# Patient Record
Sex: FEMALE | Race: WHITE | NOT HISPANIC OR LATINO | Employment: OTHER | URBAN - NONMETROPOLITAN AREA
[De-identification: names, ages, dates, MRNs, and addresses within clinical notes are randomized per-mention and may not be internally consistent; named-entity substitution may affect disease eponyms.]

---

## 2017-01-10 RX ORDER — TRAZODONE HYDROCHLORIDE 150 MG/1
TABLET ORAL
Qty: 30 TABLET | Refills: 5 | Status: SHIPPED | OUTPATIENT
Start: 2017-01-10 | End: 2017-04-12 | Stop reason: SDUPTHER

## 2017-01-11 RX ORDER — PRAVASTATIN SODIUM 80 MG/1
TABLET ORAL
Qty: 30 TABLET | Refills: 5 | Status: SHIPPED | OUTPATIENT
Start: 2017-01-11 | End: 2017-04-12 | Stop reason: SDUPTHER

## 2017-02-13 RX ORDER — LEVALBUTEROL TARTRATE 45 UG/1
1 AEROSOL, METERED ORAL EVERY 6 HOURS PRN
Qty: 15 G | Refills: 11 | Status: SHIPPED | OUTPATIENT
Start: 2017-02-13 | End: 2021-10-26

## 2017-02-20 ENCOUNTER — OFFICE VISIT (OUTPATIENT)
Dept: FAMILY MEDICINE CLINIC | Facility: CLINIC | Age: 62
End: 2017-02-20

## 2017-02-20 VITALS
DIASTOLIC BLOOD PRESSURE: 68 MMHG | SYSTOLIC BLOOD PRESSURE: 118 MMHG | HEART RATE: 93 BPM | HEIGHT: 62 IN | WEIGHT: 119 LBS | BODY MASS INDEX: 21.9 KG/M2 | OXYGEN SATURATION: 93 %

## 2017-02-20 DIAGNOSIS — I25.10 CAD IN NATIVE ARTERY: Chronic | ICD-10-CM

## 2017-02-20 DIAGNOSIS — Z13.9 SCREENING: ICD-10-CM

## 2017-02-20 DIAGNOSIS — E11.9 TYPE 2 DIABETES MELLITUS WITHOUT COMPLICATION, UNSPECIFIED LONG TERM INSULIN USE STATUS: ICD-10-CM

## 2017-02-20 DIAGNOSIS — M54.2 CERVICAL SPINE PAIN: Primary | ICD-10-CM

## 2017-02-20 DIAGNOSIS — J44.9 CHRONIC OBSTRUCTIVE PULMONARY DISEASE, UNSPECIFIED COPD TYPE (HCC): Chronic | ICD-10-CM

## 2017-02-20 DIAGNOSIS — M54.12 CERVICAL RADICULOPATHY: ICD-10-CM

## 2017-02-20 DIAGNOSIS — Z12.31 ENCOUNTER FOR SCREENING MAMMOGRAM FOR BREAST CANCER: ICD-10-CM

## 2017-02-20 PROCEDURE — 99214 OFFICE O/P EST MOD 30 MIN: CPT | Performed by: INTERNAL MEDICINE

## 2017-02-20 RX ORDER — PREDNISONE 5 MG/1
1 TABLET ORAL TAKE AS DIRECTED
Qty: 48 EACH | Refills: 0 | Status: SHIPPED | OUTPATIENT
Start: 2017-02-20 | End: 2017-04-12

## 2017-03-08 LAB
ALBUMIN SERPL-MCNC: 4 G/DL (ref 3.2–5.5)
ALBUMIN/GLOB SERPL: 1.3 G/DL (ref 1–3)
ALP SERPL-CCNC: 100 U/L (ref 15–121)
ALT SERPL W P-5'-P-CCNC: 27 U/L (ref 10–60)
ANION GAP SERPL CALCULATED.3IONS-SCNC: 6 MMOL/L (ref 5–15)
AST SERPL-CCNC: 22 U/L (ref 10–60)
BILIRUB SERPL-MCNC: 0.5 MG/DL (ref 0.2–1)
BUN BLD-MCNC: 14 MG/DL (ref 8–25)
BUN/CREAT SERPL: 17.5 (ref 7–25)
CALCIUM SPEC-SCNC: 9.7 MG/DL (ref 8.4–10.8)
CHLORIDE SERPL-SCNC: 101 MMOL/L (ref 100–112)
CHOLEST SERPL-MCNC: 166 MG/DL (ref 150–200)
CO2 SERPL-SCNC: 31 MMOL/L (ref 20–32)
CREAT BLD-MCNC: 0.8 MG/DL (ref 0.4–1.3)
GFR SERPL CREATININE-BSD FRML MDRD: 73 ML/MIN/1.73 (ref 45–104)
GLOBULIN UR ELPH-MCNC: 3 GM/DL (ref 2.5–4.6)
GLUCOSE BLD-MCNC: 122 MG/DL (ref 70–100)
HDLC SERPL-MCNC: 69 MG/DL (ref 35–100)
LDLC SERPL CALC-MCNC: 83 MG/DL
LDLC/HDLC SERPL: 1.2 {RATIO}
POTASSIUM BLD-SCNC: 4.3 MMOL/L (ref 3.4–5.4)
PROT SERPL-MCNC: 7 G/DL (ref 6.7–8.2)
SODIUM BLD-SCNC: 138 MMOL/L (ref 134–146)
TRIGL SERPL-MCNC: 70 MG/DL (ref 35–160)
VLDLC SERPL-MCNC: 14 MG/DL

## 2017-03-08 PROCEDURE — 36415 COLL VENOUS BLD VENIPUNCTURE: CPT | Performed by: INTERNAL MEDICINE

## 2017-03-08 PROCEDURE — 80053 COMPREHEN METABOLIC PANEL: CPT | Performed by: INTERNAL MEDICINE

## 2017-03-08 PROCEDURE — 84443 ASSAY THYROID STIM HORMONE: CPT | Performed by: INTERNAL MEDICINE

## 2017-03-08 PROCEDURE — 84436 ASSAY OF TOTAL THYROXINE: CPT | Performed by: INTERNAL MEDICINE

## 2017-03-08 PROCEDURE — 80061 LIPID PANEL: CPT | Performed by: INTERNAL MEDICINE

## 2017-03-09 LAB
T4 SERPL-MCNC: 7.76 MCG/DL (ref 5.5–11)
TSH SERPL DL<=0.05 MIU/L-ACNC: 0.48 MIU/ML (ref 0.46–4.68)

## 2017-03-10 ENCOUNTER — TELEPHONE (OUTPATIENT)
Dept: FAMILY MEDICINE CLINIC | Facility: CLINIC | Age: 62
End: 2017-03-10

## 2017-03-10 ENCOUNTER — TRANSCRIBE ORDERS (OUTPATIENT)
Dept: PHYSICAL THERAPY | Facility: CLINIC | Age: 62
End: 2017-03-10

## 2017-03-10 ENCOUNTER — OFFICE VISIT (OUTPATIENT)
Dept: FAMILY MEDICINE CLINIC | Facility: CLINIC | Age: 62
End: 2017-03-10

## 2017-03-10 VITALS
BODY MASS INDEX: 21.94 KG/M2 | DIASTOLIC BLOOD PRESSURE: 68 MMHG | WEIGHT: 119.2 LBS | OXYGEN SATURATION: 97 % | SYSTOLIC BLOOD PRESSURE: 128 MMHG | HEIGHT: 62 IN

## 2017-03-10 DIAGNOSIS — Z00.00 ROUTINE MEDICAL EXAM: Primary | ICD-10-CM

## 2017-03-10 DIAGNOSIS — R63.4 WEIGHT LOSS: ICD-10-CM

## 2017-03-10 DIAGNOSIS — M54.2 CERVICAL PAIN: Primary | ICD-10-CM

## 2017-03-10 DIAGNOSIS — Z13.9 SCREENING: ICD-10-CM

## 2017-03-10 DIAGNOSIS — R13.14 PHARYNGOESOPHAGEAL DYSPHAGIA: Primary | ICD-10-CM

## 2017-03-10 DIAGNOSIS — D64.9 ANEMIA, UNSPECIFIED TYPE: Chronic | ICD-10-CM

## 2017-03-10 DIAGNOSIS — I10 ESSENTIAL HYPERTENSION: Chronic | ICD-10-CM

## 2017-03-10 DIAGNOSIS — J43.9 PULMONARY EMPHYSEMA, UNSPECIFIED EMPHYSEMA TYPE (HCC): Chronic | ICD-10-CM

## 2017-03-10 DIAGNOSIS — R11.0 NAUSEA: Chronic | ICD-10-CM

## 2017-03-10 PROCEDURE — 99214 OFFICE O/P EST MOD 30 MIN: CPT | Performed by: INTERNAL MEDICINE

## 2017-03-10 NOTE — TELEPHONE ENCOUNTER
Spoke with pt about lab results and orders to have lab work done .  Colonoscopy was done 2 years ago.  Pt voiced understanding and lab orders placed

## 2017-03-10 NOTE — TELEPHONE ENCOUNTER
----- Message from Manjinder Joshi MD sent at 3/9/2017  1:50 PM CST -----  Anemia weiss. colonscopy if due. Cbc 2 weeks.

## 2017-03-10 NOTE — PROGRESS NOTES
Subjective   Maira Yepez is a 61 y.o. female.   Chief Complaint   Patient presents with   • Follow-up     review lab work       History of Present Illness pt fu. Co wt loss over past 4 mths. Food sticks in throat. Even lipids. Weak. Sees gi. Fu labs.     The following portions of the patient's history were reviewed and updated as appropriate: allergies, current medications, past family history, past medical history, past social history, past surgical history and problem list.    Review of Systems   Constitutional: Positive for unexpected weight change. Negative for activity change, appetite change and fatigue.   HENT: Negative for ear pain, facial swelling and hearing loss.    Respiratory: Positive for shortness of breath and wheezing. Negative for cough and chest tightness.    Cardiovascular: Negative for chest pain, palpitations and leg swelling.   Gastrointestinal: Positive for diarrhea and nausea. Negative for abdominal pain.   Genitourinary: Negative for difficulty urinating, dysuria, flank pain, frequency and urgency.   Musculoskeletal: Negative for arthralgias and back pain.   Skin: Negative for color change and rash.   Allergic/Immunologic: Negative for environmental allergies and food allergies.   Neurological: Negative for dizziness, syncope, weakness, numbness and headaches.   Hematological: Negative for adenopathy.   Psychiatric/Behavioral: Negative for confusion, decreased concentration, dysphoric mood, sleep disturbance and suicidal ideas. The patient is not nervous/anxious.    All other systems reviewed and are negative.      Objective   Physical Exam   Constitutional: She is oriented to person, place, and time. Vital signs are normal.   HENT:   Head: Normocephalic.   Right Ear: External ear normal.   Left Ear: External ear normal.   Nose: Nose normal.   Mouth/Throat: Oropharynx is clear and moist.   Eyes: Conjunctivae and EOM are normal. Pupils are equal, round, and reactive to light.    Neck: Normal range of motion. Neck supple. No JVD present. No thyromegaly present.   Cardiovascular: Normal rate, regular rhythm, normal heart sounds and intact distal pulses.    No murmur heard.  Pulmonary/Chest: Effort normal and breath sounds normal. No respiratory distress. She has no wheezes. She has no rales. She exhibits no tenderness.   Abdominal: Soft. Bowel sounds are normal. She exhibits no distension and no mass. There is no tenderness. There is no rebound and no guarding. No hernia.   Musculoskeletal: Normal range of motion. She exhibits no edema, tenderness or deformity.   Lymphadenopathy:     She has no cervical adenopathy.   Neurological: She is alert and oriented to person, place, and time. No cranial nerve deficit. Coordination normal.   Skin: Skin is warm and dry. No rash noted. No pallor.   Psychiatric: She has a normal mood and affect. Her behavior is normal. Judgment and thought content normal. Her mood appears not anxious. She does not exhibit a depressed mood. She expresses no homicidal and no suicidal ideation.   Nursing note and vitals reviewed.      Assessment/Plan   Maria was seen today for follow-up.    Diagnoses and all orders for this visit:    Pharyngoesophageal dysphagia    Screening  -     Hemoglobin A1c; Future  -     Protein, Urine, Random; Future    Pulmonary emphysema, unspecified emphysema type    Essential hypertension    Weight loss  Comments:  acute    Nausea      Decrease metformin to 500mg bid. Protocool. See gi within 2 weeks. Needs ct scan, egd,     i reviewed labs. Anemia weiss.     rtc after gi or within 1 mth.

## 2017-03-13 ENCOUNTER — CONSULT (OUTPATIENT)
Dept: PHYSICAL THERAPY | Facility: CLINIC | Age: 62
End: 2017-03-13

## 2017-03-13 DIAGNOSIS — M54.2 CERVICAL PAIN: ICD-10-CM

## 2017-03-13 PROCEDURE — 97162 PT EVAL MOD COMPLEX 30 MIN: CPT | Performed by: PHYSICAL THERAPIST

## 2017-03-13 NOTE — PROGRESS NOTES
"     Outpatient Physical Therapy Ortho Initial Evaluation       Patient Name: Maria Yepez  : 1955  MRN: [id number]  Today's Date: 3/13/2017      Visit Date: 2017    TIME IN 10:55    TIME OUT 11:26   Not yet approved by insurance.  No M.D. follow-up is set  Patient Active Problem List   Diagnosis   • Right foot pain   • Plantar fasciitis of right foot        Past Medical History   Diagnosis Date   • Abdominal pain    • Acute bronchitis    • Acute pancreatitis    • Acute posthemorrhagic anemia    • Acute sinusitis    • Anal pain    • Anemia    • Anemia due to blood loss    • Anxiety    • Backache      mid and lower back     • Chronic back pain    • Chronic obstructive lung disease    • Coronary atherosclerosis    • Diabetic neuropathy    • Diarrhea    • Diverticulosis of colon without diverticulitis    • Dysphagia    • Dysuria    • Emphysema/COPD      \"Emphysema\"   • Encounter for immunization    • Epigastric pain    • Esophagitis      with stricture   • Essential hypertension    • Fatigue    • Foot pain    • Ganglion cyst of wrist      left wrist     • Gastroesophageal reflux disease    • Generalized abdominal pain    • Gout    • Headache    • Herpes simplex    • Hip pain, bilateral    • Hoarse    • Hyperlipidemia    • Incontinence of feces    • Insomnia    • Irritable bowel syndrome    • Joint pain    • Knee pain    • Left lower quadrant pain    • Left upper quadrant pain    • Leg pain    • Leukorrhea      not specified as infective    • Low back pain      injury   • Malignant neoplasm of head and neck      History of malignant neoplasm of head and/or neck      • Muscle pain    • Nausea    • Nausea    • Nausea and vomiting    • Neck pain    • Neck pain    • Noncompliance with medication regimen    • Osteopenia    • Pain in lower back    • Pain in right femur    • Palpitations    • Productive cough    • Scoliosis deformity of spine    • Screening examination for venereal disease    • Shoulder " pain, left    • Stricture of esophagus    • Symptomatic menopausal or female climacteric states    • Synovitis and tenosynovitis      left forearm   • Tenderness      Tenderness of left upper quadrant of abdomen      • Thoracic back pain    • Type 2 diabetes mellitus    • Upper abdominal pain    • Urgency of urination      Urgent desire to urinate      • Urinary frequency      due to benign prostatic hypertrophy      • Urinary tract infection      site not specified   • Urinary tract infectious disease    • Venous varices    • Weakness      General symptom - weakness    • Weight gain         Past Surgical History   Procedure Laterality Date   • Hysterectomy       Anesth, hysterectomy (1)   • Fracture surgery Right      Arm Surgery (1)  right, arm fracture   • Lymph node biopsy       Biopsy/removal, lymph node(s) (1)  cervical node biopsy   • Cardiac catheterization  2014     Cardiac cath 17700 (1)  Non obstructive coronary artery disease. Normal left ventricular systolic function. Performed at [facility].   • Carpal tunnel release     •  section     • Cholecystectomy       laparoscopic   • Endoscopy  10/24/2012     Colon endoscopy 91645 (2)  internal & external hemorrhoids found. Diverticulum in sigmoid colon.   • Colonoscopy w/ polypectomy  2015     Colonoscopy remove polyps 61506 (1)  Internal and external hemorrhoids found. Colonoscopy with biopsy.   • Injection of medication  2016     Depo Medrol (Methylprednisone) 80mg (2)  Ordered By: BRIAN LOU ([facility])    • Endoscopy  2014     EGD w/ Dilatation 83649 (1)  Esophageal stricture was present. Dilatation performed. Gastritis found in the stomach. Biopsy taken. Normal duodenum.   • Endoscopy  2013     EGD w/ tube 38697 (3)  Normal esophagus. Gastritis in stomach. Biopsy taken. Normal duodenum. Biopsy taken.   • Hernia repair       Hernia repair w/mesh (1)   • Injection of medication  03/10/2016      Kenalog (4)  Ordered By: ARNOLDO MURDOCK ([facility])    • Paraesophageal hernia repair     • Injection of medication  2016     Rocephin (1)  Ordered By: ARNOLDO MURDOCK ([facility])    • Breast biopsy       Stereotactic breast biopsy (1)  left   • Total abdominal hysterectomy       RUPALI BSO   • Other surgical history       Tubal ligation (1)     Outpatient Medications     amLODIPine (NORVASC) 2.5 MG tablet      aspirin 81 MG chewable tablet      clonazePAM (KLONOPIN) 0.5 MG tablet      famciclovir (FAMVIR) 500 MG tablet      furosemide (LASIX) 20 MG tablet      gabapentin (NEURONTIN) 400 MG capsule      levalbuterol (XOPENEX HFA) 45 MCG/ACT inhaler      lisinopril (PRINIVIL,ZESTRIL) 5 MG tablet      metFORMIN (GLUCOPHAGE) 500 MG tablet      nitroglycerin (NITROSTAT) 0.4 MG SL tablet     omeprazole (PRILOSEC) 20 MG capsule      ondansetron ODT (ZOFRAN ODT) 4 MG disintegrating tablet      pravastatin (PRAVACHOL) 80 MG tablet      PredniSONE 5 MG (48) tablet therapy pack dosepak      traMADol (ULTRAM) 50 MG tablet      traZODone (DESYREL) 150 MG tablet      vitamin D (ERGOCALCIFEROL) 42486 UNITS capsule capsule      ALLERGIES:  Albuterol, Ibuprofen, shellfish derived products.  Visit Dx:     ICD-10-CM ICD-9-CM   1. Cervical pain M54.2 723.1       Subjective Evaluation    History of Present Illness  Mechanism of injury: The patient reports that she has had cervical problems for a long time.  She had anterior cervical fusion in  by Dr. Cheney.  She is unsure of the levels that were repaired.  She is currently having increased neck pain and headaches bilateral upper arm pain and cramps.  She is raising a 10-year-old grandchild with ADHD.  Doesn't tolerate much lifting or pulling on heavy objects    Quality of life: good    Pain  Current pain ratin  Location: Neck and upper arms  Quality: dull ache, radiating and cramping  Relieving factors: change in position, rest, ice and medications  Aggravating factors: movement  (Driving)    Social Support  Lives in: one-story house  Lives with: young children    Hand dominance: right    Diagnostic Tests  X-ray: abnormal  MRI studies: abnormal    Patient Goals  Patient goals for therapy: decreased pain  Patient goal: 1.  Cervical flexion ROM 50°  2.  Cervical extension ROM 40°  3.  Cervical side bend 30 right, 25 left  Degrees  4.  Cervical rotation  65  Degrees.    LT. Independent in HEP.  2. Decrease NDI to 40%.  3.  strength increases 5 #.  4.  Lateral pinch increased 2 #.  5. MMT shoulder flexion 4+/5 without pain      OBJECTIVE:     Cervical range of motion 35° extension, 44° flexion, 60° rotation bilaterally, right side bend 25, left side bend 17.  Tenderness to palpation left cervical paraspinals, spinous processes C7 to T1.  Left rotations of the upper vertebral spinous processes.  Manual muscle test upper extremities biceps 5 minus right, 4+ left shoulder flexion 4 with giveaway pain bilaterally.   on hand-held dynamometer position 2 right 34, left 28.  Lateral pinch 11 pounds bilaterally.   Patient denies  numbness and tingling.  Reflexes intact and symmetrical, biceps, triceps and brachial radialis.       TREATMENT: Deferred for insurance authorization.      Assessment & Plan       Goals  1.  Cervical flexion ROM 50°  2.  Cervical extension ROM 40°  3.  Cervical side bend 30 right, 25 left  Degrees  4.  Cervical rotation  65  Degrees.    LT. Independent in HEP.  2. Decrease NDI to 40%.  3.  strength increases 5 #.  4.  Lateral pinch increased 2 #.  5. MMT shoulder flexion 4+/5 without pain    Plan  Therapy options: will be seen for skilled physical therapy services  Planned modality interventions: cryotherapy and electrical stimulation/Russian stimulation  Planned therapy interventions: home exercise program, strengthening, stretching and manual therapy  Frequency: 2x week  Duration in weeks: 5  Treatment plan discussed with: patient  Plan details:  Cervical ROM with postural/scapular stabilization exercises. Light Manual distraction, ME, upper PIVM and ROM/stretching.  Ice /electrical stimulation. HEP        Time Calculation: 31      Rebekah Alvarado, PT, ATC, DPT  3/13/2017        Maria Yepez    1955

## 2017-03-14 RX ORDER — LISINOPRIL 5 MG/1
TABLET ORAL
Qty: 30 TABLET | Refills: 5 | Status: SHIPPED | OUTPATIENT
Start: 2017-03-14 | End: 2017-04-12 | Stop reason: SDUPTHER

## 2017-03-20 ENCOUNTER — OFFICE VISIT (OUTPATIENT)
Dept: PODIATRY | Facility: CLINIC | Age: 62
End: 2017-03-20

## 2017-03-20 VITALS — HEIGHT: 62 IN | BODY MASS INDEX: 21.53 KG/M2 | WEIGHT: 117 LBS

## 2017-03-20 DIAGNOSIS — M76.71 PERONEAL TENDINITIS, RIGHT: ICD-10-CM

## 2017-03-20 DIAGNOSIS — E11.42 DIABETIC POLYNEUROPATHY ASSOCIATED WITH TYPE 2 DIABETES MELLITUS (HCC): Primary | ICD-10-CM

## 2017-03-20 DIAGNOSIS — M79.671 RIGHT FOOT PAIN: ICD-10-CM

## 2017-03-20 PROCEDURE — 99203 OFFICE O/P NEW LOW 30 MIN: CPT | Performed by: PODIATRIST

## 2017-03-21 ENCOUNTER — TREATMENT (OUTPATIENT)
Dept: PHYSICAL THERAPY | Facility: CLINIC | Age: 62
End: 2017-03-21

## 2017-03-21 ENCOUNTER — LAB (OUTPATIENT)
Dept: LAB | Facility: OTHER | Age: 62
End: 2017-03-21

## 2017-03-21 DIAGNOSIS — Z13.9 SCREENING: ICD-10-CM

## 2017-03-21 DIAGNOSIS — M54.2 CERVICAL PAIN: Primary | ICD-10-CM

## 2017-03-21 DIAGNOSIS — D64.9 ANEMIA, UNSPECIFIED TYPE: Chronic | ICD-10-CM

## 2017-03-21 LAB
COLLECT DATE SP2 STL: NORMAL
COLLECT DATE SP3 STL: NORMAL
COLLECT DATE STL: NORMAL
GASTROCULT GAST QL: NEGATIVE
Lab: NORMAL

## 2017-03-21 PROCEDURE — 82272 OCCULT BLD FECES 1-3 TESTS: CPT | Performed by: INTERNAL MEDICINE

## 2017-03-21 NOTE — PROGRESS NOTES
"Daily Progress Note    Time In: 12:30      Time Out: 1:31    ICD-10-CM ICD-9-CM   1. Cervical pain M54.2 723.1       Subjective   Pt reports no pain presently. She has pain with increased activity.   Patient reports to therapy 0/10 pain, and % improvement.  Attendance  2/2 visits. No scheduled MD f/u.       Objective     V cues necessary for correct TE performance. TTP @ L upper cerv. Mild guarding observed at UT's. HEP reviewed with handouts.       PROCEDURES AND MODALITIES:   Ice  Ice Applied: Yes  Location: Cerv  Rx Minutes: 15 mins  Ice S/P Rx: Yes    Electrical Stimulation  Stimulation Type: IFC  Max mAmp: 8  Location/Electrode Placement/Other: Cerv       EXERCISE  Exercise 1  Exercise Name 1: Man cerv: light distraction, L upper cerv p-a's  Sets/Reps 1: 8 1/2 mn  Exercise 1 Completed: Yes  Exercise 2  Exercise Name 2: Supine CT  Sets/Reps 2: 2/10x  Exercise 2 Completed: Yes Exercise 3  Exercise Name 3: Supine cerv retraction  Sets/Reps 3: 2/10x  Exercise 3 Completed: Yes  Exercise 3 Home Program: Yes Exercise 4  Exercise Name 4: B UT stretch  Sets/Reps 4: 2/30\"  Exercise 4 Completed: Yes  Exercise 4 Home Program: Yes Exercise 5  Exercise Name 5: Shld rolls  A/P  Sets/Reps 5: 10x ea  Exercise 5 Completed: Yes Exercise 6  Exercise Name 6: CROM  Sets/Reps 6: 10x ea  Exercise 6 Completed: Yes  Exercise 6 Home Program: Yes   Exercise 7  Exercise Name 7: Doorway pec stretch  Sets/Reps 7: 2/30\"  Exercise 7 Completed: Yes  Exercise 7 Home Program: Yes Exercise 8  Exercise Name 8: Wand flex  Sets/Reps 8: 2/10x  Exercise 8 Completed: Yes Exercise 9  Exercise Name 9: Scap squeeze  Sets/Reps 9: 2/10x  Exercise 9 Completed: Yes  Exercise 9 Home Program: Yes                                     Therapy Exercise [id number] 46 minutes and Other Procedure CPT 15 minutes Electrical Stimulation    Total Treatment Time: 61 Minutes    Assessment/Plan   Good tolerance of today's treatment. Added HEP should help improve condition. No " increased pain post treatment.   Progress per Plan of Care             Harshad Ko, PTA  Physical Therapist

## 2017-03-23 ENCOUNTER — TREATMENT (OUTPATIENT)
Dept: PHYSICAL THERAPY | Facility: CLINIC | Age: 62
End: 2017-03-23

## 2017-03-23 DIAGNOSIS — M54.2 CERVICAL PAIN: Primary | ICD-10-CM

## 2017-03-23 NOTE — PROGRESS NOTES
"Daily Progress Note    Time In: 10:18      Time Out: 11:16    ICD-10-CM ICD-9-CM   1. Cervical pain M54.2 723.1       Subjective   Pt reports feeling better after prev treatment. Then she did too much house cleaning and aggravated symptoms. She does not like CP secondary to cold tolerance.   Patient reports to therapy 5/10 pain, and % improvement.  Attendance  3/3 visits.       Objective     V cues necessary for correct TE performance. Intermittent c/o discomfort with TE. Pain decreased post treatment.       PROCEDURES AND MODALITIES:       Electrical Stimulation  Stimulation Type: IFC  Max mAmp: 8  Location/Electrode Placement/Other: Cerv  Rx Minutes: 15 mins       EXERCISE  Exercise 1  Exercise Name 1: Man cerv: light distraction  Sets/Reps 1: 6 min  Exercise 1 Completed: Yes  Exercise 2  Exercise Name 2: Supine CT  Sets/Reps 2: 2/10x  Exercise 2 Completed: Yes Exercise 3  Exercise Name 3: Supine cerv retraction  Sets/Reps 3: 2/10x  Exercise 3 Completed: Yes Exercise 4  Exercise Name 4: Supine DNF training: squares, circles, triangles  Sets/Reps 4: 2/10x ea  Exercise 4 Completed: Yes Exercise 5  Exercise Name 5: B UT stretch  Sets/Reps 5: 2/30\"  Exercise 5 Completed: Yes Exercise 6  Exercise Name 6: CROM  Sets/Reps 6: 10x ea  Exercise 6 Completed: Yes   Exercise 7  Exercise Name 7: Doorway pec stretch  Sets/Reps 7: 2/30\"  Exercise 7 Completed: Yes Exercise 8  Exercise Name 8: Wand flex  Equipment/Resistance 8: 2#  Sets/Reps 8: 2/10x  Exercise 8 Completed: Yes Exercise 9  Exercise Name 9: Scap squeeze  Sets/Reps 9: 20x  Exercise 9 Completed: Yes                                     Therapy Exercise [id number] 43 minutes and Other Procedure CPT 15 minutes Electrical Stimulation    Total Treatment Time: 58 Minutes    Assessment/Plan   Fair demetra of today's TE. Able to effectively manage pain with modality. Tolerance to ADL's needs increasing.   Progress per Plan of Care             Harshad Ko, PTA  Physical " Therapist

## 2017-03-24 RX ORDER — OMEPRAZOLE 20 MG/1
20 CAPSULE, DELAYED RELEASE ORAL 2 TIMES DAILY
Qty: 60 CAPSULE | Refills: 5 | Status: SHIPPED | OUTPATIENT
Start: 2017-03-24 | End: 2017-09-21 | Stop reason: SDUPTHER

## 2017-03-27 DIAGNOSIS — Z13.9 SCREENING: Primary | ICD-10-CM

## 2017-03-27 NOTE — TELEPHONE ENCOUNTER
Patient came by office window this morning wanting to speak with nurse.  Spoke with patient and she is concerned because she can not get into [facility] office til May and stated she can not wait that long.  Explained to her if she is having pain and not eating like she stated she needed to go to ER.  She stated that she had grandchildren and she could not do that she had to be there for them when they get off the bus.  I spoke with Dr. Joshi about this situation and he wants her referred to Dr. Morris for upper and lower GI that she could get into him quicker.  Called patient back with this info and she is now referred to see Dr. Morris.

## 2017-04-04 DIAGNOSIS — Z12.11 SCREEN FOR COLON CANCER: Primary | ICD-10-CM

## 2017-04-04 RX ORDER — DEXTROSE AND SODIUM CHLORIDE 5; .45 G/100ML; G/100ML
100 INJECTION, SOLUTION INTRAVENOUS CONTINUOUS
Status: CANCELLED | OUTPATIENT
Start: 2017-04-19

## 2017-04-07 ENCOUNTER — TREATMENT (OUTPATIENT)
Dept: PHYSICAL THERAPY | Facility: CLINIC | Age: 62
End: 2017-04-07

## 2017-04-07 DIAGNOSIS — M54.2 CERVICAL PAIN: Primary | ICD-10-CM

## 2017-04-07 PROCEDURE — 97110 THERAPEUTIC EXERCISES: CPT | Performed by: PHYSICAL THERAPIST

## 2017-04-07 PROCEDURE — 97014 ELECTRIC STIMULATION THERAPY: CPT | Performed by: PHYSICAL THERAPIST

## 2017-04-07 NOTE — PROGRESS NOTES
"Recertification    Diagnosis/ICD-10 Code:  Cervical pain [M54.2]  Referring practitioner: Manjinder Joshi MD  Date of Initial Visit: 4/7/2017  Patient seen for 4/6 sessions, 6 approved.   840-  9:42    Subjective:   Maria Yepez states:   Left arm has been hurting and burning.  Objective:   Current condition: Fair  Test measurement: Cervical flexion 58, extension 32, rotation 57,55 :sidebend 20, 23.  Shoulder flexion 5/5.  strength right 49, left 35.     Assessment:   Summary of Treatment:     Electrical Stimulation  Stimulation Type: IFC  Max mAmp: 8  Location/Electrode Placement/Other: Cerv     EXERCISE 39  Exercise 1  Exercise Name 1: Man cerv: light distraction  Sets/Reps 1:  7min  Exercise 1 Completed: Yes  Exercise 2  Exercise Name 2: Cervical ROM  Sets/Reps 2: 10 x each Exercise 3  Exercise Name 3: scap squeezes  Sets/Reps 3: 20x  Exercise 3 Completed: Yes Exercise 4  Exercise Name 4: UT strectch  Sets/Reps 4: 2 x 30\"  Exercise 4 Completed: Yes  Exercise 4 Home Program: Yes Exercise 5  Exercise Name 5: Door way stretches  Sets/Reps 5: 2 x30\"  Exercise 5 Completed: Yes  Exercise 5 Home Program: Yes Exercise 6  Exercise Name 6: Putty   Equipment/Resistance 6: red  Sets/Reps 6: 4 min   Exercise 7  Exercise Name 7: wand flexion  Equipment/Resistance 7: 1#  Sets/Reps 7: 25                                         Progress toward previous goals: Continue STG/LTG goal 1,3,LTG 5 met.        Plan:   Goals  Short-term goals (STG):  continue eval goals  Long-term goals (LTG):  continue eval goals    Timeframe: 2 weeks  Prognosis to achieve goals: fair    Plan  Treatment plan with rationale: The patient is to  be seen 2 time(s) per week, for 2 week(s) to progress toward short and long term goals.  Recommendations: Initiate/continue PT services    PT Signature: Rebekah Alvarado, PT, ATC, DPT      Based upon review of the patient's progress and continued therapy plan, it is my medical opinion that Maria Yepez " should continue physical therapy treatment at [facility]  [address] [place] [address] Dr Katerina RUIZ [id number] [phone].    Signature:  Manjinder Joshi MD

## 2017-04-10 ENCOUNTER — LAB (OUTPATIENT)
Dept: LAB | Facility: OTHER | Age: 62
End: 2017-04-10

## 2017-04-10 ENCOUNTER — TELEPHONE (OUTPATIENT)
Dept: FAMILY MEDICINE CLINIC | Facility: CLINIC | Age: 62
End: 2017-04-10

## 2017-04-10 ENCOUNTER — TREATMENT (OUTPATIENT)
Dept: PHYSICAL THERAPY | Facility: CLINIC | Age: 62
End: 2017-04-10

## 2017-04-10 DIAGNOSIS — D64.9 ANEMIA, UNSPECIFIED TYPE: Chronic | ICD-10-CM

## 2017-04-10 DIAGNOSIS — Z13.9 SCREENING: Primary | ICD-10-CM

## 2017-04-10 DIAGNOSIS — Z00.00 ROUTINE MEDICAL EXAM: ICD-10-CM

## 2017-04-10 DIAGNOSIS — M54.2 CERVICAL PAIN: Primary | ICD-10-CM

## 2017-04-10 DIAGNOSIS — Z13.9 SCREENING: ICD-10-CM

## 2017-04-10 LAB
ALBUMIN SERPL-MCNC: 4.2 G/DL (ref 3.2–5.5)
ANION GAP SERPL CALCULATED.3IONS-SCNC: 6 MMOL/L (ref 5–15)
BASOPHILS # BLD AUTO: 0.01 10*3/MM3 (ref 0–0.2)
BASOPHILS NFR BLD AUTO: 0.2 % (ref 0–2)
BUN BLD-MCNC: 14 MG/DL (ref 8–25)
BUN/CREAT SERPL: 23.3 (ref 7–25)
CALCIUM SPEC-SCNC: 9.6 MG/DL (ref 8.4–10.8)
CHLORIDE SERPL-SCNC: 99 MMOL/L (ref 100–112)
CO2 SERPL-SCNC: 32 MMOL/L (ref 20–32)
CREAT BLD-MCNC: 0.6 MG/DL (ref 0.4–1.3)
DEPRECATED RDW RBC AUTO: 40.3 FL (ref 36.4–46.3)
EOSINOPHIL # BLD AUTO: 0.27 10*3/MM3 (ref 0–0.7)
EOSINOPHIL NFR BLD AUTO: 6 % (ref 0–7)
ERYTHROCYTE [DISTWIDTH] IN BLOOD BY AUTOMATED COUNT: 12.8 % (ref 11.5–14.5)
GFR SERPL CREATININE-BSD FRML MDRD: 102 ML/MIN/1.73 (ref 45–104)
GLUCOSE BLD-MCNC: 102 MG/DL (ref 70–100)
HCT VFR BLD AUTO: 33.5 % (ref 35–45)
HGB BLD-MCNC: 10.6 G/DL (ref 12–15.5)
LYMPHOCYTES # BLD AUTO: 1.84 10*3/MM3 (ref 0.6–4.2)
LYMPHOCYTES NFR BLD AUTO: 40.6 % (ref 10–50)
MCH RBC QN AUTO: 28.1 PG (ref 26.5–34)
MCHC RBC AUTO-ENTMCNC: 31.6 G/DL (ref 31.4–36)
MCV RBC AUTO: 88.9 FL (ref 80–98)
MONOCYTES # BLD AUTO: 0.39 10*3/MM3 (ref 0–0.9)
MONOCYTES NFR BLD AUTO: 8.6 % (ref 0–12)
NEUTROPHILS # BLD AUTO: 2.02 10*3/MM3 (ref 2–8.6)
NEUTROPHILS NFR BLD AUTO: 44.6 % (ref 37–80)
PLATELET # BLD AUTO: 297 10*3/MM3 (ref 150–450)
PMV BLD AUTO: 9.4 FL (ref 8–12)
POTASSIUM BLD-SCNC: 4.5 MMOL/L (ref 3.4–5.4)
RBC # BLD AUTO: 3.77 10*6/MM3 (ref 3.77–5.16)
SODIUM BLD-SCNC: 137 MMOL/L (ref 134–146)
WBC NRBC COR # BLD: 4.53 10*3/MM3 (ref 3.2–9.8)

## 2017-04-10 PROCEDURE — 36415 COLL VENOUS BLD VENIPUNCTURE: CPT | Performed by: INTERNAL MEDICINE

## 2017-04-10 PROCEDURE — 82607 VITAMIN B-12: CPT | Performed by: INTERNAL MEDICINE

## 2017-04-10 PROCEDURE — 97014 ELECTRIC STIMULATION THERAPY: CPT | Performed by: PHYSICAL THERAPIST

## 2017-04-10 PROCEDURE — 84156 ASSAY OF PROTEIN URINE: CPT | Performed by: INTERNAL MEDICINE

## 2017-04-10 PROCEDURE — 82040 ASSAY OF SERUM ALBUMIN: CPT | Performed by: INTERNAL MEDICINE

## 2017-04-10 PROCEDURE — 83036 HEMOGLOBIN GLYCOSYLATED A1C: CPT | Performed by: INTERNAL MEDICINE

## 2017-04-10 PROCEDURE — 82728 ASSAY OF FERRITIN: CPT | Performed by: INTERNAL MEDICINE

## 2017-04-10 PROCEDURE — 97110 THERAPEUTIC EXERCISES: CPT | Performed by: PHYSICAL THERAPIST

## 2017-04-10 PROCEDURE — 85025 COMPLETE CBC W/AUTO DIFF WBC: CPT | Performed by: INTERNAL MEDICINE

## 2017-04-10 PROCEDURE — 80048 BASIC METABOLIC PNL TOTAL CA: CPT | Performed by: INTERNAL MEDICINE

## 2017-04-10 NOTE — PROGRESS NOTES
"Daily Progress Note    Time In: 10:15      Time Out: 11:07    ICD-10-CM ICD-9-CM   1. Cervical pain M54.2 723.1       Subjective   Continued R cerv complaints. She reports some overall improvement.   Patient reports to therapy 1-2/10 pain, and 50% improvement.  Attendance  5/7 visits. 6 approved visits.       Objective     V cues necessary for correct TE performance. Reported increased rot post SNAGS. Post treatment pain 0/10.     PROCEDURES AND MODALITIES:     Electrical Stimulation  Stimulation Type: IFC  Max mAmp: 8  Location/Electrode Placement/Other: Cerv  Rx Minutes: 15 mins       EXERCISE  Exercise 1  Exercise Name 1: Man cerv: light distraction  Time: 6 min  Exercise 1 Completed: Yes  Exercise 2  Exercise Name 2: Supine CT  Sets/Reps 2: 2/10x  Exercise 2 Completed: Yes Exercise 3  Exercise Name 3: Supine cerv retractions  Sets/Reps 3: 2/10x  Exercise 3 Completed: Yes Exercise 4  Exercise Name 4: L UT stretch  Sets/Reps 4: 2/30\"  Exercise 4 Completed: Yes Exercise 5  Exercise Name 5: L LS stretch  Sets/Reps 5: 2/30\"  Exercise 5 Completed: Yes Exercise 6  Exercise Name 6: Doorway pec stretch  Sets/Reps 6: 2/30\"  Exercise 6 Completed: Yes   Exercise 7  Exercise Name 7: Rows  Equipment/Resistance 7: red tb  Sets/Reps 7: 30x  Exercise 7 Completed: Yes Exercise 8  Exercise Name 8: B shld ext  Equipment/Resistance 8: yellow tb  Sets/Reps 8: 30x  Exercise 8 Completed: Yes Exercise 9  Exercise Name 9: SNAGS  L rot  Sets/Reps 9: 10x, 15x  Exercise 9 Completed: Yes                                       Therapy Exercise [id number] 37 minutes and Other Procedure CPT 15 minutes Elkectrical Stimulation    Total Treatment Time: 52 Minutes    Assessment/Plan   Good sub reports of % improvement. Pt continues to benefit from PT for further progression towards goals.    Progress per Plan of Care             Harshad Ko, PTA  Physical Therapist    "

## 2017-04-11 LAB
FERRITIN SERPL-MCNC: 18.7 NG/ML (ref 11.1–264)
HBA1C MFR BLD: 5.84 % (ref 4–5.6)
PROT UR-MCNC: 7.3 MG/DL
VIT B12 BLD-MCNC: 552 PG/ML (ref 239–931)

## 2017-04-12 ENCOUNTER — TREATMENT (OUTPATIENT)
Dept: PHYSICAL THERAPY | Facility: CLINIC | Age: 62
End: 2017-04-12

## 2017-04-12 DIAGNOSIS — M54.2 CERVICAL PAIN: Primary | ICD-10-CM

## 2017-04-12 PROCEDURE — 97110 THERAPEUTIC EXERCISES: CPT | Performed by: PHYSICAL THERAPIST

## 2017-04-12 RX ORDER — PRAVASTATIN SODIUM 80 MG/1
80 TABLET ORAL DAILY
COMMUNITY
End: 2017-08-30 | Stop reason: SDUPTHER

## 2017-04-12 RX ORDER — LISINOPRIL 5 MG/1
5 TABLET ORAL DAILY
COMMUNITY
End: 2017-04-25 | Stop reason: SDUPTHER

## 2017-04-12 RX ORDER — TRAZODONE HYDROCHLORIDE 150 MG/1
150 TABLET ORAL NIGHTLY
COMMUNITY
End: 2017-08-14 | Stop reason: SDUPTHER

## 2017-04-12 RX ORDER — GABAPENTIN 400 MG/1
CAPSULE ORAL
Qty: 30 CAPSULE | Refills: 5 | Status: SHIPPED | OUTPATIENT
Start: 2017-04-12 | End: 2017-05-16 | Stop reason: SDUPTHER

## 2017-04-12 NOTE — PROGRESS NOTES
"Daily Progress Note    Time In: 10:15      Time Out: 11:21    ICD-10-CM ICD-9-CM   1. Cervical pain M54.2 723.1       Subjective   Pt reports mild L cerv discomfort.   Patient reports to therapy 2/10 pain, and 60% improvement.  Attendance  6/7 visits.       Objective     V cues necessary for correct TE performance.  B 53#. Lat pinch B 10#.     AROM:    CROM: flex 40°, ext 51°, SB R 20°, L 21°, Rot R 65°, L 60°.         PROCEDURES AND MODALITIES:     Electrical Stimulation  Stimulation Type: IFC  Max mAmp: 11  Location/Electrode Placement/Other: Cerv  Rx Minutes: 10 mins       EXERCISE  Exercise 1  Exercise Name 1: PRO2  UE's  Equipment/Resistance 1: 3.0  Sets/Reps 1: 5min  Exercise 1 Completed: Yes  Exercise 2  Exercise Name 2: Supine towel roll stretch  Sets/Reps 2: 3min  Exercise 2 Completed: Yes Exercise 3  Exercise Name 3: L UT stretch  Sets/Reps 3: 2/30\"  Exercise 3 Completed: Yes Exercise 4  Exercise Name 4: L LS stretch  Sets/Reps 4: 2/30\"  Exercise 4 Completed: Yes Exercise 5  Exercise Name 5: Manual cerv  Time 5: 7 1/2 min  Exercise 5 Completed: Yes Exercise 6  Exercise Name 6: Supine cerv retractions  Sets/Reps 6: 20x  Exercise 6 Completed: Yes   Exercise 7  Exercise Name 7: SNAGS  L rot  Sets/Reps 7: 2/10x  Exercise 7 Completed: Yes Exercise 8  Exercise Name 8: Horz abd  Equipment/Resistance 8: yellow tb  Sets/Reps 8: 2/10x  Exercise 8 Completed: Yes Exercise 9  Exercise Name 9: Rows  Equipment/Resistance 9: green tb  Sets/Reps 9: 2 sets  Exercise 9 Completed: Yes                                     Therapy Exercise [id number] 56 minutes    Total Treatment Time: 66 Minutes    Assessment/Plan   Good tolerance of today's treatment. ROM increased. STG's 2,4 met. LTG 3 met. Pt continues to benefit from PT for further progression towards goals.  Progress per Plan of Care             Harshad Ko, PTA  Physical Therapist    "

## 2017-04-18 ENCOUNTER — OFFICE VISIT (OUTPATIENT)
Dept: CARDIOLOGY | Facility: CLINIC | Age: 62
End: 2017-04-18

## 2017-04-18 VITALS
WEIGHT: 113.8 LBS | HEART RATE: 86 BPM | SYSTOLIC BLOOD PRESSURE: 128 MMHG | HEIGHT: 62 IN | RESPIRATION RATE: 16 BRPM | DIASTOLIC BLOOD PRESSURE: 64 MMHG | OXYGEN SATURATION: 98 % | BODY MASS INDEX: 20.94 KG/M2

## 2017-04-18 DIAGNOSIS — E78.5 HYPERLIPIDEMIA, UNSPECIFIED HYPERLIPIDEMIA TYPE: ICD-10-CM

## 2017-04-18 DIAGNOSIS — R07.9 CHEST PAIN, UNSPECIFIED TYPE: ICD-10-CM

## 2017-04-18 DIAGNOSIS — I10 ESSENTIAL HYPERTENSION: Primary | ICD-10-CM

## 2017-04-18 PROCEDURE — 93010 ELECTROCARDIOGRAM REPORT: CPT | Performed by: INTERNAL MEDICINE

## 2017-04-18 PROCEDURE — 99202 OFFICE O/P NEW SF 15 MIN: CPT | Performed by: INTERNAL MEDICINE

## 2017-04-18 RX ORDER — GUAIFENESIN 600 MG/1
600 TABLET, EXTENDED RELEASE ORAL DAILY
COMMUNITY
End: 2018-04-23

## 2017-04-18 RX ORDER — CHLORAL HYDRATE 500 MG
1000 CAPSULE ORAL
COMMUNITY

## 2017-04-18 NOTE — PROGRESS NOTES
Chief complaint : Chest pain    History of Present Illness this is a very pleasant 61-year-old female who comes for cardiac evaluation and establish a cardiologist close to home.  She was seeing Dr. Dunne in [place].  The patient has had multiple workup for vascular disease including carotid Doppler and cardiac catheterization.  Currently she has no significant chest pain or chest discomfort she denies orthopnea or PND.  She has no dizziness or palpitations.    Patient has lost significant amount of weight.  Her weight loss appears to be not intentional.  Patient currently is getting a workup by GI.  She is scheduled to have colonoscopy and upper GI endoscopy tomorrow.    · S/p carotid doppler 2/2015: [place]   1. Mild plaque of the left carotid bifurcation and proximal left internal carotid artery with elevated velocity in the left internal carotid artery. Doppler findings indicate 50-69 percent diameter stenosis.  2. No gross plaque or significant stenosis right cervical carotid system.  3. Antegrade flow in both vertebral arteries    · S/p cath 6/25/2014 [place]  Non Obstructive CAD   Normal left ventricular systolic function          Review of Systems   Constitution: Negative. Negative for decreased appetite, diaphoresis, weakness, night sweats, weight gain and weight loss.   HENT: Negative for headaches, hearing loss, nosebleeds and sore throat.    Eyes: Negative.  Negative for blurred vision and photophobia.   Cardiovascular: Negative for chest pain, claudication, dyspnea on exertion, irregular heartbeat, leg swelling, palpitations, paroxysmal nocturnal dyspnea and syncope.   Respiratory: Negative for cough, hemoptysis, shortness of breath and wheezing.    Endocrine: Negative for cold intolerance, heat intolerance, polydipsia, polyphagia and polyuria.   Hematologic/Lymphatic: Negative.    Skin: Negative for color change, dry skin, flushing, itching and rash.   Musculoskeletal: Negative.  Negative for  "muscle cramps, muscle weakness and myalgias.   Gastrointestinal: Negative for abdominal pain, change in bowel habit, diarrhea, hematemesis, melena, nausea and vomiting.   Genitourinary: Negative for dysuria, frequency and hematuria.   Neurological: Negative for dizziness, focal weakness, light-headedness, loss of balance, numbness and seizures.   Psychiatric/Behavioral: Negative.  Negative for substance abuse, suicidal ideas and thoughts of violence.   Allergic/Immunologic: Negative.        Past Medical History:   Diagnosis Date   • Abdominal pain    • Acute bronchitis    • Acute pancreatitis    • Acute posthemorrhagic anemia    • Acute sinusitis    • Anal pain    • Anemia    • Anemia due to blood loss    • Anxiety    • Backache     mid and lower back     • Chronic back pain    • Chronic obstructive lung disease    • Coronary atherosclerosis    • Diabetic neuropathy    • Diarrhea    • Diverticulosis of colon without diverticulitis    • Dysphagia    • Dysuria    • Emphysema/COPD     \"Emphysema\"   • Encounter for immunization    • Epigastric pain    • Esophagitis     with stricture   • Essential hypertension    • Fatigue    • Foot pain    • Ganglion cyst of wrist     left wrist     • Gastroesophageal reflux disease    • Generalized abdominal pain    • Gout    • Headache    • Herpes simplex    • Hip pain, bilateral    • Hoarse    • Hyperlipidemia    • Incontinence of feces    • Insomnia    • Irritable bowel syndrome    • Joint pain    • Knee pain    • Left lower quadrant pain    • Left upper quadrant pain    • Leg pain    • Leukorrhea     not specified as infective    • Low back pain     injury   • Muscle pain    • Nausea    • Nausea    • Nausea and vomiting    • Neck pain    • Neck pain    • Noncompliance with medication regimen    • Osteopenia    • Pain in lower back    • Pain in right femur    • Palpitations    • Productive cough    • Scoliosis deformity of spine    • Screening examination for venereal disease    • " Shoulder pain, left    • Stricture of esophagus    • Symptomatic menopausal or female climacteric states    • Synovitis and tenosynovitis     left forearm   • Tenderness     Tenderness of left upper quadrant of abdomen      • Thoracic back pain    • Type 2 diabetes mellitus    • Upper abdominal pain    • Urgency of urination     Urgent desire to urinate      • Urinary frequency     due to benign prostatic hypertrophy      • Urinary tract infection     site not specified   • Urinary tract infectious disease    • Venous varices    • Weakness     General symptom - weakness    • Weight gain        Family History   Problem Relation Age of Onset   • Lung cancer Mother    • Hypertension Mother    • Lymphoma Father      NonHodgkins   • Hypertension Father    • Diabetes Other    • Hypertension Other    • Hypertension Sister    • Diabetes Sister    • Hypertension Brother    • Diabetes Brother        Albuterol; Ibuprofen; and Shellfish-derived products     reports that she quit smoking about 10 years ago. She has never used smokeless tobacco. She reports that she does not drink alcohol or use illicit drugs.    Objective     Vital Signs  Heart Rate:  [86] 86  Resp:  [16] 16  BP: (128)/(64) 128/64    Physical Exam   Constitutional: She is oriented to person, place, and time. She appears well-developed and well-nourished.   HENT:   Head: Normocephalic and atraumatic.   Eyes: Conjunctivae and EOM are normal. Pupils are equal, round, and reactive to light.   Neck: Neck supple. No JVD present. Carotid bruit is not present. No tracheal deviation and no edema present.   Cardiovascular: Normal rate, regular rhythm, S1 normal, S2 normal, normal heart sounds and intact distal pulses.  Exam reveals no gallop, no S3, no S4 and no friction rub.    No murmur heard.  Pulmonary/Chest: Effort normal and breath sounds normal. She has no wheezes. She has no rales. She exhibits no tenderness.   Abdominal: Bowel sounds are normal. She exhibits no  abdominal bruit and no pulsatile midline mass. There is no rebound and no guarding.   Musculoskeletal: Normal range of motion. She exhibits no edema.   Neurological: She is alert and oriented to person, place, and time.   Skin: Skin is warm and dry.   Psychiatric: She has a normal mood and affect.         ECG 12 Lead  Date/Time: 4/18/2017 10:34 AM  Performed by: DICK PITTMAN  Authorized by: DICK PITTMAN   Rhythm: sinus rhythm  Rate: normal  BPM: 82  Conduction: conduction normal  ST Segments: ST segments normal  T Waves: T waves normal  QRS axis: normal  Other: no other findings  Clinical impression: normal ECG            Assessment/Plan     Patient Active Problem List   Diagnosis   • Right foot pain   • Plantar fasciitis of right foot     1. Essential hypertension  Patient's blood pressure is well-controlled.  Plan:  We will continue with current medications.  Continue with current lifestyle modification.    2. Hyperlipidemia, unspecified hyperlipidemia type  Component      Latest Ref Rng & Units 8/7/2014 2/3/2015 7/14/2016 3/8/2017   Total Cholesterol      150 - 200 mg/dL 166 166 159 166   Triglycerides      35 - 160 mg/dL 111 128 75 70   HDL Cholesterol      35 - 100 mg/dL 58.0 55.0 52.1 69   LDL Cholesterol      mg/dL 86 85 92 83   VLDL Cholesterol      mg/dL    14   LDL/HDL Ratio          1.20     We will continue with current dose of pravastatin.  Continue with lifestyle modification.    3. Chest pain, unspecified type  Cardiac catheterization performed 2014 revealed no significant obstructive coronary disease.  Plan:  · Continue with current medications including amlodipine  · Continue with current dose of statin.  · Continue with low dose aspirin    Patient is cleared for her upper GI and lower GI endoscopy tomorrow.    I discussed the patients findings and my recommendations with patient.    Dick Pittman MD  04/18/17  10:30 AM    EMR Dragon/Transcription disclaimer:   Much of this encounter note is an  electronic transcription/translation of spoken language to printed text. The electronic translation of spoken language may permit erroneous, or at times, nonsensical words or phrases to be inadvertently transcribed; Although I have reviewed the note for such errors, some may still exist.

## 2017-04-19 ENCOUNTER — HOSPITAL ENCOUNTER (OUTPATIENT)
Facility: HOSPITAL | Age: 62
Setting detail: HOSPITAL OUTPATIENT SURGERY
Discharge: HOME | End: 2017-04-19
Attending: SURGERY | Admitting: SURGERY

## 2017-04-19 ENCOUNTER — ANESTHESIA EVENT (OUTPATIENT)
Dept: GASTROENTEROLOGY | Facility: HOSPITAL | Age: 62
End: 2017-04-19

## 2017-04-19 ENCOUNTER — ANESTHESIA (OUTPATIENT)
Dept: GASTROENTEROLOGY | Facility: HOSPITAL | Age: 62
End: 2017-04-19

## 2017-04-19 VITALS
DIASTOLIC BLOOD PRESSURE: 55 MMHG | WEIGHT: 112.66 LBS | SYSTOLIC BLOOD PRESSURE: 107 MMHG | HEART RATE: 78 BPM | HEIGHT: 62 IN | RESPIRATION RATE: 16 BRPM | TEMPERATURE: 97.3 F | BODY MASS INDEX: 20.73 KG/M2 | OXYGEN SATURATION: 99 %

## 2017-04-19 DIAGNOSIS — Z12.11 SCREEN FOR COLON CANCER: ICD-10-CM

## 2017-04-19 LAB — GLUCOSE BLDC GLUCOMTR-MCNC: 78 MG/DL (ref 70–130)

## 2017-04-19 PROCEDURE — 25010000002 MIDAZOLAM PER 1 MG: Performed by: NURSE ANESTHETIST, CERTIFIED REGISTERED

## 2017-04-19 PROCEDURE — 25010000002 PROPOFOL 10 MG/ML EMULSION: Performed by: NURSE ANESTHETIST, CERTIFIED REGISTERED

## 2017-04-19 PROCEDURE — 82962 GLUCOSE BLOOD TEST: CPT

## 2017-04-19 PROCEDURE — 45378 DIAGNOSTIC COLONOSCOPY: CPT | Performed by: SURGERY

## 2017-04-19 RX ORDER — LIDOCAINE HYDROCHLORIDE 10 MG/ML
INJECTION, SOLUTION INFILTRATION; PERINEURAL AS NEEDED
Status: DISCONTINUED | OUTPATIENT
Start: 2017-04-19 | End: 2017-04-19 | Stop reason: SURG

## 2017-04-19 RX ORDER — PROPOFOL 10 MG/ML
VIAL (ML) INTRAVENOUS AS NEEDED
Status: DISCONTINUED | OUTPATIENT
Start: 2017-04-19 | End: 2017-04-19 | Stop reason: SURG

## 2017-04-19 RX ORDER — DEXTROSE AND SODIUM CHLORIDE 5; .45 G/100ML; G/100ML
100 INJECTION, SOLUTION INTRAVENOUS CONTINUOUS
Status: DISCONTINUED | OUTPATIENT
Start: 2017-04-19 | End: 2017-04-19 | Stop reason: HOSPADM

## 2017-04-19 RX ORDER — SODIUM CHLORIDE, SODIUM GLUCONATE, SODIUM ACETATE, POTASSIUM CHLORIDE, AND MAGNESIUM CHLORIDE 526; 502; 368; 37; 30 MG/100ML; MG/100ML; MG/100ML; MG/100ML; MG/100ML
INJECTION, SOLUTION INTRAVENOUS CONTINUOUS PRN
Status: DISCONTINUED | OUTPATIENT
Start: 2017-04-19 | End: 2017-04-19 | Stop reason: SURG

## 2017-04-19 RX ORDER — MIDAZOLAM HYDROCHLORIDE 1 MG/ML
INJECTION INTRAMUSCULAR; INTRAVENOUS AS NEEDED
Status: DISCONTINUED | OUTPATIENT
Start: 2017-04-19 | End: 2017-04-19 | Stop reason: SURG

## 2017-04-19 RX ADMIN — DEXTROSE AND SODIUM CHLORIDE 100 ML/HR: 5; 450 INJECTION, SOLUTION INTRAVENOUS at 08:48

## 2017-04-19 RX ADMIN — PROPOFOL 50 MG: 10 INJECTION, EMULSION INTRAVENOUS at 10:07

## 2017-04-19 RX ADMIN — LIDOCAINE HYDROCHLORIDE 50 MG: 10 INJECTION, SOLUTION INFILTRATION; PERINEURAL at 10:07

## 2017-04-19 RX ADMIN — MIDAZOLAM 1 MG: 1 INJECTION INTRAMUSCULAR; INTRAVENOUS at 09:59

## 2017-04-19 RX ADMIN — MIDAZOLAM 1 MG: 1 INJECTION INTRAMUSCULAR; INTRAVENOUS at 10:10

## 2017-04-19 RX ADMIN — SODIUM CHLORIDE, SODIUM GLUCONATE, SODIUM ACETATE, POTASSIUM CHLORIDE, AND MAGNESIUM CHLORIDE: 526; 502; 368; 37; 30 INJECTION, SOLUTION INTRAVENOUS at 09:58

## 2017-04-19 NOTE — ANESTHESIA PREPROCEDURE EVALUATION
Anesthesia Evaluation     Patient summary reviewed   NPO Status: > 2 hours   Airway   Mallampati: II  Neck ROM: full  no difficulty expected  Dental    (+) upper dentures    Pulmonary - normal exam   (+) COPD,   Cardiovascular - normal exam    (+) hypertension well controlled, CAD,       Neuro/Psych  (+) headaches, weakness, psychiatric history Anxiety,    GI/Hepatic/Renal/Endo    (+)  GERD, diabetes mellitus,     Musculoskeletal     (+) neck pain,   Abdominal    Substance History      OB/GYN          Other   (+) arthritis     ROS/Med Hx Other: ibs                            Anesthesia Plan    ASA 3     MAC     intravenous induction   Anesthetic plan and risks discussed with patient.

## 2017-04-19 NOTE — ANESTHESIA POSTPROCEDURE EVALUATION
Patient: Maria Yepez    Procedure Summary     Date Anesthesia Start Anesthesia Stop Room / Location    04/19/17 0957 1021 [facility] ENDOSCOPY 2 / [facility] ENDOSCOPY       Procedure Diagnosis Surgeon Provider    COLONOSCOPY (N/A ) Screen for colon cancer  (Screen for colon cancer [Z12.11]) MD Lissa Hardy CRNA          Anesthesia Type: MAC  Last vitals  BP      Temp      Pulse     Resp      SpO2        Post Anesthesia Care and Evaluation    Patient location during evaluation: bedside  Patient participation: complete - patient participated  Level of consciousness: awake and awake and alert  Pain score: 0  Pain management: adequate  Airway patency: patent  Anesthetic complications: No anesthetic complications  PONV Status: none  Cardiovascular status: acceptable  Respiratory status: acceptable  Hydration status: acceptable

## 2017-04-19 NOTE — PLAN OF CARE
Problem: Patient Care Overview (Adult)  Goal: Plan of Care Review  Outcome: Outcome(s) achieved Date Met:  04/19/17 04/19/17 1036   Coping/Psychosocial Response Interventions   Plan Of Care Reviewed With patient   Patient Care Overview   Progress no change   Outcome Evaluation   Outcome Summary/Follow up Plan vss, pt alert, no nausea         Problem: GI Endoscopy (Adult)  Goal: Signs and Symptoms of Listed Potential Problems Will be Absent or Manageable (GI Endoscopy)  Outcome: Outcome(s) achieved Date Met:  04/19/17 04/19/17 1036   GI Endoscopy   Problems Assessed (GI Endoscopy) all   Problems Present (GI Endoscopy) none

## 2017-04-19 NOTE — PLAN OF CARE
Problem: Patient Care Overview (Adult)  Goal: Plan of Care Review  Outcome: Ongoing (interventions implemented as appropriate)    04/19/17 1022   Coping/Psychosocial Response Interventions   Plan Of Care Reviewed With patient   Patient Care Overview   Progress no change   Outcome Evaluation   Outcome Summary/Follow up Plan vss         Problem: GI Endoscopy (Adult)  Goal: Signs and Symptoms of Listed Potential Problems Will be Absent or Manageable (GI Endoscopy)  Outcome: Ongoing (interventions implemented as appropriate)    04/19/17 1022   GI Endoscopy   Problems Assessed (GI Endoscopy) all   Problems Present (GI Endoscopy) none

## 2017-04-19 NOTE — H&P
No chief complaint on file.  Referred by Dr Joshi.  Unable to get in to GI medicine here for weight loss and eval so set up to have cscope with us.   Last cscope 3 yrs ago  By gi medicine and was negative.  Had colon polyps in past.      Maria Yepez is a 61 y.o. female referred today for evaluation for colonoscopy.  She notes no change in bowel habits, no blood in the stool.     Prior Colonoscopy:yes  Prior Polyps:yes  Family History of Colon Cancer:no  On anticoagulation:no    Past Surgical History:   Procedure Laterality Date   • BREAST BIOPSY      Stereotactic breast biopsy (1)  left   • CARDIAC CATHETERIZATION  2014    Cardiac cath 39302 (1)  Non obstructive coronary artery disease. Normal left ventricular systolic function. Performed at [facility].   • CARPAL TUNNEL RELEASE     •  SECTION     • CHOLECYSTECTOMY      laparoscopic   • COLONOSCOPY W/ POLYPECTOMY  2015    Colonoscopy remove polyps 94344 (1)  Internal and external hemorrhoids found. Colonoscopy with biopsy.   • ENDOSCOPY  10/24/2012    Colon endoscopy 78295 (2)  internal & external hemorrhoids found. Diverticulum in sigmoid colon.   • ENDOSCOPY  2014    EGD w/ Dilatation 30759 (1)  Esophageal stricture was present. Dilatation performed. Gastritis found in the stomach. Biopsy taken. Normal duodenum.   • ENDOSCOPY  2013    EGD w/ tube 40608 (3)  Normal esophagus. Gastritis in stomach. Biopsy taken. Normal duodenum. Biopsy taken.   • FRACTURE SURGERY Right     Arm Surgery (1)  right, arm fracture   • HERNIA REPAIR      Hernia repair w/mesh (1)   • HYSTERECTOMY      Anesth, hysterectomy (1)   • INJECTION OF MEDICATION  2016    Depo Medrol (Methylprednisone) 80mg (2)  Ordered By: BRIAN LOU ([facility])    • INJECTION OF MEDICATION  03/10/2016    Kenalog (4)  Ordered By: ARNOLDO JOSHI ([facility])    • INJECTION OF MEDICATION  2016    Rocephin (1)  Ordered By: ARNOLDO JOSHI ([facility])    •  "LYMPH NODE BIOPSY      Biopsy/removal, lymph node(s) (1)  cervical node biopsy   • OTHER SURGICAL HISTORY      Tubal ligation (1)   • PARAESOPHAGEAL HERNIA REPAIR     • TOTAL ABDOMINAL HYSTERECTOMY      RUPALI BSO     Past Medical History:   Diagnosis Date   • Abdominal pain    • Acute bronchitis    • Acute pancreatitis    • Acute posthemorrhagic anemia    • Acute sinusitis    • Anal pain    • Anemia    • Anemia due to blood loss    • Anxiety    • Backache     mid and lower back     • Chronic back pain    • Chronic obstructive lung disease    • Coronary atherosclerosis    • Diabetic neuropathy    • Diarrhea    • Diverticulosis of colon without diverticulitis    • Dysphagia    • Dysuria    • Emphysema/COPD     \"Emphysema\"   • Encounter for immunization    • Epigastric pain    • Esophagitis     with stricture   • Essential hypertension    • Fatigue    • Foot pain    • Ganglion cyst of wrist     left wrist     • Gastroesophageal reflux disease    • Generalized abdominal pain    • Gout    • Headache    • Herpes simplex    • Hip pain, bilateral    • Hoarse    • Hyperlipidemia    • Incontinence of feces    • Insomnia    • Irritable bowel syndrome    • Joint pain    • Knee pain    • Left lower quadrant pain    • Left upper quadrant pain    • Leg pain    • Leukorrhea     not specified as infective    • Low back pain     injury   • Muscle pain    • Nausea    • Nausea    • Nausea and vomiting    • Neck pain    • Neck pain    • Noncompliance with medication regimen    • Osteopenia    • Pain in lower back    • Pain in right femur    • Palpitations    • Productive cough    • Scoliosis deformity of spine    • Screening examination for venereal disease    • Shoulder pain, left    • Stricture of esophagus    • Symptomatic menopausal or female climacteric states    • Synovitis and tenosynovitis     left forearm   • Tenderness     Tenderness of left upper quadrant of abdomen      • Thoracic back pain    • Type 2 diabetes mellitus    • " Upper abdominal pain    • Urgency of urination     Urgent desire to urinate      • Urinary frequency     due to benign prostatic hypertrophy      • Urinary tract infection     site not specified   • Urinary tract infectious disease    • Venous varices    • Weakness     General symptom - weakness    • Weight gain      Social History     Social History   • Marital status:      Spouse name: N/A   • Number of children: N/A   • Years of education: N/A     Occupational History   • Not on file.     Social History Main Topics   • Smoking status: Former Smoker     Quit date: 2007   • Smokeless tobacco: Never Used   • Alcohol use No   • Drug use: No   • Sexual activity: No     Other Topics Concern   • Not on file     Social History Narrative     Current Facility-Administered Medications   Medication Dose Route Frequency Provider Last Rate Last Dose   • dextrose 5 % and sodium chloride 0.45 % infusion  100 mL/hr Intravenous Continuous Elia Cheatham  mL/hr at 04/19/17 0848 100 mL/hr at 04/19/17 0848       Review of Systems   Constitutional: Negative.    HENT: Negative for hearing loss, nosebleeds and trouble swallowing.    Respiratory: Negative for apnea, chest tightness and shortness of breath.    Cardiovascular: Negative for chest pain and palpitations.   Gastrointestinal: Negative for abdominal distention, abdominal pain, blood in stool, constipation, diarrhea, nausea and vomiting.   Genitourinary: Negative for difficulty urinating, dysuria, frequency and urgency.   Musculoskeletal: Negative for back pain, joint swelling and neck pain.   Skin: Negative for rash.   Neurological: Negative for dizziness, seizures, weakness, light-headedness, numbness and headaches.   Hematological: Negative for adenopathy.   Psychiatric/Behavioral: Negative for agitation. The patient is not nervous/anxious.        Vitals:    04/19/17 0820   BP: 153/62   Pulse: 78   Resp: 16   Temp: 97.6 °F (36.4 °C)   SpO2: 100%       Physical  Exam   Constitutional: She is oriented to person, place, and time. She appears well-developed and well-nourished. No distress.   HENT:   Head: Normocephalic and atraumatic.   Nose: Nose normal.   Eyes: Conjunctivae are normal.   Neck: Normal range of motion. No tracheal deviation present. No thyromegaly present.   Cardiovascular: Normal rate, regular rhythm and normal heart sounds.    No murmur heard.  Pulmonary/Chest: Effort normal and breath sounds normal. No respiratory distress. She has no wheezes. She has no rales. She exhibits no tenderness.   Abdominal: Soft. She exhibits no distension. There is no tenderness. There is no rebound and no guarding. No hernia.   Musculoskeletal: She exhibits no tenderness or deformity.   Neurological: She is alert and oriented to person, place, and time.   Skin: Skin is warm and dry. No rash noted.   Psychiatric: She has a normal mood and affect. Her behavior is normal. Judgment and thought content normal.   Vitals reviewed.      Assessment     In need of   Colonoscopy. With hx of weight loss and polyps.      Plan     Risks, benefits, rationale and prep for colonoscopy have been discussed with the patient.  The patient indicates understanding of these issues and agrees with the plan.        EMR Dragon/Transcription disclaimer:  Much of this encounter note is on electronic transcription/translation of spoken language to printed text.  The electronic translation of spoken language may permit erroneous or at times, nonsensical words or phrases to be inadvertently transcribed;  Although I have reviewed the note for such errors, some may still exist.

## 2017-04-24 ENCOUNTER — TREATMENT (OUTPATIENT)
Dept: PHYSICAL THERAPY | Facility: CLINIC | Age: 62
End: 2017-04-24

## 2017-04-24 DIAGNOSIS — M54.2 CERVICAL PAIN: Primary | ICD-10-CM

## 2017-04-24 PROCEDURE — 97014 ELECTRIC STIMULATION THERAPY: CPT | Performed by: PHYSICAL THERAPIST

## 2017-04-24 PROCEDURE — 97110 THERAPEUTIC EXERCISES: CPT | Performed by: PHYSICAL THERAPIST

## 2017-04-24 NOTE — PROGRESS NOTES
"Daily Progress Note    Time In: 9:35     Time Out: 10:36    ICD-10-CM ICD-9-CM   1. Cervical pain M54.2 723.1       Subjective   Pt reports continued on/off pain. She feels good for a couple of days post PT.   Patient reports to therapy 3-4/10 pain, and 60% improvement.  Attendance  7/8 visits. Receert 4-28-17.       Objective     V cues necessary for correct TE performance. No c/o TTP with manual.     PROCEDURES AND MODALITIES:     Electrical Stimulation  Stimulation Type: IFC  Max mAmp: 13  Location/Electrode Placement/Other: Cerv  Rx Minutes: 15 mins     EXERCISE  Exercise 1  Exercise Name 1: Man cerv: distraction  Time: 7 min  Exercise 1 Completed: Yes  Exercise 2  Exercise Name 2: Supine cerv retractions  Sets/Reps 2: 20x  Exercise 2 Completed: Yes Exercise 3  Exercise Name 3: Supine CT, head on towel  Sets/Reps 3: 2/10x  Exercise 3 Completed: Yes Exercise 4  Exercise Name 4: Doorway pec stretch  Sets/Reps 4: 2/30\"  Exercise 4 Completed: Yes Exercise 5  Exercise Name 5: B UT stretch  Sets/Reps 5: 2/30\"  Exercise 5 Completed: Yes Exercise 6  Exercise Name 6: PRO2  UE's (Focus on posture)  Equipment/Resistance 6: 3.0  Sets/Reps 6: 5min  Exercise 6 Completed: Yes   Exercise 7  Exercise Name 7: [place] walk  Equipment/Resistance 7: 3#dbs  Sets/Reps 7: 3.5 min  Exercise 7 Completed: Yes Exercise 8  Exercise Name 8: Putty   Equipment/Resistance 8: Lime  Sets/Reps 8: 2min ea, 4min total  Exercise 8 Completed: Yes                                         Therapy Exercise 15620 46 minutes and Other Procedure CPT 15 minutes Electrical Stimulation    Total Treatment Time: 61 Minutes    Assessment/Plan   Continued cyclical reactivity. Good tolerance of today's treatment. No recorded goals met this date.   Progress per Plan of Care             Harshad Ko, PTA  Physical Therapist    "

## 2017-04-25 RX ORDER — LISINOPRIL 5 MG/1
5 TABLET ORAL DAILY
Qty: 30 TABLET | Refills: 5 | Status: SHIPPED | OUTPATIENT
Start: 2017-04-25 | End: 2017-10-09 | Stop reason: SDUPTHER

## 2017-05-02 ENCOUNTER — LAB (OUTPATIENT)
Dept: LAB | Facility: OTHER | Age: 62
End: 2017-05-02

## 2017-05-02 ENCOUNTER — OFFICE VISIT (OUTPATIENT)
Dept: FAMILY MEDICINE CLINIC | Facility: CLINIC | Age: 62
End: 2017-05-02

## 2017-05-02 VITALS
SYSTOLIC BLOOD PRESSURE: 122 MMHG | HEART RATE: 98 BPM | OXYGEN SATURATION: 99 % | BODY MASS INDEX: 21.09 KG/M2 | WEIGHT: 114.6 LBS | HEIGHT: 62 IN | DIASTOLIC BLOOD PRESSURE: 74 MMHG

## 2017-05-02 DIAGNOSIS — R10.13 EPIGASTRIC PAIN: Primary | ICD-10-CM

## 2017-05-02 DIAGNOSIS — R10.13 EPIGASTRIC PAIN: ICD-10-CM

## 2017-05-02 LAB
ALBUMIN SERPL-MCNC: 4.4 G/DL (ref 3.2–5.5)
ALBUMIN/GLOB SERPL: 1.4 G/DL (ref 1–3)
ALP SERPL-CCNC: 75 U/L (ref 15–121)
ALT SERPL W P-5'-P-CCNC: 16 U/L (ref 10–60)
AMYLASE SERPL-CCNC: 60 U/L (ref 25–140)
ANION GAP SERPL CALCULATED.3IONS-SCNC: 7 MMOL/L (ref 5–15)
AST SERPL-CCNC: 23 U/L (ref 10–60)
BACTERIA UR QL AUTO: ABNORMAL /HPF
BASOPHILS # BLD AUTO: 0.02 10*3/MM3 (ref 0–0.2)
BASOPHILS NFR BLD AUTO: 0.3 % (ref 0–2)
BILIRUB SERPL-MCNC: 0.3 MG/DL (ref 0.2–1)
BILIRUB UR QL STRIP: NEGATIVE
BILIRUB UR QL STRIP: NEGATIVE
BUN BLD-MCNC: 18 MG/DL (ref 8–25)
BUN/CREAT SERPL: 22.5 (ref 7–25)
CALCIUM SPEC-SCNC: 9.6 MG/DL (ref 8.4–10.8)
CHLORIDE SERPL-SCNC: 97 MMOL/L (ref 100–112)
CLARITY UR: CLEAR
CLARITY UR: CLEAR
CO2 SERPL-SCNC: 32 MMOL/L (ref 20–32)
COLOR UR: YELLOW
COLOR UR: YELLOW
CREAT BLD-MCNC: 0.8 MG/DL (ref 0.4–1.3)
DEPRECATED RDW RBC AUTO: 40.6 FL (ref 36.4–46.3)
EOSINOPHIL # BLD AUTO: 0.21 10*3/MM3 (ref 0–0.7)
EOSINOPHIL NFR BLD AUTO: 2.7 % (ref 0–7)
ERYTHROCYTE [DISTWIDTH] IN BLOOD BY AUTOMATED COUNT: 12.9 % (ref 11.5–14.5)
GFR SERPL CREATININE-BSD FRML MDRD: 73 ML/MIN/1.73 (ref 45–104)
GLOBULIN UR ELPH-MCNC: 3.2 GM/DL (ref 2.5–4.6)
GLUCOSE BLD-MCNC: 98 MG/DL (ref 70–100)
GLUCOSE UR STRIP-MCNC: NEGATIVE MG/DL
GLUCOSE UR STRIP-MCNC: NEGATIVE MG/DL
HCT VFR BLD AUTO: 34.7 % (ref 35–45)
HGB BLD-MCNC: 11.3 G/DL (ref 12–15.5)
HGB UR QL STRIP.AUTO: NEGATIVE
HGB UR QL STRIP.AUTO: NEGATIVE
HYALINE CASTS UR QL AUTO: ABNORMAL /LPF
KETONES UR QL STRIP: NEGATIVE
KETONES UR QL STRIP: NEGATIVE
LEUKOCYTE ESTERASE UR QL STRIP.AUTO: ABNORMAL
LEUKOCYTE ESTERASE UR QL STRIP.AUTO: ABNORMAL
LYMPHOCYTES # BLD AUTO: 2.55 10*3/MM3 (ref 0.6–4.2)
LYMPHOCYTES NFR BLD AUTO: 33.2 % (ref 10–50)
MCH RBC QN AUTO: 28.7 PG (ref 26.5–34)
MCHC RBC AUTO-ENTMCNC: 32.6 G/DL (ref 31.4–36)
MCV RBC AUTO: 88.1 FL (ref 80–98)
MONOCYTES # BLD AUTO: 0.5 10*3/MM3 (ref 0–0.9)
MONOCYTES NFR BLD AUTO: 6.5 % (ref 0–12)
NEUTROPHILS # BLD AUTO: 4.4 10*3/MM3 (ref 2–8.6)
NEUTROPHILS NFR BLD AUTO: 57.3 % (ref 37–80)
NITRITE UR QL STRIP: NEGATIVE
NITRITE UR QL STRIP: NEGATIVE
PH UR STRIP.AUTO: 8.5 [PH] (ref 5.5–8)
PH UR STRIP.AUTO: 8.5 [PH] (ref 5.5–8)
PLATELET # BLD AUTO: 319 10*3/MM3 (ref 150–450)
PMV BLD AUTO: 9 FL (ref 8–12)
POTASSIUM BLD-SCNC: 4.3 MMOL/L (ref 3.4–5.4)
PROT SERPL-MCNC: 7.6 G/DL (ref 6.7–8.2)
PROT UR QL STRIP: ABNORMAL
PROT UR QL STRIP: ABNORMAL
RBC # BLD AUTO: 3.94 10*6/MM3 (ref 3.77–5.16)
RBC # UR: ABNORMAL /HPF
REF LAB TEST METHOD: ABNORMAL
SODIUM BLD-SCNC: 136 MMOL/L (ref 134–146)
SP GR UR STRIP: 1.01 (ref 1–1.03)
SP GR UR STRIP: 1.01 (ref 1–1.03)
SQUAMOUS #/AREA URNS HPF: ABNORMAL /HPF
UROBILINOGEN UR QL STRIP: ABNORMAL
UROBILINOGEN UR QL STRIP: ABNORMAL
WBC NRBC COR # BLD: 7.68 10*3/MM3 (ref 3.2–9.8)
WBC UR QL AUTO: ABNORMAL /HPF

## 2017-05-02 PROCEDURE — 82150 ASSAY OF AMYLASE: CPT | Performed by: INTERNAL MEDICINE

## 2017-05-02 PROCEDURE — 80053 COMPREHEN METABOLIC PANEL: CPT | Performed by: INTERNAL MEDICINE

## 2017-05-02 PROCEDURE — 85025 COMPLETE CBC W/AUTO DIFF WBC: CPT | Performed by: INTERNAL MEDICINE

## 2017-05-02 PROCEDURE — 99213 OFFICE O/P EST LOW 20 MIN: CPT | Performed by: INTERNAL MEDICINE

## 2017-05-02 PROCEDURE — 81001 URINALYSIS AUTO W/SCOPE: CPT | Performed by: INTERNAL MEDICINE

## 2017-05-02 RX ORDER — ONDANSETRON 4 MG/1
4 TABLET, FILM COATED ORAL EVERY 8 HOURS PRN
Qty: 20 TABLET | Refills: 1 | Status: SHIPPED | OUTPATIENT
Start: 2017-05-02 | End: 2017-05-02 | Stop reason: SDUPTHER

## 2017-05-02 RX ORDER — ONDANSETRON 4 MG/1
4 TABLET, FILM COATED ORAL EVERY 8 HOURS PRN
Qty: 20 TABLET | Refills: 1 | Status: SHIPPED | OUTPATIENT
Start: 2017-05-02 | End: 2017-10-25 | Stop reason: SDUPTHER

## 2017-05-02 RX ORDER — SULFAMETHOXAZOLE AND TRIMETHOPRIM 800; 160 MG/1; MG/1
1 TABLET ORAL 2 TIMES DAILY
Qty: 10 TABLET | Refills: 0 | Status: SHIPPED | OUTPATIENT
Start: 2017-05-02 | End: 2017-05-16

## 2017-05-03 ENCOUNTER — TREATMENT (OUTPATIENT)
Dept: PHYSICAL THERAPY | Facility: CLINIC | Age: 62
End: 2017-05-03

## 2017-05-03 DIAGNOSIS — M54.2 CERVICAL PAIN: Primary | ICD-10-CM

## 2017-05-03 PROCEDURE — 97014 ELECTRIC STIMULATION THERAPY: CPT | Performed by: PHYSICAL THERAPIST

## 2017-05-03 PROCEDURE — 97110 THERAPEUTIC EXERCISES: CPT | Performed by: PHYSICAL THERAPIST

## 2017-05-03 PROCEDURE — 97140 MANUAL THERAPY 1/> REGIONS: CPT | Performed by: PHYSICAL THERAPIST

## 2017-05-11 ENCOUNTER — CONSULT (OUTPATIENT)
Dept: SURGERY | Facility: CLINIC | Age: 62
End: 2017-05-11

## 2017-05-11 ENCOUNTER — TELEPHONE (OUTPATIENT)
Dept: FAMILY MEDICINE CLINIC | Facility: CLINIC | Age: 62
End: 2017-05-11

## 2017-05-11 VITALS
HEIGHT: 62 IN | SYSTOLIC BLOOD PRESSURE: 122 MMHG | WEIGHT: 112 LBS | DIASTOLIC BLOOD PRESSURE: 76 MMHG | BODY MASS INDEX: 20.61 KG/M2

## 2017-05-11 DIAGNOSIS — R13.10 DYSPHAGIA, UNSPECIFIED TYPE: Primary | ICD-10-CM

## 2017-05-11 PROCEDURE — 99213 OFFICE O/P EST LOW 20 MIN: CPT | Performed by: SURGERY

## 2017-05-11 RX ORDER — DEXTROSE AND SODIUM CHLORIDE 5; .45 G/100ML; G/100ML
30 INJECTION, SOLUTION INTRAVENOUS CONTINUOUS PRN
Status: CANCELLED | OUTPATIENT
Start: 2017-05-23

## 2017-05-11 RX ORDER — LIDOCAINE HYDROCHLORIDE 10 MG/ML
0.1 INJECTION, SOLUTION EPIDURAL; INFILTRATION; INTRACAUDAL; PERINEURAL ONCE AS NEEDED
Status: CANCELLED | OUTPATIENT
Start: 2017-05-11

## 2017-05-11 RX ORDER — SODIUM CHLORIDE 0.9 % (FLUSH) 0.9 %
1-10 SYRINGE (ML) INJECTION AS NEEDED
Status: CANCELLED | OUTPATIENT
Start: 2017-05-11

## 2017-05-11 RX ORDER — TIZANIDINE 4 MG/1
4 TABLET ORAL EVERY 8 HOURS PRN
COMMUNITY
Start: 2017-04-24 | End: 2017-10-25

## 2017-05-12 ENCOUNTER — DOCUMENTATION (OUTPATIENT)
Dept: PHYSICAL THERAPY | Facility: CLINIC | Age: 62
End: 2017-05-12

## 2017-05-16 RX ORDER — FLUOXETINE HYDROCHLORIDE 20 MG/1
20 CAPSULE ORAL DAILY
Qty: 30 CAPSULE | Refills: 5 | Status: SHIPPED | OUTPATIENT
Start: 2017-05-16 | End: 2017-11-20 | Stop reason: SDUPTHER

## 2017-05-16 RX ORDER — GABAPENTIN 400 MG/1
400 CAPSULE ORAL NIGHTLY
COMMUNITY
End: 2017-11-03 | Stop reason: SDUPTHER

## 2017-05-23 ENCOUNTER — ANESTHESIA EVENT (OUTPATIENT)
Dept: GASTROENTEROLOGY | Facility: HOSPITAL | Age: 62
End: 2017-05-23

## 2017-05-23 ENCOUNTER — HOSPITAL ENCOUNTER (OUTPATIENT)
Facility: HOSPITAL | Age: 62
Setting detail: HOSPITAL OUTPATIENT SURGERY
Discharge: HOME | End: 2017-05-23
Attending: SURGERY | Admitting: SURGERY

## 2017-05-23 ENCOUNTER — ANESTHESIA (OUTPATIENT)
Dept: GASTROENTEROLOGY | Facility: HOSPITAL | Age: 62
End: 2017-05-23

## 2017-05-23 VITALS
BODY MASS INDEX: 19.8 KG/M2 | TEMPERATURE: 96.3 F | HEART RATE: 86 BPM | RESPIRATION RATE: 19 BRPM | SYSTOLIC BLOOD PRESSURE: 122 MMHG | DIASTOLIC BLOOD PRESSURE: 68 MMHG | OXYGEN SATURATION: 98 % | HEIGHT: 62 IN | WEIGHT: 107.58 LBS

## 2017-05-23 DIAGNOSIS — R13.10 DYSPHAGIA, UNSPECIFIED TYPE: ICD-10-CM

## 2017-05-23 LAB — GLUCOSE BLDC GLUCOMTR-MCNC: 76 MG/DL (ref 70–130)

## 2017-05-23 PROCEDURE — 43235 EGD DIAGNOSTIC BRUSH WASH: CPT | Performed by: SURGERY

## 2017-05-23 PROCEDURE — 25010000002 PROPOFOL 10 MG/ML EMULSION: Performed by: NURSE ANESTHETIST, CERTIFIED REGISTERED

## 2017-05-23 PROCEDURE — 82962 GLUCOSE BLOOD TEST: CPT

## 2017-05-23 RX ORDER — LIDOCAINE HYDROCHLORIDE 10 MG/ML
INJECTION, SOLUTION INFILTRATION; PERINEURAL AS NEEDED
Status: DISCONTINUED | OUTPATIENT
Start: 2017-05-23 | End: 2017-05-23 | Stop reason: SURG

## 2017-05-23 RX ORDER — PROPOFOL 10 MG/ML
VIAL (ML) INTRAVENOUS AS NEEDED
Status: DISCONTINUED | OUTPATIENT
Start: 2017-05-23 | End: 2017-05-23 | Stop reason: SURG

## 2017-05-23 RX ORDER — DEXTROSE AND SODIUM CHLORIDE 5; .45 G/100ML; G/100ML
30 INJECTION, SOLUTION INTRAVENOUS CONTINUOUS PRN
Status: DISCONTINUED | OUTPATIENT
Start: 2017-05-23 | End: 2017-05-23 | Stop reason: HOSPADM

## 2017-05-23 RX ADMIN — DEXTROSE AND SODIUM CHLORIDE 30 ML/HR: 5; 450 INJECTION, SOLUTION INTRAVENOUS at 13:54

## 2017-05-23 RX ADMIN — LIDOCAINE HYDROCHLORIDE 60 MG: 10 INJECTION, SOLUTION INFILTRATION; PERINEURAL at 14:33

## 2017-05-23 RX ADMIN — PROPOFOL 60 MG: 10 INJECTION, EMULSION INTRAVENOUS at 14:36

## 2017-05-23 RX ADMIN — PROPOFOL 60 MG: 10 INJECTION, EMULSION INTRAVENOUS at 14:33

## 2017-05-25 RX ORDER — FUROSEMIDE 20 MG/1
20 TABLET ORAL DAILY
Qty: 30 TABLET | Refills: 5 | Status: SHIPPED | OUTPATIENT
Start: 2017-05-25 | End: 2021-10-26

## 2017-05-30 ENCOUNTER — OFFICE VISIT (OUTPATIENT)
Dept: SURGERY | Facility: CLINIC | Age: 62
End: 2017-05-30

## 2017-05-30 VITALS
BODY MASS INDEX: 19.51 KG/M2 | HEIGHT: 62 IN | WEIGHT: 106 LBS | DIASTOLIC BLOOD PRESSURE: 70 MMHG | SYSTOLIC BLOOD PRESSURE: 112 MMHG

## 2017-05-30 DIAGNOSIS — R13.10 DYSPHAGIA, UNSPECIFIED TYPE: Primary | ICD-10-CM

## 2017-05-30 PROCEDURE — 99212 OFFICE O/P EST SF 10 MIN: CPT | Performed by: SURGERY

## 2017-06-02 ENCOUNTER — HOSPITAL ENCOUNTER (OUTPATIENT)
Dept: GENERAL RADIOLOGY | Facility: HOSPITAL | Age: 62
Discharge: HOME | End: 2017-06-02
Admitting: SURGERY

## 2017-06-02 DIAGNOSIS — R13.10 DYSPHAGIA, UNSPECIFIED TYPE: ICD-10-CM

## 2017-06-02 PROCEDURE — 74247: CPT

## 2017-06-02 RX ADMIN — BARIUM SULFATE 100 ML: 960 POWDER, FOR SUSPENSION ORAL at 08:45

## 2017-06-05 ENCOUNTER — OFFICE VISIT (OUTPATIENT)
Dept: SURGERY | Facility: CLINIC | Age: 62
End: 2017-06-05

## 2017-06-05 VITALS
SYSTOLIC BLOOD PRESSURE: 128 MMHG | BODY MASS INDEX: 19.88 KG/M2 | WEIGHT: 108 LBS | HEIGHT: 62 IN | DIASTOLIC BLOOD PRESSURE: 72 MMHG

## 2017-06-05 DIAGNOSIS — R13.10 DYSPHAGIA, UNSPECIFIED TYPE: Primary | ICD-10-CM

## 2017-06-05 PROCEDURE — 99212 OFFICE O/P EST SF 10 MIN: CPT | Performed by: SURGERY

## 2017-06-05 RX ORDER — SODIUM CHLORIDE 9 MG/ML
100 INJECTION, SOLUTION INTRAVENOUS CONTINUOUS
Status: CANCELLED | OUTPATIENT
Start: 2017-06-05

## 2017-06-05 RX ORDER — SODIUM CHLORIDE 0.9 % (FLUSH) 0.9 %
1-10 SYRINGE (ML) INJECTION AS NEEDED
Status: CANCELLED | OUTPATIENT
Start: 2017-06-05

## 2017-06-05 NOTE — PROGRESS NOTES
"CHIEF COMPLAINT:    Follow up UGI, dysphagia    HISTORY OF PRESENT ILLNESS:    Maria Yepez is a 61 y.o. female who has been seen previously for dysphagia and weight loss following a Nissen fundoplication in 2005.  She is now undergone an EGD as well as an upper GI.  On her upper GI she was found to have lodging of the barium tablet near the gastroesophageal junction.  She states that she has continued dysphagia and upper abdominal and back pain at home along with anxiety due to her dysphagia.    We discussed her upper GI results today.    Past Medical History:   Diagnosis Date   • Abdominal pain    • Acute bronchitis    • Acute pancreatitis    • Acute posthemorrhagic anemia    • Acute sinusitis    • Anal pain    • Anemia    • Anemia due to blood loss    • Anxiety    • Backache     mid and lower back     • Chronic back pain    • Chronic obstructive lung disease    • Coronary atherosclerosis    • Diabetic neuropathy    • Diarrhea    • Diverticulosis of colon without diverticulitis    • Dysphagia    • Dysuria    • Emphysema/COPD     \"Emphysema\"   • Encounter for immunization    • Epigastric pain    • Esophagitis     with stricture   • Essential hypertension    • Fatigue    • Foot pain    • Ganglion cyst of wrist     left wrist     • Gastroesophageal reflux disease    • Generalized abdominal pain    • Gout    • Headache    • Herpes simplex    • Hip pain, bilateral    • Hoarse    • Hyperlipidemia    • Incontinence of feces    • Insomnia    • Irritable bowel syndrome    • Joint pain    • Knee pain    • Left lower quadrant pain    • Left upper quadrant pain    • Leg pain    • Leukorrhea     not specified as infective    • Low back pain     injury   • Muscle pain    • Nausea    • Nausea    • Nausea and vomiting    • Neck pain    • Neck pain    • Noncompliance with medication regimen    • Osteopenia    • Pain in lower back    • Pain in right femur    • Palpitations    • Productive cough    • Scoliosis deformity of " spine    • Screening examination for venereal disease    • Shoulder pain, left    • Stricture of esophagus    • Symptomatic menopausal or female climacteric states    • Synovitis and tenosynovitis     left forearm   • Tenderness     Tenderness of left upper quadrant of abdomen      • Thoracic back pain    • Type 2 diabetes mellitus    • Upper abdominal pain    • Urgency of urination     Urgent desire to urinate      • Urinary frequency     due to benign prostatic hypertrophy      • Urinary tract infection     site not specified   • Urinary tract infectious disease    • Venous varices    • Weakness     General symptom - weakness    • Weight gain        Past Surgical History:   Procedure Laterality Date   • BREAST BIOPSY      Stereotactic breast biopsy (1)  left   • CARDIAC CATHETERIZATION  2014    Cardiac cath 52860 (1)  Non obstructive coronary artery disease. Normal left ventricular systolic function. Performed at [facility].   • CARPAL TUNNEL RELEASE     •  SECTION     • CHOLECYSTECTOMY      laparoscopic   • COLONOSCOPY N/A 2017    Procedure: COLONOSCOPY;  Surgeon: Elia Cheatham MD;  Location: [facility] ENDOSCOPY;  Service:    • COLONOSCOPY W/ POLYPECTOMY  2015    Colonoscopy remove polyps 85172 (1)  Internal and external hemorrhoids found. Colonoscopy with biopsy.   • ENDOSCOPY  10/24/2012    Colon endoscopy 04392 (2)  internal & external hemorrhoids found. Diverticulum in sigmoid colon.   • ENDOSCOPY  2014    EGD w/ Dilatation 82704 (1)  Esophageal stricture was present. Dilatation performed. Gastritis found in the stomach. Biopsy taken. Normal duodenum.   • ENDOSCOPY  2013    EGD w/ tube 02028 (3)  Normal esophagus. Gastritis in stomach. Biopsy taken. Normal duodenum. Biopsy taken.   • ENDOSCOPY N/A 2017    Procedure: ESOPHAGOGASTRODUODENOSCOPY, possible dilation;  Surgeon: Grant Morris MD;  Location: [facility] ENDOSCOPY;  Service:     • FRACTURE SURGERY Right     Arm Surgery (1)  right, arm fracture   • HERNIA REPAIR      Hernia repair w/mesh (1)   • HYSTERECTOMY      Anesth, hysterectomy (1)   • INJECTION OF MEDICATION  05/27/2016    Depo Medrol (Methylprednisone) 80mg (2)  Ordered By: BRIAN LOU ([facility])    • INJECTION OF MEDICATION  03/10/2016    Kenalog (4)  Ordered By: ARNOLDO MURDOCK ([facility])    • INJECTION OF MEDICATION  01/07/2016    Rocephin (1)  Ordered By: ARNOLDO MURDOCK ([facility])    • LYMPH NODE BIOPSY      Biopsy/removal, lymph node(s) (1)  cervical node biopsy   • NISSEN FUNDOPLICATION     • OTHER SURGICAL HISTORY      Tubal ligation (1)   • PARAESOPHAGEAL HERNIA REPAIR     • TOTAL ABDOMINAL HYSTERECTOMY      RUPALI BSO         Current Outpatient Prescriptions:   •  amLODIPine (NORVASC) 2.5 MG tablet, Take 2.5 mg by mouth daily. (unconfirmed), Disp: , Rfl:   •  aspirin 81 MG chewable tablet, Chew 81 mg daily. (unconfirmed), Disp: , Rfl:   •  clonazePAM (KLONOPIN) 0.5 MG tablet, 0.5 mg 2 (two) times a day as needed. 1/2 in the am and 1/2 in the pm prn  (unconfirmed), Disp: , Rfl:   •  famciclovir (FAMVIR) 500 MG tablet, Take 500 mg by mouth daily. (unconfirmed), Disp: , Rfl:   •  FLUoxetine (PROzac) 20 MG capsule, Take 1 capsule by mouth Daily., Disp: 30 capsule, Rfl: 5  •  furosemide (LASIX) 20 MG tablet, Take 1 tablet by mouth Daily. (unconfirmed), Disp: 30 tablet, Rfl: 5  •  gabapentin (NEURONTIN) 400 MG capsule, Take 400 mg by mouth Every Night., Disp: , Rfl:   •  guaiFENesin (MUCINEX) 600 MG 12 hr tablet, Take 600 mg by mouth 2 (Two) Times a Day., Disp: , Rfl:   •  levalbuterol (XOPENEX HFA) 45 MCG/ACT inhaler, Inhale 1 puff Every 6 (Six) Hours As Needed for wheezing., Disp: 15 g, Rfl: 11  •  lisinopril (PRINIVIL,ZESTRIL) 5 MG tablet, Take 1 tablet by mouth Daily., Disp: 30 tablet, Rfl: 5  •  metFORMIN (GLUCOPHAGE) 500 MG tablet, Take 1 tablet by mouth 2 (Two) Times a Day With Meals., Disp: 60 tablet, Rfl: 5  •  nitroglycerin (NITROSTAT)  0.4 MG SL tablet, Place 0.4 mg under the tongue Every 5 (Five) Minutes As Needed., Disp: , Rfl:   •  Omega-3 Fatty Acids (FISH OIL) 1000 MG capsule capsule, Take 1,000 mg by mouth Daily With Breakfast., Disp: , Rfl:   •  omeprazole (PRILOSEC) 20 MG capsule, Take 1 capsule by mouth 2 (Two) Times a Day. Prilosec 20 mg capsule,delayed release  (unconfirmed), Disp: 60 capsule, Rfl: 5  •  ondansetron (ZOFRAN) 4 MG tablet, Take 1 tablet by mouth Every 8 (Eight) Hours As Needed for Nausea or Vomiting., Disp: 20 tablet, Rfl: 1  •  pravastatin (PRAVACHOL) 80 MG tablet, Take 80 mg by mouth Daily., Disp: , Rfl:   •  tiotropium (SPIRIVA) 18 MCG per inhalation capsule, Place 1 capsule into inhaler and inhale Daily., Disp: , Rfl:   •  tiZANidine (ZANAFLEX) 4 MG tablet, Take 4 mg by mouth As Needed., Disp: , Rfl:   •  traMADol (ULTRAM) 50 MG tablet, Take 50 mg by mouth Every 6 (Six) Hours As Needed (knee pain). (unconfirmed) , Disp: , Rfl:   •  traZODone (DESYREL) 150 MG tablet, Take 150 mg by mouth Every Night., Disp: , Rfl:   •  vitamin D (ERGOCALCIFEROL) 06504 UNITS capsule capsule, Take 50,000 Units by mouth Every 14 (Fourteen) Days., Disp: , Rfl:     Allergies   Allergen Reactions   • Albuterol Shortness Of Breath and Palpitations   • Ibuprofen GI Intolerance   • Shellfish-Derived Products Itching       Family History   Problem Relation Age of Onset   • Lung cancer Mother    • Hypertension Mother    • Lymphoma Father      NonHodgkins   • Hypertension Father    • Diabetes Other    • Hypertension Other    • Hypertension Sister    • Diabetes Sister    • Hypertension Brother    • Diabetes Brother        Social History     Social History   • Marital status: Single     Spouse name: N/A   • Number of children: N/A   • Years of education: N/A     Occupational History   • Not on file.     Social History Main Topics   • Smoking status: Former Smoker     Quit date: 2007   • Smokeless tobacco: Never Used   • Alcohol use No   • Drug use:  "No   • Sexual activity: No     Other Topics Concern   • Not on file     Social History Narrative       Review of Systems   Constitutional: Positive for appetite change and unexpected weight change. Negative for chills and fever.   HENT: Negative for hearing loss, nosebleeds and trouble swallowing.    Eyes: Negative for visual disturbance.   Respiratory: Negative for apnea, cough, choking, chest tightness, shortness of breath, wheezing and stridor.    Cardiovascular: Negative for chest pain, palpitations and leg swelling.   Gastrointestinal: Positive for abdominal pain. Negative for abdominal distention, blood in stool, constipation, diarrhea, nausea and vomiting.        Dysphagia   Endocrine: Negative for cold intolerance, heat intolerance, polydipsia, polyphagia and polyuria.   Genitourinary: Negative for difficulty urinating, dysuria, frequency, hematuria and urgency.   Musculoskeletal: Negative for arthralgias, back pain, myalgias and neck pain.   Skin: Negative for color change, pallor and rash.   Allergic/Immunologic: Negative for immunocompromised state.   Neurological: Negative for dizziness, seizures, syncope, light-headedness, numbness and headaches.   Hematological: Negative for adenopathy.   Psychiatric/Behavioral: Negative for suicidal ideas. The patient is not nervous/anxious.        Objective     /72  Ht 62\" (157.5 cm)  Wt 108 lb (49 kg)  BMI 19.75 kg/m2    Physical Exam   Constitutional: She is oriented to person, place, and time. She appears well-developed and well-nourished. No distress.   HENT:   Head: Normocephalic and atraumatic.   Eyes: Conjunctivae and EOM are normal. Pupils are equal, round, and reactive to light. Right eye exhibits no discharge. Left eye exhibits no discharge.   Neck: Normal range of motion. Neck supple. No JVD present. No tracheal deviation present. No thyromegaly present.   Cardiovascular: Normal rate, regular rhythm and normal heart sounds.  Exam reveals no gallop " and no friction rub.    No murmur heard.  Pulmonary/Chest: Effort normal and breath sounds normal. No respiratory distress. She has no wheezes. She has no rales. She exhibits no tenderness.   Abdominal: Soft. She exhibits no distension and no mass. There is no tenderness. There is no rebound and no guarding. No hernia.   Musculoskeletal: Normal range of motion. She exhibits no edema, tenderness or deformity.   Neurological: She is alert and oriented to person, place, and time. No cranial nerve deficit.   Skin: Skin is warm and dry. No rash noted. She is not diaphoretic. No erythema. No pallor.   Psychiatric: She has a normal mood and affect. Her behavior is normal. Judgment and thought content normal.       DIAGNOSTIC DATA:    UGI images reviewed, shows intact Nissen with lodging of barium tablet above wrap.    ASSESSMENT:    Dysphagia s/p Open Nissen    PLAN:    I discussed her swallow study with her today.  She clearly had lodging of the barium tablet in the area of her wrap.  She did not appear to have an area that was amenable to dilation on EGD, and she has had prior dilation with minimal relief.  In any care, she is not interested in undergoing serial dilations.  Therefore, the only option would then be to revise her wrap.  Risks and benefits of Open Revision of Nissen fundoplicaiton with gastrostomy tube placement were discussed with the patient and she is agreeable with proceeding.  Will plan for OR on 6/7/17.          This document has been electronically signed by Grant Morris MD on June 5, 2017 10:31 AM

## 2017-06-06 ENCOUNTER — APPOINTMENT (OUTPATIENT)
Dept: PREADMISSION TESTING | Facility: HOSPITAL | Age: 62
End: 2017-06-06

## 2017-06-06 VITALS
DIASTOLIC BLOOD PRESSURE: 76 MMHG | HEIGHT: 62 IN | HEART RATE: 82 BPM | OXYGEN SATURATION: 98 % | RESPIRATION RATE: 16 BRPM | BODY MASS INDEX: 19.88 KG/M2 | WEIGHT: 108 LBS | SYSTOLIC BLOOD PRESSURE: 152 MMHG

## 2017-06-06 DIAGNOSIS — R13.10 DYSPHAGIA, UNSPECIFIED TYPE: ICD-10-CM

## 2017-06-06 LAB
ABO GROUP BLD: NORMAL
ANION GAP SERPL CALCULATED.3IONS-SCNC: 12 MMOL/L (ref 5–15)
BLD GP AB SCN SERPL QL: NEGATIVE
BUN BLD-MCNC: 16 MG/DL (ref 7–21)
BUN/CREAT SERPL: 19.8 (ref 7–25)
CALCIUM SPEC-SCNC: 9.8 MG/DL (ref 8.4–10.2)
CHLORIDE SERPL-SCNC: 95 MMOL/L (ref 95–110)
CO2 SERPL-SCNC: 29 MMOL/L (ref 22–31)
CREAT BLD-MCNC: 0.81 MG/DL (ref 0.5–1)
DEPRECATED RDW RBC AUTO: 40.8 FL (ref 36.4–46.3)
ERYTHROCYTE [DISTWIDTH] IN BLOOD BY AUTOMATED COUNT: 13.2 % (ref 11.5–14.5)
GFR SERPL CREATININE-BSD FRML MDRD: 72 ML/MIN/1.73 (ref 45–104)
GLUCOSE BLD-MCNC: 88 MG/DL (ref 60–100)
HCT VFR BLD AUTO: 33.8 % (ref 35–45)
HGB BLD-MCNC: 11.3 G/DL (ref 12–15.5)
Lab: NORMAL
MCH RBC QN AUTO: 28.2 PG (ref 26.5–34)
MCHC RBC AUTO-ENTMCNC: 33.4 G/DL (ref 31.4–36)
MCV RBC AUTO: 84.3 FL (ref 80–98)
PLATELET # BLD AUTO: 309 10*3/MM3 (ref 150–450)
PMV BLD AUTO: 9.3 FL (ref 8–12)
POTASSIUM BLD-SCNC: 4.5 MMOL/L (ref 3.5–5.1)
RBC # BLD AUTO: 4.01 10*6/MM3 (ref 3.77–5.16)
RH BLD: POSITIVE
SODIUM BLD-SCNC: 136 MMOL/L (ref 137–145)
WBC NRBC COR # BLD: 6.43 10*3/MM3 (ref 3.2–9.8)

## 2017-06-06 PROCEDURE — 80048 BASIC METABOLIC PNL TOTAL CA: CPT | Performed by: SURGERY

## 2017-06-06 PROCEDURE — 86901 BLOOD TYPING SEROLOGIC RH(D): CPT | Performed by: ANESTHESIOLOGY

## 2017-06-06 PROCEDURE — 86900 BLOOD TYPING SEROLOGIC ABO: CPT | Performed by: ANESTHESIOLOGY

## 2017-06-06 PROCEDURE — 36415 COLL VENOUS BLD VENIPUNCTURE: CPT

## 2017-06-06 PROCEDURE — 86850 RBC ANTIBODY SCREEN: CPT | Performed by: ANESTHESIOLOGY

## 2017-06-06 PROCEDURE — 85027 COMPLETE CBC AUTOMATED: CPT | Performed by: SURGERY

## 2017-06-06 RX ORDER — ASPIRIN 81 MG/1
81 TABLET ORAL NIGHTLY
COMMUNITY
End: 2017-10-25 | Stop reason: SDUPTHER

## 2017-06-06 NOTE — DISCHARGE INSTRUCTIONS
[facility]  Pre-op Information and Guidelines    You will be called after 2 p.m. the day before your surgery (Friday for Monday surgery) and notified of your time for arrival and approximate surgery time.  If you have not received a call by 4P.M., please contact Same Day Surgery at [phone] of if outside [facility] call [phone].    Please Follow these Important Safety Guidelines:    • The morning of your procedure, take only the medications listed below with   A sip of water:_____________________________________________       _AMLODIPINE, PROZAC, PRILOSEC, AND INHALERS      • DO NOT eat or drink anything after 12:00 midnight the night before surgery  Specific instructions concerning drinking clear liquids will be discussed during  the pre-surgery instruction call the day before your surgery.    • If you take a blood thinner (ex. Plavix, Coumadin, aspirin), ask your doctor when to stop it before surgery  STOP DATE: _________________    • Only 2 visitors are allowed in patient rooms at a time  Your visitors will be asked to wait in the lobby until the admission process is complete with the exception of a parent with a child and patients in need of special assistance.    • YOU CANNOT DRIVE YOURSELF HOME  You must be accompanied by someone who will be responsible for driving you home after surgery and for your care at home.    • DO NOT chew gum, use breath mints, hard candy, or smoke the day of surgery  • DO NOT drink alcohol for at least 24 hours before your surgery  • DO NOT wear any jewelry and remove all body piercing before coming to the hospital  • DO NOT wear make-up to the hospital  • If you are having surgery on an extremity (arm/leg/foot) remove nail polish/artificial nails on the surgical side  • Clothing, glasses, contacts, dentures, and hairpieces must be removed before surgery  • Bathe the night before or the morning of your surgery and do not use powders/lotions on  skin.

## 2017-06-07 ENCOUNTER — ANESTHESIA EVENT (OUTPATIENT)
Dept: PERIOP | Facility: HOSPITAL | Age: 62
End: 2017-06-07

## 2017-06-07 ENCOUNTER — ANESTHESIA (OUTPATIENT)
Dept: PERIOP | Facility: HOSPITAL | Age: 62
End: 2017-06-07

## 2017-06-07 ENCOUNTER — HOSPITAL ENCOUNTER (INPATIENT)
Facility: HOSPITAL | Age: 62
LOS: 4 days | Discharge: HOME | End: 2017-06-11
Attending: SURGERY | Admitting: SURGERY

## 2017-06-07 DIAGNOSIS — R13.10 DYSPHAGIA, UNSPECIFIED TYPE: ICD-10-CM

## 2017-06-07 LAB
ARTERIAL PATENCY WRIST A: ABNORMAL
ATMOSPHERIC PRESS: ABNORMAL MMHG
BASE EXCESS BLDA CALC-SCNC: -3.4 MMOL/L (ref -2.4–2.4)
BDY SITE: ABNORMAL
CA-I BLD-MCNC: 4 MG/DL (ref 4.5–4.9)
CO2 BLDA-SCNC: 21.9 MMOL/L (ref 23–27)
GLUCOSE BLDA-MCNC: 111 MMOL/L
GLUCOSE BLDC GLUCOMTR-MCNC: 110 MG/DL (ref 70–130)
GLUCOSE BLDC GLUCOMTR-MCNC: 96 MG/DL (ref 70–130)
HCO3 BLDA-SCNC: 20.9 MMOL/L (ref 22–26)
HCT VFR BLD CALC: 27 % (ref 38–47)
HGB BLDA-MCNC: 9.1 G/DL (ref 12–16)
MODALITY: ABNORMAL
PCO2 BLDA: 34.4 MM HG (ref 35–45)
PH BLDA: 7.4 PH UNITS (ref 7.35–7.45)
PO2 BLDA: 235.1 MM HG (ref 80–105)
POTASSIUM BLDA-SCNC: 3.45 MMOL/L (ref 3.6–4.9)
SAO2 % BLDCOA: 99.1 %
SODIUM BLDA-SCNC: 136.7 MMOL/L (ref 138–146)

## 2017-06-07 PROCEDURE — 25010000002 HYDROMORPHONE PER 4 MG: Performed by: NURSE ANESTHETIST, CERTIFIED REGISTERED

## 2017-06-07 PROCEDURE — 25010000002 MIDAZOLAM PER 1 MG: Performed by: NURSE ANESTHETIST, CERTIFIED REGISTERED

## 2017-06-07 PROCEDURE — 25010000002 NEOSTIGMINE PER 0.5 MG: Performed by: NURSE ANESTHETIST, CERTIFIED REGISTERED

## 2017-06-07 PROCEDURE — 43327 ESOPH FUNDOPLASTY LAP: CPT | Performed by: SURGERY

## 2017-06-07 PROCEDURE — C2627 CATH, SUPRAPUBIC/CYSTOSCOPIC: HCPCS | Performed by: SURGERY

## 2017-06-07 PROCEDURE — 25010000002 SUCCINYLCHOLINE PER 20 MG: Performed by: NURSE ANESTHETIST, CERTIFIED REGISTERED

## 2017-06-07 PROCEDURE — 0DV40ZZ RESTRICTION OF ESOPHAGOGASTRIC JUNCTION, OPEN APPROACH: ICD-10-PCS | Performed by: SURGERY

## 2017-06-07 PROCEDURE — P9041 ALBUMIN (HUMAN),5%, 50ML: HCPCS | Performed by: NURSE ANESTHETIST, CERTIFIED REGISTERED

## 2017-06-07 PROCEDURE — 0DH60UZ INSERTION OF FEEDING DEVICE INTO STOMACH, OPEN APPROACH: ICD-10-PCS | Performed by: SURGERY

## 2017-06-07 PROCEDURE — 25010000002 PHENYLEPHRINE PER 1 ML: Performed by: NURSE ANESTHETIST, CERTIFIED REGISTERED

## 2017-06-07 PROCEDURE — 94799 UNLISTED PULMONARY SVC/PX: CPT

## 2017-06-07 PROCEDURE — 25010000002 ALBUMIN HUMAN 5% PER 50 ML: Performed by: NURSE ANESTHETIST, CERTIFIED REGISTERED

## 2017-06-07 PROCEDURE — 82803 BLOOD GASES ANY COMBINATION: CPT | Performed by: NURSE ANESTHETIST, CERTIFIED REGISTERED

## 2017-06-07 PROCEDURE — 82962 GLUCOSE BLOOD TEST: CPT

## 2017-06-07 PROCEDURE — 25010000002 PROPOFOL 10 MG/ML EMULSION: Performed by: NURSE ANESTHETIST, CERTIFIED REGISTERED

## 2017-06-07 PROCEDURE — 25010000002 FENTANYL CITRATE (PF) 100 MCG/2ML SOLUTION: Performed by: NURSE ANESTHETIST, CERTIFIED REGISTERED

## 2017-06-07 PROCEDURE — 25010000003 CEFAZOLIN PER 500 MG: Performed by: SURGERY

## 2017-06-07 PROCEDURE — 94640 AIRWAY INHALATION TREATMENT: CPT

## 2017-06-07 PROCEDURE — 25010000003 MORPHINE PER 10 MG: Performed by: SURGERY

## 2017-06-07 RX ORDER — ONDANSETRON 2 MG/ML
4 INJECTION INTRAMUSCULAR; INTRAVENOUS EVERY 6 HOURS PRN
Status: DISCONTINUED | OUTPATIENT
Start: 2017-06-07 | End: 2017-06-11 | Stop reason: HOSPADM

## 2017-06-07 RX ORDER — GLYCOPYRROLATE 0.2 MG/ML
INJECTION INTRAMUSCULAR; INTRAVENOUS AS NEEDED
Status: DISCONTINUED | OUTPATIENT
Start: 2017-06-07 | End: 2017-06-07 | Stop reason: SURG

## 2017-06-07 RX ORDER — PROMETHAZINE HYDROCHLORIDE 25 MG/ML
12.5 INJECTION, SOLUTION INTRAMUSCULAR; INTRAVENOUS EVERY 4 HOURS PRN
Status: DISCONTINUED | OUTPATIENT
Start: 2017-06-07 | End: 2017-06-07 | Stop reason: RX

## 2017-06-07 RX ORDER — LISINOPRIL 5 MG/1
5 TABLET ORAL DAILY
Status: DISCONTINUED | OUTPATIENT
Start: 2017-06-07 | End: 2017-06-11 | Stop reason: HOSPADM

## 2017-06-07 RX ORDER — DIPHENHYDRAMINE HYDROCHLORIDE 50 MG/ML
12.5 INJECTION INTRAMUSCULAR; INTRAVENOUS
Status: DISCONTINUED | OUTPATIENT
Start: 2017-06-07 | End: 2017-06-07 | Stop reason: HOSPADM

## 2017-06-07 RX ORDER — FLUOXETINE HYDROCHLORIDE 20 MG/1
20 CAPSULE ORAL DAILY
Status: DISCONTINUED | OUTPATIENT
Start: 2017-06-08 | End: 2017-06-11 | Stop reason: HOSPADM

## 2017-06-07 RX ORDER — ONDANSETRON 4 MG/1
4 TABLET, ORALLY DISINTEGRATING ORAL EVERY 6 HOURS PRN
Status: DISCONTINUED | OUTPATIENT
Start: 2017-06-07 | End: 2017-06-11 | Stop reason: HOSPADM

## 2017-06-07 RX ORDER — ACETAMINOPHEN 650 MG/1
650 SUPPOSITORY RECTAL ONCE AS NEEDED
Status: DISCONTINUED | OUTPATIENT
Start: 2017-06-07 | End: 2017-06-07 | Stop reason: HOSPADM

## 2017-06-07 RX ORDER — ACETAMINOPHEN 325 MG/1
650 TABLET ORAL ONCE AS NEEDED
Status: DISCONTINUED | OUTPATIENT
Start: 2017-06-07 | End: 2017-06-07 | Stop reason: HOSPADM

## 2017-06-07 RX ORDER — ONDANSETRON 2 MG/ML
4 INJECTION INTRAMUSCULAR; INTRAVENOUS ONCE AS NEEDED
Status: DISCONTINUED | OUTPATIENT
Start: 2017-06-07 | End: 2017-06-07 | Stop reason: HOSPADM

## 2017-06-07 RX ORDER — MIDAZOLAM HYDROCHLORIDE 1 MG/ML
INJECTION INTRAMUSCULAR; INTRAVENOUS AS NEEDED
Status: DISCONTINUED | OUTPATIENT
Start: 2017-06-07 | End: 2017-06-07 | Stop reason: SURG

## 2017-06-07 RX ORDER — ALBUMIN, HUMAN INJ 5% 5 %
SOLUTION INTRAVENOUS CONTINUOUS PRN
Status: DISCONTINUED | OUTPATIENT
Start: 2017-06-07 | End: 2017-06-07 | Stop reason: SURG

## 2017-06-07 RX ORDER — PROMETHAZINE HYDROCHLORIDE 25 MG/ML
12.5 INJECTION, SOLUTION INTRAMUSCULAR; INTRAVENOUS EVERY 6 HOURS PRN
Status: DISCONTINUED | OUTPATIENT
Start: 2017-06-07 | End: 2017-06-07 | Stop reason: RX

## 2017-06-07 RX ORDER — LABETALOL HYDROCHLORIDE 5 MG/ML
5 INJECTION, SOLUTION INTRAVENOUS
Status: DISCONTINUED | OUTPATIENT
Start: 2017-06-07 | End: 2017-06-07 | Stop reason: HOSPADM

## 2017-06-07 RX ORDER — NALOXONE HCL 0.4 MG/ML
0.1 VIAL (ML) INJECTION
Status: DISCONTINUED | OUTPATIENT
Start: 2017-06-07 | End: 2017-06-07 | Stop reason: SDUPTHER

## 2017-06-07 RX ORDER — TRAZODONE HYDROCHLORIDE 150 MG/1
150 TABLET ORAL NIGHTLY
Status: DISCONTINUED | OUTPATIENT
Start: 2017-06-07 | End: 2017-06-11 | Stop reason: HOSPADM

## 2017-06-07 RX ORDER — GABAPENTIN 400 MG/1
400 CAPSULE ORAL NIGHTLY
Status: DISCONTINUED | OUTPATIENT
Start: 2017-06-07 | End: 2017-06-11 | Stop reason: HOSPADM

## 2017-06-07 RX ORDER — LIDOCAINE HYDROCHLORIDE 20 MG/ML
INJECTION, SOLUTION INFILTRATION; PERINEURAL AS NEEDED
Status: DISCONTINUED | OUTPATIENT
Start: 2017-06-07 | End: 2017-06-07 | Stop reason: SURG

## 2017-06-07 RX ORDER — LEVALBUTEROL TARTRATE 45 UG/1
1 AEROSOL, METERED ORAL EVERY 6 HOURS PRN
Status: DISCONTINUED | OUTPATIENT
Start: 2017-06-07 | End: 2017-06-07 | Stop reason: CLARIF

## 2017-06-07 RX ORDER — FENTANYL CITRATE 50 UG/ML
INJECTION, SOLUTION INTRAMUSCULAR; INTRAVENOUS AS NEEDED
Status: DISCONTINUED | OUTPATIENT
Start: 2017-06-07 | End: 2017-06-07 | Stop reason: SURG

## 2017-06-07 RX ORDER — NALOXONE HCL 0.4 MG/ML
0.2 VIAL (ML) INJECTION AS NEEDED
Status: DISCONTINUED | OUTPATIENT
Start: 2017-06-07 | End: 2017-06-07 | Stop reason: HOSPADM

## 2017-06-07 RX ORDER — SODIUM CHLORIDE 9 MG/ML
75 INJECTION, SOLUTION INTRAVENOUS CONTINUOUS
Status: DISCONTINUED | OUTPATIENT
Start: 2017-06-07 | End: 2017-06-11 | Stop reason: HOSPADM

## 2017-06-07 RX ORDER — SODIUM CHLORIDE 0.9 % (FLUSH) 0.9 %
1-10 SYRINGE (ML) INJECTION AS NEEDED
Status: DISCONTINUED | OUTPATIENT
Start: 2017-06-07 | End: 2017-06-07 | Stop reason: HOSPADM

## 2017-06-07 RX ORDER — SUCCINYLCHOLINE CHLORIDE 20 MG/ML
INJECTION INTRAMUSCULAR; INTRAVENOUS AS NEEDED
Status: DISCONTINUED | OUTPATIENT
Start: 2017-06-07 | End: 2017-06-07 | Stop reason: SURG

## 2017-06-07 RX ORDER — ROCURONIUM BROMIDE 10 MG/ML
INJECTION, SOLUTION INTRAVENOUS AS NEEDED
Status: DISCONTINUED | OUTPATIENT
Start: 2017-06-07 | End: 2017-06-07 | Stop reason: SURG

## 2017-06-07 RX ORDER — SODIUM CHLORIDE 9 MG/ML
100 INJECTION, SOLUTION INTRAVENOUS CONTINUOUS
Status: DISCONTINUED | OUTPATIENT
Start: 2017-06-07 | End: 2017-06-07 | Stop reason: DRUGHIGH

## 2017-06-07 RX ORDER — SODIUM CHLORIDE 9 MG/ML
INJECTION, SOLUTION INTRAVENOUS CONTINUOUS PRN
Status: DISCONTINUED | OUTPATIENT
Start: 2017-06-07 | End: 2017-06-07 | Stop reason: SURG

## 2017-06-07 RX ORDER — AMLODIPINE BESYLATE 2.5 MG/1
2.5 TABLET ORAL DAILY
Status: DISCONTINUED | OUTPATIENT
Start: 2017-06-08 | End: 2017-06-11 | Stop reason: HOSPADM

## 2017-06-07 RX ORDER — SODIUM CHLORIDE, SODIUM GLUCONATE, SODIUM ACETATE, POTASSIUM CHLORIDE, AND MAGNESIUM CHLORIDE 526; 502; 368; 37; 30 MG/100ML; MG/100ML; MG/100ML; MG/100ML; MG/100ML
INJECTION, SOLUTION INTRAVENOUS CONTINUOUS PRN
Status: DISCONTINUED | OUTPATIENT
Start: 2017-06-07 | End: 2017-06-07 | Stop reason: SURG

## 2017-06-07 RX ORDER — PROPOFOL 10 MG/ML
VIAL (ML) INTRAVENOUS AS NEEDED
Status: DISCONTINUED | OUTPATIENT
Start: 2017-06-07 | End: 2017-06-07 | Stop reason: SURG

## 2017-06-07 RX ORDER — FLUMAZENIL 0.1 MG/ML
0.2 INJECTION INTRAVENOUS AS NEEDED
Status: DISCONTINUED | OUTPATIENT
Start: 2017-06-07 | End: 2017-06-07 | Stop reason: HOSPADM

## 2017-06-07 RX ORDER — ONDANSETRON 4 MG/1
4 TABLET, FILM COATED ORAL EVERY 6 HOURS PRN
Status: DISCONTINUED | OUTPATIENT
Start: 2017-06-07 | End: 2017-06-11 | Stop reason: HOSPADM

## 2017-06-07 RX ORDER — NALOXONE HCL 0.4 MG/ML
0.1 VIAL (ML) INJECTION
Status: DISCONTINUED | OUTPATIENT
Start: 2017-06-07 | End: 2017-06-11 | Stop reason: HOSPADM

## 2017-06-07 RX ORDER — NITROGLYCERIN 0.4 MG/1
0.4 TABLET SUBLINGUAL
Status: DISCONTINUED | OUTPATIENT
Start: 2017-06-07 | End: 2017-06-11 | Stop reason: HOSPADM

## 2017-06-07 RX ORDER — MORPHINE SULFATE 1 MG/ML
INJECTION INTRAVENOUS CONTINUOUS
Status: DISCONTINUED | OUTPATIENT
Start: 2017-06-07 | End: 2017-06-09

## 2017-06-07 RX ORDER — LEVALBUTEROL TARTRATE 45 UG/1
1 AEROSOL, METERED ORAL EVERY 6 HOURS PRN
Status: DISCONTINUED | OUTPATIENT
Start: 2017-06-07 | End: 2017-06-08 | Stop reason: SDDI

## 2017-06-07 RX ADMIN — FENTANYL CITRATE 50 MCG: 50 INJECTION, SOLUTION INTRAMUSCULAR; INTRAVENOUS at 15:22

## 2017-06-07 RX ADMIN — ROCURONIUM BROMIDE 10 MG: 10 INJECTION INTRAVENOUS at 13:46

## 2017-06-07 RX ADMIN — ROCURONIUM BROMIDE 30 MG: 10 INJECTION INTRAVENOUS at 11:25

## 2017-06-07 RX ADMIN — SUCCINYLCHOLINE CHLORIDE 80 MG: 20 INJECTION, SOLUTION INTRAMUSCULAR; INTRAVENOUS at 11:09

## 2017-06-07 RX ADMIN — LISINOPRIL 5 MG: 5 TABLET ORAL at 18:16

## 2017-06-07 RX ADMIN — FENTANYL CITRATE 50 MCG: 50 INJECTION, SOLUTION INTRAMUSCULAR; INTRAVENOUS at 12:20

## 2017-06-07 RX ADMIN — FENTANYL CITRATE 50 MCG: 50 INJECTION, SOLUTION INTRAMUSCULAR; INTRAVENOUS at 11:13

## 2017-06-07 RX ADMIN — CEFAZOLIN 1 G: 1 INJECTION, POWDER, FOR SOLUTION INTRAMUSCULAR; INTRAVENOUS; PARENTERAL at 18:52

## 2017-06-07 RX ADMIN — SODIUM CHLORIDE: 9 INJECTION, SOLUTION INTRAVENOUS at 11:19

## 2017-06-07 RX ADMIN — FENTANYL CITRATE 50 MCG: 50 INJECTION, SOLUTION INTRAMUSCULAR; INTRAVENOUS at 15:17

## 2017-06-07 RX ADMIN — ALBUMIN HUMAN: 0.05 INJECTION, SOLUTION INTRAVENOUS at 14:35

## 2017-06-07 RX ADMIN — SODIUM CHLORIDE, SODIUM GLUCONATE, SODIUM ACETATE, POTASSIUM CHLORIDE, AND MAGNESIUM CHLORIDE: 526; 502; 368; 37; 30 INJECTION, SOLUTION INTRAVENOUS at 14:51

## 2017-06-07 RX ADMIN — GLYCOPYRROLATE 0.6 MG: 0.2 INJECTION, SOLUTION INTRAMUSCULAR; INTRAVENOUS at 14:53

## 2017-06-07 RX ADMIN — MIDAZOLAM 2 MG: 1 INJECTION INTRAMUSCULAR; INTRAVENOUS at 11:06

## 2017-06-07 RX ADMIN — IPRATROPIUM BROMIDE 0.5 MG: 0.5 SOLUTION RESPIRATORY (INHALATION) at 19:10

## 2017-06-07 RX ADMIN — PROPOFOL 120 MG: 10 INJECTION, EMULSION INTRAVENOUS at 11:13

## 2017-06-07 RX ADMIN — GABAPENTIN 400 MG: 400 CAPSULE ORAL at 20:56

## 2017-06-07 RX ADMIN — FENTANYL CITRATE 100 MCG: 50 INJECTION, SOLUTION INTRAMUSCULAR; INTRAVENOUS at 11:48

## 2017-06-07 RX ADMIN — PROPOFOL 30 MG: 10 INJECTION, EMULSION INTRAVENOUS at 15:04

## 2017-06-07 RX ADMIN — FENTANYL CITRATE 100 MCG: 50 INJECTION, SOLUTION INTRAMUSCULAR; INTRAVENOUS at 12:54

## 2017-06-07 RX ADMIN — LIDOCAINE HYDROCHLORIDE 40 MG: 20 INJECTION, SOLUTION INFILTRATION; PERINEURAL at 11:13

## 2017-06-07 RX ADMIN — SODIUM CHLORIDE 100 ML/HR: 9 INJECTION, SOLUTION INTRAVENOUS at 09:32

## 2017-06-07 RX ADMIN — ROCURONIUM BROMIDE 10 MG: 10 INJECTION INTRAVENOUS at 12:16

## 2017-06-07 RX ADMIN — FENTANYL CITRATE 50 MCG: 50 INJECTION, SOLUTION INTRAMUSCULAR; INTRAVENOUS at 15:26

## 2017-06-07 RX ADMIN — HYDROMORPHONE HYDROCHLORIDE 0.5 MG: 1 INJECTION, SOLUTION INTRAMUSCULAR; INTRAVENOUS; SUBCUTANEOUS at 15:47

## 2017-06-07 RX ADMIN — PHENYLEPHRINE HYDROCHLORIDE 100 MCG: 10 INJECTION INTRAVENOUS at 14:26

## 2017-06-07 RX ADMIN — ROCURONIUM BROMIDE 10 MG: 10 INJECTION INTRAVENOUS at 12:55

## 2017-06-07 RX ADMIN — TRAZODONE HYDROCHLORIDE 150 MG: 150 TABLET ORAL at 20:56

## 2017-06-07 RX ADMIN — NEOSTIGMINE METHYLSULFATE 3 MG: 1 INJECTION, SOLUTION INTRAMUSCULAR; INTRAVENOUS; SUBCUTANEOUS at 14:53

## 2017-06-07 RX ADMIN — HYDROMORPHONE HYDROCHLORIDE 0.5 MG: 1 INJECTION, SOLUTION INTRAMUSCULAR; INTRAVENOUS; SUBCUTANEOUS at 15:58

## 2017-06-07 RX ADMIN — Medication: at 16:09

## 2017-06-07 RX ADMIN — SODIUM CHLORIDE: 9 INJECTION, SOLUTION INTRAVENOUS at 12:50

## 2017-06-07 RX ADMIN — ALBUMIN HUMAN: 0.05 INJECTION, SOLUTION INTRAVENOUS at 14:23

## 2017-06-07 RX ADMIN — FENTANYL CITRATE 50 MCG: 50 INJECTION, SOLUTION INTRAMUSCULAR; INTRAVENOUS at 12:30

## 2017-06-07 RX ADMIN — SODIUM CHLORIDE 125 ML/HR: 900 INJECTION, SOLUTION INTRAVENOUS at 20:13

## 2017-06-07 RX ADMIN — CEFAZOLIN SODIUM 2 G: 1 INJECTION, POWDER, FOR SOLUTION INTRAMUSCULAR; INTRAVENOUS at 11:27

## 2017-06-07 NOTE — INTERVAL H&P NOTE
H&P reviewed. The patient was examined and there are no changes to the H&P.         This document has been electronically signed by Grant Morris MD on June 7, 2017 11:10 AM

## 2017-06-07 NOTE — BRIEF OP NOTE
NISSEN FUNDOPLICATION  Procedure Note    Maria Yepez  6/7/2017    Pre-op Diagnosis:   Dysphagia, unspecified type [R13.10]    Post-op Diagnosis:     Post-Op Diagnosis Codes:     * Dysphagia, unspecified type [R13.10]    Procedure/CPT® Codes:      Procedure(s):  OPEN REVISION OF NISSEN FUNDOPLICATION WITH GASTROSTOMY TUBE PLACEMENT     Surgeon(s):  Grant Morris MD    Anesthesia: General    Staff:   Circulator: Trisha Mirza RN; Oanh Mckinney RN  Scrub Person: Serge Tinoco; Kathia Murphy  Assistant: Nohelia Mas    Estimated Blood Loss: 200 mL  Urine Voided: 390 mL    Specimens:                  ID Type Source Tests Collected by Time Destination   1 :  Arterial Blood Blood, Arterial Line BLOOD GAS, ARTERIAL Shelly Joe CRNA 6/7/2017 1449          Drains:   Naso/Oral/Gastric Tube 06/07/17 1252 nasogastric 18 right nostril (Active)       Naso/Oral/Gastric Tube 06/07/17 1431 22 (Active)       Urethral Catheter 06/07/17 1128 100% silicone 16 10 10 (Active)         IVF: 2700cc Crystalloid, 500cc 5% Albumin    Implant: 22 Fr Gtube    Findings: Nissen constructed around 56Fr Bougie    Complications: none noted.          This document has been electronically signed by Grant Morris MD on June 7, 2017 3:33 PM      Grant Morris MD     Date: 6/7/2017  Time: 3:29 PM

## 2017-06-07 NOTE — PLAN OF CARE
Problem: Patient Care Overview (Adult)  Goal: Plan of Care Review  Outcome: Ongoing (interventions implemented as appropriate)    06/07/17 1641   Coping/Psychosocial Response Interventions   Plan Of Care Reviewed With patient   Patient Care Overview   Progress improving   Outcome Evaluation   Outcome Summary/Follow up Plan vss. pain to shoulders better. pt rests comfortably when not disturbed - has not used pca in pacu. mcneal patent. gtube patent - with thick drainage noted in tubing only. ready for dc to icu.       Goal: Adult Individualization and Mutuality  Outcome: Ongoing (interventions implemented as appropriate)    Problem: Perioperative Period (Adult)  Goal: Signs and Symptoms of Listed Potential Problems Will be Absent or Manageable (Perioperative Period)  Outcome: Ongoing (interventions implemented as appropriate)

## 2017-06-07 NOTE — H&P (VIEW-ONLY)
"CHIEF COMPLAINT:    Follow up UGI, dysphagia    HISTORY OF PRESENT ILLNESS:    Maria Yepez is a 61 y.o. female who has been seen previously for dysphagia and weight loss following a Nissen fundoplication in 2005.  She is now undergone an EGD as well as an upper GI.  On her upper GI she was found to have lodging of the barium tablet near the gastroesophageal junction.  She states that she has continued dysphagia and upper abdominal and back pain at home along with anxiety due to her dysphagia.    We discussed her upper GI results today.    Past Medical History:   Diagnosis Date   • Abdominal pain    • Acute bronchitis    • Acute pancreatitis    • Acute posthemorrhagic anemia    • Acute sinusitis    • Anal pain    • Anemia    • Anemia due to blood loss    • Anxiety    • Backache     mid and lower back     • Chronic back pain    • Chronic obstructive lung disease    • Coronary atherosclerosis    • Diabetic neuropathy    • Diarrhea    • Diverticulosis of colon without diverticulitis    • Dysphagia    • Dysuria    • Emphysema/COPD     \"Emphysema\"   • Encounter for immunization    • Epigastric pain    • Esophagitis     with stricture   • Essential hypertension    • Fatigue    • Foot pain    • Ganglion cyst of wrist     left wrist     • Gastroesophageal reflux disease    • Generalized abdominal pain    • Gout    • Headache    • Herpes simplex    • Hip pain, bilateral    • Hoarse    • Hyperlipidemia    • Incontinence of feces    • Insomnia    • Irritable bowel syndrome    • Joint pain    • Knee pain    • Left lower quadrant pain    • Left upper quadrant pain    • Leg pain    • Leukorrhea     not specified as infective    • Low back pain     injury   • Muscle pain    • Nausea    • Nausea    • Nausea and vomiting    • Neck pain    • Neck pain    • Noncompliance with medication regimen    • Osteopenia    • Pain in lower back    • Pain in right femur    • Palpitations    • Productive cough    • Scoliosis deformity of " spine    • Screening examination for venereal disease    • Shoulder pain, left    • Stricture of esophagus    • Symptomatic menopausal or female climacteric states    • Synovitis and tenosynovitis     left forearm   • Tenderness     Tenderness of left upper quadrant of abdomen      • Thoracic back pain    • Type 2 diabetes mellitus    • Upper abdominal pain    • Urgency of urination     Urgent desire to urinate      • Urinary frequency     due to benign prostatic hypertrophy      • Urinary tract infection     site not specified   • Urinary tract infectious disease    • Venous varices    • Weakness     General symptom - weakness    • Weight gain        Past Surgical History:   Procedure Laterality Date   • BREAST BIOPSY      Stereotactic breast biopsy (1)  left   • CARDIAC CATHETERIZATION  2014    Cardiac cath 73613 (1)  Non obstructive coronary artery disease. Normal left ventricular systolic function. Performed at [facility].   • CARPAL TUNNEL RELEASE     •  SECTION     • CHOLECYSTECTOMY      laparoscopic   • COLONOSCOPY N/A 2017    Procedure: COLONOSCOPY;  Surgeon: Elia Cheatham MD;  Location: [facility] ENDOSCOPY;  Service:    • COLONOSCOPY W/ POLYPECTOMY  2015    Colonoscopy remove polyps 47389 (1)  Internal and external hemorrhoids found. Colonoscopy with biopsy.   • ENDOSCOPY  10/24/2012    Colon endoscopy 68751 (2)  internal & external hemorrhoids found. Diverticulum in sigmoid colon.   • ENDOSCOPY  2014    EGD w/ Dilatation 57933 (1)  Esophageal stricture was present. Dilatation performed. Gastritis found in the stomach. Biopsy taken. Normal duodenum.   • ENDOSCOPY  2013    EGD w/ tube 44274 (3)  Normal esophagus. Gastritis in stomach. Biopsy taken. Normal duodenum. Biopsy taken.   • ENDOSCOPY N/A 2017    Procedure: ESOPHAGOGASTRODUODENOSCOPY, possible dilation;  Surgeon: Grant Morris MD;  Location: [facility] ENDOSCOPY;  Service:     • FRACTURE SURGERY Right     Arm Surgery (1)  right, arm fracture   • HERNIA REPAIR      Hernia repair w/mesh (1)   • HYSTERECTOMY      Anesth, hysterectomy (1)   • INJECTION OF MEDICATION  05/27/2016    Depo Medrol (Methylprednisone) 80mg (2)  Ordered By: BRIAN LOU ([facility])    • INJECTION OF MEDICATION  03/10/2016    Kenalog (4)  Ordered By: ARNOLDO MURDOCK ([facility])    • INJECTION OF MEDICATION  01/07/2016    Rocephin (1)  Ordered By: ARNOLDO MURDOCK ([facility])    • LYMPH NODE BIOPSY      Biopsy/removal, lymph node(s) (1)  cervical node biopsy   • NISSEN FUNDOPLICATION     • OTHER SURGICAL HISTORY      Tubal ligation (1)   • PARAESOPHAGEAL HERNIA REPAIR     • TOTAL ABDOMINAL HYSTERECTOMY      RUPALI BSO         Current Outpatient Prescriptions:   •  amLODIPine (NORVASC) 2.5 MG tablet, Take 2.5 mg by mouth daily. (unconfirmed), Disp: , Rfl:   •  aspirin 81 MG chewable tablet, Chew 81 mg daily. (unconfirmed), Disp: , Rfl:   •  clonazePAM (KLONOPIN) 0.5 MG tablet, 0.5 mg 2 (two) times a day as needed. 1/2 in the am and 1/2 in the pm prn  (unconfirmed), Disp: , Rfl:   •  famciclovir (FAMVIR) 500 MG tablet, Take 500 mg by mouth daily. (unconfirmed), Disp: , Rfl:   •  FLUoxetine (PROzac) 20 MG capsule, Take 1 capsule by mouth Daily., Disp: 30 capsule, Rfl: 5  •  furosemide (LASIX) 20 MG tablet, Take 1 tablet by mouth Daily. (unconfirmed), Disp: 30 tablet, Rfl: 5  •  gabapentin (NEURONTIN) 400 MG capsule, Take 400 mg by mouth Every Night., Disp: , Rfl:   •  guaiFENesin (MUCINEX) 600 MG 12 hr tablet, Take 600 mg by mouth 2 (Two) Times a Day., Disp: , Rfl:   •  levalbuterol (XOPENEX HFA) 45 MCG/ACT inhaler, Inhale 1 puff Every 6 (Six) Hours As Needed for wheezing., Disp: 15 g, Rfl: 11  •  lisinopril (PRINIVIL,ZESTRIL) 5 MG tablet, Take 1 tablet by mouth Daily., Disp: 30 tablet, Rfl: 5  •  metFORMIN (GLUCOPHAGE) 500 MG tablet, Take 1 tablet by mouth 2 (Two) Times a Day With Meals., Disp: 60 tablet, Rfl: 5  •  nitroglycerin (NITROSTAT)  0.4 MG SL tablet, Place 0.4 mg under the tongue Every 5 (Five) Minutes As Needed., Disp: , Rfl:   •  Omega-3 Fatty Acids (FISH OIL) 1000 MG capsule capsule, Take 1,000 mg by mouth Daily With Breakfast., Disp: , Rfl:   •  omeprazole (PRILOSEC) 20 MG capsule, Take 1 capsule by mouth 2 (Two) Times a Day. Prilosec 20 mg capsule,delayed release  (unconfirmed), Disp: 60 capsule, Rfl: 5  •  ondansetron (ZOFRAN) 4 MG tablet, Take 1 tablet by mouth Every 8 (Eight) Hours As Needed for Nausea or Vomiting., Disp: 20 tablet, Rfl: 1  •  pravastatin (PRAVACHOL) 80 MG tablet, Take 80 mg by mouth Daily., Disp: , Rfl:   •  tiotropium (SPIRIVA) 18 MCG per inhalation capsule, Place 1 capsule into inhaler and inhale Daily., Disp: , Rfl:   •  tiZANidine (ZANAFLEX) 4 MG tablet, Take 4 mg by mouth As Needed., Disp: , Rfl:   •  traMADol (ULTRAM) 50 MG tablet, Take 50 mg by mouth Every 6 (Six) Hours As Needed (knee pain). (unconfirmed) , Disp: , Rfl:   •  traZODone (DESYREL) 150 MG tablet, Take 150 mg by mouth Every Night., Disp: , Rfl:   •  vitamin D (ERGOCALCIFEROL) 97405 UNITS capsule capsule, Take 50,000 Units by mouth Every 14 (Fourteen) Days., Disp: , Rfl:     Allergies   Allergen Reactions   • Albuterol Shortness Of Breath and Palpitations   • Ibuprofen GI Intolerance   • Shellfish-Derived Products Itching       Family History   Problem Relation Age of Onset   • Lung cancer Mother    • Hypertension Mother    • Lymphoma Father      NonHodgkins   • Hypertension Father    • Diabetes Other    • Hypertension Other    • Hypertension Sister    • Diabetes Sister    • Hypertension Brother    • Diabetes Brother        Social History     Social History   • Marital status: Single     Spouse name: N/A   • Number of children: N/A   • Years of education: N/A     Occupational History   • Not on file.     Social History Main Topics   • Smoking status: Former Smoker     Quit date: 2007   • Smokeless tobacco: Never Used   • Alcohol use No   • Drug use:  "No   • Sexual activity: No     Other Topics Concern   • Not on file     Social History Narrative       Review of Systems   Constitutional: Positive for appetite change and unexpected weight change. Negative for chills and fever.   HENT: Negative for hearing loss, nosebleeds and trouble swallowing.    Eyes: Negative for visual disturbance.   Respiratory: Negative for apnea, cough, choking, chest tightness, shortness of breath, wheezing and stridor.    Cardiovascular: Negative for chest pain, palpitations and leg swelling.   Gastrointestinal: Positive for abdominal pain. Negative for abdominal distention, blood in stool, constipation, diarrhea, nausea and vomiting.        Dysphagia   Endocrine: Negative for cold intolerance, heat intolerance, polydipsia, polyphagia and polyuria.   Genitourinary: Negative for difficulty urinating, dysuria, frequency, hematuria and urgency.   Musculoskeletal: Negative for arthralgias, back pain, myalgias and neck pain.   Skin: Negative for color change, pallor and rash.   Allergic/Immunologic: Negative for immunocompromised state.   Neurological: Negative for dizziness, seizures, syncope, light-headedness, numbness and headaches.   Hematological: Negative for adenopathy.   Psychiatric/Behavioral: Negative for suicidal ideas. The patient is not nervous/anxious.        Objective     /72  Ht 62\" (157.5 cm)  Wt 108 lb (49 kg)  BMI 19.75 kg/m2    Physical Exam   Constitutional: She is oriented to person, place, and time. She appears well-developed and well-nourished. No distress.   HENT:   Head: Normocephalic and atraumatic.   Eyes: Conjunctivae and EOM are normal. Pupils are equal, round, and reactive to light. Right eye exhibits no discharge. Left eye exhibits no discharge.   Neck: Normal range of motion. Neck supple. No JVD present. No tracheal deviation present. No thyromegaly present.   Cardiovascular: Normal rate, regular rhythm and normal heart sounds.  Exam reveals no gallop " and no friction rub.    No murmur heard.  Pulmonary/Chest: Effort normal and breath sounds normal. No respiratory distress. She has no wheezes. She has no rales. She exhibits no tenderness.   Abdominal: Soft. She exhibits no distension and no mass. There is no tenderness. There is no rebound and no guarding. No hernia.   Musculoskeletal: Normal range of motion. She exhibits no edema, tenderness or deformity.   Neurological: She is alert and oriented to person, place, and time. No cranial nerve deficit.   Skin: Skin is warm and dry. No rash noted. She is not diaphoretic. No erythema. No pallor.   Psychiatric: She has a normal mood and affect. Her behavior is normal. Judgment and thought content normal.       DIAGNOSTIC DATA:    UGI images reviewed, shows intact Nissen with lodging of barium tablet above wrap.    ASSESSMENT:    Dysphagia s/p Open Nissen    PLAN:    I discussed her swallow study with her today.  She clearly had lodging of the barium tablet in the area of her wrap.  She did not appear to have an area that was amenable to dilation on EGD, and she has had prior dilation with minimal relief.  In any care, she is not interested in undergoing serial dilations.  Therefore, the only option would then be to revise her wrap.  Risks and benefits of Open Revision of Nissen fundoplicaiton with gastrostomy tube placement were discussed with the patient and she is agreeable with proceeding.  Will plan for OR on 6/7/17.          This document has been electronically signed by Grant Morris MD on June 5, 2017 10:31 AM

## 2017-06-07 NOTE — ANESTHESIA PREPROCEDURE EVALUATION
Anesthesia Evaluation     Patient summary reviewed and Nursing notes reviewed   NPO Solid Status: > 8 hours  NPO Liquid Status: > 8 hours     Airway   Mallampati: II  TM distance: >3 FB  Neck ROM: full  possible difficult intubation  Dental    (+) upper dentures and edentulous    Pulmonary - normal exam   (+) COPD ( she feels she is breathing well today.) mild,   Cardiovascular - normal exam    ECG reviewed    (+) hypertension well controlled, CAD ( had a catheterization in 2013 which revealed 30% blockage: did not need intervention and medically manages this.), hyperlipidemia    ROS comment: She has known medically managed CAD without definite anginal symptoms (clouded by her GERD symptoms and lower esophageal symptoms).  She is able to walk 100 yards.    Neuro/Psych  (+) psychiatric history Anxiety and Depression,    GI/Hepatic/Renal/Endo    (+)  GERD ( she had her omeprazole and prozac capsules stuck at her GE-junction. I gave her a sip of H2O at 10:20 with some improvement.), diabetes mellitus ( accucheck= 96) type 2,     Musculoskeletal     (+) chronic pain ( has chronic low back pain with scoliosis (no radiculopathy)),   Abdominal    Substance History      OB/GYN          Other   (+) arthritis     Blood dyscrasia:  has chronic anemia with H/H= 11.3/33.8 on 6/8/17. Dr. Morris has ordered a T&S.    Other Comment: Has some osteopenia                                  Anesthesia Plan    ASA 3     general     intravenous induction   Anesthetic plan and risks discussed with patient and sibling.

## 2017-06-07 NOTE — ANESTHESIA PROCEDURE NOTES
Airway  Urgency: elective    End Time:6/7/2017 11:15 AM  Airway not difficult    General Information and Staff    Patient location during procedure: OR  CRNA: MARYCHUY WOODWARD    Indications and Patient Condition  Indications for airway management: airway protection    Preoxygenated: yes  MILS maintained throughout  Mask difficulty assessment: 0 - not attempted    Final Airway Details  Final airway type: endotracheal airway      Successful airway: ETT  Cuffed: yes   Successful intubation technique: direct laryngoscopy  Facilitating devices/methods: intubating stylet  Endotracheal tube insertion site: oral  Blade: Re  Blade size: #3  ETT size: 7.0 mm  Cormack-Lehane Classification: grade I - full view of glottis  Placement verified by: chest auscultation and capnometry   Cuff volume (mL): 6  Measured from: lips  ETT to lips (cm): 19  Number of attempts at approach: 1

## 2017-06-07 NOTE — ANESTHESIA POSTPROCEDURE EVALUATION
Patient: Maria Yepez    Procedure Summary     Date Anesthesia Start Anesthesia Stop Room / Location    06/07/17 1109 1529  [facility] OR 03 / [facility]       Procedure Diagnosis Surgeon Provider    OPEN REVISION OF NISSEN FUNDOPLICATION WITH GASTROSTOMY TUBE PLACEMENT  (N/A Abdomen) Dysphagia, unspecified type  (Dysphagia, unspecified type [R13.10]) MD Shelly Florez, BUSTER          Anesthesia Type: general  Last vitals  BP      Temp      Pulse     Resp      SpO2        Post Anesthesia Care and Evaluation    Patient location during evaluation: PACU  Patient participation: complete - patient participated  Level of consciousness: awake  Pain score: 2  Pain management: adequate  Airway patency: patent  Anesthetic complications: No anesthetic complications  PONV Status: none  Cardiovascular status: acceptable  Respiratory status: acceptable  Hydration status: acceptable

## 2017-06-07 NOTE — ANESTHESIA PROCEDURE NOTES
Peripheral IV    Patient location during procedure: OR  End time: 6/7/2017 11:18 AM  Line placed for Fluids/Medication Admin.  Performed By   CRNA: MARYCHUY WOODWARD  Preanesthetic Checklist  Completed: patient identified, site marked, surgical consent, pre-op evaluation, timeout performed, IV checked, risks and benefits discussed and monitors and equipment checked  Peripheral IV Prep   Patient position: supine   Prep: ChloraPrep  Patient monitoring: heart rate, cardiac monitor and continuous pulse ox  Peripheral IV Procedure   Laterality:left  Location:  Wrist  Catheter size: 16 G         Post Assessment   Dressing Type: tape and transparent.    IV Dressing/Site: clean, dry and intact

## 2017-06-08 ENCOUNTER — APPOINTMENT (OUTPATIENT)
Dept: GENERAL RADIOLOGY | Facility: HOSPITAL | Age: 62
End: 2017-06-08

## 2017-06-08 LAB
ALBUMIN SERPL-MCNC: 3 G/DL (ref 3.4–4.8)
ALBUMIN/GLOB SERPL: 1.4 G/DL (ref 1.1–1.8)
ALP SERPL-CCNC: 91 U/L (ref 38–126)
ALT SERPL W P-5'-P-CCNC: 289 U/L (ref 9–52)
ANION GAP SERPL CALCULATED.3IONS-SCNC: 12 MMOL/L (ref 5–15)
AST SERPL-CCNC: 307 U/L (ref 14–36)
BASOPHILS # BLD AUTO: 0.01 10*3/MM3 (ref 0–0.2)
BASOPHILS NFR BLD AUTO: 0.1 % (ref 0–2)
BILIRUB SERPL-MCNC: 0.7 MG/DL (ref 0.2–1.3)
BUN BLD-MCNC: 14 MG/DL (ref 7–21)
BUN/CREAT SERPL: 23.7 (ref 7–25)
CALCIUM SPEC-SCNC: 7.8 MG/DL (ref 8.4–10.2)
CHLORIDE SERPL-SCNC: 104 MMOL/L (ref 95–110)
CO2 SERPL-SCNC: 23 MMOL/L (ref 22–31)
CREAT BLD-MCNC: 0.59 MG/DL (ref 0.5–1)
DEPRECATED RDW RBC AUTO: 41.3 FL (ref 36.4–46.3)
EOSINOPHIL # BLD AUTO: 0.05 10*3/MM3 (ref 0–0.7)
EOSINOPHIL NFR BLD AUTO: 0.4 % (ref 0–7)
ERYTHROCYTE [DISTWIDTH] IN BLOOD BY AUTOMATED COUNT: 13.2 % (ref 11.5–14.5)
GFR SERPL CREATININE-BSD FRML MDRD: 104 ML/MIN/1.73 (ref 45–104)
GLOBULIN UR ELPH-MCNC: 2.2 GM/DL (ref 2.3–3.5)
GLUCOSE BLD-MCNC: 84 MG/DL (ref 60–100)
HCT VFR BLD AUTO: 23 % (ref 35–45)
HCT VFR BLD AUTO: 23.1 % (ref 35–45)
HGB BLD-MCNC: 7.5 G/DL (ref 12–15.5)
HGB BLD-MCNC: 7.6 G/DL (ref 12–15.5)
HOLD SPECIMEN: NORMAL
IMM GRANULOCYTES # BLD: 0.05 10*3/MM3 (ref 0–0.02)
IMM GRANULOCYTES NFR BLD: 0.4 % (ref 0–0.5)
LYMPHOCYTES # BLD AUTO: 0.31 10*3/MM3 (ref 0.6–4.2)
LYMPHOCYTES NFR BLD AUTO: 2.6 % (ref 10–50)
MCH RBC QN AUTO: 28.1 PG (ref 26.5–34)
MCHC RBC AUTO-ENTMCNC: 32.6 G/DL (ref 31.4–36)
MCV RBC AUTO: 86.1 FL (ref 80–98)
MONOCYTES # BLD AUTO: 0.62 10*3/MM3 (ref 0–0.9)
MONOCYTES NFR BLD AUTO: 5.1 % (ref 0–12)
NEUTROPHILS # BLD AUTO: 11 10*3/MM3 (ref 2–8.6)
NEUTROPHILS NFR BLD AUTO: 91.4 % (ref 37–80)
PLATELET # BLD AUTO: 209 10*3/MM3 (ref 150–450)
PMV BLD AUTO: 9.3 FL (ref 8–12)
POTASSIUM BLD-SCNC: 3.6 MMOL/L (ref 3.5–5.1)
PROT SERPL-MCNC: 5.2 G/DL (ref 6.3–8.6)
RBC # BLD AUTO: 2.67 10*6/MM3 (ref 3.77–5.16)
SODIUM BLD-SCNC: 139 MMOL/L (ref 137–145)
WBC NRBC COR # BLD: 12.04 10*3/MM3 (ref 3.2–9.8)

## 2017-06-08 PROCEDURE — 94640 AIRWAY INHALATION TREATMENT: CPT

## 2017-06-08 PROCEDURE — 94799 UNLISTED PULMONARY SVC/PX: CPT

## 2017-06-08 PROCEDURE — 85018 HEMOGLOBIN: CPT | Performed by: SURGERY

## 2017-06-08 PROCEDURE — 85025 COMPLETE CBC W/AUTO DIFF WBC: CPT | Performed by: SURGERY

## 2017-06-08 PROCEDURE — 85014 HEMATOCRIT: CPT | Performed by: SURGERY

## 2017-06-08 PROCEDURE — 25010000003 MORPHINE PER 10 MG: Performed by: SURGERY

## 2017-06-08 PROCEDURE — 0 DIATRIZOATE MEGLUMINE & SODIUM PER 1 ML: Performed by: SURGERY

## 2017-06-08 PROCEDURE — 25010000002 ENOXAPARIN PER 10 MG: Performed by: SURGERY

## 2017-06-08 PROCEDURE — 74240 X-RAY XM UPR GI TRC 1CNTRST: CPT

## 2017-06-08 PROCEDURE — 99024 POSTOP FOLLOW-UP VISIT: CPT | Performed by: SURGERY

## 2017-06-08 PROCEDURE — 80053 COMPREHEN METABOLIC PANEL: CPT | Performed by: SURGERY

## 2017-06-08 RX ADMIN — IPRATROPIUM BROMIDE 0.5 MG: 0.5 SOLUTION RESPIRATORY (INHALATION) at 21:26

## 2017-06-08 RX ADMIN — DIATRIZOATE MEGLUMINE AND DIATRIZOATE SODIUM 60 ML: 660; 100 LIQUID ORAL; RECTAL at 10:15

## 2017-06-08 RX ADMIN — TRAZODONE HYDROCHLORIDE 150 MG: 150 TABLET ORAL at 22:20

## 2017-06-08 RX ADMIN — SODIUM CHLORIDE 125 ML/HR: 900 INJECTION, SOLUTION INTRAVENOUS at 09:37

## 2017-06-08 RX ADMIN — GABAPENTIN 400 MG: 400 CAPSULE ORAL at 22:20

## 2017-06-08 RX ADMIN — AMLODIPINE BESYLATE 2.5 MG: 2.5 TABLET ORAL at 09:17

## 2017-06-08 RX ADMIN — FLUOXETINE 20 MG: 20 CAPSULE ORAL at 09:17

## 2017-06-08 RX ADMIN — SODIUM CHLORIDE 125 ML/HR: 900 INJECTION, SOLUTION INTRAVENOUS at 19:34

## 2017-06-08 RX ADMIN — LISINOPRIL 5 MG: 5 TABLET ORAL at 09:16

## 2017-06-08 RX ADMIN — ENOXAPARIN SODIUM 40 MG: 40 INJECTION SUBCUTANEOUS at 09:17

## 2017-06-08 RX ADMIN — CEFAZOLIN 1 G: 1 INJECTION, POWDER, FOR SOLUTION INTRAMUSCULAR; INTRAVENOUS; PARENTERAL at 03:28

## 2017-06-08 RX ADMIN — IPRATROPIUM BROMIDE 0.5 MG: 0.5 SOLUTION RESPIRATORY (INHALATION) at 08:01

## 2017-06-08 RX ADMIN — IPRATROPIUM BROMIDE 0.5 MG: 0.5 SOLUTION RESPIRATORY (INHALATION) at 15:48

## 2017-06-08 RX ADMIN — Medication: at 22:47

## 2017-06-08 RX ADMIN — Medication: at 09:17

## 2017-06-08 NOTE — PLAN OF CARE
Problem: Patient Care Overview (Adult)  Goal: Plan of Care Review  Outcome: Ongoing (interventions implemented as appropriate)    06/08/17 7360   Coping/Psychosocial Response Interventions   Plan Of Care Reviewed With patient;caregiver   Patient Care Overview   Progress no change   Outcome Evaluation   Outcome Summary/Follow up Plan Initial assessment of pt who is POD #1--Open revision of Nissen fundoplication with GTube. she has a hx of a very significant wt loss of ~70# due to poor po tolerance and dysphagia. Rd will continue to monitor diet initiation and tolerance.

## 2017-06-08 NOTE — PROGRESS NOTES
GENERAL SURGERY PROGRESS NOTE  Chief Complaint:  Surgery Follow up   LOS: 1 day       Subjective     Interval History:     Feels well this AM. No issues overnight      Objective     Vital Signs  Temp:  [97.2 °F (36.2 °C)-98.5 °F (36.9 °C)] 98.5 °F (36.9 °C)  Heart Rate:  [] 112  Resp:  [14-20] 16  BP: (112-146)/(54-65) 129/59  Arterial Line BP: (128-164)/(48-66) 138/50    Physical Exam:   Abdomen appropriate, Gtube with bilious drainage.  Labs:  Lab Results (last 24 hours)     Procedure Component Value Units Date/Time    POC Glucose Fingerstick [[id number]]  (Normal) Collected:  06/07/17 0929    Specimen:  Blood Updated:  06/07/17 0942     Glucose 96 mg/dL       Meter: [id number]: [id number] [facility]       Blood Gas, Arterial [[id number]]  (Abnormal) Collected:  06/07/17 1436    Specimen:  Arterial Blood Updated:  06/07/17 1451     Site --      Not performed at this site.        Ray's Test --      Not performed at this site.        pH, Arterial 7.401 pH units      pCO2, Arterial 34.4 (L) mm Hg      pO2, Arterial 235.1 (H) mm Hg      HCO3, Arterial 20.9 (L) mmol/L      Base Excess, Arterial -3.4 (L) mmol/L      O2 Saturation, Arterial 99.1 %      Hemoglobin, Blood Gas 9.1 (L) g/dL      Hematocrit, Blood Gas 27.0 (L) %      CO2 Content 21.9 (L)     Sodium, Arterial 136.7 (L) mmol/L      Potassium, Arterial 3.45 (L) mmol/L      Glucose, Arterial 111 mmol/L      Barometric Pressure for Blood Gas -- mmHg       Not performed at this site.        Modality --      Not performed at this site.        Ionized Calcium 4.0 (L) mg/dL     POC Glucose Fingerstick [[id number]]  (Normal) Collected:  06/07/17 1704    Specimen:  Blood Updated:  06/07/17 1715     Glucose 110 mg/dL       Meter: [id number]: [id number] GERI VARELA       CBC & Differential [[id number]] Collected:  06/08/17 0349    Specimen:  Blood Updated:  06/08/17 0421    Narrative:       The following orders were created for panel order  CBC & Differential.  Procedure                               Abnormality         Status                     ---------                               -----------         ------                     CBC Auto Differential[[id number]]        Abnormal            Final result                 Please view results for these tests on the individual orders.    CBC Auto Differential [[id number]]  (Abnormal) Collected:  06/08/17 0349    Specimen:  Blood Updated:  06/08/17 0421     WBC 12.04 (H) 10*3/mm3      RBC 2.67 (L) 10*6/mm3      Hemoglobin 7.5 (L) g/dL      Hematocrit 23.0 (L) %      MCV 86.1 fL      MCH 28.1 pg      MCHC 32.6 g/dL      RDW 13.2 %      RDW-SD 41.3 fl      MPV 9.3 fL      Platelets 209 10*3/mm3      Neutrophil % 91.4 (H) %      Lymphocyte % 2.6 (L) %      Monocyte % 5.1 %      Eosinophil % 0.4 %      Basophil % 0.1 %      Immature Grans % 0.4 %      Neutrophils, Absolute 11.00 (H) 10*3/mm3      Lymphocytes, Absolute 0.31 (L) 10*3/mm3      Monocytes, Absolute 0.62 10*3/mm3      Eosinophils, Absolute 0.05 10*3/mm3      Basophils, Absolute 0.01 10*3/mm3      Immature Grans, Absolute 0.05 (H) 10*3/mm3     Comprehensive Metabolic Panel [[id number]]  (Abnormal) Collected:  06/08/17 0349    Specimen:  Blood Updated:  06/08/17 0440     Glucose 84 mg/dL      BUN 14 mg/dL      Creatinine 0.59 mg/dL      Sodium 139 mmol/L      Potassium 3.6 mmol/L      Chloride 104 mmol/L      CO2 23.0 mmol/L      Calcium 7.8 (L) mg/dL      Total Protein 5.2 (L) g/dL      Albumin 3.00 (L) g/dL      ALT (SGPT) 289 (H) U/L      AST (SGOT) 307 (H) U/L      Alkaline Phosphatase 91 U/L      Total Bilirubin 0.7 mg/dL      eGFR Non African Amer 104 mL/min/1.73      Globulin 2.2 (L) gm/dL      A/G Ratio 1.4 g/dL      BUN/Creatinine Ratio 23.7     Anion Gap 12.0 mmol/L     Hemoglobin & Hematocrit, Blood [[id number]]  (Abnormal) Collected:  06/08/17 0538    Specimen:  Blood Updated:  06/08/17 0545     Hemoglobin 7.6 (L) g/dL       Recollected to  check Hgb value.        Hematocrit 23.1 (L) %     Extra Tubes [[id number]] Collected:  06/08/17 0543    Specimen:  Blood from Blood, Venous Line Updated:  06/08/17 0701    Narrative:       The following orders were created for panel order Extra Tubes.  Procedure                               Abnormality         Status                     ---------                               -----------         ------                     Green Top (Gel)[[id number]]                                  Final result                 Please view results for these tests on the individual orders.    Green Top (Gel) [[id number]] Collected:  06/08/17 0543    Specimen:  Blood Updated:  06/08/17 0701     Extra Tube Hold for add-ons.      Auto resulted.              Results Review:     Labs and imaging for today were reviewed.    Assessment/Plan     Maria Yepez is a 61 y.o. female who is POD#1 open revision of Nissen and gastrostomy tube placement.      Anemia- acute blood loss on chronic, currently normotensive and mildly tachycardic-will monitor.    NICOLASA CORBETT  Will repeat UGI prior to initiating diet.          This document has been electronically signed by Grant Morris MD on June 8, 2017 7:17 AM        Grant Morris MD  06/08/17  7:17 AM

## 2017-06-08 NOTE — OP NOTE
NISSEN FUNDOPLICATION  Procedure Note    Maria Yepez  6/7/2017    Pre-op Diagnosis:   Dysphagia, unspecified type [R13.10]    Post-op Diagnosis:     Post-Op Diagnosis Codes:     * Dysphagia, unspecified type [R13.10]    Procedure/CPT® Codes:      Procedure(s):  OPEN REVISION OF NISSEN FUNDOPLICATION WITH GASTROSTOMY TUBE PLACEMENT     Surgeon(s):  Grant Morris MD    Anesthesia: General    Staff:     Circulator: Trisha Mirza RN; Oanh Mckinney RN  Scrub Person: Serge Tinoco; Kathia Murphy  Assistant: Nohelia Mas    Estimated Blood Loss: 200 mL    Specimens:                  ID Type Source Tests Collected by Time Destination   1 :  Arterial Blood Blood, Arterial Line BLOOD GAS, ARTERIAL Shelly Joe CRNA 6/7/2017 1449          Drains:   Naso/Oral/Gastric Tube 06/07/17 1431 22 (Active)   Clamp Status/Tolerance unclamped;no abdominal distention;no nausea;no restlessness 6/8/2017  4:00 AM   Suction Setting/Drainage Method dependent drainage 6/8/2017 11:00 AM   Drainage Color green, dark 6/8/2017 11:00 AM   Drainage Consistency thick 6/8/2017  4:00 AM   Insertion Site Appearance drainage 6/8/2017 11:00 AM   General output (mL) 60 6/8/2017  6:00 AM       [REMOVED] Naso/Oral/Gastric Tube 06/07/17 1252 nasogastric 18 right nostril (Removed)   Removed 06/07/17 1520       [REMOVED] Urethral Catheter 06/07/17 1128 100% silicone 16 10 10 (Removed)   Removed 06/08/17 1120   Daily Indications No longer indicated 6/8/2017  8:00 AM   Securement secured to upper leg with adhesive device 6/8/2017  8:00 AM   Catheter care done Yes 6/7/2017  8:00 PM   Tolerance no signs/symptoms of discomfort 6/7/2017  8:00 PM   Urine Output (mL) 300 6/8/2017 11:20 AM       Findings: see op note  Implant: 22Fr Gastrostomy tube  Complications: none    Indication: See H and P    Operative Note:    Patient was seen and consented preoperatively.  Following this she is taking the operating room and placed in supine position  on the OR table.  Gen. anesthetic was administered and the patient was orotracheally intubated without incident.  Irwin catheter was placed sterilely by nursing.  An arterial line was placed sterilely by the anesthesia service.  A preoperative briefing was then performed.  The abdomen was prepped and draped in normal sterile fashion and a timeout was then performed.    Following time out the patient's prior upper midline scar was excised using a knife and Bovie electrocautery.  Immediately below the scar there appeared to be abdominal fascia as well as mesh from her previous incisional hernia repair.  At the upper most portion of the incision I began carefully dissecting down through the abdominal wall fascia as well as the mesh which was then divided exposing the peritoneum as well as liver which appeared to be adherent to the mesh.  The mesh was then grasped with clamps and elevated upward to allow sharp dissection to remove the left lobe of the liver from below the mesh.  This dissection was then continued downward for the full length of our incision.  Dividing adhesions of the omentum away from the mesh as well.  Once the entire operative field was cleared of adhesions in the entire piece of mesh had been exposed a self-retaining retractor was then placed.    The patient was placed in reverse Trendelenburg position.  The left lobe of the liver was once again identified.  Adhesions of the left lobe of the liver to the diaphragm were then taken down so that the left lobe of the liver was freely mobile.  The stomach appeared to be adherent to the left lobe of the liver.    A harmonic dissector was then used to divide the lesser omentum near the inferior portion of the greater curvature of the stomach.  Once the lesser sac of the abdomen had been entered dissection of the greater curvature continued all the way up to the left tran of the diaphragm.  The stomach was then elevated upwards so that the posterior surface  was visible.  Loose adhesions of the stomach to the retroperitoneum were then taken down using the Harmonic as well.  Once this was done the stomach was replaced into the anatomic position.  Anesthesia then inserted and nasogastric tube which I was able to palpate coming through the diaphragmatic hiatus.  This aided in identification of the esophagus.    The lesser curvature of the of the stomach was then approached.  By placing a finger posteriorly I was able to see my finger in the loose area volar tissue above the lesser curvature of the stomach.  His was opened with the harmonic dissector.  I then carefully dissected up along the lesser curvature of the stomach.  Sharp dissection with scissors was used to separate the left lobe of the liver from the lesser curvature of the stomach.  Once the left lobe of the liver was completely mobilized away from the stomach it was retracted out of our field so that the diaphragmatic hiatus was exposed.  There did not appear to be a hiatal hernia.    At this point adhesions at the anterior portion of the stomach were taken down.  I did not visualize any sutures from a prior Nissen.  It did not appear that her previous wrap was intact, though there was scar tissue in this area.  Once the stomach had been completely mobilized and appeared to be lying flat in the normal anatomic position I turned my attention to performing a Nissen fundoplication.  The nasogastric tube was removed.  A 54 Luxembourgish bougie was passed through the esophagus and into the stomach with my assistance.  It appeared to pass easily.  It was used to aid in further dissection of the anterior portion of the stomach.  This dissector was then removed.  Blunt dissection was then used to completely mobilize the intra-abdominal esophagus.  The anterior and posterior vagus nerves were identified and appeared to be intact.    The fundus of the stomach was then grasped and passed posteriorly behind and around the  esophagus from patient's left to right.  A 56 Urdu bougie was then passed into the stomach without difficulty.  A Nissen wrap was then constructed using 0 Ethibond suture.  2 fundus to fundus bites were taken and secured.  The third suture incorporated a small bite of distal esophagus as well.  The bougie was then removed.  The lie of the wrap appeared to be in the appropriate position.    Satisfied with our fundoplication I turned my attention to insertion of a gastrostomy tube for potential decompression.  A pursestring suture of 3-0 Vicryl was placed on the distal body of the stomach.  A gastrotomy was made using cautery.  A 22 Urdu Malecot tube was placed through the gastrotomy and passed several centimeters down the greater curvature of the stomach.  The pursestring suture was then tied down and secured around the tube.  A Witzel tunnel was then constructed using interrupted 3-0 Vicryl suture for several centimeters along the body of the stomach.  A separate incision was made lateral to the edge of the patient's mesh and the tube was passed through the abdominal wall.  It was secured to the skin using a nylon suture.    At this point hemostasis was checked and appeared to be adequate.  I felt satisfied with the position of the wrap.  The retractor was removed and all sponge, instrument, and needle counts were confirmed to be correct.  The midline fascia and mesh were then closed using running #1 PDS suture.  The skin was then closed using running 4-0 Vicryl suture.  Sterile dressings were placed and the patient was awakened and returned recovery in good condition.          This document has been electronically signed by Grant Morris MD on June 8, 2017 5:17 PM            Grant Morris MD     Date: 6/8/2017  Time: 5:06 PM

## 2017-06-08 NOTE — CONSULTS
"Adult Nutrition  Assessment    Patient Name:  Maria Yepez  YOB: 1955  MRN: [id number]  Admit Date:  6/7/2017    Assessment Date:  6/8/2017          Reason for Assessment       06/08/17 1557    Reason for Assessment    Reason For Assessment/Visit identified at risk by screening criteria;dysphagia    Identified At Risk By Screening Criteria MST SCORE 2+;unintentional loss of 10 lbs or more in the past 2 mos;reduced oral intake over the last month                Nutrition/Diet History       06/08/17 1557    Nutrition/Diet History    Typical Food/Fluid Intake Pt reports a progressive wt loss over the last year of ~70# due to Gi distress and dysphagia but they finally found out what the problem was.  She just couldn't eat.              Anthropometrics       06/08/17 1559    Usual Body Weight (UBW)    Weight Loss 31.8 kg (70 lb)    Weight Loss Time Frame the past year --year and a half            Labs/Tests/Procedures/Meds       06/08/17 1559    Labs/Tests/Procedures/Meds    Labs/Tests Review Reviewed    Medication Review Reviewed, pertinent            Physical Findings       06/08/17 1559    Physical Findings/Assessment    Additional Documentation Physical Appearance (Group)    Physical Appearance    Overall Physical Appearance underweight            Estimated/Assessed Needs       06/08/17 1600    Calculation Measurements    Weight Used For Calculations 49.9 kg (110 lb)    Height Used for Calculations 1.549 m (5' 1\")    Estimated/Assessed Energy Needs    Energy Need Method Kcal/kg    kcal/kg 30    30 Kcal/Kg (kcal) 1496.88    Estimated Kcal Range  1500    Estimated/Assessed Protein Needs    Weight Used for Protein Calculation 49.9 kg (110 lb)    Protein (gm/kg) 1.2    1.2 Gm Protein (gm) 59.88    Estimated Protein Range 60    Estimated/Assessed Fluid Needs    Fluid Need Method RDA method    RDA Method (mL)  1500            Nutrition Prescription Ordered       06/08/17 1603    Nutrition " Prescription PO    Current PO Diet NPO              Comments:  Pt currently NPO s/p POD #1 for Open Revision of Nissen Fundoplication with GTube.  She has a hx of inadequate oral intake/tolerance due to GI distress and dysphagia with a reported wt loss of ~70# in the past year and a half.  This has lost 40% of her UBW and is currently and 61% of her UBW.  RD will monitor po initiation and tolerance.          Electronically signed by:  Christel Brunson RD  06/08/17 4:08 PM

## 2017-06-09 LAB
ANION GAP SERPL CALCULATED.3IONS-SCNC: 5 MMOL/L (ref 5–15)
BUN BLD-MCNC: 14 MG/DL (ref 7–21)
BUN/CREAT SERPL: 24.1 (ref 7–25)
CALCIUM SPEC-SCNC: 8.1 MG/DL (ref 8.4–10.2)
CHLORIDE SERPL-SCNC: 104 MMOL/L (ref 95–110)
CO2 SERPL-SCNC: 29 MMOL/L (ref 22–31)
CREAT BLD-MCNC: 0.58 MG/DL (ref 0.5–1)
DEPRECATED RDW RBC AUTO: 43.3 FL (ref 36.4–46.3)
ERYTHROCYTE [DISTWIDTH] IN BLOOD BY AUTOMATED COUNT: 13.8 % (ref 11.5–14.5)
GFR SERPL CREATININE-BSD FRML MDRD: 106 ML/MIN/1.73 (ref 60–104)
GLUCOSE BLD-MCNC: 86 MG/DL (ref 60–100)
HCT VFR BLD AUTO: 20.4 % (ref 35–45)
HGB BLD-MCNC: 6.8 G/DL (ref 12–15.5)
HOLD SPECIMEN: NORMAL
MCH RBC QN AUTO: 28.7 PG (ref 26.5–34)
MCHC RBC AUTO-ENTMCNC: 33.3 G/DL (ref 31.4–36)
MCV RBC AUTO: 86.1 FL (ref 80–98)
PLATELET # BLD AUTO: 192 10*3/MM3 (ref 150–450)
PMV BLD AUTO: 9.4 FL (ref 8–12)
POTASSIUM BLD-SCNC: 3.8 MMOL/L (ref 3.5–5.1)
RBC # BLD AUTO: 2.37 10*6/MM3 (ref 3.77–5.16)
SODIUM BLD-SCNC: 138 MMOL/L (ref 137–145)
WBC NRBC COR # BLD: 11.82 10*3/MM3 (ref 3.2–9.8)
WHOLE BLOOD HOLD SPECIMEN: NORMAL

## 2017-06-09 PROCEDURE — 80048 BASIC METABOLIC PNL TOTAL CA: CPT | Performed by: SURGERY

## 2017-06-09 PROCEDURE — 99024 POSTOP FOLLOW-UP VISIT: CPT | Performed by: SURGERY

## 2017-06-09 PROCEDURE — 25010000002 MORPHINE SULFATE (PF) 2 MG/ML SOLUTION: Performed by: SURGERY

## 2017-06-09 PROCEDURE — 94799 UNLISTED PULMONARY SVC/PX: CPT

## 2017-06-09 PROCEDURE — 85027 COMPLETE CBC AUTOMATED: CPT | Performed by: SURGERY

## 2017-06-09 RX ORDER — HYDROCODONE BITARTRATE AND ACETAMINOPHEN 5; 325 MG/1; MG/1
1 TABLET ORAL EVERY 6 HOURS PRN
Status: DISCONTINUED | OUTPATIENT
Start: 2017-06-09 | End: 2017-06-11 | Stop reason: HOSPADM

## 2017-06-09 RX ORDER — HYDROCODONE BITARTRATE AND ACETAMINOPHEN 7.5; 325 MG/1; MG/1
2 TABLET ORAL EVERY 6 HOURS PRN
Status: DISCONTINUED | OUTPATIENT
Start: 2017-06-09 | End: 2017-06-11 | Stop reason: HOSPADM

## 2017-06-09 RX ORDER — MORPHINE SULFATE 2 MG/ML
2 INJECTION, SOLUTION INTRAMUSCULAR; INTRAVENOUS
Status: DISCONTINUED | OUTPATIENT
Start: 2017-06-09 | End: 2017-06-11 | Stop reason: HOSPADM

## 2017-06-09 RX ORDER — TAMSULOSIN HYDROCHLORIDE 0.4 MG/1
0.4 CAPSULE ORAL DAILY
Status: DISCONTINUED | OUTPATIENT
Start: 2017-06-09 | End: 2017-06-11 | Stop reason: HOSPADM

## 2017-06-09 RX ADMIN — LISINOPRIL 5 MG: 5 TABLET ORAL at 09:16

## 2017-06-09 RX ADMIN — SODIUM CHLORIDE 125 ML/HR: 900 INJECTION, SOLUTION INTRAVENOUS at 12:57

## 2017-06-09 RX ADMIN — TRAZODONE HYDROCHLORIDE 150 MG: 150 TABLET ORAL at 20:39

## 2017-06-09 RX ADMIN — SODIUM CHLORIDE 125 ML/HR: 900 INJECTION, SOLUTION INTRAVENOUS at 03:22

## 2017-06-09 RX ADMIN — IPRATROPIUM BROMIDE 0.5 MG: 0.5 SOLUTION RESPIRATORY (INHALATION) at 19:09

## 2017-06-09 RX ADMIN — IPRATROPIUM BROMIDE 0.5 MG: 0.5 SOLUTION RESPIRATORY (INHALATION) at 00:41

## 2017-06-09 RX ADMIN — HYDROCODONE BITARTRATE AND ACETAMINOPHEN 2 TABLET: 7.5; 325 TABLET ORAL at 17:43

## 2017-06-09 RX ADMIN — FLUOXETINE 20 MG: 20 CAPSULE ORAL at 09:16

## 2017-06-09 RX ADMIN — IPRATROPIUM BROMIDE 0.5 MG: 0.5 SOLUTION RESPIRATORY (INHALATION) at 12:26

## 2017-06-09 RX ADMIN — AMLODIPINE BESYLATE 2.5 MG: 2.5 TABLET ORAL at 09:16

## 2017-06-09 RX ADMIN — TAMSULOSIN HYDROCHLORIDE 0.4 MG: 0.4 CAPSULE ORAL at 09:16

## 2017-06-09 RX ADMIN — MORPHINE SULFATE 2 MG: 2 INJECTION, SOLUTION INTRAMUSCULAR; INTRAVENOUS at 20:40

## 2017-06-09 RX ADMIN — SODIUM CHLORIDE 75 ML/HR: 900 INJECTION, SOLUTION INTRAVENOUS at 20:40

## 2017-06-09 RX ADMIN — GABAPENTIN 400 MG: 400 CAPSULE ORAL at 20:39

## 2017-06-09 RX ADMIN — IPRATROPIUM BROMIDE 0.5 MG: 0.5 SOLUTION RESPIRATORY (INHALATION) at 07:11

## 2017-06-09 NOTE — PLAN OF CARE
Problem: Patient Care Overview (Adult)  Goal: Plan of Care Review  Outcome: Ongoing (interventions implemented as appropriate)    06/08/17 1855   Coping/Psychosocial Response Interventions   Plan Of Care Reviewed With patient   Patient Care Overview   Progress improving   Outcome Evaluation   Outcome Summary/Follow up Plan pt to transfer to [facility]       Goal: Adult Individualization and Mutuality  Outcome: Ongoing (interventions implemented as appropriate)    Problem: Perioperative Period (Adult)  Goal: Signs and Symptoms of Listed Potential Problems Will be Absent or Manageable (Perioperative Period)  Outcome: Ongoing (interventions implemented as appropriate)  pca morphine pump in use    06/08/17 1855   Perioperative Period   Problems Assessed (Perioperative Period) all   Problems Present (Perioperative Period) pain

## 2017-06-09 NOTE — PROGRESS NOTES
I discussed the patient's Hgb with her and discussed with her the prospect of blood transfusion.  She currently refuses transfusion as she is asymptomatic.  She is agreeable with transfusion if needed.    She did tolerate solid food.  Her gtube remains unclamped.  I have discussed the need to clamp her tube with nursing.          This document has been electronically signed by Grant Morris MD on June 9, 2017 1:26 PM

## 2017-06-09 NOTE — PLAN OF CARE
Problem: Patient Care Overview (Adult)  Goal: Plan of Care Review  Outcome: Ongoing (interventions implemented as appropriate)    06/09/17 1621   Coping/Psychosocial Response Interventions   Plan Of Care Reviewed With patient   Patient Care Overview   Progress improving   Outcome Evaluation   Outcome Summary/Follow up Plan pain controlled with PCA, pt experiencing some anxiety and emotional today       Goal: Adult Individualization and Mutuality  Outcome: Ongoing (interventions implemented as appropriate)  Goal: Discharge Needs Assessment  Outcome: Ongoing (interventions implemented as appropriate)    Problem: Fundoplication (Nissen,Thal,Toupet) (Pediatric)  Goal: Signs and Symptoms of Listed Potential Problems Will be Absent or Manageable (Fundoplication)  Outcome: Ongoing (interventions implemented as appropriate)

## 2017-06-09 NOTE — PROGRESS NOTES
Malnutrition Severity Assessment    Patient Name:  Maria Yepez  YOB: 1955  MRN: [id number]  Admit Date:  6/7/2017    Patient meets criteria for : Moderate malnutrition    Comments:  Pt presents at moderate malnutrition as evidenced by hx of very poor po intake and po tolerance with dysphagia and Gi distress resulting in a very significant wt loss of ~70# over the last 1 1/2 years.  WT loss is 39% of her UBW and based on her admit wt of 108#, she is  61% of her UBW of 178#.  Physical signs of malnutrition present.      Malnutrition Type: Acute Illness/Injury Malnutrition     Malnutrition Type (last 8 hours)      Malnutrition Severity Assessment       06/09/17 1550    Physical Signs of Malnutrition (Acute)    Muscle Wasting Moderate    Fat Loss Moderate    Fluid Accumulation None    Secondary Physical Signs None      06/09/17 1552    Malnutrition Severity Assessment    Malnutrition Type Acute Illness/Injury Malnutrition      06/09/17 1550    Malnutrition Severity Assessment    Malnutrition Type Acute Illness/Injury Malnutrition          Physical Signs of Malnutrition         Most Recent Value    Muscle Wasting  Moderate    Fat Loss  Moderate    Fluid Accumulation  None    Secondary Physical Signs  None      Weight Status         Most Recent Value    %UBW  Severe (<75%)    Weight Loss  Mild (>10%/1yr)      Energy Intake Status         Most Recent Value    Energy Intake  Severe (< or equal to 50% / > or equal to 5d)              Electronically signed by:  Christel Brunson RD  06/09/17 3:54 PM

## 2017-06-09 NOTE — PLAN OF CARE
Problem: Patient Care Overview (Adult)  Goal: Plan of Care Review  Outcome: Ongoing (interventions implemented as appropriate)    06/09/17 0119   Coping/Psychosocial Response Interventions   Plan Of Care Reviewed With patient   Patient Care Overview   Progress improving   Outcome Evaluation   Outcome Summary/Follow up Plan Pediatric careplan placed for nissen fundo initiated r/t no adult CP for this dx. PCA effective for pain management.        Goal: Adult Individualization and Mutuality  Outcome: Ongoing (interventions implemented as appropriate)  Goal: Discharge Needs Assessment  Outcome: Ongoing (interventions implemented as appropriate)    Problem: Perioperative Period (Adult)  Goal: Signs and Symptoms of Listed Potential Problems Will be Absent or Manageable (Perioperative Period)  Outcome: Ongoing (interventions implemented as appropriate)  Goal: Signs and Symptoms of Listed Potential Problems Will be Absent or Manageable (Perioperative Period)  Outcome: Ongoing (interventions implemented as appropriate)    Problem: Fundoplication (Nissen,Thal,Toupet) (Pediatric)  Goal: Signs and Symptoms of Listed Potential Problems Will be Absent or Manageable (Fundoplication)  Outcome: Ongoing (interventions implemented as appropriate)

## 2017-06-09 NOTE — PROGRESS NOTES
GENERAL SURGERY PROGRESS NOTE  Chief Complaint:  Surgery Follow up   LOS: 2 days       Subjective     Interval History:     Feels well, but unable to urinate.  Has had two in and out caths.  Tolerated clears  C/O incisional pain    Objective     Vital Signs  Temp:  [97.1 °F (36.2 °C)-99.1 °F (37.3 °C)] 98.6 °F (37 °C)  Heart Rate:  [] 116  Resp:  [11-25] 18  BP: (104-147)/(51-70) 127/58  Arterial Line BP: (109-124)/(41-47) 116/46    Physical Exam:   Abdomen soft, tube in place  Labs:  Lab Results (last 24 hours)     Procedure Component Value Units Date/Time    Basic Metabolic Panel [[id number]]  (Abnormal) Collected:  06/09/17 0621    Specimen:  Blood Updated:  06/09/17 0655     Glucose 86 mg/dL      BUN 14 mg/dL      Creatinine 0.58 mg/dL      Sodium 138 mmol/L      Potassium 3.8 mmol/L      Chloride 104 mmol/L      CO2 29.0 mmol/L      Calcium 8.1 (L) mg/dL      eGFR Non African Amer 106 mL/min/1.73      BUN/Creatinine Ratio 24.1     Anion Gap 5.0 mmol/L     CBC (No Diff) [[id number]] Collected:  06/09/17 0740    Specimen:  Blood Updated:  06/09/17 0740           Results Review:     Labs and imaging for today were reviewed.  UGI showed no leak      Assessment/Plan     Maria Yepez is a 61 y.o. female who is s/p revision of nissen and Gtube      Start soft diet, gtube needs to be clamped as previously ordered.  Start Flomax for urinary retention.  OOB.  Overall looks well.          This document has been electronically signed by Grant Morris MD on June 9, 2017 7:50 AM        Grant Morris MD  06/09/17  7:50 AM

## 2017-06-10 LAB
ABO GROUP BLD: NORMAL
ANION GAP SERPL CALCULATED.3IONS-SCNC: 8 MMOL/L (ref 5–15)
BLD GP AB SCN SERPL QL: NEGATIVE
BUN BLD-MCNC: 7 MG/DL (ref 7–21)
BUN/CREAT SERPL: 13 (ref 7–25)
CALCIUM SPEC-SCNC: 8 MG/DL (ref 8.4–10.2)
CHLORIDE SERPL-SCNC: 102 MMOL/L (ref 95–110)
CO2 SERPL-SCNC: 29 MMOL/L (ref 22–31)
CREAT BLD-MCNC: 0.54 MG/DL (ref 0.5–1)
DEPRECATED RDW RBC AUTO: 42.2 FL (ref 36.4–46.3)
ERYTHROCYTE [DISTWIDTH] IN BLOOD BY AUTOMATED COUNT: 13.8 % (ref 11.5–14.5)
GFR SERPL CREATININE-BSD FRML MDRD: 115 ML/MIN/1.73 (ref 45–104)
GLUCOSE BLD-MCNC: 95 MG/DL (ref 60–100)
HCT VFR BLD AUTO: 18.5 % (ref 35–45)
HGB BLD-MCNC: 6.1 G/DL (ref 12–15.5)
Lab: NORMAL
MCH RBC QN AUTO: 27.7 PG (ref 26.5–34)
MCHC RBC AUTO-ENTMCNC: 33 G/DL (ref 31.4–36)
MCV RBC AUTO: 84.1 FL (ref 80–98)
PLATELET # BLD AUTO: 199 10*3/MM3 (ref 150–450)
PMV BLD AUTO: 9.4 FL (ref 8–12)
POTASSIUM BLD-SCNC: 3.2 MMOL/L (ref 3.5–5.1)
RBC # BLD AUTO: 2.2 10*6/MM3 (ref 3.77–5.16)
RH BLD: POSITIVE
SODIUM BLD-SCNC: 139 MMOL/L (ref 137–145)
WBC NRBC COR # BLD: 8.18 10*3/MM3 (ref 3.2–9.8)

## 2017-06-10 PROCEDURE — 25010000002 MORPHINE SULFATE (PF) 2 MG/ML SOLUTION: Performed by: SURGERY

## 2017-06-10 PROCEDURE — 86900 BLOOD TYPING SEROLOGIC ABO: CPT | Performed by: SURGERY

## 2017-06-10 PROCEDURE — 86850 RBC ANTIBODY SCREEN: CPT | Performed by: SURGERY

## 2017-06-10 PROCEDURE — 86923 COMPATIBILITY TEST ELECTRIC: CPT

## 2017-06-10 PROCEDURE — 99024 POSTOP FOLLOW-UP VISIT: CPT | Performed by: SURGERY

## 2017-06-10 PROCEDURE — 94799 UNLISTED PULMONARY SVC/PX: CPT

## 2017-06-10 PROCEDURE — 80048 BASIC METABOLIC PNL TOTAL CA: CPT | Performed by: SURGERY

## 2017-06-10 PROCEDURE — 86901 BLOOD TYPING SEROLOGIC RH(D): CPT | Performed by: SURGERY

## 2017-06-10 PROCEDURE — P9021 RED BLOOD CELLS UNIT: HCPCS

## 2017-06-10 PROCEDURE — 85027 COMPLETE CBC AUTOMATED: CPT | Performed by: SURGERY

## 2017-06-10 PROCEDURE — 36430 TRANSFUSION BLD/BLD COMPNT: CPT

## 2017-06-10 PROCEDURE — 94760 N-INVAS EAR/PLS OXIMETRY 1: CPT

## 2017-06-10 PROCEDURE — 86900 BLOOD TYPING SEROLOGIC ABO: CPT

## 2017-06-10 RX ADMIN — IPRATROPIUM BROMIDE 0.5 MG: 0.5 SOLUTION RESPIRATORY (INHALATION) at 18:41

## 2017-06-10 RX ADMIN — FLUOXETINE 20 MG: 20 CAPSULE ORAL at 08:21

## 2017-06-10 RX ADMIN — GABAPENTIN 400 MG: 400 CAPSULE ORAL at 21:01

## 2017-06-10 RX ADMIN — HYDROCODONE BITARTRATE AND ACETAMINOPHEN 2 TABLET: 7.5; 325 TABLET ORAL at 12:25

## 2017-06-10 RX ADMIN — AMLODIPINE BESYLATE 2.5 MG: 2.5 TABLET ORAL at 08:21

## 2017-06-10 RX ADMIN — TRAZODONE HYDROCHLORIDE 150 MG: 150 TABLET ORAL at 21:01

## 2017-06-10 RX ADMIN — IPRATROPIUM BROMIDE 0.5 MG: 0.5 SOLUTION RESPIRATORY (INHALATION) at 06:48

## 2017-06-10 RX ADMIN — IPRATROPIUM BROMIDE 0.5 MG: 0.5 SOLUTION RESPIRATORY (INHALATION) at 13:27

## 2017-06-10 RX ADMIN — LISINOPRIL 5 MG: 5 TABLET ORAL at 08:21

## 2017-06-10 RX ADMIN — HYDROCODONE BITARTRATE AND ACETAMINOPHEN 2 TABLET: 7.5; 325 TABLET ORAL at 05:16

## 2017-06-10 RX ADMIN — HYDROCODONE BITARTRATE AND ACETAMINOPHEN 2 TABLET: 7.5; 325 TABLET ORAL at 17:54

## 2017-06-10 RX ADMIN — MORPHINE SULFATE 2 MG: 2 INJECTION, SOLUTION INTRAMUSCULAR; INTRAVENOUS at 21:01

## 2017-06-10 RX ADMIN — MORPHINE SULFATE 2 MG: 2 INJECTION, SOLUTION INTRAMUSCULAR; INTRAVENOUS at 09:59

## 2017-06-10 RX ADMIN — SODIUM CHLORIDE 75 ML/HR: 900 INJECTION, SOLUTION INTRAVENOUS at 09:59

## 2017-06-10 NOTE — PLAN OF CARE
Problem: Fundoplication (Nissen,Thal,Toupet) (Pediatric)  Goal: Signs and Symptoms of Listed Potential Problems Will be Absent or Manageable (Fundoplication)  Outcome: Ongoing (interventions implemented as appropriate)

## 2017-06-10 NOTE — PROGRESS NOTES
LOS: 3 days   Patient Care Team:  Manjinder Joshi MD as PCP - General (Internal Medicine)    Chief Complaint:  <principal problem not specified>    Subjective     Interval History:   Tolerating soft diet, no dysphagia, G tube leaking    Objective     Vital Signs  Temp:  [97.6 °F (36.4 °C)-99.5 °F (37.5 °C)] 98.9 °F (37.2 °C)  Heart Rate:  [] 99  Resp:  [14-18] 16  BP: (123-158)/(55-72) 131/60    Physical Exam:  Incision OK, G tube drain sponge saturated with SS fluid     Results Review:       Lab Results (last 24 hours)     Procedure Component Value Units Date/Time    Basic Metabolic Panel [[id number]]  (Abnormal) Collected:  06/10/17 0601    Specimen:  Blood Updated:  06/10/17 0648     Glucose 95 mg/dL      BUN 7 mg/dL      Creatinine 0.54 mg/dL      Sodium 139 mmol/L      Potassium 3.2 (L) mmol/L      Chloride 102 mmol/L      CO2 29.0 mmol/L      Calcium 8.0 (L) mg/dL      eGFR Non African Amer 115 (H) mL/min/1.73      BUN/Creatinine Ratio 13.0     Anion Gap 8.0 mmol/L     CBC (No Diff) [[id number]]  (Abnormal) Collected:  06/10/17 0602    Specimen:  Blood Updated:  06/10/17 0655     WBC 8.18 10*3/mm3      RBC 2.20 (L) 10*6/mm3      Hemoglobin 6.1 (C) g/dL       VERIFIED RESULT REPEAT ANALYSIS        Hematocrit 18.5 (L) %      MCV 84.1 fL      MCH 27.7 pg      MCHC 33.0 g/dL      RDW 13.8 %      RDW-SD 42.2 fl      MPV 9.4 fL      Platelets 199 10*3/mm3           Medication Review:   Current Facility-Administered Medications   Medication Dose Route Frequency Provider Last Rate Last Dose   • amLODIPine (NORVASC) tablet 2.5 mg  2.5 mg Oral Daily Grant Morris MD   2.5 mg at 06/10/17 0821   • enoxaparin (LOVENOX) syringe 40 mg  40 mg Subcutaneous Daily Grant Morris MD   40 mg at 06/08/17 0917   • FLUoxetine (PROzac) capsule 20 mg  20 mg Oral Daily Grant Morris MD   20 mg at 06/10/17 0821   • gabapentin (NEURONTIN) capsule 400 mg  400 mg Oral Nightly Grant Morris,  MD   400 mg at 06/09/17 2039   • HYDROcodone-acetaminophen (NORCO) 5-325 MG per tablet 1 tablet  1 tablet Oral Q6H PRN Grant Morris MD       • HYDROcodone-acetaminophen (NORCO) 7.5-325 MG per tablet 2 tablet  2 tablet Oral Q6H PRN Grant Morris MD   2 tablet at 06/10/17 0516   • ipratropium (ATROVENT) nebulizer solution 0.5 mg  0.5 mg Nebulization Q6H - RT Grant Morris MD   0.5 mg at 06/10/17 0648   • lisinopril (PRINIVIL,ZESTRIL) tablet 5 mg  5 mg Oral Daily Grant Morris MD   5 mg at 06/10/17 0821   • Morphine sulfate (PF) injection 2 mg  2 mg Intravenous Q2H PRN Grant Morris MD   2 mg at 06/10/17 0959   • naloxone (NARCAN) injection 0.1 mg  0.1 mg Intravenous Q5 Min PRN Grant Morris MD       • nitroglycerin (NITROSTAT) SL tablet 0.4 mg  0.4 mg Sublingual Q5 Min PRN Grant Morris MD       • ondansetron (ZOFRAN) tablet 4 mg  4 mg Oral Q6H PRN Grant Morris MD        Or   • ondansetron ODT (ZOFRAN-ODT) disintegrating tablet 4 mg  4 mg Oral Q6H PRN Grant Morris MD        Or   • ondansetron (ZOFRAN) injection 4 mg  4 mg Intravenous Q6H PRN Grant Morris MD       • sodium chloride 0.9 % infusion  75 mL/hr Intravenous Continuous Grant Morris MD 75 mL/hr at 06/10/17 0959 75 mL/hr at 06/10/17 0959   • tamsulosin (FLOMAX) 24 hr capsule 0.4 mg  0.4 mg Oral Daily Grant Morris MD   0.4 mg at 06/09/17 0916   • traZODone (DESYREL) tablet 150 mg  150 mg Oral Nightly Grant Morris MD   150 mg at 06/09/17 2039       Assessment/Plan     Active Problems:    Dysphagia    62 yo lady POD#3 s/p open nissen revision and G tube  Acute blood loss anemia- transfuse 2 units PRBC today, monitor H&H  Continue diet and leave G tube clamped  Replace ANASTASIA Beckford MD  06/10/17  10:10 AM

## 2017-06-11 VITALS
OXYGEN SATURATION: 95 % | HEIGHT: 61 IN | SYSTOLIC BLOOD PRESSURE: 152 MMHG | WEIGHT: 115.74 LBS | TEMPERATURE: 98.4 F | BODY MASS INDEX: 21.85 KG/M2 | HEART RATE: 95 BPM | DIASTOLIC BLOOD PRESSURE: 78 MMHG | RESPIRATION RATE: 14 BRPM

## 2017-06-11 LAB
ABO + RH BLD: NORMAL
ABO + RH BLD: NORMAL
ANION GAP SERPL CALCULATED.3IONS-SCNC: 11 MMOL/L (ref 5–15)
BASOPHILS # BLD AUTO: 0.02 10*3/MM3 (ref 0–0.2)
BASOPHILS NFR BLD AUTO: 0.3 % (ref 0–2)
BH BB BLOOD EXPIRATION DATE: NORMAL
BH BB BLOOD EXPIRATION DATE: NORMAL
BH BB BLOOD TYPE BARCODE: 5100
BH BB BLOOD TYPE BARCODE: 5100
BH BB DISPENSE STATUS: NORMAL
BH BB DISPENSE STATUS: NORMAL
BH BB PRODUCT CODE: NORMAL
BH BB PRODUCT CODE: NORMAL
BH BB UNIT NUMBER: NORMAL
BH BB UNIT NUMBER: NORMAL
BUN BLD-MCNC: 6 MG/DL (ref 7–21)
BUN/CREAT SERPL: 12 (ref 7–25)
CALCIUM SPEC-SCNC: 7.8 MG/DL (ref 8.4–10.2)
CHLORIDE SERPL-SCNC: 102 MMOL/L (ref 95–110)
CO2 SERPL-SCNC: 29 MMOL/L (ref 22–31)
CREAT BLD-MCNC: 0.5 MG/DL (ref 0.5–1)
DEPRECATED RDW RBC AUTO: 41.7 FL (ref 36.4–46.3)
EOSINOPHIL # BLD AUTO: 0.49 10*3/MM3 (ref 0–0.7)
EOSINOPHIL NFR BLD AUTO: 6.2 % (ref 0–7)
ERYTHROCYTE [DISTWIDTH] IN BLOOD BY AUTOMATED COUNT: 13.3 % (ref 11.5–14.5)
GFR SERPL CREATININE-BSD FRML MDRD: 126 ML/MIN/1.73 (ref 45–104)
GLUCOSE BLD-MCNC: 100 MG/DL (ref 60–100)
HCT VFR BLD AUTO: 27.6 % (ref 35–45)
HGB BLD-MCNC: 9.4 G/DL (ref 12–15.5)
IMM GRANULOCYTES # BLD: 0.02 10*3/MM3 (ref 0–0.02)
IMM GRANULOCYTES NFR BLD: 0.3 % (ref 0–0.5)
LYMPHOCYTES # BLD AUTO: 1.09 10*3/MM3 (ref 0.6–4.2)
LYMPHOCYTES NFR BLD AUTO: 13.9 % (ref 10–50)
MCH RBC QN AUTO: 29.1 PG (ref 26.5–34)
MCHC RBC AUTO-ENTMCNC: 34.1 G/DL (ref 31.4–36)
MCV RBC AUTO: 85.4 FL (ref 80–98)
MONOCYTES # BLD AUTO: 0.5 10*3/MM3 (ref 0–0.9)
MONOCYTES NFR BLD AUTO: 6.4 % (ref 0–12)
NEUTROPHILS # BLD AUTO: 5.74 10*3/MM3 (ref 2–8.6)
NEUTROPHILS NFR BLD AUTO: 72.9 % (ref 37–80)
PLATELET # BLD AUTO: 179 10*3/MM3 (ref 150–450)
PMV BLD AUTO: 9.4 FL (ref 8–12)
POTASSIUM BLD-SCNC: 2.5 MMOL/L (ref 3.5–5.1)
POTASSIUM BLD-SCNC: 3.5 MMOL/L (ref 3.5–5.1)
RBC # BLD AUTO: 3.23 10*6/MM3 (ref 3.77–5.16)
SODIUM BLD-SCNC: 142 MMOL/L (ref 137–145)
UNIT  ABO: NORMAL
UNIT  ABO: NORMAL
UNIT  RH: NORMAL
UNIT  RH: NORMAL
WBC NRBC COR # BLD: 7.86 10*3/MM3 (ref 3.2–9.8)

## 2017-06-11 PROCEDURE — 85025 COMPLETE CBC W/AUTO DIFF WBC: CPT | Performed by: SURGERY

## 2017-06-11 PROCEDURE — 84132 ASSAY OF SERUM POTASSIUM: CPT | Performed by: SURGERY

## 2017-06-11 PROCEDURE — 94799 UNLISTED PULMONARY SVC/PX: CPT

## 2017-06-11 PROCEDURE — 80048 BASIC METABOLIC PNL TOTAL CA: CPT | Performed by: SURGERY

## 2017-06-11 PROCEDURE — 25010000002 ENOXAPARIN PER 10 MG: Performed by: SURGERY

## 2017-06-11 PROCEDURE — 94760 N-INVAS EAR/PLS OXIMETRY 1: CPT

## 2017-06-11 PROCEDURE — 99024 POSTOP FOLLOW-UP VISIT: CPT | Performed by: SURGERY

## 2017-06-11 PROCEDURE — 25010000003 POTASSIUM CHLORIDE 10 MEQ/100ML SOLUTION: Performed by: SURGERY

## 2017-06-11 RX ORDER — POTASSIUM CHLORIDE 29.8 MG/ML
20 INJECTION INTRAVENOUS ONCE
Status: DISCONTINUED | OUTPATIENT
Start: 2017-06-11 | End: 2017-06-11

## 2017-06-11 RX ORDER — POTASSIUM CHLORIDE 1.5 G/1.77G
40 POWDER, FOR SOLUTION ORAL ONCE
Status: COMPLETED | OUTPATIENT
Start: 2017-06-11 | End: 2017-06-11

## 2017-06-11 RX ORDER — POTASSIUM CHLORIDE 7.45 MG/ML
10 INJECTION INTRAVENOUS
Status: DISCONTINUED | OUTPATIENT
Start: 2017-06-11 | End: 2017-06-11

## 2017-06-11 RX ORDER — HYDROCODONE BITARTRATE AND ACETAMINOPHEN 5; 325 MG/1; MG/1
1 TABLET ORAL EVERY 4 HOURS PRN
Qty: 30 TABLET | Refills: 0 | Status: SHIPPED | OUTPATIENT
Start: 2017-06-11 | End: 2017-06-19

## 2017-06-11 RX ADMIN — FLUOXETINE 20 MG: 20 CAPSULE ORAL at 08:52

## 2017-06-11 RX ADMIN — POTASSIUM CHLORIDE 40 MEQ: 1.5 POWDER, FOR SOLUTION ORAL at 08:51

## 2017-06-11 RX ADMIN — HYDROCODONE BITARTRATE AND ACETAMINOPHEN 2 TABLET: 7.5; 325 TABLET ORAL at 10:07

## 2017-06-11 RX ADMIN — AMLODIPINE BESYLATE 2.5 MG: 2.5 TABLET ORAL at 08:52

## 2017-06-11 RX ADMIN — HYDROCODONE BITARTRATE AND ACETAMINOPHEN 2 TABLET: 7.5; 325 TABLET ORAL at 03:29

## 2017-06-11 RX ADMIN — LISINOPRIL 5 MG: 5 TABLET ORAL at 08:52

## 2017-06-11 RX ADMIN — IPRATROPIUM BROMIDE 0.5 MG: 0.5 SOLUTION RESPIRATORY (INHALATION) at 11:52

## 2017-06-11 RX ADMIN — IPRATROPIUM BROMIDE 0.5 MG: 0.5 SOLUTION RESPIRATORY (INHALATION) at 06:37

## 2017-06-11 RX ADMIN — POTASSIUM CHLORIDE 40 MEQ: 1.5 POWDER, FOR SOLUTION ORAL at 05:47

## 2017-06-11 RX ADMIN — SODIUM CHLORIDE 75 ML/HR: 900 INJECTION, SOLUTION INTRAVENOUS at 05:19

## 2017-06-12 NOTE — DISCHARGE SUMMARY
Discharge Summary  Date: 06/12/17  Service: General Surgery  Attending: Grant Morris    Procedures: Open Revision of Nissen, Gastrostomy placement  Consults: none  Discharge Diagnoses: Dysphagia secondary to prior Nissen  Hospital Course: Patient was admitted post-operatively.  Initially NPO and observed overnight in ICU.  She did well and an UGI confirmed expected post op anatomy.  She was then started on diet which was advanced to soft foods prior to discharge.  She received a blood transfusion due to acute blood loss anemia in the setting of chronic anemia.  Her Hgb responded appropriately.  She was then discharged home by Dr. Beckford in good condition.    Discharge Medications:     Your medication list       As of 6/11/2017 11:26 AM      START taking these medications       Instructions Last Dose Given Next Dose Due    HYDROcodone-acetaminophen 5-325 MG per tablet   Commonly known as:  NORCO        Take 1 tablet by mouth Every 4 (Four) Hours As Needed for Mild Pain (1-3) for up to 8 days.           CONTINUE taking these medications       Instructions Last Dose Given Next Dose Due    aspirin 81 MG EC tablet              fish oil 1000 MG capsule capsule              FLUoxetine 20 MG capsule   Commonly known as:  PROzac        Take 1 capsule by mouth Daily.         furosemide 20 MG tablet   Commonly known as:  LASIX        Take 1 tablet by mouth Daily. (unconfirmed)         gabapentin 400 MG capsule   Commonly known as:  NEURONTIN              guaiFENesin 600 MG 12 hr tablet   Commonly known as:  MUCINEX              levalbuterol 45 MCG/ACT inhaler   Commonly known as:  XOPENEX HFA        Inhale 1 puff Every 6 (Six) Hours As Needed for wheezing.         lisinopril 5 MG tablet   Commonly known as:  PRINIVIL,ZESTRIL        Take 1 tablet by mouth Daily.         metFORMIN 500 MG tablet   Commonly known as:  GLUCOPHAGE        Take 1 tablet by mouth 2 (Two) Times a Day With Meals.         nitroglycerin 0.4 MG SL tablet    Commonly known as:  NITROSTAT              NORVASC 2.5 MG tablet   Generic drug:  amLODIPine              omeprazole 20 MG capsule   Commonly known as:  PRILOSEC        Take 1 capsule by mouth 2 (Two) Times a Day. Prilosec 20 mg capsule,delayed release  (unconfirmed)         ondansetron 4 MG tablet   Commonly known as:  ZOFRAN        Take 1 tablet by mouth Every 8 (Eight) Hours As Needed for Nausea or Vomiting.         pravastatin 80 MG tablet   Commonly known as:  PRAVACHOL              tiotropium 18 MCG per inhalation capsule   Commonly known as:  SPIRIVA              tiZANidine 4 MG tablet   Commonly known as:  ZANAFLEX              traMADol 50 MG tablet   Commonly known as:  ULTRAM              traZODone 150 MG tablet   Commonly known as:  DESYREL              vitamin D 31087 UNITS capsule capsule   Commonly known as:  ERGOCALCIFEROL                   Where to Get Your Medications      You can get these medications from any pharmacy     Bring a paper prescription for each of these medications    • HYDROcodone-acetaminophen 5-325 MG per tablet             Activity Instructions     Activity as Tolerated                       Your Scheduled Appointments     Jun 20, 2017 10:00 AM CDT   Post-Op with Grant Morris MD   [facility] GENER[facility] SURGERY (--)    [address] [place] [address] Dr  [facility] [address] [place] [id number] [phone]            Aug 15, 2017 10:45 AM CDT   Office Visit with Gregory Hoffman MD   [facility] GASTROENTEROLOGY (--)    [address] [place] [address] Dr Katerina RUIZ [id number] [phone]           Arrive 15 minutes prior to appointment.            Oct 19, 2017 10:00 AM CDT   Follow Up with Dick Romero MD   [facility] CARDIOLOGY (--)    [address] [place] [address] Dr Katerina RUIZ [id number] [phone]           Arrive 15 minutes prior to appointment.                          This document has been electronically signed by Grant Morris  MD on June 12, 2017 3:02 PM

## 2017-06-15 PROCEDURE — 87086 URINE CULTURE/COLONY COUNT: CPT | Performed by: NURSE PRACTITIONER

## 2017-06-20 ENCOUNTER — OFFICE VISIT (OUTPATIENT)
Dept: SURGERY | Facility: CLINIC | Age: 62
End: 2017-06-20

## 2017-06-20 VITALS
WEIGHT: 107 LBS | HEIGHT: 61 IN | BODY MASS INDEX: 20.2 KG/M2 | DIASTOLIC BLOOD PRESSURE: 60 MMHG | SYSTOLIC BLOOD PRESSURE: 104 MMHG

## 2017-06-20 DIAGNOSIS — Z09 FOLLOW UP: Primary | ICD-10-CM

## 2017-06-20 PROCEDURE — 99024 POSTOP FOLLOW-UP VISIT: CPT | Performed by: SURGERY

## 2017-06-22 ENCOUNTER — LAB (OUTPATIENT)
Dept: LAB | Facility: OTHER | Age: 62
End: 2017-06-22

## 2017-06-22 ENCOUNTER — OFFICE VISIT (OUTPATIENT)
Dept: FAMILY MEDICINE CLINIC | Facility: CLINIC | Age: 62
End: 2017-06-22

## 2017-06-22 ENCOUNTER — TELEPHONE (OUTPATIENT)
Dept: FAMILY MEDICINE CLINIC | Facility: CLINIC | Age: 62
End: 2017-06-22

## 2017-06-22 VITALS
SYSTOLIC BLOOD PRESSURE: 110 MMHG | WEIGHT: 102.2 LBS | OXYGEN SATURATION: 98 % | HEART RATE: 101 BPM | DIASTOLIC BLOOD PRESSURE: 62 MMHG | HEIGHT: 61 IN | BODY MASS INDEX: 19.3 KG/M2

## 2017-06-22 DIAGNOSIS — E11.9 TYPE 2 DIABETES MELLITUS WITHOUT COMPLICATION, UNSPECIFIED LONG TERM INSULIN USE STATUS: Chronic | ICD-10-CM

## 2017-06-22 DIAGNOSIS — E87.6 HYPOKALEMIA: ICD-10-CM

## 2017-06-22 DIAGNOSIS — F32.A DEPRESSION, UNSPECIFIED DEPRESSION TYPE: Chronic | ICD-10-CM

## 2017-06-22 DIAGNOSIS — D64.9 ANEMIA, UNSPECIFIED TYPE: Primary | Chronic | ICD-10-CM

## 2017-06-22 DIAGNOSIS — J45.909 UNCOMPLICATED ASTHMA, UNSPECIFIED ASTHMA SEVERITY: Chronic | ICD-10-CM

## 2017-06-22 DIAGNOSIS — D64.9 ANEMIA, UNSPECIFIED TYPE: ICD-10-CM

## 2017-06-22 DIAGNOSIS — R79.89 INCREASED PLATELET COUNT: Primary | ICD-10-CM

## 2017-06-22 DIAGNOSIS — I10 ESSENTIAL HYPERTENSION: ICD-10-CM

## 2017-06-22 DIAGNOSIS — I10 ESSENTIAL HYPERTENSION: Chronic | ICD-10-CM

## 2017-06-22 DIAGNOSIS — Z98.890 STATUS POST NISSEN FUNDOPLICATION: ICD-10-CM

## 2017-06-22 LAB
ANION GAP SERPL CALCULATED.3IONS-SCNC: 10 MMOL/L (ref 5–15)
BUN BLD-MCNC: 14 MG/DL (ref 8–25)
BUN/CREAT SERPL: 17.5 (ref 7–25)
CALCIUM SPEC-SCNC: 9.7 MG/DL (ref 8.4–10.8)
CHLORIDE SERPL-SCNC: 97 MMOL/L (ref 100–112)
CO2 SERPL-SCNC: 29 MMOL/L (ref 20–32)
CREAT BLD-MCNC: 0.8 MG/DL (ref 0.4–1.3)
DEPRECATED RDW RBC AUTO: 47.6 FL (ref 36.4–46.3)
EOSINOPHIL # BLD MANUAL: 0.64 10*3/MM3 (ref 0–0.7)
EOSINOPHIL NFR BLD MANUAL: 6 % (ref 0–7)
ERYTHROCYTE [DISTWIDTH] IN BLOOD BY AUTOMATED COUNT: 14.5 % (ref 11.5–14.5)
GFR SERPL CREATININE-BSD FRML MDRD: 73 ML/MIN/1.73 (ref 45–104)
GLUCOSE BLD-MCNC: 100 MG/DL (ref 70–100)
HCT VFR BLD AUTO: 36.4 % (ref 35–45)
HGB BLD-MCNC: 11.7 G/DL (ref 12–15.5)
LARGE PLATELETS: NORMAL
LYMPHOCYTES # BLD MANUAL: 2.01 10*3/MM3 (ref 0.6–4.2)
LYMPHOCYTES NFR BLD MANUAL: 19 % (ref 10–50)
LYMPHOCYTES NFR BLD MANUAL: 4 % (ref 0–12)
MCH RBC QN AUTO: 29.5 PG (ref 26.5–34)
MCHC RBC AUTO-ENTMCNC: 32.1 G/DL (ref 31.4–36)
MCV RBC AUTO: 91.9 FL (ref 80–98)
MONOCYTES # BLD AUTO: 0.42 10*3/MM3 (ref 0–0.9)
NEUTROPHILS # BLD AUTO: 7.53 10*3/MM3 (ref 2–8.6)
NEUTROPHILS NFR BLD MANUAL: 70 % (ref 37–80)
NEUTS BAND NFR BLD MANUAL: 1 % (ref 0–5)
PLATELET # BLD AUTO: 933 10*3/MM3 (ref 150–450)
PMV BLD AUTO: 8.7 FL (ref 8–12)
POTASSIUM BLD-SCNC: 4.9 MMOL/L (ref 3.4–5.4)
RBC # BLD AUTO: 3.96 10*6/MM3 (ref 3.77–5.16)
RBC MORPH BLD: NORMAL
SMALL PLATELETS BLD QL SMEAR: NORMAL
SODIUM BLD-SCNC: 136 MMOL/L (ref 134–146)
WBC MORPH BLD: NORMAL
WBC NRBC COR # BLD: 10.6 10*3/MM3 (ref 3.2–9.8)

## 2017-06-22 PROCEDURE — 85025 COMPLETE CBC W/AUTO DIFF WBC: CPT | Performed by: INTERNAL MEDICINE

## 2017-06-22 PROCEDURE — 99214 OFFICE O/P EST MOD 30 MIN: CPT | Performed by: INTERNAL MEDICINE

## 2017-06-22 PROCEDURE — 80048 BASIC METABOLIC PNL TOTAL CA: CPT | Performed by: INTERNAL MEDICINE

## 2017-06-22 NOTE — TELEPHONE ENCOUNTER
Call placed to patient concerning MD new orders to repeat CBC in 1 wk d/t increased platlet count, pt voiced back understanding and order entered.

## 2017-06-22 NOTE — PROGRESS NOTES
Subjective   Maria Yepez is a 61 y.o. female.     Chief Complaint   Patient presents with   • Follow-up     [facility] f/u        History of Present Illness     Pt in f/u from hospital from her Nissen and UTI.     Pt says her Nissen came undone and tighten and she couldn't swallow and she got this years ago.  Dr. Morris found it and did surgery and gave her a feeding tube for a month in case she can't eat.  She states Dr. Morris fixed the Nissen for her.  She got out of the hospital on the 7th and is looking well.  Her potassium did drop and they brought it back up.  She says she was told that she had chronic anemia and she wants to know what she can do to fix it.     The following portions of the patient's history were reviewed and updated as appropriate: allergies, current medications, past family history, past medical history, past social history, past surgical history and problem list.    Review of Systems   Constitutional: Positive for fatigue. Negative for activity change, appetite change and unexpected weight change.   HENT: Negative for ear pain, facial swelling and hearing loss.    Respiratory: Negative for cough, chest tightness, shortness of breath and wheezing.    Cardiovascular: Negative for chest pain, palpitations and leg swelling.   Genitourinary: Negative for difficulty urinating, dysuria, flank pain, frequency and urgency.   Musculoskeletal: Negative for arthralgias.   Skin: Negative for color change and rash.   Allergic/Immunologic: Negative for environmental allergies and food allergies.   Neurological: Positive for weakness. Negative for dizziness, syncope, numbness and headaches.   Hematological: Negative for adenopathy.   Psychiatric/Behavioral: Negative for confusion, decreased concentration, dysphoric mood, sleep disturbance and suicidal ideas. The patient is not nervous/anxious.    All other systems reviewed and are negative.    In past two weeks the patient has not  felt down, depressed, hopeless, or lost interest in doing things.      Objective   Physical Exam   Constitutional: She is oriented to person, place, and time. Vital signs are normal. She appears well-developed and well-nourished.   HENT:   Head: Normocephalic.   Right Ear: External ear normal.   Left Ear: External ear normal.   Nose: Nose normal.   Mouth/Throat: Oropharynx is clear and moist.   Eyes: Conjunctivae and EOM are normal. Pupils are equal, round, and reactive to light.   Neck: Normal range of motion. Neck supple. No JVD present. No thyromegaly present.   Cardiovascular: Normal rate, regular rhythm, normal heart sounds and intact distal pulses.    No murmur heard.  Pulmonary/Chest: Effort normal and breath sounds normal. No respiratory distress. She has no wheezes. She has no rales. She exhibits no tenderness.   Abdominal: Soft. Bowel sounds are normal. She exhibits no distension and no mass. There is tenderness (due to the feeding tube). There is no rebound and no guarding. No hernia.   Musculoskeletal: Normal range of motion. She exhibits tenderness. She exhibits no edema or deformity.   Lymphadenopathy:     She has no cervical adenopathy.   Neurological: She is alert and oriented to person, place, and time. No cranial nerve deficit. Coordination normal.   Skin: Skin is warm and dry. No rash noted. No pallor.   Psychiatric: She has a normal mood and affect. Her behavior is normal. Judgment and thought content normal. Her mood appears not anxious. She does not exhibit a depressed mood. She expresses no homicidal and no suicidal ideation. She expresses no suicidal plans and no homicidal plans.   Nursing note and vitals reviewed.      Assessment/Plan   Maria was seen today for follow-up.    Diagnoses and all orders for this visit:    Anemia, unspecified type    Hypokalemia  Comments:  Acute    Status post Nissen fundoplication  Comments:  Ongoing    Uncomplicated asthma, unspecified asthma  severity    Depression, unspecified depression type    Essential hypertension    Type 2 diabetes mellitus without complication, unspecified long term insulin use status        Change her Spiriva that her insurance will not be covering to the Spiriva Respimat that will be covered.    BMP CBC on the way out.    It is recommended to this patient that they get a Diabetic eye exam and a Diabetic foot exam every year.      Scribed for Dr. Joshi by Ines Boyd [facility] 06/22/17

## 2017-06-27 ENCOUNTER — TELEPHONE (OUTPATIENT)
Dept: FAMILY MEDICINE CLINIC | Facility: CLINIC | Age: 62
End: 2017-06-27

## 2017-06-27 NOTE — TELEPHONE ENCOUNTER
Call placed to pt concerning MD new orders to repeat CBC this week, left message and order placed in computer at this time

## 2017-06-27 NOTE — TELEPHONE ENCOUNTER
----- Message from Manjinder Joshi MD sent at 6/27/2017 11:24 AM CDT -----  [facility] this week.

## 2017-06-30 ENCOUNTER — LAB (OUTPATIENT)
Dept: LAB | Facility: OTHER | Age: 62
End: 2017-06-30

## 2017-06-30 DIAGNOSIS — R79.89 INCREASED PLATELET COUNT: ICD-10-CM

## 2017-06-30 LAB
BASOPHILS # BLD AUTO: 0.05 10*3/MM3 (ref 0–0.2)
BASOPHILS NFR BLD AUTO: 0.6 % (ref 0–2)
DEPRECATED RDW RBC AUTO: 46.1 FL (ref 36.4–46.3)
EOSINOPHIL # BLD AUTO: 0.61 10*3/MM3 (ref 0–0.7)
EOSINOPHIL NFR BLD AUTO: 7.9 % (ref 0–7)
ERYTHROCYTE [DISTWIDTH] IN BLOOD BY AUTOMATED COUNT: 14.1 % (ref 11.5–14.5)
HCT VFR BLD AUTO: 34 % (ref 35–45)
HGB BLD-MCNC: 10.8 G/DL (ref 12–15.5)
LYMPHOCYTES # BLD AUTO: 2.1 10*3/MM3 (ref 0.6–4.2)
LYMPHOCYTES NFR BLD AUTO: 27.1 % (ref 10–50)
MCH RBC QN AUTO: 29.3 PG (ref 26.5–34)
MCHC RBC AUTO-ENTMCNC: 31.8 G/DL (ref 31.4–36)
MCV RBC AUTO: 92.4 FL (ref 80–98)
MONOCYTES # BLD AUTO: 0.39 10*3/MM3 (ref 0–0.9)
MONOCYTES NFR BLD AUTO: 5 % (ref 0–12)
NEUTROPHILS # BLD AUTO: 4.59 10*3/MM3 (ref 2–8.6)
NEUTROPHILS NFR BLD AUTO: 59.4 % (ref 37–80)
PLATELET # BLD AUTO: 460 10*3/MM3 (ref 150–450)
PMV BLD AUTO: 8.8 FL (ref 8–12)
RBC # BLD AUTO: 3.68 10*6/MM3 (ref 3.77–5.16)
WBC NRBC COR # BLD: 7.74 10*3/MM3 (ref 3.2–9.8)

## 2017-06-30 PROCEDURE — 85025 COMPLETE CBC W/AUTO DIFF WBC: CPT | Performed by: INTERNAL MEDICINE

## 2017-07-13 ENCOUNTER — OFFICE VISIT (OUTPATIENT)
Dept: SURGERY | Facility: CLINIC | Age: 62
End: 2017-07-13

## 2017-07-13 VITALS
DIASTOLIC BLOOD PRESSURE: 72 MMHG | BODY MASS INDEX: 19.26 KG/M2 | WEIGHT: 102 LBS | HEIGHT: 61 IN | SYSTOLIC BLOOD PRESSURE: 118 MMHG

## 2017-07-13 DIAGNOSIS — Z09 FOLLOW UP: Primary | ICD-10-CM

## 2017-07-13 PROCEDURE — 99024 POSTOP FOLLOW-UP VISIT: CPT | Performed by: SURGERY

## 2017-07-13 RX ORDER — TIOTROPIUM BROMIDE 18 UG/1
CAPSULE ORAL; RESPIRATORY (INHALATION)
COMMUNITY
Start: 2017-07-07 | End: 2017-08-15 | Stop reason: SDUPTHER

## 2017-07-13 RX ORDER — CLINDAMYCIN HYDROCHLORIDE 300 MG/1
CAPSULE ORAL
Refills: 0 | COMMUNITY
Start: 2017-07-09 | End: 2017-08-14

## 2017-07-13 RX ORDER — ASPIRIN 81 MG/1
81 TABLET ORAL
COMMUNITY
Start: 2016-08-19 | End: 2017-08-14 | Stop reason: SDUPTHER

## 2017-07-17 RX ORDER — TRAZODONE HYDROCHLORIDE 150 MG/1
TABLET ORAL
Qty: 30 TABLET | Refills: 5 | Status: SHIPPED | OUTPATIENT
Start: 2017-07-17 | End: 2017-09-01 | Stop reason: SDUPTHER

## 2017-08-07 NOTE — PROGRESS NOTES
CHIEF COMPLAINT:    Chief Complaint   Patient presents with   • Follow-up     S/P Open revision of Nissen and G-tube placement on 6/7/17.       HISTORY OF PRESENT ILLNESS:    Maria Yepez is a 62 y.o. female who underwent Open revision of her prior Nissen with G-tube placement on 6/7/2017.  She has done well postoperatively.  She is tolerating a soft diet at home.  Her gastrostomy tube was removed in the emergency department in [place] recently.  She's had no issues with this site since G-tube removal.    EXAM:  Vitals:    07/13/17 1331   BP: 118/72         Abdomen soft, incision well healed    ASSESSMENT:    Status post open revision of Nissen    PLAN:    Overall she appears to be doing well.  She's tolerating diet well and gaining weight.  We will see her back in 1 month.          This document has been electronically signed by Grant Morris MD on August 7, 2017 3:06 PM

## 2017-08-10 ENCOUNTER — OFFICE VISIT (OUTPATIENT)
Dept: SURGERY | Facility: CLINIC | Age: 62
End: 2017-08-10

## 2017-08-10 VITALS
BODY MASS INDEX: 19.45 KG/M2 | HEIGHT: 61 IN | DIASTOLIC BLOOD PRESSURE: 82 MMHG | WEIGHT: 103 LBS | SYSTOLIC BLOOD PRESSURE: 122 MMHG

## 2017-08-10 DIAGNOSIS — L08.9 WOUND INFECTION: Primary | ICD-10-CM

## 2017-08-10 DIAGNOSIS — T14.8XXA WOUND INFECTION: Primary | ICD-10-CM

## 2017-08-10 PROCEDURE — 99024 POSTOP FOLLOW-UP VISIT: CPT | Performed by: SURGERY

## 2017-08-10 RX ORDER — TRAZODONE HYDROCHLORIDE 150 MG/1
150-300 TABLET ORAL
COMMUNITY
End: 2017-08-14 | Stop reason: SDUPTHER

## 2017-08-10 RX ORDER — TRAMADOL HYDROCHLORIDE 50 MG/1
50 TABLET ORAL
COMMUNITY
End: 2017-08-14 | Stop reason: SDUPTHER

## 2017-08-10 RX ORDER — HYDROCODONE BITARTRATE AND ACETAMINOPHEN 5; 325 MG/1; MG/1
1 TABLET ORAL EVERY 6 HOURS PRN
Qty: 20 TABLET | Refills: 0 | Status: SHIPPED | OUTPATIENT
Start: 2017-08-10 | End: 2017-09-01

## 2017-08-10 RX ORDER — NITROGLYCERIN 0.4 MG/1
0.4 TABLET SUBLINGUAL
COMMUNITY
Start: 2016-08-19 | End: 2017-08-14 | Stop reason: SDUPTHER

## 2017-08-14 ENCOUNTER — OFFICE VISIT (OUTPATIENT)
Dept: FAMILY MEDICINE CLINIC | Facility: CLINIC | Age: 62
End: 2017-08-14

## 2017-08-14 VITALS — WEIGHT: 107 LBS | HEART RATE: 81 BPM | HEIGHT: 61 IN | BODY MASS INDEX: 20.2 KG/M2 | OXYGEN SATURATION: 99 %

## 2017-08-14 DIAGNOSIS — F32.A DEPRESSION, UNSPECIFIED DEPRESSION TYPE: ICD-10-CM

## 2017-08-14 DIAGNOSIS — M75.82 ROTATOR CUFF TENDONITIS, LEFT: Primary | ICD-10-CM

## 2017-08-14 PROCEDURE — 96372 THER/PROPH/DIAG INJ SC/IM: CPT | Performed by: INTERNAL MEDICINE

## 2017-08-14 PROCEDURE — 99213 OFFICE O/P EST LOW 20 MIN: CPT | Performed by: INTERNAL MEDICINE

## 2017-08-14 RX ORDER — TRIAMCINOLONE ACETONIDE 40 MG/ML
40 INJECTION, SUSPENSION INTRA-ARTICULAR; INTRAMUSCULAR ONCE
Status: COMPLETED | OUTPATIENT
Start: 2017-08-14 | End: 2017-08-14

## 2017-08-14 RX ADMIN — TRIAMCINOLONE ACETONIDE 40 MG: 40 INJECTION, SUSPENSION INTRA-ARTICULAR; INTRAMUSCULAR at 12:16

## 2017-08-14 NOTE — PROGRESS NOTES
Subjective   Maria Yepez is a 62 y.o. female.     Chief Complaint   Patient presents with   • Pain     Left Shoulder        History of Present Illness     Pt in c/o of shoulder pains.    Pt says they had to open up her incision recently, and after surgery, she noticed sharp pain in her left shoulder.  She states that the pain isn't a sharp pain anymore, it is just a constant dull pain now.  She couldn't move it much at all at first, but now she can move it a little up and down.     The following portions of the patient's history were reviewed and updated as appropriate: allergies, current medications, past family history, past medical history, past social history, past surgical history and problem list.    Review of Systems   Constitutional: Negative for activity change, appetite change, fatigue and unexpected weight change.   HENT: Negative for ear pain, facial swelling and hearing loss.    Respiratory: Negative for cough, chest tightness, shortness of breath and wheezing.    Cardiovascular: Negative for chest pain, palpitations and leg swelling.   Gastrointestinal: Negative for abdominal pain.   Genitourinary: Negative for difficulty urinating, dysuria, flank pain, frequency and urgency.   Musculoskeletal: Positive for arthralgias. Negative for back pain.   Skin: Negative for color change and rash.   Allergic/Immunologic: Negative for environmental allergies and food allergies.   Neurological: Positive for weakness. Negative for dizziness, syncope, numbness and headaches.   Hematological: Negative for adenopathy.   Psychiatric/Behavioral: Negative for confusion, decreased concentration, dysphoric mood, sleep disturbance and suicidal ideas. The patient is not nervous/anxious.    All other systems reviewed and are negative.      Objective   Physical Exam   Constitutional: She is oriented to person, place, and time. Vital signs are normal. She appears well-developed and well-nourished.   HENT:   Head:  Normocephalic.   Neck: No thyromegaly present.   Cardiovascular: Normal rate, regular rhythm and normal heart sounds.    No murmur heard.  Pulmonary/Chest: Effort normal and breath sounds normal. She has no wheezes. She has no rales.   Musculoskeletal: She exhibits tenderness. She exhibits no edema.        Left shoulder: She exhibits decreased range of motion and tenderness.   Lymphadenopathy:     She has no cervical adenopathy.   Neurological: She is alert and oriented to person, place, and time. No cranial nerve deficit.   Skin: Skin is warm and dry.   Psychiatric: She has a normal mood and affect. Her behavior is normal. Judgment and thought content normal. Her mood appears not anxious. She does not exhibit a depressed mood. She expresses no homicidal and no suicidal ideation. She expresses no suicidal plans and no homicidal plans.   Nursing note and vitals reviewed.      Assessment/Plan   Maria was seen today for pain.    Diagnoses and all orders for this visit:    Rotator cuff tendonitis, left  Comments:  New since surgery   Orders:  -     triamcinolone acetonide (KENALOG-40) injection 40 mg; Inject 1 mL into the shoulder, thigh, or buttocks 1 (One) Time.    Depression, unspecified depression type        Kenalog 40mg IM for rotator cuff tendonitis    If no better Friday RTC, and will re evaluate and do xray's and if xray is okay will do PT.     Get a blood count in a month.     Scribed for Dr. Joshi by Ines Boyd [facility] 08/14/17

## 2017-08-15 ENCOUNTER — OFFICE VISIT (OUTPATIENT)
Dept: GASTROENTEROLOGY | Facility: CLINIC | Age: 62
End: 2017-08-15

## 2017-08-15 VITALS
BODY MASS INDEX: 18.12 KG/M2 | WEIGHT: 96 LBS | HEART RATE: 80 BPM | DIASTOLIC BLOOD PRESSURE: 80 MMHG | SYSTOLIC BLOOD PRESSURE: 120 MMHG | HEIGHT: 61 IN

## 2017-08-15 DIAGNOSIS — R79.89 ELEVATED LIVER FUNCTION TESTS: Primary | ICD-10-CM

## 2017-08-15 PROCEDURE — 99203 OFFICE O/P NEW LOW 30 MIN: CPT | Performed by: INTERNAL MEDICINE

## 2017-08-15 RX ORDER — TIOTROPIUM BROMIDE 18 UG/1
1 CAPSULE ORAL; RESPIRATORY (INHALATION)
Qty: 30 CAPSULE | Refills: 5 | Status: SHIPPED | OUTPATIENT
Start: 2017-08-15 | End: 2021-10-26

## 2017-08-15 NOTE — PROGRESS NOTES
"[facility]      Chief Complaint:   Chief Complaint   Patient presents with   • Weight Loss   • Difficulty Swallowing     Ref B Adi       Subjective     HPI:   Patient presents for evaluation of her dysphagia.  Patient has been seeing Dr. Morris for this and has had surgery to repair of hiatal hernia and states that dysphagia has markedly improved since the surgery.  Patient continues to lose weight despite stating she is eating.  Evaluation of patient's labs show a markedly elevation in LFTs a few months ago.  Patient states she is unaware of any liver problems that she has.    Plan; we'll do workup on patient's liver disease.  The patient follow up in 4 weeks.  We'll consider doing EGD with small bowel biopsies for possible malabsorption if patient's weight is not improve shortly.    Past Medical History:   Past Medical History:   Diagnosis Date   • Abdominal pain    • Acute bronchitis    • Acute pancreatitis    • Acute posthemorrhagic anemia    • Acute sinusitis    • Anal pain    • Anemia    • Anemia due to blood loss    • Anxiety    • Backache     mid and lower back     • Carotid artery disease    • Chronic back pain    • Chronic obstructive lung disease    • Coronary atherosclerosis    • Diabetic neuropathy    • Diarrhea    • Diverticulosis of colon without diverticulitis    • Dysphagia    • Dysuria    • Emphysema/COPD     \"Emphysema\"   • Encounter for immunization    • Epigastric pain    • Esophagitis     with stricture   • Essential hypertension    • Fatigue    • Foot pain    • Ganglion cyst of wrist     left wrist     • Gastroesophageal reflux disease    • Generalized abdominal pain    • Gout    • Headache    • Herpes simplex    • Hip pain, bilateral    • Hoarse    • Hyperlipidemia    • Incontinence of feces    • Insomnia    • Irritable bowel syndrome    • Joint pain    • Knee pain    • Left lower quadrant pain    • Left upper quadrant pain    • Leg pain    • Leukorrhea     " not specified as infective    • Low back pain     injury   • Muscle pain    • Nausea    • Nausea    • Nausea and vomiting    • Neck pain    • Neck pain    • Noncompliance with medication regimen    • Osteopenia    • Pain in lower back    • Pain in right femur    • Palpitations    • Productive cough    • Scoliosis deformity of spine    • Screening examination for venereal disease    • Shoulder pain, left    • Stricture of esophagus    • Symptomatic menopausal or female climacteric states    • Synovitis and tenosynovitis     left forearm   • Tenderness     Tenderness of left upper quadrant of abdomen      • Thoracic back pain    • Type 2 diabetes mellitus    • Upper abdominal pain    • Urgency of urination     Urgent desire to urinate      • Urinary frequency     due to benign prostatic hypertrophy      • Urinary tract infection     site not specified   • Urinary tract infectious disease    • Venous varices    • Weakness     General symptom - weakness    • Weight gain        Family History:  Family History   Problem Relation Age of Onset   • Lung cancer Mother    • Hypertension Mother    • Lymphoma Father      NonHodgkins   • Hypertension Father    • Diabetes Other    • Hypertension Other    • Hypertension Sister    • Diabetes Sister    • Hypertension Brother    • Diabetes Brother        Social History:   reports that she quit smoking about 10 years ago. She quit after 15.00 years of use. She has never used smokeless tobacco. She reports that she does not drink alcohol or use illicit drugs.    Medications:   Current Outpatient Prescriptions   Medication Sig Dispense Refill   • amLODIPine (NORVASC) 2.5 MG tablet Take 2.5 mg by mouth daily. (unconfirmed)     • aspirin 81 MG EC tablet Take 81 mg by mouth Daily.     • FLUoxetine (PROzac) 20 MG capsule Take 1 capsule by mouth Daily. 30 capsule 5   • furosemide (LASIX) 20 MG tablet Take 1 tablet by mouth Daily. (unconfirmed) 30 tablet 5   • gabapentin (NEURONTIN) 400 MG  capsule Take 400 mg by mouth Every Night.     • guaiFENesin (MUCINEX) 600 MG 12 hr tablet Take 600 mg by mouth 2 (Two) Times a Day.     • HYDROcodone-acetaminophen (NORCO) 5-325 MG per tablet Take 1 tablet by mouth Every 6 (Six) Hours As Needed (pain). 20 tablet 0   • levalbuterol (XOPENEX HFA) 45 MCG/ACT inhaler Inhale 1 puff Every 6 (Six) Hours As Needed for wheezing. 15 g 11   • lisinopril (PRINIVIL,ZESTRIL) 5 MG tablet Take 1 tablet by mouth Daily. 30 tablet 5   • metFORMIN (GLUCOPHAGE) 500 MG tablet Take 1 tablet by mouth 2 (Two) Times a Day With Meals. 60 tablet 5   • nitroglycerin (NITROSTAT) 0.4 MG SL tablet Place 0.4 mg under the tongue Every 5 (Five) Minutes As Needed.     • Omega-3 Fatty Acids (FISH OIL) 1000 MG capsule capsule Take 1,000 mg by mouth Daily With Breakfast.     • omeprazole (PRILOSEC) 20 MG capsule Take 1 capsule by mouth 2 (Two) Times a Day. Prilosec 20 mg capsule,delayed release  (unconfirmed) 60 capsule 5   • ondansetron (ZOFRAN) 4 MG tablet Take 1 tablet by mouth Every 8 (Eight) Hours As Needed for Nausea or Vomiting. 20 tablet 1   • pravastatin (PRAVACHOL) 80 MG tablet Take 80 mg by mouth Daily.     • SPIRIVA HANDIHALER 18 MCG per inhalation capsule      • Tiotropium Bromide Monohydrate 2.5 MCG/ACT aerosol solution Inhale 2 puffs Daily. 4 g 5   • tiZANidine (ZANAFLEX) 4 MG tablet Take 4 mg by mouth Every 8 (Eight) Hours As Needed.     • traMADol (ULTRAM) 50 MG tablet Take 50 mg by mouth Every 6 (Six) Hours As Needed (knee pain). (unconfirmed)      • traZODone (DESYREL) 150 MG tablet TAKE 1 TABLET BY MOUTH AT BEDTIME 30 tablet 5   • vitamin D (ERGOCALCIFEROL) 34440 UNITS capsule capsule Take 50,000 Units by mouth Every 14 (Fourteen) Days.       No current facility-administered medications for this visit.        Allergies:  Albuterol; Ibuprofen; and Shellfish-derived products    ROS:    Review of Systems   Constitutional: Negative for activity change, appetite change, chills,  "diaphoresis, fatigue, fever and unexpected weight change.   HENT: Negative for sore throat and trouble swallowing.    Respiratory: Negative for shortness of breath.    Gastrointestinal: Negative for abdominal distention, abdominal pain, anal bleeding, blood in stool, constipation, diarrhea, nausea, rectal pain and vomiting.   Musculoskeletal: Negative for arthralgias.   Skin: Negative for pallor.   Neurological: Negative for light-headedness.     Objective     Blood pressure 120/80, pulse 80, height 61\" (154.9 cm), weight 96 lb (43.5 kg), not currently breastfeeding.    Physical Exam   Constitutional: She is oriented to person, place, and time. She appears well-developed and well-nourished. No distress.   HENT:   Head: Normocephalic and atraumatic.   Cardiovascular: Normal rate, regular rhythm, normal heart sounds and intact distal pulses.  Exam reveals no gallop and no friction rub.    No murmur heard.  Pulmonary/Chest: Breath sounds normal. No respiratory distress. She has no wheezes. She has no rales. She exhibits no tenderness.   Abdominal: Soft. Bowel sounds are normal. She exhibits no distension and no mass. There is no tenderness. There is no rebound and no guarding. No hernia.   Musculoskeletal: Normal range of motion. She exhibits no edema.   Neurological: She is alert and oriented to person, place, and time.   Skin: Skin is warm and dry. No rash noted. She is not diaphoretic. No erythema. No pallor.   Psychiatric: She has a normal mood and affect. Her behavior is normal. Judgment and thought content normal.        Assessment/Plan   Maria was seen today for weight loss and difficulty swallowing.    Diagnoses and all orders for this visit:    Elevated liver function tests  -     LEBLANC Fibrosure  -     Comprehensive Metabolic Panel  -     CBC & Differential  -     US Abdomen Complete; Future  -     Hepatitis B & C Profile  -     Alpha - 1 - Antitrypsin  -     Anti-Smooth Muscle Antibody Titer  -     FOLRES  -   "   Ceruloplasmin  -     Hemochromatosis Mutation  -     Mitochondrial Antibodies, M2  -     Iron Profile  -     Ferritin        * Surgery not found *     Diagnosis Plan   1. Elevated liver function tests  LEBLANC Fibrosure    Comprehensive Metabolic Panel    CBC & Differential    US Abdomen Complete    Hepatitis B & C Profile    Alpha - 1 - Antitrypsin    Anti-Smooth Muscle Antibody Titer    FLORES    Ceruloplasmin    Hemochromatosis Mutation    Mitochondrial Antibodies, M2    Iron Profile    Ferritin       Anticipated Surgical Procedure:  Orders Placed This Encounter   Procedures   • US Abdomen Complete     Standing Status:   Future     Standing Expiration Date:   8/15/2018     Order Specific Question:   Reason for Exam:     Answer:   elevated lfts   • LEBLANC Fibrosure   • Comprehensive Metabolic Panel   • Hepatitis B & C Profile   • Alpha - 1 - Antitrypsin   • Anti-Smooth Muscle Antibody Titer   • FLORES   • Ceruloplasmin   • Hemochromatosis Mutation   • Mitochondrial Antibodies, M2   • Iron Profile   • Ferritin   • CBC & Differential     Order Specific Question:   Manual Differential     Answer:   No       The risks, benefits, and alternatives of this procedure have been discussed with the patient or the responsible party- the patient understands and agrees to proceed.

## 2017-08-17 ENCOUNTER — OFFICE VISIT (OUTPATIENT)
Dept: SURGERY | Facility: CLINIC | Age: 62
End: 2017-08-17

## 2017-08-17 VITALS
HEIGHT: 61 IN | DIASTOLIC BLOOD PRESSURE: 68 MMHG | BODY MASS INDEX: 18.69 KG/M2 | WEIGHT: 99 LBS | SYSTOLIC BLOOD PRESSURE: 114 MMHG

## 2017-08-17 DIAGNOSIS — Z09 FOLLOW UP: Primary | ICD-10-CM

## 2017-08-17 PROCEDURE — 99024 POSTOP FOLLOW-UP VISIT: CPT | Performed by: SURGERY

## 2017-08-17 NOTE — PROGRESS NOTES
CHIEF COMPLAINT:    Chief Complaint   Patient presents with   • Follow-up     Recheck Wound Infection.       HISTORY OF PRESENT ILLNESS:    Maria Yepez is a 62 y.o. female who underwent open revision of a nissen and Gtube placement.  She has done well, but was seen last week with a superficial wound infection.  She has been undergoing wound care at home without issue.    EXAM:  Vitals:    08/17/17 1008   BP: 114/68         Healing open portion of upper incision. Gtube stie healing.    ASSESSMENT:    S/p open revision of nissen    PLAN:    Overall healing well.  She reports she continues to eat well.  Will see in two weeks.          This document has been electronically signed by Grant Morris MD on August 17, 2017 2:25 PM

## 2017-08-24 RX ORDER — AMLODIPINE BESYLATE 2.5 MG/1
2.5 TABLET ORAL DAILY
Qty: 30 TABLET | Refills: 5 | Status: SHIPPED | OUTPATIENT
Start: 2017-08-24 | End: 2018-02-27 | Stop reason: SDUPTHER

## 2017-08-24 RX ORDER — NITROGLYCERIN 0.4 MG/1
0.4 TABLET SUBLINGUAL
Qty: 20 TABLET | Refills: 5 | Status: SHIPPED | OUTPATIENT
Start: 2017-08-24

## 2017-08-30 RX ORDER — PRAVASTATIN SODIUM 80 MG/1
80 TABLET ORAL DAILY
Qty: 30 TABLET | Refills: 5 | Status: SHIPPED | OUTPATIENT
Start: 2017-08-30 | End: 2018-03-19 | Stop reason: SDUPTHER

## 2017-08-31 ENCOUNTER — OFFICE VISIT (OUTPATIENT)
Dept: SURGERY | Facility: CLINIC | Age: 62
End: 2017-08-31

## 2017-08-31 VITALS
WEIGHT: 100 LBS | HEIGHT: 61 IN | SYSTOLIC BLOOD PRESSURE: 136 MMHG | DIASTOLIC BLOOD PRESSURE: 78 MMHG | BODY MASS INDEX: 18.88 KG/M2

## 2017-08-31 DIAGNOSIS — L08.9 WOUND INFECTION: Primary | ICD-10-CM

## 2017-08-31 DIAGNOSIS — T14.8XXA WOUND INFECTION: Primary | ICD-10-CM

## 2017-08-31 PROCEDURE — 99024 POSTOP FOLLOW-UP VISIT: CPT | Performed by: SURGERY

## 2017-08-31 RX ORDER — SODIUM CHLORIDE 0.9 % (FLUSH) 0.9 %
1-10 SYRINGE (ML) INJECTION AS NEEDED
Status: CANCELLED | OUTPATIENT
Start: 2017-09-05

## 2017-08-31 RX ORDER — SODIUM CHLORIDE 9 MG/ML
100 INJECTION, SOLUTION INTRAVENOUS CONTINUOUS
Status: CANCELLED | OUTPATIENT
Start: 2017-09-05

## 2017-09-01 ENCOUNTER — APPOINTMENT (OUTPATIENT)
Dept: PREADMISSION TESTING | Facility: HOSPITAL | Age: 62
End: 2017-09-01

## 2017-09-01 VITALS
RESPIRATION RATE: 14 BRPM | HEIGHT: 61 IN | WEIGHT: 104 LBS | HEART RATE: 79 BPM | DIASTOLIC BLOOD PRESSURE: 70 MMHG | OXYGEN SATURATION: 98 % | BODY MASS INDEX: 19.63 KG/M2 | SYSTOLIC BLOOD PRESSURE: 130 MMHG

## 2017-09-01 PROCEDURE — 36415 COLL VENOUS BLD VENIPUNCTURE: CPT

## 2017-09-01 RX ORDER — TRAZODONE HYDROCHLORIDE 150 MG/1
150-300 TABLET ORAL NIGHTLY
COMMUNITY

## 2017-09-01 NOTE — DISCHARGE INSTRUCTIONS
[facility]  Pre-op Information and Guidelines    You will be called after 2 p.m. the day before your surgery (Friday for Monday surgery) and notified of your time for arrival and approximate surgery time.  If you have not received a call by 4P.M., please contact Same Day Surgery at [phone] of if outside [facility] call [phone].    Please Follow these Important Safety Guidelines:    • The morning of your procedure, take only the medications listed below with   A sip of water:_____________________________________________       ______________________________________________    • DO NOT eat or drink anything after 12:00 midnight the night before surgery  Specific instructions concerning drinking clear liquids will be discussed during  the pre-surgery instruction call the day before your surgery.    • If you take a blood thinner (ex. Plavix, Coumadin, aspirin), ask your doctor when to stop it before surgery  STOP DATE: _________________    • Only 2 visitors are allowed in patient rooms at a time  Your visitors will be asked to wait in the lobby until the admission process is complete with the exception of a parent with a child and patients in need of special assistance.    • YOU CANNOT DRIVE YOURSELF HOME  You must be accompanied by someone who will be responsible for driving you home after surgery and for your care at home.    • DO NOT chew gum, use breath mints, hard candy, or smoke the day of surgery  • DO NOT drink alcohol for at least 24 hours before your surgery  • DO NOT wear any jewelry and remove all body piercing before coming to the hospital  • DO NOT wear make-up to the hospital  • If you are having surgery on an extremity (arm/leg/foot) remove nail polish/artificial nails on the surgical side  • Clothing, glasses, contacts, dentures, and hairpieces must be removed before surgery  • Bathe the night before or the morning of your surgery and do not use powders/lotions on  skin.

## 2017-09-01 NOTE — PAT
Received call back from OR with Dr. Morris response regarding asa 81mg, she related that he had told her that it wasn't addressed because it didn't need to be addressed. Called patient at this time left message regarding asa that she could continue to take however not to take morning of surgery left number for patient to call back if she had any further questions.

## 2017-09-01 NOTE — PAT
Have not heard back from Dr. Morris at this time instructed patient to call his office after 1pm today for further instructions regarding asa

## 2017-09-03 NOTE — PROGRESS NOTES
CHIEF COMPLAINT:    Chief Complaint   Patient presents with   • Follow-up     S/P Open revision of Nissen 6/7/17.       HISTORY OF PRESENT ILLNESS:    Maria Yepez is a 62 y.o. female who underwent Prior open revision of a Nissen.  She initially did well following this.  She returns today noting swelling and redness at her midline incision for the past 4 days.  She has had no drainage from these areas.  She denies fever and chills.  She's had increasing incisional pain.  She's tolerating a diet well.    EXAM:  Vitals:    08/10/17 1327   BP: 122/82         2 small areas of purulence and midline wound.  Both opened and packed today.    ASSESSMENT:    Superficial wound infection    PLAN:    Home health was ordered to assist with dressing changes.  Pain medication was refill ×1 today.  We'll see back in 2 weeks to reassess wound.        This document has been electronically signed by Grant Morris MD on September 3, 2017 4:33 PM

## 2017-09-05 ENCOUNTER — HOSPITAL ENCOUNTER (OUTPATIENT)
Facility: HOSPITAL | Age: 62
Setting detail: HOSPITAL OUTPATIENT SURGERY
Discharge: HOME | End: 2017-09-05
Attending: SURGERY | Admitting: SURGERY

## 2017-09-05 ENCOUNTER — ANESTHESIA EVENT (OUTPATIENT)
Dept: PERIOP | Facility: HOSPITAL | Age: 62
End: 2017-09-05

## 2017-09-05 ENCOUNTER — ANESTHESIA (OUTPATIENT)
Dept: PERIOP | Facility: HOSPITAL | Age: 62
End: 2017-09-05

## 2017-09-05 VITALS
SYSTOLIC BLOOD PRESSURE: 139 MMHG | HEART RATE: 80 BPM | RESPIRATION RATE: 18 BRPM | DIASTOLIC BLOOD PRESSURE: 64 MMHG | HEIGHT: 61 IN | WEIGHT: 104.94 LBS | BODY MASS INDEX: 19.81 KG/M2 | TEMPERATURE: 97.7 F | OXYGEN SATURATION: 97 %

## 2017-09-05 DIAGNOSIS — T14.8XXA WOUND INFECTION: ICD-10-CM

## 2017-09-05 DIAGNOSIS — L08.9 WOUND INFECTION: ICD-10-CM

## 2017-09-05 LAB
ANION GAP SERPL CALCULATED.3IONS-SCNC: 11 MMOL/L (ref 5–15)
BUN BLD-MCNC: 15 MG/DL (ref 7–21)
BUN/CREAT SERPL: 27.8 (ref 7–25)
CALCIUM SPEC-SCNC: 9.3 MG/DL (ref 8.4–10.2)
CHLORIDE SERPL-SCNC: 97 MMOL/L (ref 95–110)
CO2 SERPL-SCNC: 29 MMOL/L (ref 22–31)
CREAT BLD-MCNC: 0.54 MG/DL (ref 0.5–1)
GFR SERPL CREATININE-BSD FRML MDRD: 115 ML/MIN/1.73 (ref 45–104)
GLUCOSE BLD-MCNC: 91 MG/DL (ref 60–100)
GLUCOSE BLDC GLUCOMTR-MCNC: 91 MG/DL (ref 70–130)
GLUCOSE BLDC GLUCOMTR-MCNC: 93 MG/DL (ref 70–130)
POTASSIUM BLD-SCNC: 4.5 MMOL/L (ref 3.5–5.1)
SODIUM BLD-SCNC: 137 MMOL/L (ref 137–145)

## 2017-09-05 PROCEDURE — 25010000003 CEFAZOLIN PER 500 MG: Performed by: SURGERY

## 2017-09-05 PROCEDURE — 10180 I&D COMPLEX PO WOUND INFCTJ: CPT | Performed by: SURGERY

## 2017-09-05 PROCEDURE — 25010000002 FENTANYL CITRATE (PF) 100 MCG/2ML SOLUTION: Performed by: NURSE ANESTHETIST, CERTIFIED REGISTERED

## 2017-09-05 PROCEDURE — 25010000002 PROPOFOL 10 MG/ML EMULSION: Performed by: NURSE ANESTHETIST, CERTIFIED REGISTERED

## 2017-09-05 PROCEDURE — 87077 CULTURE AEROBIC IDENTIFY: CPT | Performed by: SURGERY

## 2017-09-05 PROCEDURE — 87176 TISSUE HOMOGENIZATION CULTR: CPT | Performed by: SURGERY

## 2017-09-05 PROCEDURE — 25010000002 SUCCINYLCHOLINE PER 20 MG: Performed by: NURSE ANESTHETIST, CERTIFIED REGISTERED

## 2017-09-05 PROCEDURE — 87205 SMEAR GRAM STAIN: CPT | Performed by: SURGERY

## 2017-09-05 PROCEDURE — 82962 GLUCOSE BLOOD TEST: CPT

## 2017-09-05 PROCEDURE — 25010000002 ONDANSETRON PER 1 MG: Performed by: NURSE ANESTHETIST, CERTIFIED REGISTERED

## 2017-09-05 PROCEDURE — 87070 CULTURE OTHR SPECIMN AEROBIC: CPT | Performed by: SURGERY

## 2017-09-05 PROCEDURE — 87186 SC STD MICRODIL/AGAR DIL: CPT | Performed by: SURGERY

## 2017-09-05 PROCEDURE — 87147 CULTURE TYPE IMMUNOLOGIC: CPT | Performed by: SURGERY

## 2017-09-05 PROCEDURE — 80048 BASIC METABOLIC PNL TOTAL CA: CPT | Performed by: ANESTHESIOLOGY

## 2017-09-05 RX ORDER — SODIUM CHLORIDE 0.9 % (FLUSH) 0.9 %
1-10 SYRINGE (ML) INJECTION AS NEEDED
Status: DISCONTINUED | OUTPATIENT
Start: 2017-09-05 | End: 2017-09-05 | Stop reason: HOSPADM

## 2017-09-05 RX ORDER — ONDANSETRON 2 MG/ML
INJECTION INTRAMUSCULAR; INTRAVENOUS AS NEEDED
Status: DISCONTINUED | OUTPATIENT
Start: 2017-09-05 | End: 2017-09-05 | Stop reason: SURG

## 2017-09-05 RX ORDER — SODIUM CHLORIDE 9 MG/ML
100 INJECTION, SOLUTION INTRAVENOUS CONTINUOUS
Status: DISCONTINUED | OUTPATIENT
Start: 2017-09-05 | End: 2017-09-05 | Stop reason: HOSPADM

## 2017-09-05 RX ORDER — ROCURONIUM BROMIDE 10 MG/ML
INJECTION, SOLUTION INTRAVENOUS AS NEEDED
Status: DISCONTINUED | OUTPATIENT
Start: 2017-09-05 | End: 2017-09-05 | Stop reason: SURG

## 2017-09-05 RX ORDER — FLUMAZENIL 0.1 MG/ML
0.2 INJECTION INTRAVENOUS AS NEEDED
Status: DISCONTINUED | OUTPATIENT
Start: 2017-09-05 | End: 2017-09-05 | Stop reason: HOSPADM

## 2017-09-05 RX ORDER — LIDOCAINE HYDROCHLORIDE 20 MG/ML
INJECTION, SOLUTION INFILTRATION; PERINEURAL AS NEEDED
Status: DISCONTINUED | OUTPATIENT
Start: 2017-09-05 | End: 2017-09-05 | Stop reason: SURG

## 2017-09-05 RX ORDER — BACITRACIN 50000 [IU]/1
INJECTION, POWDER, FOR SOLUTION INTRAMUSCULAR AS NEEDED
Status: DISCONTINUED | OUTPATIENT
Start: 2017-09-05 | End: 2017-09-05 | Stop reason: HOSPADM

## 2017-09-05 RX ORDER — ACETAMINOPHEN 325 MG/1
650 TABLET ORAL ONCE AS NEEDED
Status: DISCONTINUED | OUTPATIENT
Start: 2017-09-05 | End: 2017-09-05 | Stop reason: HOSPADM

## 2017-09-05 RX ORDER — EPHEDRINE SULFATE 50 MG/ML
5 INJECTION, SOLUTION INTRAVENOUS ONCE AS NEEDED
Status: DISCONTINUED | OUTPATIENT
Start: 2017-09-05 | End: 2017-09-05 | Stop reason: HOSPADM

## 2017-09-05 RX ORDER — PROPOFOL 10 MG/ML
VIAL (ML) INTRAVENOUS AS NEEDED
Status: DISCONTINUED | OUTPATIENT
Start: 2017-09-05 | End: 2017-09-05 | Stop reason: SURG

## 2017-09-05 RX ORDER — DIPHENHYDRAMINE HYDROCHLORIDE 50 MG/ML
12.5 INJECTION INTRAMUSCULAR; INTRAVENOUS
Status: DISCONTINUED | OUTPATIENT
Start: 2017-09-05 | End: 2017-09-05 | Stop reason: HOSPADM

## 2017-09-05 RX ORDER — FENTANYL CITRATE 50 UG/ML
INJECTION, SOLUTION INTRAMUSCULAR; INTRAVENOUS AS NEEDED
Status: DISCONTINUED | OUTPATIENT
Start: 2017-09-05 | End: 2017-09-05 | Stop reason: SURG

## 2017-09-05 RX ORDER — LABETALOL HYDROCHLORIDE 5 MG/ML
5 INJECTION, SOLUTION INTRAVENOUS
Status: DISCONTINUED | OUTPATIENT
Start: 2017-09-05 | End: 2017-09-05 | Stop reason: HOSPADM

## 2017-09-05 RX ORDER — ONDANSETRON 2 MG/ML
4 INJECTION INTRAMUSCULAR; INTRAVENOUS ONCE AS NEEDED
Status: DISCONTINUED | OUTPATIENT
Start: 2017-09-05 | End: 2017-09-05 | Stop reason: HOSPADM

## 2017-09-05 RX ORDER — HYDROCODONE BITARTRATE AND ACETAMINOPHEN 5; 325 MG/1; MG/1
1-2 TABLET ORAL EVERY 4 HOURS PRN
Qty: 30 TABLET | Refills: 0 | Status: SHIPPED | OUTPATIENT
Start: 2017-09-05 | End: 2017-09-28 | Stop reason: SDUPTHER

## 2017-09-05 RX ORDER — SUCCINYLCHOLINE CHLORIDE 20 MG/ML
INJECTION INTRAMUSCULAR; INTRAVENOUS AS NEEDED
Status: DISCONTINUED | OUTPATIENT
Start: 2017-09-05 | End: 2017-09-05 | Stop reason: SURG

## 2017-09-05 RX ORDER — ACETAMINOPHEN 650 MG/1
650 SUPPOSITORY RECTAL ONCE AS NEEDED
Status: DISCONTINUED | OUTPATIENT
Start: 2017-09-05 | End: 2017-09-05 | Stop reason: HOSPADM

## 2017-09-05 RX ORDER — NALOXONE HCL 0.4 MG/ML
0.2 VIAL (ML) INJECTION AS NEEDED
Status: DISCONTINUED | OUTPATIENT
Start: 2017-09-05 | End: 2017-09-05 | Stop reason: HOSPADM

## 2017-09-05 RX ADMIN — CEFAZOLIN SODIUM 2 G: 1 INJECTION, POWDER, FOR SOLUTION INTRAMUSCULAR; INTRAVENOUS at 13:49

## 2017-09-05 RX ADMIN — ONDANSETRON 4 MG: 2 INJECTION INTRAMUSCULAR; INTRAVENOUS at 14:05

## 2017-09-05 RX ADMIN — ROCURONIUM BROMIDE 5 MG: 10 INJECTION INTRAVENOUS at 13:45

## 2017-09-05 RX ADMIN — SUCCINYLCHOLINE CHLORIDE 80 MG: 20 INJECTION, SOLUTION INTRAMUSCULAR; INTRAVENOUS at 13:45

## 2017-09-05 RX ADMIN — PROPOFOL 120 MG: 10 INJECTION, EMULSION INTRAVENOUS at 13:45

## 2017-09-05 RX ADMIN — FENTANYL CITRATE 25 MCG: 50 INJECTION, SOLUTION INTRAMUSCULAR; INTRAVENOUS at 13:45

## 2017-09-05 RX ADMIN — LIDOCAINE HYDROCHLORIDE 40 MG: 20 INJECTION, SOLUTION INFILTRATION; PERINEURAL at 13:45

## 2017-09-05 RX ADMIN — SODIUM CHLORIDE 100 ML/HR: 9 INJECTION, SOLUTION INTRAVENOUS at 11:10

## 2017-09-05 RX ADMIN — FENTANYL CITRATE 25 MCG: 50 INJECTION, SOLUTION INTRAMUSCULAR; INTRAVENOUS at 13:55

## 2017-09-05 RX ADMIN — FENTANYL CITRATE 50 MCG: 50 INJECTION, SOLUTION INTRAMUSCULAR; INTRAVENOUS at 14:00

## 2017-09-05 RX ADMIN — PROPOFOL 30 MG: 10 INJECTION, EMULSION INTRAVENOUS at 14:03

## 2017-09-05 NOTE — BRIEF OP NOTE
INCISION AND DRAINAGE ABSCESS  Procedure Note    Maria Yepez  9/5/2017    Pre-op Diagnosis:   Wound infection [T14.8, L08.9]    Post-op Diagnosis:     Post-Op Diagnosis Codes:     * Wound infection [T14.8, L08.9]    Procedure/CPT® Codes:      Procedure(s):  INCISION AND DRAINAGE OF ABDOMINAL WOUND    Surgeon(s):  Grant Morris MD    Anesthesia: General    Staff:   Circulator: Sherrill Garcia RN  Scrub Person: Carmelina Calvillo  Assistant: Mariaa Mas CSA    Estimated Blood Loss: 5cc  Urine Voided: * No values recorded between 9/5/2017  1:37 PM and 9/5/2017  2:25 PM *    Specimens:                  ID Type Source Tests Collected by Time Destination   1 : abdominal tissue for culture Tissue Abdominal Wall TISSUE/BONE CULTURE Grant Morris MD 9/5/2017 1410          Drains:   Naso/Oral/Gastric Tube 06/07/17 1431 22 (Active)           Findings: infected wound, exposed mesh    Complications: none      Grant Morris MD     Date: 9/5/2017  Time: 2:36 PM

## 2017-09-05 NOTE — ANESTHESIA PROCEDURE NOTES
Airway  Urgency: elective    Airway not difficult    General Information and Staff    Patient location during procedure: OR    Indications and Patient Condition  Indications for airway management: airway protection    Preoxygenated: yes  MILS maintained throughout  Mask difficulty assessment: 0 - not attempted    Final Airway Details  Final airway type: endotracheal airway      Successful airway: ETT  Cuffed: yes   Successful intubation technique: direct laryngoscopy  Facilitating devices/methods: intubating stylet  Endotracheal tube insertion site: oral  Blade: Re  Blade size: #3  ETT size: 7.0 mm  Cormack-Lehane Classification: grade I - full view of glottis  Placement verified by: chest auscultation and capnometry   Measured from: gums  ETT to gums (cm): 19  Number of attempts at approach: 1

## 2017-09-05 NOTE — ANESTHESIA POSTPROCEDURE EVALUATION
Patient: Maria Yepez    Procedure Summary     Date Anesthesia Start Anesthesia Stop Room / Location    09/05/17 2138 [id number]  [facility] OR 12 / [facility]       Procedure Diagnosis Surgeon Provider    INCISION AND DRAINAGE OF ABDOMINAL WOUND (N/A ) Wound infection  (Wound infection [T14.8, L08.9]) MD Lonnie Florez MD          Anesthesia Type: general  Last vitals  BP        Temp        Pulse       Resp        SpO2          Post Anesthesia Care and Evaluation    Patient location during evaluation: PACU  Patient participation: complete - patient participated  Level of consciousness: awake and alert  Pain score: 0  Pain management: adequate  Airway patency: patent  Anesthetic complications: No anesthetic complications  PONV Status: none  Cardiovascular status: acceptable and stable  Respiratory status: acceptable, spontaneous ventilation and face mask  Hydration status: acceptable

## 2017-09-05 NOTE — PLAN OF CARE
Problem: Patient Care Overview (Adult)  Goal: Plan of Care Review  Outcome: Ongoing (interventions implemented as appropriate)    09/05/17 5475   Coping/Psychosocial Response Interventions   Plan Of Care Reviewed With patient   Patient Care Overview   Progress improving   Outcome Evaluation   Outcome Summary/Follow up Plan Once all criteria met, patient ready for tranfer back to [facility]         Problem: Perioperative Period (Adult)  Goal: Signs and Symptoms of Listed Potential Problems Will be Absent or Manageable (Perioperative Period)  Outcome: Ongoing (interventions implemented as appropriate)

## 2017-09-05 NOTE — H&P
"CHIEF COMPLAINT:    Abdominal wound infection    HISTORY OF PRESENT ILLNESS:    Maria Yepez is a 62 y.o. female who underwent open revision of her nissen and Gtube placement.  She initially healed well. She then returned to the office about 1 month post op with superficial wound infections.  She has been doing local wound care but continues to have areas of infection.     Past Medical History:   Diagnosis Date   • Abdominal pain    • Acute bronchitis    • Acute pancreatitis    • Acute posthemorrhagic anemia    • Acute sinusitis    • Anal pain    • Anemia    • Anemia due to blood loss    • Anxiety    • Backache     mid and lower back     • Carotid artery disease    • Chronic back pain    • Chronic obstructive lung disease    • Coronary atherosclerosis    • Diabetic neuropathy    • Diarrhea    • Diverticulosis of colon without diverticulitis    • Dysphagia    • Dysuria    • Emphysema/COPD     \"Emphysema\"   • Encounter for immunization    • Epigastric pain    • Esophagitis     with stricture   • Essential hypertension    • Fatigue    • Foot pain    • Ganglion cyst of wrist     left wrist     • Gastroesophageal reflux disease    • Generalized abdominal pain    • Gout    • Headache    • Herpes simplex    • Hip pain, bilateral    • Hoarse    • Hyperlipidemia    • Incontinence of feces    • Insomnia    • Irritable bowel syndrome    • Joint pain    • Knee pain    • Left lower quadrant pain    • Left upper quadrant pain    • Leg pain    • Leukorrhea     not specified as infective    • Low back pain     injury   • Muscle pain    • Nausea    • Nausea    • Nausea and vomiting    • Neck pain    • Neck pain    • Noncompliance with medication regimen    • Osteopenia    • Pain in lower back    • Pain in right femur    • Palpitations    • Productive cough    • PTSD (post-traumatic stress disorder)    • Scoliosis deformity of spine    • Screening examination for venereal disease    • Shoulder pain, left    • Stricture of " esophagus    • Symptomatic menopausal or female climacteric states    • Synovitis and tenosynovitis     left forearm   • Tenderness     Tenderness of left upper quadrant of abdomen      • Thoracic back pain    • Type 2 diabetes mellitus    • Upper abdominal pain    • Urgency of urination     Urgent desire to urinate      • Urinary frequency     due to benign prostatic hypertrophy      • Urinary tract infection     site not specified   • Urinary tract infectious disease    • Venous varices    • Weakness     General symptom - weakness    • Weight gain        Past Surgical History:   Procedure Laterality Date   • BREAST BIOPSY      Stereotactic breast biopsy (1)  left   • CARDIAC CATHETERIZATION  2014    Cardiac cath 40928 (1)  Non obstructive coronary artery disease. Normal left ventricular systolic function. Performed at [facility].   • CARPAL TUNNEL RELEASE     •  SECTION     • CHOLECYSTECTOMY      laparoscopic   • COLONOSCOPY N/A 2017    Procedure: COLONOSCOPY;  Surgeon: Elia Cheatham MD;  Location: [facility] ENDOSCOPY;  Service:    • COLONOSCOPY W/ POLYPECTOMY  2015    Colonoscopy remove polyps 69541 (1)  Internal and external hemorrhoids found. Colonoscopy with biopsy.   • ENDOSCOPY  10/24/2012    Colon endoscopy 61620 (2)  internal & external hemorrhoids found. Diverticulum in sigmoid colon.   • ENDOSCOPY  2014    EGD w/ Dilatation 27266 (1)  Esophageal stricture was present. Dilatation performed. Gastritis found in the stomach. Biopsy taken. Normal duodenum.   • ENDOSCOPY  2013    EGD w/ tube 13484 (3)  Normal esophagus. Gastritis in stomach. Biopsy taken. Normal duodenum. Biopsy taken.   • ENDOSCOPY N/A 2017    Procedure: ESOPHAGOGASTRODUODENOSCOPY, possible dilation;  Surgeon: Grant Morris MD;  Location: [facility] ENDOSCOPY;  Service:    • FRACTURE SURGERY Right     Arm Surgery (1)  right, arm fracture   • HERNIA REPAIR       Hernia repair w/mesh (1)   • HYSTERECTOMY      Anesth, hysterectomy (1)   • INJECTION OF MEDICATION  05/27/2016    Depo Medrol (Methylprednisone) 80mg (2)  Ordered By: BRIAN LOU ([facility])    • INJECTION OF MEDICATION  03/10/2016    Kenalog (4)  Ordered By: ARNOLDO MURDOCK ([facility])    • INJECTION OF MEDICATION  01/07/2016    Rocephin (1)  Ordered By: ARNOLDO MURDOCK ([facility])    • LYMPH NODE BIOPSY      Biopsy/removal, lymph node(s) (1)  cervical node biopsy   • NECK SURGERY  2013   • NISSEN FUNDOPLICATION     • NISSEN FUNDOPLICATION N/A 6/7/2017    Procedure: OPEN REVISION OF NISSEN FUNDOPLICATION WITH GASTROSTOMY TUBE PLACEMENT ;  Surgeon: Grant Morris MD;  Location: [facility];  Service:    • OTHER SURGICAL HISTORY      Tubal ligation (1)   • PARAESOPHAGEAL HERNIA REPAIR     • TOTAL ABDOMINAL HYSTERECTOMY      RUPALI BSO   • UPPER GASTROINTESTINAL ENDOSCOPY  05/23/2017     No current facility-administered medications for this encounter.     Current Outpatient Prescriptions:   •  amLODIPine (NORVASC) 2.5 MG tablet, Take 1 tablet by mouth Daily. (unconfirmed), Disp: 30 tablet, Rfl: 5  •  FLUoxetine (PROzac) 20 MG capsule, Take 1 capsule by mouth Daily., Disp: 30 capsule, Rfl: 5  •  furosemide (LASIX) 20 MG tablet, Take 1 tablet by mouth Daily. (unconfirmed), Disp: 30 tablet, Rfl: 5  •  gabapentin (NEURONTIN) 400 MG capsule, Take 400 mg by mouth Every Night., Disp: , Rfl:   •  guaiFENesin (MUCINEX) 600 MG 12 hr tablet, Take 600 mg by mouth Daily., Disp: , Rfl:   •  levalbuterol (XOPENEX HFA) 45 MCG/ACT inhaler, Inhale 1 puff Every 6 (Six) Hours As Needed for wheezing., Disp: 15 g, Rfl: 11  •  lisinopril (PRINIVIL,ZESTRIL) 5 MG tablet, Take 1 tablet by mouth Daily., Disp: 30 tablet, Rfl: 5  •  metFORMIN (GLUCOPHAGE) 500 MG tablet, Take 1 tablet by mouth 2 (Two) Times a Day With Meals., Disp: 60 tablet, Rfl: 5  •  Omega-3 Fatty Acids (FISH OIL) 1000 MG capsule capsule, Take 1,000 mg by mouth Daily With Breakfast., Disp: ,  Rfl:   •  omeprazole (PRILOSEC) 20 MG capsule, Take 1 capsule by mouth 2 (Two) Times a Day. Prilosec 20 mg capsule,delayed release  (unconfirmed), Disp: 60 capsule, Rfl: 5  •  ondansetron (ZOFRAN) 4 MG tablet, Take 1 tablet by mouth Every 8 (Eight) Hours As Needed for Nausea or Vomiting., Disp: 20 tablet, Rfl: 1  •  pravastatin (PRAVACHOL) 80 MG tablet, Take 1 tablet by mouth Daily., Disp: 30 tablet, Rfl: 5  •  SPIRIVA HANDIHALER 18 MCG per inhalation capsule, Place 1 capsule into inhaler and inhale Daily., Disp: 30 capsule, Rfl: 5  •  tiZANidine (ZANAFLEX) 4 MG tablet, Take 4 mg by mouth Every 8 (Eight) Hours As Needed., Disp: , Rfl:   •  traMADol (ULTRAM) 50 MG tablet, Take 50 mg by mouth Every 6 (Six) Hours As Needed (knee pain). (unconfirmed) , Disp: , Rfl:   •  traZODone (DESYREL) 150 MG tablet, Take 150 mg by mouth Every Night., Disp: , Rfl:   •  aspirin 81 MG EC tablet, Take 81 mg by mouth Every Night. Hx of CAD, Disp: , Rfl:   •  HYDROcodone-acetaminophen (NORCO) 5-325 MG per tablet, Take 1-2 tablets by mouth Every 4 (Four) Hours As Needed (Pain)., Disp: 30 tablet, Rfl: 0  •  nitroglycerin (NITROSTAT) 0.4 MG SL tablet, Place 1 tablet under the tongue Every 5 (Five) Minutes As Needed for Chest Pain., Disp: 20 tablet, Rfl: 5  •  vitamin D (ERGOCALCIFEROL) 08881 UNITS capsule capsule, Take 50,000 Units by mouth Every 14 (Fourteen) Days., Disp: , Rfl:       Current Facility-Administered Medications:   •  ceFAZolin (ANCEF) 2 g in sodium chloride 0.9 % 100 mL IVPB, 2 g, Intravenous, Once, Grant Morris MD  •  sodium chloride 0.9 % flush 1-10 mL, 1-10 mL, Intravenous, PRN, Grant Morris MD  •  sodium chloride 0.9 % infusion, 100 mL/hr, Intravenous, Continuous, Grant Morris MD, Last Rate: 100 mL/hr at 09/05/17 1110, 100 mL/hr at 09/05/17 1110    Allergies   Allergen Reactions   • Albuterol Shortness Of Breath and Palpitations   • Claritin [Loratadine]      Weird feeling all  over/patient relates most antihistamines   • Ibuprofen GI Intolerance   • Lactose Intolerance (Gi)      Upset stomach   • Shellfish-Derived Products Itching       Family History   Problem Relation Age of Onset   • Lung cancer Mother    • Hypertension Mother    • Lymphoma Father      NonHodgkins   • Hypertension Father    • Diabetes Other    • Hypertension Other    • Hypertension Sister    • Diabetes Sister    • Hypertension Brother    • Diabetes Brother        Social History     Social History   • Marital status:      Spouse name: N/A   • Number of children: N/A   • Years of education: N/A     Occupational History   • Not on file.     Social History Main Topics   • Smoking status: Former Smoker     Years: 15.00     Quit date: 2007   • Smokeless tobacco: Never Used   • Alcohol use No   • Drug use: No   • Sexual activity: Defer     Other Topics Concern   • Not on file     Social History Narrative       Review of Systems   Constitutional: Negative for appetite change, chills, fever and unexpected weight change.   HENT: Negative for hearing loss, nosebleeds and trouble swallowing.    Eyes: Negative for visual disturbance.   Respiratory: Negative for apnea, cough, choking, chest tightness, shortness of breath, wheezing and stridor.    Cardiovascular: Negative for chest pain, palpitations and leg swelling.   Gastrointestinal: Positive for abdominal pain. Negative for abdominal distention, blood in stool, constipation, diarrhea, nausea and vomiting.   Endocrine: Negative for cold intolerance, heat intolerance, polydipsia, polyphagia and polyuria.   Genitourinary: Negative for difficulty urinating, dysuria, frequency, hematuria and urgency.   Musculoskeletal: Negative for arthralgias, back pain, myalgias and neck pain.   Skin: Negative for color change, pallor and rash.   Allergic/Immunologic: Negative for immunocompromised state.   Neurological: Negative for dizziness, seizures, syncope, light-headedness, numbness  "and headaches.   Hematological: Negative for adenopathy.   Psychiatric/Behavioral: Negative for suicidal ideas. The patient is not nervous/anxious.        Objective     /67  Pulse 85  Temp 97.8 °F (36.6 °C)  Resp 18  Ht 61\" (154.9 cm)  Wt 104 lb 15 oz (47.6 kg)  SpO2 97%  BMI 19.83 kg/m2    Physical Exam   Constitutional: She is oriented to person, place, and time. She appears well-developed and well-nourished. No distress.   HENT:   Head: Normocephalic and atraumatic.   Eyes: Conjunctivae and EOM are normal. Pupils are equal, round, and reactive to light. Right eye exhibits no discharge. Left eye exhibits no discharge.   Neck: Normal range of motion. Neck supple. No JVD present. No tracheal deviation present. No thyromegaly present.   Cardiovascular: Normal rate, regular rhythm and normal heart sounds.  Exam reveals no gallop and no friction rub.    No murmur heard.  Pulmonary/Chest: Effort normal and breath sounds normal. No respiratory distress. She has no wheezes. She has no rales. She exhibits no tenderness.   Abdominal: Soft. She exhibits no distension and no mass. There is no tenderness. There is no rebound and no guarding. No hernia.       Musculoskeletal: Normal range of motion. She exhibits no edema, tenderness or deformity.   Neurological: She is alert and oriented to person, place, and time. No cranial nerve deficit.   Skin: Skin is warm and dry. No rash noted. She is not diaphoretic. No erythema. No pallor.   Psychiatric: She has a normal mood and affect. Her behavior is normal. Judgment and thought content normal.         ASSESSMENT:    Wound Infection    PLAN:    Will need operative debridement of her abdominal wound.  Risks and benefits of abdominal wound incision, drainage and debridement were discussed and she is willing to proceed.          This document has been electronically signed by Grant Morris MD on September 5, 2017 1:21 PM      "

## 2017-09-06 NOTE — OP NOTE
Operative Note    Maria Yepez  9/5/2017    Pre-op Diagnosis:   Wound infection [T14.8, L08.9]    Post-op Diagnosis:     Post-Op Diagnosis Codes:     * Wound infection [T14.8, L08.9]    Procedure/CPT® Codes:      Procedure(s):  INCISION AND DRAINAGE OF ABDOMINAL WOUND with excisional debridement of 2x3cm area of skin and subcutaneous tissue.    Surgeon(s):  Grant Morris MD    Anesthesia: General    Staff:   Circulator: Sherrill Garcia RN  Scrub Person: Carmelina Calvillo  Assistant: Mariaa Mas CSA    Estimated Blood Loss: 5cc    Specimens:                  ID Type Source Tests Collected by Time Destination   1 : abdominal tissue for culture Tissue Abdominal Wall TISSUE/BONE CULTURE Grant Morris MD 9/5/2017 1410          Drains:   Naso/Oral/Gastric Tube 06/07/17 1431 22 (Active)           Findings: infected wound, exposed mesh    Complications: none    Indication: 62-year-old lady status post open redo of Nissen fundoplication with gastrostomy tube insertion previously.  Developed a superficial wound infection.  He has developed significant amount of granulation tissue within the wound and has continued drainage.  Presents today for operative drainage of wound.    Operative Note:    Patient was seen and consented preoperatively.  Following this she is taking the operating room and placed in supine position on the OR table.  Gen. anesthetic was administered and an airway placed and secured by anesthesia.  A preoperative briefing was then performed.  The abdomen was prepped and draped in normal sterile fashion.  Timeout was then performed.    Following timeout the abdominal wound was examined.  She had 3 open areas in her midline wound 1 at the apex of the incision with the inferior most aspect of the incision and one near the midpoint of the incision.  Each site appeared to have granulation tissue within it.  Initially the granulation tissue was excised using Bovie electrocautery.   At the upper and lower aspect of the wound there appeared to be small defect within the underlying fascia and no per ailments.  At the midpoint of the wound the skin was opened further and the wound was examined and unhealthy tissue was removed, in total a 2x3xm area of skin and subcutaneous tissue was removed.  Portion of this was sent for Gram stain and culture.  Underlying this did appear to be a small area of her previously placed abdominal wall mesh that was exposed.  However, the majority of this mesh appeared to be well incorporated.  There was no active purulence in this portion of the wound.  And I had not discussed an extensive removal of mesh from her abdominal wall with her prior to surgery.  Therefore the wounds were irrigated copiously moist gauze was placed in the wounds.  Granulation tissue at the site of her former G-tube was then removed using Bovie electrocautery.  Sterile dressings were then placed.  The patient was then awakened and returned recovery in good condition.          This document has been electronically signed by Grant Morris MD on September 6, 2017 3:26 PM        Grant Morris MD     Date: 9/6/2017  Time: 3:22 PM

## 2017-09-08 LAB
BACTERIA SPEC AEROBE CULT: ABNORMAL
GRAM STN SPEC: ABNORMAL
GRAM STN SPEC: ABNORMAL

## 2017-09-13 NOTE — PROGRESS NOTES
CHIEF COMPLAINT:    Chief Complaint   Patient presents with   • Follow-up     2 wk. bebeto- wound infection. S/P Open revision Nissen 6-7-17.       HISTORY OF PRESENT ILLNESS:    Maria Yepez is a 62 y.o. female who underwent Open revision of a Nissen fundoplication on 6/7/2017.  She initially did well postoperatively but has developed a wound infection approximate 1 month postop.  She returns today for follow-up.  She has a new area of redness in the central portion of her wound.    EXAM:  Vitals:    08/31/17 1329   BP: 136/78         Areas of drainage at the superior and inferior aspects of her midline wound with an area of erythema and small opening in the midline portion of her wound    ASSESSMENT:    Wound infection    PLAN:    Given the extensive nature of her infection and her history of MRSA infections previously have advised her to undergo operative drainage.  Risks and benefits of incision and drainage of midline wound in the operating room were discussed with the patient and she is agreeable proceeding.          This document has been electronically signed by Grant Morris MD on September 13, 2017 4:02 PM

## 2017-09-14 ENCOUNTER — OFFICE VISIT (OUTPATIENT)
Dept: SURGERY | Facility: CLINIC | Age: 62
End: 2017-09-14

## 2017-09-14 VITALS
DIASTOLIC BLOOD PRESSURE: 68 MMHG | SYSTOLIC BLOOD PRESSURE: 122 MMHG | BODY MASS INDEX: 19.63 KG/M2 | HEIGHT: 61 IN | WEIGHT: 104 LBS

## 2017-09-14 DIAGNOSIS — Z09 FOLLOW UP: Primary | ICD-10-CM

## 2017-09-14 PROCEDURE — 99024 POSTOP FOLLOW-UP VISIT: CPT | Performed by: SURGERY

## 2017-09-21 RX ORDER — OMEPRAZOLE 20 MG/1
20 CAPSULE, DELAYED RELEASE ORAL 2 TIMES DAILY
Qty: 60 CAPSULE | Refills: 5 | Status: SHIPPED | OUTPATIENT
Start: 2017-09-21 | End: 2018-04-16 | Stop reason: SDUPTHER

## 2017-09-28 ENCOUNTER — OFFICE VISIT (OUTPATIENT)
Dept: SURGERY | Facility: CLINIC | Age: 62
End: 2017-09-28

## 2017-09-28 VITALS
HEIGHT: 61 IN | DIASTOLIC BLOOD PRESSURE: 70 MMHG | BODY MASS INDEX: 19.63 KG/M2 | SYSTOLIC BLOOD PRESSURE: 142 MMHG | WEIGHT: 104 LBS

## 2017-09-28 DIAGNOSIS — L08.9 WOUND INFECTION: Primary | ICD-10-CM

## 2017-09-28 DIAGNOSIS — T14.8XXA WOUND INFECTION: Primary | ICD-10-CM

## 2017-09-28 DIAGNOSIS — Z09 FOLLOW UP: ICD-10-CM

## 2017-09-28 PROCEDURE — 99024 POSTOP FOLLOW-UP VISIT: CPT | Performed by: SURGERY

## 2017-09-28 RX ORDER — HYDROCODONE BITARTRATE AND ACETAMINOPHEN 5; 325 MG/1; MG/1
1 TABLET ORAL EVERY 6 HOURS PRN
Qty: 30 TABLET | Refills: 0 | Status: SHIPPED | OUTPATIENT
Start: 2017-09-28 | End: 2017-10-10

## 2017-09-28 RX ORDER — HYDROCODONE BITARTRATE AND ACETAMINOPHEN 5; 325 MG/1; MG/1
1 TABLET ORAL EVERY 6 HOURS PRN
Qty: 30 TABLET | Refills: 0 | Status: SHIPPED | OUTPATIENT
Start: 2017-09-28 | End: 2017-09-28 | Stop reason: SDUPTHER

## 2017-10-05 ENCOUNTER — OFFICE VISIT (OUTPATIENT)
Dept: SURGERY | Facility: CLINIC | Age: 62
End: 2017-10-05

## 2017-10-05 VITALS
BODY MASS INDEX: 19.45 KG/M2 | SYSTOLIC BLOOD PRESSURE: 130 MMHG | HEIGHT: 61 IN | WEIGHT: 103 LBS | DIASTOLIC BLOOD PRESSURE: 70 MMHG

## 2017-10-05 DIAGNOSIS — Z09 FOLLOW UP: Primary | ICD-10-CM

## 2017-10-05 PROCEDURE — 99024 POSTOP FOLLOW-UP VISIT: CPT | Performed by: SURGERY

## 2017-10-05 RX ORDER — SODIUM CHLORIDE 9 MG/ML
100 INJECTION, SOLUTION INTRAVENOUS CONTINUOUS
Status: CANCELLED | OUTPATIENT
Start: 2017-10-13

## 2017-10-09 RX ORDER — LISINOPRIL 5 MG/1
5 TABLET ORAL DAILY
Qty: 30 TABLET | Refills: 5 | Status: SHIPPED | OUTPATIENT
Start: 2017-10-09 | End: 2018-04-16 | Stop reason: SDUPTHER

## 2017-10-10 ENCOUNTER — APPOINTMENT (OUTPATIENT)
Dept: PREADMISSION TESTING | Facility: HOSPITAL | Age: 62
End: 2017-10-10

## 2017-10-10 VITALS
HEIGHT: 61 IN | HEART RATE: 84 BPM | OXYGEN SATURATION: 98 % | WEIGHT: 101 LBS | BODY MASS INDEX: 19.07 KG/M2 | RESPIRATION RATE: 18 BRPM | SYSTOLIC BLOOD PRESSURE: 100 MMHG | DIASTOLIC BLOOD PRESSURE: 70 MMHG

## 2017-10-10 DIAGNOSIS — Z09 FOLLOW UP: ICD-10-CM

## 2017-10-10 LAB
ANION GAP SERPL CALCULATED.3IONS-SCNC: 12 MMOL/L (ref 5–15)
BUN BLD-MCNC: 17 MG/DL (ref 7–21)
BUN/CREAT SERPL: 27 (ref 7–25)
CALCIUM SPEC-SCNC: 9.7 MG/DL (ref 8.4–10.2)
CHLORIDE SERPL-SCNC: 95 MMOL/L (ref 95–110)
CO2 SERPL-SCNC: 30 MMOL/L (ref 22–31)
CREAT BLD-MCNC: 0.63 MG/DL (ref 0.5–1)
DEPRECATED RDW RBC AUTO: 43.5 FL (ref 36.4–46.3)
ERYTHROCYTE [DISTWIDTH] IN BLOOD BY AUTOMATED COUNT: 13.8 % (ref 11.5–14.5)
GFR SERPL CREATININE-BSD FRML MDRD: 96 ML/MIN/1.73 (ref 60–104)
GLUCOSE BLD-MCNC: 98 MG/DL (ref 60–100)
HCT VFR BLD AUTO: 34.1 % (ref 35–45)
HGB BLD-MCNC: 11.1 G/DL (ref 12–15.5)
MCH RBC QN AUTO: 27.8 PG (ref 26.5–34)
MCHC RBC AUTO-ENTMCNC: 32.6 G/DL (ref 31.4–36)
MCV RBC AUTO: 85.5 FL (ref 80–98)
PLATELET # BLD AUTO: 502 10*3/MM3 (ref 150–450)
PMV BLD AUTO: 7.9 FL (ref 8–12)
POTASSIUM BLD-SCNC: 5.1 MMOL/L (ref 3.5–5.1)
RBC # BLD AUTO: 3.99 10*6/MM3 (ref 3.77–5.16)
SODIUM BLD-SCNC: 137 MMOL/L (ref 137–145)
WBC NRBC COR # BLD: 8.45 10*3/MM3 (ref 3.2–9.8)

## 2017-10-10 PROCEDURE — 85027 COMPLETE CBC AUTOMATED: CPT | Performed by: SURGERY

## 2017-10-10 PROCEDURE — 36415 COLL VENOUS BLD VENIPUNCTURE: CPT

## 2017-10-10 PROCEDURE — 80048 BASIC METABOLIC PNL TOTAL CA: CPT | Performed by: SURGERY

## 2017-10-10 NOTE — DISCHARGE INSTRUCTIONS
[facility]  Pre-op Information and Guidelines    You will be called after 2 p.m. the day before your surgery (Friday for Monday surgery) and notified of your time for arrival and approximate surgery time.  If you have not received a call by 4P.M., please contact Same Day Surgery at [phone] of if outside [facility] call [phone].    Please Follow these Important Safety Guidelines:    • The morning of your procedure, take only the medications listed below with   A sip of water:_____________________________________________       ______________________________________________    • DO NOT eat or drink anything after 12:00 midnight the night before surgery  Specific instructions concerning drinking clear liquids will be discussed during  the pre-surgery instruction call the day before your surgery.    • If you take a blood thinner (ex. Plavix, Coumadin, aspirin), ask your doctor when to stop it before surgery  STOP DATE: _________________    • Only 2 visitors are allowed in patient rooms at a time  Your visitors will be asked to wait in the lobby until the admission process is complete with the exception of a parent with a child and patients in need of special assistance.    • YOU CANNOT DRIVE YOURSELF HOME  You must be accompanied by someone who will be responsible for driving you home after surgery and for your care at home.    • DO NOT chew gum, use breath mints, hard candy, or smoke the day of surgery  • DO NOT drink alcohol for at least 24 hours before your surgery  • DO NOT wear any jewelry and remove all body piercing before coming to the hospital  • DO NOT wear make-up to the hospital  • If you are having surgery on an extremity (arm/leg/foot) remove nail polish/artificial nails on the surgical side  • Clothing, glasses, contacts, dentures, and hairpieces must be removed before surgery  • Bathe the night before or the morning of your surgery and do not use powders/lotions on  skin.

## 2017-10-12 NOTE — PROGRESS NOTES
CHIEF COMPLAINT:    Chief Complaint   Patient presents with   • Follow-up     Ryann abdominal wounds.       HISTORY OF PRESENT ILLNESS:    Maria Yepez is a 62 y.o. female who underwent Prior open revision of Nissen fundoplication with gastrostomy tube placement.  The patient subsequently developed a, located wound infection.  She's undergone debridement of her wound previously was drainage of multiple areas of purulence.  Unfortunately she does have synthetic mesh in place within this wound which is partially exposed.  She has been undergoing local wound care at home.  She continues to have fatigue at home.  She states that she is eating fairly well.  She continues to have some drainage from her open wounds.    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  She understands that this may require a major abdominal operation.  She understands that this may cause recurrence of an incisional hernia as well.  However, given that she has a complex wound infection involving the synthetic mesh I feel this is the only way that it will adequately heal.  Risks and benefits of mesh excision with abdominal wall reconstruction were discussed with the patient and she is agreeable with proceeding.  She has been scheduled for 10/13/2017.          This document has been electronically signed by Grant Morris MD on October 12, 2017 12:26 PM

## 2017-10-13 ENCOUNTER — ANESTHESIA EVENT (OUTPATIENT)
Dept: PERIOP | Facility: HOSPITAL | Age: 62
End: 2017-10-13

## 2017-10-13 ENCOUNTER — HOSPITAL ENCOUNTER (INPATIENT)
Facility: HOSPITAL | Age: 62
LOS: 3 days | Discharge: HOME | End: 2017-10-16
Attending: SURGERY | Admitting: SURGERY

## 2017-10-13 ENCOUNTER — ANESTHESIA (OUTPATIENT)
Dept: PERIOP | Facility: HOSPITAL | Age: 62
End: 2017-10-13

## 2017-10-13 DIAGNOSIS — Z09 FOLLOW UP: ICD-10-CM

## 2017-10-13 PROBLEM — T85.79XA INFECTED HERNIOPLASTY MESH (HCC): Status: ACTIVE | Noted: 2017-10-13

## 2017-10-13 LAB
CRE SCREEN PCR: NOT DETECTED
GLUCOSE BLDC GLUCOMTR-MCNC: 148 MG/DL (ref 70–130)
GLUCOSE BLDC GLUCOMTR-MCNC: 95 MG/DL (ref 70–130)
IMP STRAIN: NORMAL
KPC STRAIN: NORMAL
NDM STRAIN: NORMAL
OXA 48 STRAIN: NORMAL
VIM STRAIN: NORMAL

## 2017-10-13 PROCEDURE — 94760 N-INVAS EAR/PLS OXIMETRY 1: CPT

## 2017-10-13 PROCEDURE — 88304 TISSUE EXAM BY PATHOLOGIST: CPT | Performed by: PATHOLOGY

## 2017-10-13 PROCEDURE — 94799 UNLISTED PULMONARY SVC/PX: CPT

## 2017-10-13 PROCEDURE — 25010000002 ONDANSETRON PER 1 MG: Performed by: NURSE ANESTHETIST, CERTIFIED REGISTERED

## 2017-10-13 PROCEDURE — 25010000002 MIDAZOLAM PER 1 MG: Performed by: NURSE ANESTHETIST, CERTIFIED REGISTERED

## 2017-10-13 PROCEDURE — 25010000002 HYDROMORPHONE PER 4 MG: Performed by: NURSE ANESTHETIST, CERTIFIED REGISTERED

## 2017-10-13 PROCEDURE — 0WPF0JZ REMOVAL OF SYNTHETIC SUBSTITUTE FROM ABDOMINAL WALL, OPEN APPROACH: ICD-10-PCS | Performed by: SURGERY

## 2017-10-13 PROCEDURE — 25010000002 DEXAMETHASONE PER 1 MG: Performed by: NURSE ANESTHETIST, CERTIFIED REGISTERED

## 2017-10-13 PROCEDURE — 25010000002 PHENYLEPHRINE PER 1 ML: Performed by: NURSE ANESTHETIST, CERTIFIED REGISTERED

## 2017-10-13 PROCEDURE — 88304 TISSUE EXAM BY PATHOLOGIST: CPT | Performed by: SURGERY

## 2017-10-13 PROCEDURE — 87081 CULTURE SCREEN ONLY: CPT | Performed by: SURGERY

## 2017-10-13 PROCEDURE — 25010000002 VANCOMYCIN PER 500 MG: Performed by: SURGERY

## 2017-10-13 PROCEDURE — 82962 GLUCOSE BLOOD TEST: CPT

## 2017-10-13 PROCEDURE — 25010000002 FENTANYL CITRATE (PF) 100 MCG/2ML SOLUTION: Performed by: NURSE ANESTHETIST, CERTIFIED REGISTERED

## 2017-10-13 PROCEDURE — 49900 REPAIR OF ABDOMINAL WALL: CPT | Performed by: SURGERY

## 2017-10-13 PROCEDURE — 25010000002 PROPOFOL 10 MG/ML EMULSION: Performed by: NURSE ANESTHETIST, CERTIFIED REGISTERED

## 2017-10-13 PROCEDURE — 99024 POSTOP FOLLOW-UP VISIT: CPT | Performed by: SURGERY

## 2017-10-13 PROCEDURE — 25010000002 HYDROMORPHONE HCL-NACL 6-0.9 MG/30ML-% SOLUTION PREFILLED SYRINGE: Performed by: SURGERY

## 2017-10-13 PROCEDURE — 25010000002 NEOSTIGMINE 10 MG/10ML SOLUTION

## 2017-10-13 RX ORDER — TIZANIDINE 4 MG/1
4 TABLET ORAL EVERY 6 HOURS PRN
Status: DISCONTINUED | OUTPATIENT
Start: 2017-10-13 | End: 2017-10-16 | Stop reason: HOSPADM

## 2017-10-13 RX ORDER — SODIUM CHLORIDE 9 MG/ML
100 INJECTION, SOLUTION INTRAVENOUS CONTINUOUS
Status: DISCONTINUED | OUTPATIENT
Start: 2017-10-13 | End: 2017-10-13

## 2017-10-13 RX ORDER — SODIUM CHLORIDE 9 MG/ML
125 INJECTION, SOLUTION INTRAVENOUS CONTINUOUS
Status: DISCONTINUED | OUTPATIENT
Start: 2017-10-13 | End: 2017-10-15

## 2017-10-13 RX ORDER — ACETAMINOPHEN 325 MG/1
650 TABLET ORAL EVERY 4 HOURS PRN
Status: DISCONTINUED | OUTPATIENT
Start: 2017-10-13 | End: 2017-10-16 | Stop reason: HOSPADM

## 2017-10-13 RX ORDER — LEVALBUTEROL TARTRATE 45 UG/1
1 AEROSOL, METERED ORAL EVERY 6 HOURS PRN
Status: DISCONTINUED | OUTPATIENT
Start: 2017-10-13 | End: 2017-10-13 | Stop reason: CLARIF

## 2017-10-13 RX ORDER — GLYCOPYRROLATE 0.2 MG/ML
INJECTION INTRAMUSCULAR; INTRAVENOUS AS NEEDED
Status: DISCONTINUED | OUTPATIENT
Start: 2017-10-13 | End: 2017-10-13 | Stop reason: SURG

## 2017-10-13 RX ORDER — PROPOFOL 10 MG/ML
VIAL (ML) INTRAVENOUS AS NEEDED
Status: DISCONTINUED | OUTPATIENT
Start: 2017-10-13 | End: 2017-10-13 | Stop reason: SURG

## 2017-10-13 RX ORDER — SODIUM CHLORIDE, SODIUM GLUCONATE, SODIUM ACETATE, POTASSIUM CHLORIDE, AND MAGNESIUM CHLORIDE 526; 502; 368; 37; 30 MG/100ML; MG/100ML; MG/100ML; MG/100ML; MG/100ML
INJECTION, SOLUTION INTRAVENOUS CONTINUOUS PRN
Status: DISCONTINUED | OUTPATIENT
Start: 2017-10-13 | End: 2017-10-13 | Stop reason: SURG

## 2017-10-13 RX ORDER — MIDAZOLAM HYDROCHLORIDE 1 MG/ML
INJECTION INTRAMUSCULAR; INTRAVENOUS AS NEEDED
Status: DISCONTINUED | OUTPATIENT
Start: 2017-10-13 | End: 2017-10-13

## 2017-10-13 RX ORDER — GABAPENTIN 400 MG/1
400 CAPSULE ORAL NIGHTLY
Status: DISCONTINUED | OUTPATIENT
Start: 2017-10-13 | End: 2017-10-16 | Stop reason: HOSPADM

## 2017-10-13 RX ORDER — LABETALOL HYDROCHLORIDE 5 MG/ML
5 INJECTION, SOLUTION INTRAVENOUS
Status: DISCONTINUED | OUTPATIENT
Start: 2017-10-13 | End: 2017-10-13 | Stop reason: HOSPADM

## 2017-10-13 RX ORDER — FENTANYL CITRATE 50 UG/ML
INJECTION, SOLUTION INTRAMUSCULAR; INTRAVENOUS AS NEEDED
Status: DISCONTINUED | OUTPATIENT
Start: 2017-10-13 | End: 2017-10-13 | Stop reason: SURG

## 2017-10-13 RX ORDER — MIDAZOLAM HYDROCHLORIDE 1 MG/ML
INJECTION INTRAMUSCULAR; INTRAVENOUS AS NEEDED
Status: DISCONTINUED | OUTPATIENT
Start: 2017-10-13 | End: 2017-10-13 | Stop reason: SURG

## 2017-10-13 RX ORDER — ACETAMINOPHEN 650 MG/1
650 SUPPOSITORY RECTAL ONCE AS NEEDED
Status: DISCONTINUED | OUTPATIENT
Start: 2017-10-13 | End: 2017-10-13 | Stop reason: HOSPADM

## 2017-10-13 RX ORDER — ACETAMINOPHEN 325 MG/1
650 TABLET ORAL ONCE AS NEEDED
Status: DISCONTINUED | OUTPATIENT
Start: 2017-10-13 | End: 2017-10-13 | Stop reason: HOSPADM

## 2017-10-13 RX ORDER — ONDANSETRON 2 MG/ML
4 INJECTION INTRAMUSCULAR; INTRAVENOUS EVERY 6 HOURS PRN
Status: DISCONTINUED | OUTPATIENT
Start: 2017-10-13 | End: 2017-10-15

## 2017-10-13 RX ORDER — NALOXONE HCL 0.4 MG/ML
0.2 VIAL (ML) INJECTION AS NEEDED
Status: DISCONTINUED | OUTPATIENT
Start: 2017-10-13 | End: 2017-10-13 | Stop reason: HOSPADM

## 2017-10-13 RX ORDER — NALOXONE HCL 0.4 MG/ML
0.1 VIAL (ML) INJECTION
Status: DISCONTINUED | OUTPATIENT
Start: 2017-10-13 | End: 2017-10-16 | Stop reason: HOSPADM

## 2017-10-13 RX ORDER — TRAZODONE HYDROCHLORIDE 150 MG/1
150 TABLET ORAL NIGHTLY
Status: DISCONTINUED | OUTPATIENT
Start: 2017-10-13 | End: 2017-10-16 | Stop reason: HOSPADM

## 2017-10-13 RX ORDER — AMLODIPINE BESYLATE 2.5 MG/1
2.5 TABLET ORAL DAILY
Status: DISCONTINUED | OUTPATIENT
Start: 2017-10-13 | End: 2017-10-16 | Stop reason: HOSPADM

## 2017-10-13 RX ORDER — DEXAMETHASONE SODIUM PHOSPHATE 4 MG/ML
INJECTION, SOLUTION INTRA-ARTICULAR; INTRALESIONAL; INTRAMUSCULAR; INTRAVENOUS; SOFT TISSUE AS NEEDED
Status: DISCONTINUED | OUTPATIENT
Start: 2017-10-13 | End: 2017-10-13 | Stop reason: SURG

## 2017-10-13 RX ORDER — NALOXONE HCL 0.4 MG/ML
0.1 VIAL (ML) INJECTION
Status: DISCONTINUED | OUTPATIENT
Start: 2017-10-13 | End: 2017-10-13 | Stop reason: SDUPTHER

## 2017-10-13 RX ORDER — HYDROMORPHONE HCL IN 0.9% NACL 0.2 MG/ML
PLASTIC BAG, INJECTION (ML) INTRAVENOUS CONTINUOUS
Status: DISCONTINUED | OUTPATIENT
Start: 2017-10-13 | End: 2017-10-15

## 2017-10-13 RX ORDER — ONDANSETRON 4 MG/1
4 TABLET, FILM COATED ORAL EVERY 6 HOURS PRN
Status: DISCONTINUED | OUTPATIENT
Start: 2017-10-13 | End: 2017-10-15

## 2017-10-13 RX ORDER — FLUOXETINE HYDROCHLORIDE 20 MG/1
20 CAPSULE ORAL DAILY
Status: DISCONTINUED | OUTPATIENT
Start: 2017-10-13 | End: 2017-10-16 | Stop reason: HOSPADM

## 2017-10-13 RX ORDER — PANTOPRAZOLE SODIUM 40 MG/1
40 TABLET, DELAYED RELEASE ORAL EVERY MORNING
Status: DISCONTINUED | OUTPATIENT
Start: 2017-10-14 | End: 2017-10-16 | Stop reason: HOSPADM

## 2017-10-13 RX ORDER — FLUMAZENIL 0.1 MG/ML
0.2 INJECTION INTRAVENOUS AS NEEDED
Status: DISCONTINUED | OUTPATIENT
Start: 2017-10-13 | End: 2017-10-13 | Stop reason: HOSPADM

## 2017-10-13 RX ORDER — EPHEDRINE SULFATE 50 MG/ML
5 INJECTION, SOLUTION INTRAVENOUS ONCE AS NEEDED
Status: DISCONTINUED | OUTPATIENT
Start: 2017-10-13 | End: 2017-10-13 | Stop reason: HOSPADM

## 2017-10-13 RX ORDER — LIDOCAINE HYDROCHLORIDE 20 MG/ML
INJECTION, SOLUTION INFILTRATION; PERINEURAL AS NEEDED
Status: DISCONTINUED | OUTPATIENT
Start: 2017-10-13 | End: 2017-10-13 | Stop reason: SURG

## 2017-10-13 RX ORDER — ONDANSETRON 2 MG/ML
INJECTION INTRAMUSCULAR; INTRAVENOUS AS NEEDED
Status: DISCONTINUED | OUTPATIENT
Start: 2017-10-13 | End: 2017-10-13 | Stop reason: SURG

## 2017-10-13 RX ORDER — ONDANSETRON 4 MG/1
4 TABLET, ORALLY DISINTEGRATING ORAL EVERY 6 HOURS PRN
Status: DISCONTINUED | OUTPATIENT
Start: 2017-10-13 | End: 2017-10-15

## 2017-10-13 RX ORDER — LISINOPRIL 5 MG/1
5 TABLET ORAL DAILY
Status: DISCONTINUED | OUTPATIENT
Start: 2017-10-13 | End: 2017-10-16 | Stop reason: HOSPADM

## 2017-10-13 RX ORDER — DIPHENHYDRAMINE HYDROCHLORIDE 50 MG/ML
12.5 INJECTION INTRAMUSCULAR; INTRAVENOUS
Status: DISCONTINUED | OUTPATIENT
Start: 2017-10-13 | End: 2017-10-13 | Stop reason: HOSPADM

## 2017-10-13 RX ORDER — ONDANSETRON 2 MG/ML
4 INJECTION INTRAMUSCULAR; INTRAVENOUS ONCE AS NEEDED
Status: COMPLETED | OUTPATIENT
Start: 2017-10-13 | End: 2017-10-13

## 2017-10-13 RX ORDER — ROCURONIUM BROMIDE 10 MG/ML
INJECTION, SOLUTION INTRAVENOUS AS NEEDED
Status: DISCONTINUED | OUTPATIENT
Start: 2017-10-13 | End: 2017-10-13 | Stop reason: SURG

## 2017-10-13 RX ADMIN — FENTANYL CITRATE 50 MCG: 50 INJECTION, SOLUTION INTRAMUSCULAR; INTRAVENOUS at 09:15

## 2017-10-13 RX ADMIN — GLYCOPYRROLATE 0.6 MG: 0.2 INJECTION, SOLUTION INTRAMUSCULAR; INTRAVENOUS at 12:30

## 2017-10-13 RX ADMIN — ONDANSETRON 4 MG: 2 INJECTION INTRAMUSCULAR; INTRAVENOUS at 13:36

## 2017-10-13 RX ADMIN — GABAPENTIN 400 MG: 400 CAPSULE ORAL at 21:00

## 2017-10-13 RX ADMIN — SODIUM CHLORIDE: 900 INJECTION, SOLUTION INTRAVENOUS at 11:05

## 2017-10-13 RX ADMIN — LISINOPRIL 5 MG: 5 TABLET ORAL at 16:13

## 2017-10-13 RX ADMIN — PHENYLEPHRINE HYDROCHLORIDE 100 MCG: 10 INJECTION INTRAVENOUS at 10:45

## 2017-10-13 RX ADMIN — DEXAMETHASONE SODIUM PHOSPHATE 4 MG: 4 INJECTION, SOLUTION INTRAMUSCULAR; INTRAVENOUS at 10:30

## 2017-10-13 RX ADMIN — ONDANSETRON 4 MG: 2 INJECTION INTRAMUSCULAR; INTRAVENOUS at 10:30

## 2017-10-13 RX ADMIN — HYDROMORPHONE HYDROCHLORIDE 0.5 MG: 1 INJECTION, SOLUTION INTRAMUSCULAR; INTRAVENOUS; SUBCUTANEOUS at 13:13

## 2017-10-13 RX ADMIN — VANCOMYCIN HYDROCHLORIDE 0.75 G: 1 INJECTION, POWDER, LYOPHILIZED, FOR SOLUTION INTRAVENOUS at 09:15

## 2017-10-13 RX ADMIN — SODIUM CHLORIDE 100 ML/HR: 900 INJECTION, SOLUTION INTRAVENOUS at 07:21

## 2017-10-13 RX ADMIN — HYDROMORPHONE HYDROCHLORIDE 0.5 MG: 1 INJECTION, SOLUTION INTRAMUSCULAR; INTRAVENOUS; SUBCUTANEOUS at 12:52

## 2017-10-13 RX ADMIN — IPRATROPIUM BROMIDE 0.5 MG: 0.5 SOLUTION RESPIRATORY (INHALATION) at 19:24

## 2017-10-13 RX ADMIN — FENTANYL CITRATE 50 MCG: 50 INJECTION, SOLUTION INTRAMUSCULAR; INTRAVENOUS at 09:30

## 2017-10-13 RX ADMIN — ROCURONIUM BROMIDE 20 MG: 10 INJECTION INTRAVENOUS at 11:15

## 2017-10-13 RX ADMIN — SODIUM CHLORIDE 125 ML/HR: 900 INJECTION, SOLUTION INTRAVENOUS at 13:45

## 2017-10-13 RX ADMIN — VANCOMYCIN HYDROCHLORIDE 750 G: 1 INJECTION, POWDER, LYOPHILIZED, FOR SOLUTION INTRAVENOUS at 10:49

## 2017-10-13 RX ADMIN — LIDOCAINE HYDROCHLORIDE 50 MG: 20 INJECTION, SOLUTION INFILTRATION; PERINEURAL at 09:26

## 2017-10-13 RX ADMIN — ROCURONIUM BROMIDE 20 MG: 10 INJECTION INTRAVENOUS at 09:45

## 2017-10-13 RX ADMIN — TRAZODONE HYDROCHLORIDE 150 MG: 150 TABLET ORAL at 21:00

## 2017-10-13 RX ADMIN — IPRATROPIUM BROMIDE 0.5 MG: 0.5 SOLUTION RESPIRATORY (INHALATION) at 15:16

## 2017-10-13 RX ADMIN — PHENYLEPHRINE HYDROCHLORIDE 100 MCG: 10 INJECTION INTRAVENOUS at 10:35

## 2017-10-13 RX ADMIN — PROPOFOL 100 MG: 10 INJECTION, EMULSION INTRAVENOUS at 09:15

## 2017-10-13 RX ADMIN — PHENYLEPHRINE HYDROCHLORIDE 100 MCG: 10 INJECTION INTRAVENOUS at 10:40

## 2017-10-13 RX ADMIN — HYDROMORPHONE HYDROCHLORIDE 0.5 MG: 1 INJECTION, SOLUTION INTRAMUSCULAR; INTRAVENOUS; SUBCUTANEOUS at 12:46

## 2017-10-13 RX ADMIN — Medication: at 13:27

## 2017-10-13 RX ADMIN — ROCURONIUM BROMIDE 20 MG: 10 INJECTION INTRAVENOUS at 10:36

## 2017-10-13 RX ADMIN — MIDAZOLAM 2 MG: 1 INJECTION INTRAMUSCULAR; INTRAVENOUS at 09:15

## 2017-10-13 RX ADMIN — ROCURONIUM BROMIDE 30 MG: 10 INJECTION INTRAVENOUS at 09:26

## 2017-10-13 RX ADMIN — ROCURONIUM BROMIDE 10 MG: 10 INJECTION INTRAVENOUS at 12:05

## 2017-10-13 RX ADMIN — SODIUM CHLORIDE, SODIUM GLUCONATE, SODIUM ACETATE, POTASSIUM CHLORIDE, AND MAGNESIUM CHLORIDE: 526; 502; 368; 37; 30 INJECTION, SOLUTION INTRAVENOUS at 11:46

## 2017-10-13 RX ADMIN — SODIUM CHLORIDE, SODIUM GLUCONATE, SODIUM ACETATE, POTASSIUM CHLORIDE, AND MAGNESIUM CHLORIDE: 526; 502; 368; 37; 30 INJECTION, SOLUTION INTRAVENOUS at 11:05

## 2017-10-13 NOTE — PROGRESS NOTES
Still sleepy post op but awakens and is oriented. Dr. Cheatham will see over weekend.          This document has been electronically signed by Grant Morris MD on October 13, 2017 2:49 PM

## 2017-10-13 NOTE — BRIEF OP NOTE
VENTRAL/INCISIONAL HERNIA REPAIR  Progress Note    Maria Yepez  10/13/2017    Pre-op Diagnosis:   Follow up [Z09]       Post-Op Diagnosis Codes:     * Follow up [Z09]    Procedure/CPT® Codes:      Procedure(s):  REMOVAL OF INFECTED MESH, REPAIR OF ABDOMINAL WALL    Surgeon(s):  Grant Morris MD    Anesthesia: General    Staff:   Circulator: Sherrill Lopez RN; Shanna Bradford RN  Scrub Person: Katy Lay; Dominique Turner  Assistant: Mariaa Mas CSA    Estimated Blood Loss: 200 mL    Urine Voided: 50 mL    Specimens:                  ID Type Source Tests Collected by Time Destination   A : old mesh Tissue Abdominal Wall TISSUE EXAM Grant Morris MD 10/13/2017 1228          Drains:   Naso/Oral/Gastric Tube 06/07/17 1431 22 (Active)       Urethral Catheter 10/13/17 0930 100% silicone 16 10 10 (Active)           Findings: multiple abscesses surrounding previously placed mesh    Complications: none      Grant Morris MD     Date: 10/13/2017  Time: 12:52 PM

## 2017-10-13 NOTE — PLAN OF CARE
Problem: Patient Care Overview (Adult)  Goal: Plan of Care Review  Outcome: Ongoing (interventions implemented as appropriate)    10/13/17 1433   Coping/Psychosocial Response Interventions   Plan Of Care Reviewed With patient   Patient Care Overview   Progress no change   Outcome Evaluation   Outcome Summary/Follow up Plan new admit from pacu       Goal: Adult Individualization and Mutuality  Outcome: Ongoing (interventions implemented as appropriate)  Goal: Discharge Needs Assessment  Outcome: Ongoing (interventions implemented as appropriate)    Problem: Perioperative Period (Adult)  Goal: Signs and Symptoms of Listed Potential Problems Will be Absent or Manageable (Perioperative Period)  Outcome: Ongoing (interventions implemented as appropriate)

## 2017-10-13 NOTE — ANESTHESIA POSTPROCEDURE EVALUATION
Patient: Maria Yepez    Procedure Summary     Date Anesthesia Start Anesthesia Stop Room / Location    10/13/17 0921 1248 [facility] OR 11 / [facility]       Procedure Diagnosis Surgeon Provider    REMOVAL OF INFECTED MESH, REPAIR OF ABDOMINAL WALL   (Will need Strattice mesh in room). (N/A Abdomen) Follow up  (Follow up [Z09]) MD Lonnie Florez MD          Anesthesia Type: general  Last vitals  BP        Temp        Pulse       Resp        SpO2          Post Anesthesia Care and Evaluation    Patient location during evaluation: PACU  Patient participation: complete - patient participated  Level of consciousness: awake  Pain score: 2  Pain management: adequate  Airway patency: patent  Anesthetic complications: No anesthetic complications  PONV Status: none  Cardiovascular status: acceptable  Respiratory status: acceptable  Hydration status: acceptable

## 2017-10-13 NOTE — ANESTHESIA PREPROCEDURE EVALUATION
Anesthesia Evaluation     NPO Solid Status: > 8 hours  NPO Liquid Status: > 8 hours     Airway   Mallampati: II  TM distance: >3 FB  Neck ROM: full  no difficulty expected  Dental    (+) edentulous    Pulmonary     breath sounds clear to auscultation  (+) a smoker Former, COPD mild,   Cardiovascular     ECG reviewed  Rhythm: regular  Rate: normal    (+) hypertension well controlled, CAD, PVD, hyperlipidemia      Neuro/Psych  (+) headaches, weakness, psychiatric history Anxiety,    GI/Hepatic/Renal/Endo    (+)  GERD poorly controlled, diabetes mellitus type 2,     Musculoskeletal     (+) neck pain,   Abdominal     Abdomen: tender.   Substance History      OB/GYN          Other   (+) arthritis                                     Anesthesia Plan    ASA 3     general     intravenous induction   Anesthetic plan and risks discussed with patient.

## 2017-10-13 NOTE — PLAN OF CARE
Problem: Patient Care Overview (Adult)  Goal: Plan of Care Review  Outcome: Ongoing (interventions implemented as appropriate)    10/13/17 1328   Coping/Psychosocial Response Interventions   Plan Of Care Reviewed With patient   Patient Care Overview   Progress improving   Outcome Evaluation   Outcome Summary/Follow up Plan VSS on 2L oxygen via nasal cannula. Denies having nausea. Resting comfortably, snoring at times in between care. PCA started and pt instructed at this time. Ready for transfer to floor.         Problem: Perioperative Period (Adult)  Goal: Signs and Symptoms of Listed Potential Problems Will be Absent or Manageable (Perioperative Period)  Outcome: Ongoing (interventions implemented as appropriate)

## 2017-10-13 NOTE — ANESTHESIA PROCEDURE NOTES
Airway  Urgency: elective    Airway not difficult    General Information and Staff    Patient location during procedure: OR    Indications and Patient Condition  Indications for airway management: airway protection    Preoxygenated: yes  Mask difficulty assessment: 1 - vent by mask    Final Airway Details  Final airway type: endotracheal airway      Successful airway: ETT  Cuffed: yes   Successful intubation technique: direct laryngoscopy  Endotracheal tube insertion site: oral  Blade: Re  Blade size: #3

## 2017-10-13 NOTE — H&P
"CHIEF COMPLAINT:    Abdominal wound infection with exposed mesh    HISTORY OF PRESENT ILLNESS:    Maria Yepez is a 62 y.o. female who underwent open revision of a Nissen with gtube placement.  She subsequently had a midline wound infection which has involved a previously placed synthetic mesh.  Her wound has been undergoing local wound care but fails to progress to healing.    Past Medical History:   Diagnosis Date   • Abdominal pain    • Acute bronchitis    • Acute pancreatitis    • Acute posthemorrhagic anemia    • Acute sinusitis    • Anal pain    • Anemia    • Anemia due to blood loss    • Anxiety    • Backache     mid and lower back     • Carotid artery disease    • Chronic back pain    • Chronic obstructive lung disease    • Coronary atherosclerosis    • Diabetic neuropathy    • Diarrhea    • Diverticulosis of colon without diverticulitis    • Dysphagia    • Dysuria    • Emphysema/COPD     \"Emphysema\"   • Encounter for immunization    • Epigastric pain    • Esophagitis     with stricture   • Essential hypertension    • Fatigue    • Foot pain    • Ganglion cyst of wrist     left wrist     • Gastroesophageal reflux disease    • Generalized abdominal pain    • Gout    • Headache    • Herpes simplex    • Hip pain, bilateral    • Hoarse    • Hyperlipidemia    • Incontinence of feces    • Insomnia    • Irritable bowel syndrome    • Joint pain    • Knee pain    • Left lower quadrant pain    • Left upper quadrant pain    • Leg pain    • Leukorrhea     not specified as infective    • Low back pain     injury   • Muscle pain    • Nausea    • Nausea    • Nausea and vomiting    • Neck pain    • Neck pain    • Noncompliance with medication regimen    • Osteopenia    • Pain in lower back    • Pain in right femur    • Palpitations    • Productive cough    • PTSD (post-traumatic stress disorder)    • Scoliosis deformity of spine    • Screening examination for venereal disease    • Shoulder pain, left    • Stricture " of esophagus    • Symptomatic menopausal or female climacteric states    • Synovitis and tenosynovitis     left forearm   • Tenderness     Tenderness of left upper quadrant of abdomen      • Thoracic back pain    • Type 2 diabetes mellitus    • Upper abdominal pain    • Urgency of urination     Urgent desire to urinate      • Urinary frequency     due to benign prostatic hypertrophy      • Urinary tract infection     site not specified   • Urinary tract infectious disease    • Venous varices    • Weakness     General symptom - weakness    • Weight gain        Past Surgical History:   Procedure Laterality Date   • BREAST BIOPSY      Stereotactic breast biopsy (1)  left   • CARDIAC CATHETERIZATION  2014    Cardiac cath 99745 (1)  Non obstructive coronary artery disease. Normal left ventricular systolic function. Performed at [facility].   • CARPAL TUNNEL RELEASE     •  SECTION     • CHOLECYSTECTOMY      laparoscopic   • COLONOSCOPY N/A 2017    Procedure: COLONOSCOPY;  Surgeon: Elia Cheatham MD;  Location: [facility] ENDOSCOPY;  Service:    • COLONOSCOPY W/ POLYPECTOMY  2015    Colonoscopy remove polyps 01933 (1)  Internal and external hemorrhoids found. Colonoscopy with biopsy.   • ENDOSCOPY  10/24/2012    Colon endoscopy 34786 (2)  internal & external hemorrhoids found. Diverticulum in sigmoid colon.   • ENDOSCOPY  2014    EGD w/ Dilatation 47644 (1)  Esophageal stricture was present. Dilatation performed. Gastritis found in the stomach. Biopsy taken. Normal duodenum.   • ENDOSCOPY  2013    EGD w/ tube 35235 (3)  Normal esophagus. Gastritis in stomach. Biopsy taken. Normal duodenum. Biopsy taken.   • ENDOSCOPY N/A 2017    Procedure: ESOPHAGOGASTRODUODENOSCOPY, possible dilation;  Surgeon: Grant Morris MD;  Location: [facility] ENDOSCOPY;  Service:    • FRACTURE SURGERY Right     Arm Surgery (1)  right, arm fracture   • HERNIA REPAIR       "Hernia repair w/mesh (1)   • HYSTERECTOMY      Anesth, hysterectomy (1)   • INCISION AND DRAINAGE ABSCESS N/A 9/5/2017    Procedure: INCISION AND DRAINAGE OF ABDOMINAL WOUND;  Surgeon: Grant Morris MD;  Location: [facility];  Service:    • INJECTION OF MEDICATION  05/27/2016    Depo Medrol (Methylprednisone) 80mg (2)  Ordered By: BRIAN LOU ([facility])    • INJECTION OF MEDICATION  03/10/2016    Kenalog (4)  Ordered By: ARNOLDO MURDOCK ([facility])    • INJECTION OF MEDICATION  01/07/2016    Rocephin (1)  Ordered By: ARNOLDO MURDOCK ([facility])    • LYMPH NODE BIOPSY      Biopsy/removal, lymph node(s) (1)  cervical node biopsy   • NECK SURGERY  2013   • NISSEN FUNDOPLICATION     • NISSEN FUNDOPLICATION N/A 6/7/2017    Procedure: OPEN REVISION OF NISSEN FUNDOPLICATION WITH GASTROSTOMY TUBE PLACEMENT ;  Surgeon: Grant Morris MD;  Location: [facility];  Service:    • OTHER SURGICAL HISTORY      Tubal ligation (1)   • PARAESOPHAGEAL HERNIA REPAIR     • TOTAL ABDOMINAL HYSTERECTOMY      RUPALI BSO   • UPPER GASTROINTESTINAL ENDOSCOPY  05/23/2017         Current Facility-Administered Medications:   •  sodium chloride 0.9 % infusion, 100 mL/hr, Intravenous, Continuous, Grant Morris MD, Last Rate: 100 mL/hr at 10/13/17 0721, 100 mL/hr at 10/13/17 0721  •  vancomycin (VANCOCIN) 750 mg in sodium chloride 0.9 % 250 mL IVPB, 15 mg/kg, Intravenous, Once, Grant Morris MD    Allergies   Allergen Reactions   • Albuterol Shortness Of Breath and Palpitations   • Adhesive Tape Itching   • Claritin [Loratadine] Other (See Comments)     \"wierd feeling\"   • Ibuprofen GI Intolerance   • Lactose Intolerance (Gi) GI Intolerance   • Shellfish-Derived Products Itching       Family History   Problem Relation Age of Onset   • Lung cancer Mother    • Hypertension Mother    • Lymphoma Father      NonHodgkins   • Hypertension Father    • Diabetes Other    • Hypertension Other    • Hypertension Sister    • Diabetes Sister    • " "Hypertension Brother    • Diabetes Brother        Social History     Social History   • Marital status:      Spouse name: N/A   • Number of children: N/A   • Years of education: N/A     Occupational History   • Not on file.     Social History Main Topics   • Smoking status: Former Smoker     Years: 0.00     Quit date: 2007   • Smokeless tobacco: Never Used   • Alcohol use Yes      Comment: socially   • Drug use: No   • Sexual activity: Defer     Other Topics Concern   • Not on file     Social History Narrative       Review of Systems   Constitutional: Positive for activity change, fatigue and fever. Negative for appetite change, chills and unexpected weight change.   HENT: Negative for hearing loss, nosebleeds and trouble swallowing.    Eyes: Negative for visual disturbance.   Respiratory: Negative for apnea, cough, choking, chest tightness, shortness of breath, wheezing and stridor.    Cardiovascular: Negative for chest pain, palpitations and leg swelling.   Gastrointestinal: Positive for abdominal pain. Negative for abdominal distention, blood in stool, constipation, diarrhea, nausea and vomiting.   Endocrine: Negative for cold intolerance, heat intolerance, polydipsia, polyphagia and polyuria.   Genitourinary: Negative for difficulty urinating, dysuria, frequency, hematuria and urgency.   Musculoskeletal: Positive for back pain. Negative for arthralgias, myalgias and neck pain.   Skin: Negative for color change, pallor and rash.   Allergic/Immunologic: Negative for immunocompromised state.   Neurological: Negative for dizziness, seizures, syncope, light-headedness, numbness and headaches.   Hematological: Negative for adenopathy.   Psychiatric/Behavioral: Negative for suicidal ideas. The patient is not nervous/anxious.        Objective     /68 (BP Location: Right arm, Patient Position: Lying)  Pulse 79  Temp 97.3 °F (36.3 °C) (Temporal Artery )   Resp 18  Ht 61\" (154.9 cm)  Wt 102 lb 4.7 oz " (46.4 kg)  SpO2 99%  BMI 19.33 kg/m2    Physical Exam   Constitutional: She is oriented to person, place, and time. She appears well-developed and well-nourished. No distress.   HENT:   Head: Normocephalic and atraumatic.   Eyes: Conjunctivae and EOM are normal. Pupils are equal, round, and reactive to light. Right eye exhibits no discharge. Left eye exhibits no discharge.   Neck: Normal range of motion. Neck supple. No JVD present. No tracheal deviation present. No thyromegaly present.   Cardiovascular: Normal rate, regular rhythm and normal heart sounds.  Exam reveals no gallop and no friction rub.    No murmur heard.  Pulmonary/Chest: Effort normal and breath sounds normal. No respiratory distress. She has no wheezes. She has no rales. She exhibits no tenderness.   Abdominal: Soft. She exhibits no distension and no mass. There is no tenderness. There is no rebound and no guarding. No hernia.       Musculoskeletal: Normal range of motion. She exhibits no edema, tenderness or deformity.   Neurological: She is alert and oriented to person, place, and time. No cranial nerve deficit.   Skin: Skin is warm and dry. No rash noted. She is not diaphoretic. No erythema. No pallor.   Psychiatric: She has a normal mood and affect. Her behavior is normal. Judgment and thought content normal.         ASSESSMENT:    Wound infection with exposed mesh    PLAN:    She will need excision of all involved mesh to clear her infection.  Risks and benefits of mesh excision with possible abdominal wall reconstruction were discussed and she is agreeable with proceeding.          This document has been electronically signed by Grant Morris MD on October 13, 2017 9:14 AM

## 2017-10-14 LAB
ABO GROUP BLD: NORMAL
ALBUMIN SERPL-MCNC: 2.4 G/DL (ref 3.4–4.8)
ALBUMIN/GLOB SERPL: 1 G/DL (ref 1.1–1.8)
ALP SERPL-CCNC: 82 U/L (ref 38–126)
ALT SERPL W P-5'-P-CCNC: 62 U/L (ref 9–52)
ANION GAP SERPL CALCULATED.3IONS-SCNC: 4 MMOL/L (ref 5–15)
AST SERPL-CCNC: 46 U/L (ref 14–36)
BASOPHILS # BLD AUTO: 0.03 10*3/MM3 (ref 0–0.2)
BASOPHILS NFR BLD AUTO: 0.3 % (ref 0–2)
BILIRUB SERPL-MCNC: 0.6 MG/DL (ref 0.2–1.3)
BLD GP AB SCN SERPL QL: NEGATIVE
BUN BLD-MCNC: 9 MG/DL (ref 7–21)
BUN/CREAT SERPL: 16.1 (ref 7–25)
CALCIUM SPEC-SCNC: 7.6 MG/DL (ref 8.4–10.2)
CHLORIDE SERPL-SCNC: 101 MMOL/L (ref 95–110)
CO2 SERPL-SCNC: 30 MMOL/L (ref 22–31)
CREAT BLD-MCNC: 0.56 MG/DL (ref 0.5–1)
DEPRECATED RDW RBC AUTO: 44.5 FL (ref 36.4–46.3)
EOSINOPHIL # BLD AUTO: 0.32 10*3/MM3 (ref 0–0.7)
EOSINOPHIL NFR BLD AUTO: 2.8 % (ref 0–7)
ERYTHROCYTE [DISTWIDTH] IN BLOOD BY AUTOMATED COUNT: 14.2 % (ref 11.5–14.5)
GFR SERPL CREATININE-BSD FRML MDRD: 110 ML/MIN/1.73 (ref 45–104)
GLOBULIN UR ELPH-MCNC: 2.4 GM/DL (ref 2.3–3.5)
GLUCOSE BLD-MCNC: 101 MG/DL (ref 60–100)
HCT VFR BLD AUTO: 18.9 % (ref 35–45)
HCT VFR BLD AUTO: 29 % (ref 35–45)
HGB BLD-MCNC: 6.2 G/DL (ref 12–15.5)
HGB BLD-MCNC: 9.8 G/DL (ref 12–15.5)
HOLD SPECIMEN: NORMAL
IMM GRANULOCYTES # BLD: 0.04 10*3/MM3 (ref 0–0.02)
IMM GRANULOCYTES NFR BLD: 0.4 % (ref 0–0.5)
LYMPHOCYTES # BLD AUTO: 0.78 10*3/MM3 (ref 0.6–4.2)
LYMPHOCYTES NFR BLD AUTO: 6.9 % (ref 10–50)
Lab: NORMAL
MCH RBC QN AUTO: 27.9 PG (ref 26.5–34)
MCHC RBC AUTO-ENTMCNC: 32.8 G/DL (ref 31.4–36)
MCV RBC AUTO: 85.1 FL (ref 80–98)
MONOCYTES # BLD AUTO: 0.77 10*3/MM3 (ref 0–0.9)
MONOCYTES NFR BLD AUTO: 6.8 % (ref 0–12)
NEUTROPHILS # BLD AUTO: 9.37 10*3/MM3 (ref 2–8.6)
NEUTROPHILS NFR BLD AUTO: 82.8 % (ref 37–80)
NRBC BLD MANUAL-RTO: 0 /100 WBC (ref 0–0)
PLATELET # BLD AUTO: 380 10*3/MM3 (ref 150–450)
PMV BLD AUTO: 8.1 FL (ref 8–12)
POTASSIUM BLD-SCNC: 4.5 MMOL/L (ref 3.5–5.1)
PROT SERPL-MCNC: 4.8 G/DL (ref 6.3–8.6)
RBC # BLD AUTO: 2.22 10*6/MM3 (ref 3.77–5.16)
RH BLD: POSITIVE
SODIUM BLD-SCNC: 135 MMOL/L (ref 137–145)
WBC NRBC COR # BLD: 11.31 10*3/MM3 (ref 3.2–9.8)

## 2017-10-14 PROCEDURE — 25010000002 ENOXAPARIN PER 10 MG: Performed by: SURGERY

## 2017-10-14 PROCEDURE — 85025 COMPLETE CBC W/AUTO DIFF WBC: CPT | Performed by: SURGERY

## 2017-10-14 PROCEDURE — 36430 TRANSFUSION BLD/BLD COMPNT: CPT

## 2017-10-14 PROCEDURE — 85014 HEMATOCRIT: CPT | Performed by: SURGERY

## 2017-10-14 PROCEDURE — 86900 BLOOD TYPING SEROLOGIC ABO: CPT

## 2017-10-14 PROCEDURE — 25010000002 VANCOMYCIN PER 500 MG: Performed by: SURGERY

## 2017-10-14 PROCEDURE — 80053 COMPREHEN METABOLIC PANEL: CPT | Performed by: SURGERY

## 2017-10-14 PROCEDURE — 86901 BLOOD TYPING SEROLOGIC RH(D): CPT | Performed by: SURGERY

## 2017-10-14 PROCEDURE — 94760 N-INVAS EAR/PLS OXIMETRY 1: CPT

## 2017-10-14 PROCEDURE — 25010000002 HYDROMORPHONE HCL-NACL 6-0.9 MG/30ML-% SOLUTION PREFILLED SYRINGE: Performed by: SURGERY

## 2017-10-14 PROCEDURE — 94799 UNLISTED PULMONARY SVC/PX: CPT

## 2017-10-14 PROCEDURE — P9016 RBC LEUKOCYTES REDUCED: HCPCS

## 2017-10-14 PROCEDURE — 85018 HEMOGLOBIN: CPT | Performed by: SURGERY

## 2017-10-14 PROCEDURE — 86850 RBC ANTIBODY SCREEN: CPT | Performed by: SURGERY

## 2017-10-14 PROCEDURE — 86923 COMPATIBILITY TEST ELECTRIC: CPT

## 2017-10-14 PROCEDURE — 99024 POSTOP FOLLOW-UP VISIT: CPT | Performed by: SURGERY

## 2017-10-14 PROCEDURE — 63710000001 DIPHENHYDRAMINE PER 50 MG: Performed by: SURGERY

## 2017-10-14 PROCEDURE — 86900 BLOOD TYPING SEROLOGIC ABO: CPT | Performed by: SURGERY

## 2017-10-14 RX ORDER — ACETAMINOPHEN 325 MG/1
650 TABLET ORAL ONCE
Status: COMPLETED | OUTPATIENT
Start: 2017-10-14 | End: 2017-10-14

## 2017-10-14 RX ORDER — DIPHENHYDRAMINE HCL 25 MG
25 CAPSULE ORAL ONCE
Status: COMPLETED | OUTPATIENT
Start: 2017-10-14 | End: 2017-10-14

## 2017-10-14 RX ADMIN — SODIUM CHLORIDE 125 ML/HR: 900 INJECTION, SOLUTION INTRAVENOUS at 22:59

## 2017-10-14 RX ADMIN — FLUOXETINE 20 MG: 20 CAPSULE ORAL at 09:01

## 2017-10-14 RX ADMIN — ENOXAPARIN SODIUM 40 MG: 40 INJECTION SUBCUTANEOUS at 09:01

## 2017-10-14 RX ADMIN — Medication: at 08:47

## 2017-10-14 RX ADMIN — IPRATROPIUM BROMIDE 0.5 MG: 0.5 SOLUTION RESPIRATORY (INHALATION) at 19:21

## 2017-10-14 RX ADMIN — IPRATROPIUM BROMIDE 0.5 MG: 0.5 SOLUTION RESPIRATORY (INHALATION) at 07:13

## 2017-10-14 RX ADMIN — SODIUM CHLORIDE 125 ML/HR: 900 INJECTION, SOLUTION INTRAVENOUS at 00:28

## 2017-10-14 RX ADMIN — VANCOMYCIN HYDROCHLORIDE 750 MG: 5 INJECTION, POWDER, LYOPHILIZED, FOR SOLUTION INTRAVENOUS at 00:27

## 2017-10-14 RX ADMIN — PANTOPRAZOLE SODIUM 40 MG: 40 TABLET, DELAYED RELEASE ORAL at 06:01

## 2017-10-14 RX ADMIN — IPRATROPIUM BROMIDE 0.5 MG: 0.5 SOLUTION RESPIRATORY (INHALATION) at 16:19

## 2017-10-14 RX ADMIN — SODIUM CHLORIDE 125 ML/HR: 900 INJECTION, SOLUTION INTRAVENOUS at 08:49

## 2017-10-14 RX ADMIN — GABAPENTIN 400 MG: 400 CAPSULE ORAL at 20:28

## 2017-10-14 RX ADMIN — ONDANSETRON 4 MG: 4 TABLET, ORALLY DISINTEGRATING ORAL at 04:01

## 2017-10-14 RX ADMIN — IPRATROPIUM BROMIDE 0.5 MG: 0.5 SOLUTION RESPIRATORY (INHALATION) at 11:06

## 2017-10-14 RX ADMIN — ACETAMINOPHEN 650 MG: 325 TABLET ORAL at 13:19

## 2017-10-14 RX ADMIN — DIPHENHYDRAMINE HYDROCHLORIDE 25 MG: 25 CAPSULE ORAL at 13:19

## 2017-10-14 NOTE — PROGRESS NOTES
"  Subjective:   Doing well.  No complaints pod 1 removal infected mesh.     /58  Pulse 97  Temp 98.6 °F (37 °C) (Oral)   Resp 19  Ht 61\" (154.9 cm)  Wt 110 lb 1.6 oz (49.9 kg)  SpO2 97%  BMI 20.8 kg/m2    Lab Results (last 24 hours)     Procedure Component Value Units Date/Time    POC Glucose Fingerstick [[id number]]  (Abnormal) Collected:  10/13/17 1250    Specimen:  Blood Updated:  10/13/17 1303     Glucose 148 (H) mg/dL       RN NotifiedMeter: [id number]: [id number] CLOTILDE PARIKH       Tissue Pathology Exam - Tissue, Abdominal Wall [[id number]] Collected:  10/13/17 1228    Specimen:  Tissue from Abdominal Wall Updated:  10/13/17 1334    CRE Screen by PCR - Swab, Large Intestine, Rectum [[id number]] Collected:  10/13/17 1610    Specimen:  Swab from Large Intestine, Rectum Updated:  10/13/17 1737     CRE SCREEN Not Detected      This test is performed by utilizing real time polymerace chain recation (PCR).         OXA 48 Strain --      Not Applicable.        IMP STRAIN --      Not Applicable        VIM STRAING --      Not Applicable        NDM Strain --      Not Applicable        KPC Strain --      Not Applicable       Comprehensive Metabolic Panel [[id number]]  (Abnormal) Collected:  10/14/17 0551    Specimen:  Blood Updated:  10/14/17 0655     Glucose 101 (H) mg/dL      BUN 9 mg/dL      Creatinine 0.56 mg/dL      Sodium 135 (L) mmol/L      Potassium 4.5 mmol/L      Chloride 101 mmol/L      CO2 30.0 mmol/L      Calcium 7.6 (L) mg/dL      Total Protein 4.8 (L) g/dL      Albumin 2.40 (L) g/dL      ALT (SGPT) 62 (H) U/L      AST (SGOT) 46 (H) U/L      Alkaline Phosphatase 82 U/L      Total Bilirubin 0.6 mg/dL      eGFR Non African Amer 110 (H) mL/min/1.73      Globulin 2.4 gm/dL      A/G Ratio 1.0 (L) g/dL      BUN/Creatinine Ratio 16.1     Anion Gap 4.0 (L) mmol/L     Extra Tubes [[id number]] Collected:  10/14/17 0905    Specimen:  Blood from Blood, Venous Line Updated:  10/14/17 0905    Narrative:   "     The following orders were created for panel order Extra Tubes.  Procedure                               Abnormality         Status                     ---------                               -----------         ------                     Green Top (Gel)[[id number]]                                  In process                   Please view results for these tests on the individual orders.    Green Top (Gel) [[id number]] Collected:  10/14/17 0905    Specimen:  Blood Updated:  10/14/17 0905    CBC & Differential [[id number]] Collected:  10/14/17 0824    Specimen:  Blood Updated:  10/14/17 0938    Narrative:       The following orders were created for panel order CBC & Differential.  Procedure                               Abnormality         Status                     ---------                               -----------         ------                     CBC Auto Differential[[id number]]        Abnormal            Final result                 Please view results for these tests on the individual orders.    CBC Auto Differential [[id number]]  (Abnormal) Collected:  10/14/17 0824    Specimen:  Blood Updated:  10/14/17 0938     WBC 11.31 (H) 10*3/mm3      RBC 2.22 (L) 10*6/mm3      Hemoglobin 6.2 (C) g/dL       RESULTS CONFIRMED BY RECOLLECTION/ REPEAT ANALYSIS        Hematocrit 18.9 (L) %      MCV 85.1 fL      MCH 27.9 pg      MCHC 32.8 g/dL      RDW 14.2 %      RDW-SD 44.5 fl      MPV 8.1 fL      Platelets 380 10*3/mm3      Neutrophil % 82.8 (H) %      Lymphocyte % 6.9 (L) %      Monocyte % 6.8 %      Eosinophil % 2.8 %      Basophil % 0.3 %      Immature Grans % 0.4 %      Neutrophils, Absolute 9.37 (H) 10*3/mm3      Lymphocytes, Absolute 0.78 10*3/mm3      Monocytes, Absolute 0.77 10*3/mm3      Eosinophils, Absolute 0.32 10*3/mm3      Basophils, Absolute 0.03 10*3/mm3      Immature Grans, Absolute 0.04 (H) 10*3/mm3      nRBC 0.0 /100 WBC           Current Medications:  Current Facility-Administered Medications    Medication Dose Route Frequency Provider Last Rate Last Dose   • acetaminophen (TYLENOL) tablet 650 mg  650 mg Oral Q4H PRN Grant Morris MD       • amLODIPine (NORVASC) tablet 2.5 mg  2.5 mg Oral Daily Grant Morris MD       • enoxaparin (LOVENOX) syringe 40 mg  40 mg Subcutaneous Daily Grant Morris MD   40 mg at 10/14/17 0901   • FLUoxetine (PROzac) capsule 20 mg  20 mg Oral Daily Grant Morris MD   20 mg at 10/14/17 0901   • gabapentin (NEURONTIN) capsule 400 mg  400 mg Oral Nightly Grant Morris MD   400 mg at 10/13/17 2100   • HYDROmorphone (DILAUDID) injection 0.5 mg  0.5 mg Intravenous Q2H PRN Grant Morris MD        And   • naloxone (NARCAN) injection 0.1 mg  0.1 mg Intravenous Q5 Min PRN Grant Morris MD       • HYDROmorphone (DILAUDID) PCA 0.2 mg/mL 30 mL syringe   Intravenous Continuous Grant Morris MD       • ipratropium (ATROVENT) nebulizer solution 0.5 mg  0.5 mg Nebulization 4x Daily - RT Grant Morris MD   0.5 mg at 10/14/17 0713   • lisinopril (PRINIVIL,ZESTRIL) tablet 5 mg  5 mg Oral Daily Grant Morris MD   5 mg at 10/13/17 1613   • ondansetron (ZOFRAN) tablet 4 mg  4 mg Oral Q6H PRN Grant Morris MD        Or   • ondansetron ODT (ZOFRAN-ODT) disintegrating tablet 4 mg  4 mg Oral Q6H PRN Grant Morris MD   4 mg at 10/14/17 0401    Or   • ondansetron (ZOFRAN) injection 4 mg  4 mg Intravenous Q6H PRN Grant Morris MD       • pantoprazole (PROTONIX) EC tablet 40 mg  40 mg Oral QAM Grant Morris MD   40 mg at 10/14/17 0601   • sodium chloride 0.9 % infusion  125 mL/hr Intravenous Continuous Grant Morris  mL/hr at 10/14/17 0849 125 mL/hr at 10/14/17 0849   • tiZANidine (ZANAFLEX) tablet 4 mg  4 mg Oral Q6H PRN Grant Morris MD       • traZODone (DESYREL) tablet 150 mg  150 mg Oral Nightly Grant Morris MD   150 mg at  10/13/17 2100       Prior to admission medications:  Prescriptions Prior to Admission   Medication Sig Dispense Refill Last Dose   • amLODIPine (NORVASC) 2.5 MG tablet Take 1 tablet by mouth Daily. (unconfirmed) 30 tablet 5 10/13/2017 at 0445   • FLUoxetine (PROzac) 20 MG capsule Take 1 capsule by mouth Daily. 30 capsule 5 10/13/2017 at 0445   • gabapentin (NEURONTIN) 400 MG capsule Take 400 mg by mouth Every Night.   10/12/2017 at 2100   • guaiFENesin (MUCINEX) 600 MG 12 hr tablet Take 600 mg by mouth Daily.   10/12/2017 at 0900   • levalbuterol (XOPENEX HFA) 45 MCG/ACT inhaler Inhale 1 puff Every 6 (Six) Hours As Needed for wheezing. 15 g 11 10/13/2017 at 0500   • omeprazole (PRILOSEC) 20 MG capsule Take 1 capsule by mouth 2 (Two) Times a Day. Prilosec 20 mg capsule,delayed release  (unconfirmed) 60 capsule 5 10/13/2017 at Unknown time   • ondansetron (ZOFRAN) 4 MG tablet Take 1 tablet by mouth Every 8 (Eight) Hours As Needed for Nausea or Vomiting. 20 tablet 1 Past Month at Unknown time   • pravastatin (PRAVACHOL) 80 MG tablet Take 1 tablet by mouth Daily. (Patient taking differently: Take 80 mg by mouth Every Night.) 30 tablet 5 10/12/2017 at 2100   • SPIRIVA HANDIHALER 18 MCG per inhalation capsule Place 1 capsule into inhaler and inhale Daily. 30 capsule 5 10/13/2017 at 0500   • tiZANidine (ZANAFLEX) 4 MG tablet Take 4 mg by mouth Every 8 (Eight) Hours As Needed.   10/12/2017 at 2100   • traMADol (ULTRAM) 50 MG tablet Take 50 mg by mouth Every 6 (Six) Hours As Needed (knee pain). (unconfirmed)    10/12/2017 at 1700   • traZODone (DESYREL) 150 MG tablet Take 150 mg by mouth Every Night.   10/12/2017 at 2100   • vitamin D (ERGOCALCIFEROL) 00959 UNITS capsule capsule Take 50,000 Units by mouth Every 14 (Fourteen) Days.   Past Month at Unknown time   • aspirin 81 MG EC tablet Take 81 mg by mouth Every Night. Hx of CAD   10/10/2017   • furosemide (LASIX) 20 MG tablet Take 1 tablet by mouth Daily. (unconfirmed)  (Patient taking differently: Take 20 mg by mouth Daily As Needed. (unconfirmed) ) 30 tablet 5 10/9/2017   • lisinopril (PRINIVIL,ZESTRIL) 5 MG tablet Take 1 tablet by mouth Daily. 30 tablet 5 10/11/2017   • metFORMIN (GLUCOPHAGE) 500 MG tablet Take 1 tablet by mouth 2 (Two) Times a Day With Meals. 60 tablet 5 10/11/2017   • nitroglycerin (NITROSTAT) 0.4 MG SL tablet Place 1 tablet under the tongue Every 5 (Five) Minutes As Needed for Chest Pain. 20 tablet 5 More than a month at Unknown time   • Omega-3 Fatty Acids (FISH OIL) 1000 MG capsule capsule Take 1,000 mg by mouth Daily With Breakfast.   10/11/2017       Physical exam:  Alert appropriate  Abd soft  Dressings dry and intact    Assessment : POD 1    Plan   Advance diet as tolerated.  Ambulate      Addendum today's note  Hemoglobin came back as 6.2 and confirmed on repeat.  We'll transfuse 2 units packed red blood cells.  Blood loss likely from surgery.

## 2017-10-15 LAB
ABO + RH BLD: NORMAL
ABO + RH BLD: NORMAL
BH BB BLOOD EXPIRATION DATE: NORMAL
BH BB BLOOD EXPIRATION DATE: NORMAL
BH BB BLOOD TYPE BARCODE: 5100
BH BB BLOOD TYPE BARCODE: 5100
BH BB DISPENSE STATUS: NORMAL
BH BB DISPENSE STATUS: NORMAL
BH BB PRODUCT CODE: NORMAL
BH BB PRODUCT CODE: NORMAL
BH BB UNIT NUMBER: NORMAL
BH BB UNIT NUMBER: NORMAL
UNIT  ABO: NORMAL
UNIT  ABO: NORMAL
UNIT  RH: NORMAL
UNIT  RH: NORMAL

## 2017-10-15 PROCEDURE — 94799 UNLISTED PULMONARY SVC/PX: CPT

## 2017-10-15 PROCEDURE — 99024 POSTOP FOLLOW-UP VISIT: CPT | Performed by: SURGERY

## 2017-10-15 PROCEDURE — 25010000002 HYDROMORPHONE HCL-NACL 6-0.9 MG/30ML-% SOLUTION PREFILLED SYRINGE: Performed by: SURGERY

## 2017-10-15 PROCEDURE — 94760 N-INVAS EAR/PLS OXIMETRY 1: CPT

## 2017-10-15 PROCEDURE — 25010000002 ENOXAPARIN PER 10 MG: Performed by: SURGERY

## 2017-10-15 RX ORDER — OXYCODONE AND ACETAMINOPHEN 7.5; 325 MG/1; MG/1
1 TABLET ORAL EVERY 4 HOURS PRN
Status: DISCONTINUED | OUTPATIENT
Start: 2017-10-15 | End: 2017-10-16 | Stop reason: HOSPADM

## 2017-10-15 RX ORDER — HYDROCODONE BITARTRATE AND ACETAMINOPHEN 7.5; 325 MG/1; MG/1
1 TABLET ORAL EVERY 6 HOURS PRN
Status: DISCONTINUED | OUTPATIENT
Start: 2017-10-15 | End: 2017-10-15

## 2017-10-15 RX ADMIN — IPRATROPIUM BROMIDE 0.5 MG: 0.5 SOLUTION RESPIRATORY (INHALATION) at 06:55

## 2017-10-15 RX ADMIN — IPRATROPIUM BROMIDE 0.5 MG: 0.5 SOLUTION RESPIRATORY (INHALATION) at 19:09

## 2017-10-15 RX ADMIN — SODIUM CHLORIDE 125 ML/HR: 900 INJECTION, SOLUTION INTRAVENOUS at 06:00

## 2017-10-15 RX ADMIN — IPRATROPIUM BROMIDE 0.5 MG: 0.5 SOLUTION RESPIRATORY (INHALATION) at 11:22

## 2017-10-15 RX ADMIN — IPRATROPIUM BROMIDE 0.5 MG: 0.5 SOLUTION RESPIRATORY (INHALATION) at 14:57

## 2017-10-15 RX ADMIN — GABAPENTIN 400 MG: 400 CAPSULE ORAL at 20:09

## 2017-10-15 RX ADMIN — OXYCODONE HYDROCHLORIDE AND ACETAMINOPHEN 1 TABLET: 7.5; 325 TABLET ORAL at 20:09

## 2017-10-15 RX ADMIN — AMLODIPINE BESYLATE 2.5 MG: 2.5 TABLET ORAL at 08:24

## 2017-10-15 RX ADMIN — LISINOPRIL 5 MG: 5 TABLET ORAL at 08:24

## 2017-10-15 RX ADMIN — OXYCODONE HYDROCHLORIDE AND ACETAMINOPHEN 1 TABLET: 7.5; 325 TABLET ORAL at 23:49

## 2017-10-15 RX ADMIN — ENOXAPARIN SODIUM 40 MG: 40 INJECTION SUBCUTANEOUS at 08:26

## 2017-10-15 RX ADMIN — TRAZODONE HYDROCHLORIDE 150 MG: 150 TABLET ORAL at 20:09

## 2017-10-15 RX ADMIN — TIZANIDINE 4 MG: 4 TABLET ORAL at 15:51

## 2017-10-15 RX ADMIN — FLUOXETINE 20 MG: 20 CAPSULE ORAL at 08:24

## 2017-10-15 RX ADMIN — OXYCODONE HYDROCHLORIDE AND ACETAMINOPHEN 1 TABLET: 7.5; 325 TABLET ORAL at 13:15

## 2017-10-15 RX ADMIN — PANTOPRAZOLE SODIUM 40 MG: 40 TABLET, DELAYED RELEASE ORAL at 05:54

## 2017-10-15 RX ADMIN — ACETAMINOPHEN 650 MG: 325 TABLET ORAL at 04:24

## 2017-10-15 RX ADMIN — OXYCODONE HYDROCHLORIDE AND ACETAMINOPHEN 1 TABLET: 7.5; 325 TABLET ORAL at 17:03

## 2017-10-15 RX ADMIN — Medication: at 04:18

## 2017-10-15 RX ADMIN — TIZANIDINE 4 MG: 4 TABLET ORAL at 23:49

## 2017-10-15 RX ADMIN — HYDROCODONE BITARTRATE AND ACETAMINOPHEN 1 TABLET: 7.5; 325 TABLET ORAL at 10:44

## 2017-10-15 NOTE — PLAN OF CARE
Problem: Patient Care Overview (Adult)  Goal: Plan of Care Review  Outcome: Ongoing (interventions implemented as appropriate)    10/15/17 0234   Coping/Psychosocial Response Interventions   Plan Of Care Reviewed With patient   Patient Care Overview   Progress progress toward functional goals as expected   Outcome Evaluation   Outcome Summary/Follow up Plan Transfused 2 units PRBC, VSS       Goal: Adult Individualization and Mutuality  Outcome: Ongoing (interventions implemented as appropriate)    Problem: Infection, Risk/Actual (Adult)  Goal: Identify Related Risk Factors and Signs and Symptoms  Outcome: Ongoing (interventions implemented as appropriate)  Goal: Infection Prevention/Resolution  Outcome: Ongoing (interventions implemented as appropriate)    Problem: Fall Risk (Adult)  Goal: Identify Related Risk Factors and Signs and Symptoms  Outcome: Ongoing (interventions implemented as appropriate)    Problem: Pain, Acute (Adult)  Goal: Acceptable Pain Control/Comfort Level  Outcome: Ongoing (interventions implemented as appropriate)

## 2017-10-15 NOTE — PROGRESS NOTES
"  Subjective:   Postoperative day 2.  Patient feels much better.  She is up ambulating moving around well clearly feels better after blood transfusion yesterday.  No nausea no vomiting     /66  Pulse 105  Temp 98.9 °F (37.2 °C)  Resp 18  Ht 61\" (154.9 cm)  Wt 110 lb 1.6 oz (49.9 kg)  SpO2 92%  BMI 20.8 kg/m2    Lab Results (last 24 hours)     Procedure Component Value Units Date/Time    CBC & Differential [[id number]] Collected:  10/14/17 0824    Specimen:  Blood Updated:  10/14/17 0938    Narrative:       The following orders were created for panel order CBC & Differential.  Procedure                               Abnormality         Status                     ---------                               -----------         ------                     CBC Auto Differential[[id number]]        Abnormal            Final result                 Please view results for these tests on the individual orders.    CBC Auto Differential [[id number]]  (Abnormal) Collected:  10/14/17 0824    Specimen:  Blood Updated:  10/14/17 0938     WBC 11.31 (H) 10*3/mm3      RBC 2.22 (L) 10*6/mm3      Hemoglobin 6.2 (C) g/dL       RESULTS CONFIRMED BY RECOLLECTION/ REPEAT ANALYSIS        Hematocrit 18.9 (L) %      MCV 85.1 fL      MCH 27.9 pg      MCHC 32.8 g/dL      RDW 14.2 %      RDW-SD 44.5 fl      MPV 8.1 fL      Platelets 380 10*3/mm3      Neutrophil % 82.8 (H) %      Lymphocyte % 6.9 (L) %      Monocyte % 6.8 %      Eosinophil % 2.8 %      Basophil % 0.3 %      Immature Grans % 0.4 %      Neutrophils, Absolute 9.37 (H) 10*3/mm3      Lymphocytes, Absolute 0.78 10*3/mm3      Monocytes, Absolute 0.77 10*3/mm3      Eosinophils, Absolute 0.32 10*3/mm3      Basophils, Absolute 0.03 10*3/mm3      Immature Grans, Absolute 0.04 (H) 10*3/mm3      nRBC 0.0 /100 WBC     Extra Tubes [[id number]] Collected:  10/14/17 0905    Specimen:  Blood from Blood, Venous Line Updated:  10/14/17 1016    Narrative:       The following orders were created " for panel order Extra Tubes.  Procedure                               Abnormality         Status                     ---------                               -----------         ------                     Green Top (Gel)[[id number]]                                  Final result                 Please view results for these tests on the individual orders.    Green Top (Gel) [[id number]] Collected:  10/14/17 0905    Specimen:  Blood Updated:  10/14/17 1016     Extra Tube Hold for add-ons.      Auto resulted.       Hemoglobin & Hematocrit, Blood [[id number]]  (Abnormal) Collected:  10/14/17 2129    Specimen:  Blood Updated:  10/14/17 2155     Hemoglobin 9.8 (L) g/dL      Hematocrit 29.0 (L) %           Current Medications:  Current Facility-Administered Medications   Medication Dose Route Frequency Provider Last Rate Last Dose   • acetaminophen (TYLENOL) tablet 650 mg  650 mg Oral Q4H PRN Grant Morris MD   650 mg at 10/15/17 0424   • amLODIPine (NORVASC) tablet 2.5 mg  2.5 mg Oral Daily Grant Morris MD   2.5 mg at 10/15/17 0824   • enoxaparin (LOVENOX) syringe 40 mg  40 mg Subcutaneous Daily Grant Morris MD   40 mg at 10/15/17 0826   • FLUoxetine (PROzac) capsule 20 mg  20 mg Oral Daily Grant Morris MD   20 mg at 10/15/17 0824   • gabapentin (NEURONTIN) capsule 400 mg  400 mg Oral Nightly Grant Morris MD   400 mg at 10/14/17 2028   • HYDROmorphone (DILAUDID) injection 0.5 mg  0.5 mg Intravenous Q2H PRN Grant Morris MD        And   • naloxone (NARCAN) injection 0.1 mg  0.1 mg Intravenous Q5 Min PRN Grant Morris MD       • HYDROmorphone (DILAUDID) PCA 0.2 mg/mL 30 mL syringe   Intravenous Continuous Grant Morris MD       • ipratropium (ATROVENT) nebulizer solution 0.5 mg  0.5 mg Nebulization 4x Daily - RT Grant Morris MD   0.5 mg at 10/15/17 0655   • lisinopril (PRINIVIL,ZESTRIL) tablet 5 mg  5 mg Oral Daily Grant Breaux  MD Arturo   5 mg at 10/15/17 0824   • ondansetron (ZOFRAN) tablet 4 mg  4 mg Oral Q6H PRN Grant Morris MD        Or   • ondansetron ODT (ZOFRAN-ODT) disintegrating tablet 4 mg  4 mg Oral Q6H PRN Grant Morris MD   4 mg at 10/14/17 0401    Or   • ondansetron (ZOFRAN) injection 4 mg  4 mg Intravenous Q6H PRN Grant Morris MD       • pantoprazole (PROTONIX) EC tablet 40 mg  40 mg Oral QAM Grant Morris MD   40 mg at 10/15/17 0554   • sodium chloride 0.9 % infusion  125 mL/hr Intravenous Continuous Grant Morris  mL/hr at 10/15/17 0600 125 mL/hr at 10/15/17 0600   • tiZANidine (ZANAFLEX) tablet 4 mg  4 mg Oral Q6H PRN Grant Morris MD       • traZODone (DESYREL) tablet 150 mg  150 mg Oral Nightly Grant Morris MD   150 mg at 10/13/17 2100       Prior to admission medications:  Prescriptions Prior to Admission   Medication Sig Dispense Refill Last Dose   • amLODIPine (NORVASC) 2.5 MG tablet Take 1 tablet by mouth Daily. (unconfirmed) 30 tablet 5 10/13/2017 at 0445   • FLUoxetine (PROzac) 20 MG capsule Take 1 capsule by mouth Daily. 30 capsule 5 10/13/2017 at 0445   • gabapentin (NEURONTIN) 400 MG capsule Take 400 mg by mouth Every Night.   10/12/2017 at 2100   • guaiFENesin (MUCINEX) 600 MG 12 hr tablet Take 600 mg by mouth Daily.   10/12/2017 at 0900   • levalbuterol (XOPENEX HFA) 45 MCG/ACT inhaler Inhale 1 puff Every 6 (Six) Hours As Needed for wheezing. 15 g 11 10/13/2017 at 0500   • omeprazole (PRILOSEC) 20 MG capsule Take 1 capsule by mouth 2 (Two) Times a Day. Prilosec 20 mg capsule,delayed release  (unconfirmed) 60 capsule 5 10/13/2017 at Unknown time   • ondansetron (ZOFRAN) 4 MG tablet Take 1 tablet by mouth Every 8 (Eight) Hours As Needed for Nausea or Vomiting. 20 tablet 1 Past Month at Unknown time   • pravastatin (PRAVACHOL) 80 MG tablet Take 1 tablet by mouth Daily. (Patient taking differently: Take 80 mg by mouth Every  Night.) 30 tablet 5 10/12/2017 at 2100   • SPIRIVA HANDIHALER 18 MCG per inhalation capsule Place 1 capsule into inhaler and inhale Daily. 30 capsule 5 10/13/2017 at 0500   • tiZANidine (ZANAFLEX) 4 MG tablet Take 4 mg by mouth Every 8 (Eight) Hours As Needed.   10/12/2017 at 2100   • traMADol (ULTRAM) 50 MG tablet Take 50 mg by mouth Every 6 (Six) Hours As Needed (knee pain). (unconfirmed)    10/12/2017 at 1700   • traZODone (DESYREL) 150 MG tablet Take 150 mg by mouth Every Night.   10/12/2017 at 2100   • vitamin D (ERGOCALCIFEROL) 65308 UNITS capsule capsule Take 50,000 Units by mouth Every 14 (Fourteen) Days.   Past Month at Unknown time   • aspirin 81 MG EC tablet Take 81 mg by mouth Every Night. Hx of CAD   10/10/2017   • furosemide (LASIX) 20 MG tablet Take 1 tablet by mouth Daily. (unconfirmed) (Patient taking differently: Take 20 mg by mouth Daily As Needed. (unconfirmed) ) 30 tablet 5 10/9/2017   • lisinopril (PRINIVIL,ZESTRIL) 5 MG tablet Take 1 tablet by mouth Daily. 30 tablet 5 10/11/2017   • metFORMIN (GLUCOPHAGE) 500 MG tablet Take 1 tablet by mouth 2 (Two) Times a Day With Meals. 60 tablet 5 10/11/2017   • nitroglycerin (NITROSTAT) 0.4 MG SL tablet Place 1 tablet under the tongue Every 5 (Five) Minutes As Needed for Chest Pain. 20 tablet 5 More than a month at Unknown time   • Omega-3 Fatty Acids (FISH OIL) 1000 MG capsule capsule Take 1,000 mg by mouth Daily With Breakfast.   10/11/2017       Physical exam:  Alert and appropriate nontoxic-appearing abdomen soft dressings dry and intact    Assessment :  Postop day 2 doing very well.     Plan:  We'll Hep-Lock IV fluids continue present care likely home tomorrow

## 2017-10-15 NOTE — PLAN OF CARE
Problem: Infection, Risk/Actual (Adult)  Goal: Infection Prevention/Resolution  Outcome: Ongoing (interventions implemented as appropriate)    Problem: Fall Risk (Adult)  Goal: Absence of Falls  Outcome: Ongoing (interventions implemented as appropriate)    Problem: Pain, Acute (Adult)  Goal: Acceptable Pain Control/Comfort Level  Outcome: Ongoing (interventions implemented as appropriate)

## 2017-10-16 VITALS
DIASTOLIC BLOOD PRESSURE: 53 MMHG | BODY MASS INDEX: 20.79 KG/M2 | TEMPERATURE: 97.6 F | HEART RATE: 68 BPM | WEIGHT: 110.1 LBS | OXYGEN SATURATION: 92 % | HEIGHT: 61 IN | RESPIRATION RATE: 18 BRPM | SYSTOLIC BLOOD PRESSURE: 107 MMHG

## 2017-10-16 LAB
LAB AP CASE REPORT: NORMAL
Lab: NORMAL
PATH REPORT.FINAL DX SPEC: NORMAL
PATH REPORT.GROSS SPEC: NORMAL

## 2017-10-16 PROCEDURE — 99024 POSTOP FOLLOW-UP VISIT: CPT | Performed by: SURGERY

## 2017-10-16 PROCEDURE — 94799 UNLISTED PULMONARY SVC/PX: CPT

## 2017-10-16 PROCEDURE — 94760 N-INVAS EAR/PLS OXIMETRY 1: CPT

## 2017-10-16 PROCEDURE — 25010000002 ENOXAPARIN PER 10 MG: Performed by: SURGERY

## 2017-10-16 RX ORDER — OXYCODONE AND ACETAMINOPHEN 7.5; 325 MG/1; MG/1
1 TABLET ORAL EVERY 4 HOURS PRN
Qty: 40 TABLET | Refills: 0 | Status: SHIPPED | OUTPATIENT
Start: 2017-10-16 | End: 2017-10-23 | Stop reason: SDUPTHER

## 2017-10-16 RX ADMIN — OXYCODONE HYDROCHLORIDE AND ACETAMINOPHEN 1 TABLET: 7.5; 325 TABLET ORAL at 10:45

## 2017-10-16 RX ADMIN — FLUOXETINE 20 MG: 20 CAPSULE ORAL at 09:18

## 2017-10-16 RX ADMIN — ENOXAPARIN SODIUM 40 MG: 40 INJECTION SUBCUTANEOUS at 09:18

## 2017-10-16 RX ADMIN — IPRATROPIUM BROMIDE 0.5 MG: 0.5 SOLUTION RESPIRATORY (INHALATION) at 10:58

## 2017-10-16 RX ADMIN — PANTOPRAZOLE SODIUM 40 MG: 40 TABLET, DELAYED RELEASE ORAL at 06:13

## 2017-10-16 RX ADMIN — IPRATROPIUM BROMIDE 0.5 MG: 0.5 SOLUTION RESPIRATORY (INHALATION) at 07:02

## 2017-10-16 RX ADMIN — OXYCODONE HYDROCHLORIDE AND ACETAMINOPHEN 1 TABLET: 7.5; 325 TABLET ORAL at 14:49

## 2017-10-16 RX ADMIN — OXYCODONE HYDROCHLORIDE AND ACETAMINOPHEN 1 TABLET: 7.5; 325 TABLET ORAL at 06:13

## 2017-10-16 NOTE — OP NOTE
Operative Note    Maria Yepez  10/13/2017    Pre-op Diagnosis:   Follow up [Z09]    Post-op Diagnosis:     Post-Op Diagnosis Codes:     * Follow up [Z09]    Procedure/CPT® Codes:      Procedure(s):  REMOVAL OF INFECTED MESH, REPAIR OF ABDOMINAL WALL    Surgeon(s):  Grant Morris MD    Anesthesia: General    Staff:   Circulator: Sherrill Lopez RN; Shanna Bradford RN  Scrub Person: Katy Lay; Dominique Turner  Assistant: Mariaa Mas CSA    Estimated Blood Loss: 200 mL    Specimens:                  ID Type Source Tests Collected by Time Destination   A : old mesh Tissue Abdominal Wall TISSUE EXAM Grant Morris MD 10/13/2017 1228          Drains:       [REMOVED] Urethral Catheter 10/13/17 0930 100% silicone 16 10 10 (Removed)   Removed 10/15/17 0720   Daily Indications Selected surgeries ( tract, abdomen) 10/14/2017  8:00 PM   Securement secured to upper leg with adhesive device 10/14/2017  8:00 PM   Catheter care done Yes 10/14/2017 11:20 PM   Tolerance signs/symptoms of discomfort 10/14/2017  8:00 PM   Urine Output (mL) 650 10/15/2017  6:00 AM       Findings: Multiple abscesses surrounding previously placed intra-abdominal mesh    Complications: None    Indication: Chronically infected mesh with nonhealing wounds    Operative Note:    Patient was seen and consented preoperatively.  Following this she was taken to the operating room and placed in supine position on the OR table.  Gen. anesthetic was measured and the patient was orotracheally intubated without incident.  Irwin catheter was placed sterilely by nursing.  Preoperative briefing was performed.  The abdomen was then prepped and draped in normal sterile fashion.  A timeout was then performed.    The patient's previous midline incision with multiple abscess openings was then excised using a knife as well as Bovie electrocautery.  In doing so the underlying mesh was exposed at the midline of the abdomen.  Near the  midpoint of her prior incision there appeared to be an opening in the mesh.  A finger was placed through this opening and used to guide further opening of the mesh in the inferior as well as superior directions.  The edges of the mesh were then grasped elevated upwards using Allis clamps.  Careful sharp dissection was used to free adhesions of intra-abdominal contents to the backside of the mesh for the full length of the incision.  The mesh was then opened for the full length of the incision as well.    Once this was done additional adhesio lysis was undertaken so that the entire left side of the mesh was able to be visualized.  In doing so there were multiple areas of mesh that did not appear to be well incorporated into the abdominal wall and appeared to have abscesses around the periphery of the mesh and multiple locations.  Once the entirety of the mesh was exposed on the left side it was grasped and Bovie electrocautery was used to remove it from the posterior aspect of the abdominal wall.  Then examined the area of the patient's prior gastrostomy tube which was lateral to the mesh.  There appeared to be a small abscess in this area that was adjacent to the mesh, however there did not appear to be any remaining opening in the stomach.    The mesh was then removed in a similar fashion from the right side of the abdomen and passed off the field as specimen.  At this point the abdomen was copiously irrigated with warm irrigation.  All abscesses related to the mesh on both the left and right side of the abdominal cavity appeared to have been drained.  Satisfied that the source of intra-abdominal infection had been removed and then turned my attention to closure.  The midline fascia appeared to be healthy and appeared to be suitable for closure without any undue tension.  Therefore a running #1 PDS was used to close the fascia at the midline.  The wound was then irrigated.  The skin was closed loosely with staples.   At the prior gastrostomy tube site and area of granulation tissue was removed.  This wound was also irrigated.  The fascia at this site had a small defect and this was closed with a figure-of-eight 2-0 Vicryl stitch.  Once gauze was placed into the skin defect.  Sterile dressings were then placed.  The patient was then awakened from anesthetic in good condition.          This document has been electronically signed by Grant Morris MD on October 16, 2017 3:08 PM        Grant Morris MD     Date: 10/16/2017  Time: 3:03 PM

## 2017-10-16 NOTE — DISCHARGE SUMMARY
Discharge Summary  Date: 10/16/17  Service: General Surgery  Attending: Grant Morris    Procedures: Removal of infected mesh  Consults: none  Discharge Diagnoses: Infected intra-abdominal mesh  Hospital Course: Admitted following operation for mesh removal.  Required 2 units of pRBCs on POD#1 due to symptomatic anemia.  Did well and was able to be transitioned to PO pain medications.  At the time of discharge was ambulatory, tolerating diet and afebrile.    Discharge Medications:     Your medication list      START taking these medications       Instructions Last Dose Given Next Dose Due    oxyCODONE-acetaminophen 7.5-325 MG per tablet   Commonly known as:  PERCOCET        Take 1 tablet by mouth Every 4 (Four) Hours As Needed for Moderate Pain  or Severe Pain  for up to 9 days.           CHANGE how you take these medications       Instructions Last Dose Given Next Dose Due    furosemide 20 MG tablet   Commonly known as:  LASIX   What changed:    - when to take this  - reasons to take this  - additional instructions        Take 1 tablet by mouth Daily. (unconfirmed)         pravastatin 80 MG tablet   Commonly known as:  PRAVACHOL   What changed:  when to take this        Take 1 tablet by mouth Daily.           CONTINUE taking these medications       Instructions Last Dose Given Next Dose Due    amLODIPine 2.5 MG tablet   Commonly known as:  NORVASC        Take 1 tablet by mouth Daily. (unconfirmed)         aspirin 81 MG EC tablet              fish oil 1000 MG capsule capsule              FLUoxetine 20 MG capsule   Commonly known as:  PROzac        Take 1 capsule by mouth Daily.         gabapentin 400 MG capsule   Commonly known as:  NEURONTIN              guaiFENesin 600 MG 12 hr tablet   Commonly known as:  MUCINEX              levalbuterol 45 MCG/ACT inhaler   Commonly known as:  XOPENEX HFA        Inhale 1 puff Every 6 (Six) Hours As Needed for wheezing.         lisinopril 5 MG tablet   Commonly known as:   PRINIVIL,ZESTRIL        Take 1 tablet by mouth Daily.         metFORMIN 500 MG tablet   Commonly known as:  GLUCOPHAGE        Take 1 tablet by mouth 2 (Two) Times a Day With Meals.         nitroglycerin 0.4 MG SL tablet   Commonly known as:  NITROSTAT        Place 1 tablet under the tongue Every 5 (Five) Minutes As Needed for Chest Pain.         omeprazole 20 MG capsule   Commonly known as:  PRILOSEC        Take 1 capsule by mouth 2 (Two) Times a Day. Prilosec 20 mg capsule,delayed release  (unconfirmed)         ondansetron 4 MG tablet   Commonly known as:  ZOFRAN        Take 1 tablet by mouth Every 8 (Eight) Hours As Needed for Nausea or Vomiting.         SPIRIVA HANDIHALER 18 MCG per inhalation capsule   Generic drug:  tiotropium        Place 1 capsule into inhaler and inhale Daily.         tiZANidine 4 MG tablet   Commonly known as:  ZANAFLEX              traMADol 50 MG tablet   Commonly known as:  ULTRAM              traZODone 150 MG tablet   Commonly known as:  DESYREL              vitamin D 67519 units capsule capsule   Commonly known as:  ERGOCALCIFEROL                   Where to Get Your Medications      You can get these medications from any pharmacy     Bring a paper prescription for each of these medications    • oxyCODONE-acetaminophen 7.5-325 MG per tablet             Activity Instructions     Discharge Activity       1) No driving while taking narcotics.   2) May shower, no tub bath or submerging incisions  3) Change dressing daily and as needed to left upper abdomen.  4) Do not lift / push / pull more than 15 lbs.                   Your Scheduled Appointments     Nov 21, 2017 10:45 AM CST   Office Visit with Gregory Hoffman MD   [facility] GASTRO[facility]OLOG[facility] (--)    [address] [place] [address] Dr Katerina RUIZ [id number] [phone]           Arrive 15 minutes prior to appointment.                          This document has been electronically signed by Grant Morris MD on October  16, 2017 2:00 PM

## 2017-10-16 NOTE — PROGRESS NOTES
CHIEF COMPLAINT:    Chief Complaint   Patient presents with   • Post-op     P.O. I&D abdominal wounds on 9/5/17.       HISTORY OF PRESENT ILLNESS:    Maria Yepez is a 62 y.o. female who underwent Incision and drainage of multiple abdominal wound abscesses on 9/5/2017.  This showed MRSA.  This was discussed with the patient today.  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  She states that she's been eating and drinking well at home.  She has had some fatigue.  She is undergoing local wound care at home without difficulty.    EXAM:  Vitals:    09/14/17 1428   BP: 122/68         Multiple open areas of midline abdominal wound as well as prior gastrostomy tube site packed.  Dressings were changed today.  Small area of exposed mesh seen at midpoint of wound.    ASSESSMENT:    Abdominal wound infection    PLAN:    Continue local wound care at this point.  Likely, she will need to undergo excision of any infected mesh in the future.  Would like to increase her nutrition prior to undergoing this if at all possible.  I will see her again in 2 weeks to reassess the wound.          This document has been electronically signed by Grant Morris MD on October 16, 2017 3:50 PM

## 2017-10-16 NOTE — PROGRESS NOTES
GENERAL SURGERY PROGRESS NOTE  Chief Complaint:  Surgery Follow up   LOS: 3 days       Subjective     Interval History:     C/o pain this AM. Tolerating diet. Has been ambulatory.    Objective     Vital Signs  Temp:  [97.1 °F (36.2 °C)-99.8 °F (37.7 °C)] 97.1 °F (36.2 °C)  Heart Rate:  [] 67  Resp:  [18-21] 18  BP: ()/(55-72) 106/60    Physical Exam:   Incision healing well. Packing at gtube site.  Labs:  Lab Results (last 24 hours)     ** No results found for the last 24 hours. **           Results Review:     Labs and imaging for today were reviewed.    Assessment/Plan     Maria Yepez is a 62 y.o. female who is s/p excision of infected mesh      Shower today.  Change gtube site dressing.  Overall well, anticipate home later today.          This document has been electronically signed by Grant Morris MD on October 16, 2017 7:27 AM        Grant Morris MD  10/16/17  7:27 AM

## 2017-10-17 ENCOUNTER — TELEPHONE (OUTPATIENT)
Dept: SURGERY | Facility: CLINIC | Age: 62
End: 2017-10-17

## 2017-10-17 NOTE — TELEPHONE ENCOUNTER
PO 10/13  REMOVAL OF HERNIA MESH  10/13    THINKS SHE HAS AN INFECTION.. WILL YOU CALL IN MEDS ???    CAN NOT GET TO [place]    CALL    [phone]      [facility]  [phone]

## 2017-10-23 ENCOUNTER — OFFICE VISIT (OUTPATIENT)
Dept: SURGERY | Facility: CLINIC | Age: 62
End: 2017-10-23

## 2017-10-23 VITALS
DIASTOLIC BLOOD PRESSURE: 68 MMHG | BODY MASS INDEX: 18.88 KG/M2 | SYSTOLIC BLOOD PRESSURE: 126 MMHG | WEIGHT: 100 LBS | HEIGHT: 61 IN

## 2017-10-23 DIAGNOSIS — T85.79XS INFECTED HERNIOPLASTY MESH, SEQUELA: ICD-10-CM

## 2017-10-23 DIAGNOSIS — Z09 FOLLOW UP: Primary | ICD-10-CM

## 2017-10-23 PROCEDURE — 99024 POSTOP FOLLOW-UP VISIT: CPT | Performed by: SURGERY

## 2017-10-23 RX ORDER — OXYCODONE AND ACETAMINOPHEN 7.5; 325 MG/1; MG/1
1 TABLET ORAL EVERY 4 HOURS PRN
Qty: 40 TABLET | Refills: 0 | Status: SHIPPED | OUTPATIENT
Start: 2017-10-23 | End: 2017-10-30 | Stop reason: SDUPTHER

## 2017-10-23 NOTE — PROGRESS NOTES
CHIEF COMPLAINT:    Chief Complaint   Patient presents with   • Post-op Follow-up     Post operative Removal of Infected mesh, Repair of abdominal wall 10-13-17.       HISTORY OF PRESENT ILLNESS:    Maria Yepez is a 62 y.o. female who underwent Excision of infected mesh with primary closure of her abdominal wall on 10/13/2017.  She returns today with complaints of abdominal wall soreness.  She's had no fevers or chills at home.    Shortly after leaving the hospital the patient did feel as though she had a urinary tract infection with some burning during urination.  This has since resolved.    EXAM:  Vitals:    10/23/17 0952   BP: 126/68         Abdomen soft, midline incision healing well with staples left in place.  Left upper quadrant open wound granulating well.    ASSESSMENT:    Status post excision of infected mesh    PLAN:    Overall she appears to be healing appropriately.  Her staples were left in place today.  We will plan to see her back on Thursday and [place] [facility].  Her pain medicine was refilled today. [facility] # [phone]           This document has been electronically signed by Grant Morris MD on October 23, 2017 10:17 AM

## 2017-10-25 ENCOUNTER — OFFICE VISIT (OUTPATIENT)
Dept: FAMILY MEDICINE CLINIC | Facility: CLINIC | Age: 62
End: 2017-10-25

## 2017-10-25 VITALS
DIASTOLIC BLOOD PRESSURE: 68 MMHG | HEART RATE: 95 BPM | OXYGEN SATURATION: 99 % | WEIGHT: 105.8 LBS | HEIGHT: 61 IN | SYSTOLIC BLOOD PRESSURE: 130 MMHG | BODY MASS INDEX: 19.98 KG/M2

## 2017-10-25 DIAGNOSIS — E11.9 TYPE 2 DIABETES MELLITUS WITHOUT COMPLICATION, WITHOUT LONG-TERM CURRENT USE OF INSULIN (HCC): Chronic | ICD-10-CM

## 2017-10-25 DIAGNOSIS — Z98.890 H/O ABDOMINAL SURGERY: Primary | ICD-10-CM

## 2017-10-25 DIAGNOSIS — D50.9 IRON DEFICIENCY ANEMIA, UNSPECIFIED IRON DEFICIENCY ANEMIA TYPE: ICD-10-CM

## 2017-10-25 DIAGNOSIS — E78.5 HYPERLIPIDEMIA, UNSPECIFIED HYPERLIPIDEMIA TYPE: Chronic | ICD-10-CM

## 2017-10-25 DIAGNOSIS — I10 ESSENTIAL HYPERTENSION: Chronic | ICD-10-CM

## 2017-10-25 DIAGNOSIS — J44.9 CHRONIC OBSTRUCTIVE PULMONARY DISEASE, UNSPECIFIED COPD TYPE (HCC): Chronic | ICD-10-CM

## 2017-10-25 PROCEDURE — 99214 OFFICE O/P EST MOD 30 MIN: CPT | Performed by: INTERNAL MEDICINE

## 2017-10-25 RX ORDER — ASPIRIN 81 MG/1
81 TABLET ORAL NIGHTLY
Qty: 30 TABLET | Refills: 5 | Status: SHIPPED | OUTPATIENT
Start: 2017-10-25

## 2017-10-25 RX ORDER — ONDANSETRON 4 MG/1
4 TABLET, FILM COATED ORAL EVERY 8 HOURS PRN
Qty: 20 TABLET | Refills: 1 | Status: SHIPPED | OUTPATIENT
Start: 2017-10-25

## 2017-10-25 RX ORDER — ERGOCALCIFEROL 1.25 MG/1
50000 CAPSULE ORAL
Qty: 2 CAPSULE | Refills: 5 | Status: SHIPPED | OUTPATIENT
Start: 2017-10-25

## 2017-10-30 ENCOUNTER — OFFICE VISIT (OUTPATIENT)
Dept: SURGERY | Facility: CLINIC | Age: 62
End: 2017-10-30

## 2017-10-30 VITALS
DIASTOLIC BLOOD PRESSURE: 80 MMHG | HEIGHT: 61 IN | BODY MASS INDEX: 19.63 KG/M2 | SYSTOLIC BLOOD PRESSURE: 150 MMHG | WEIGHT: 104 LBS

## 2017-10-30 DIAGNOSIS — Z09 FOLLOW UP: Primary | ICD-10-CM

## 2017-10-30 PROCEDURE — 99024 POSTOP FOLLOW-UP VISIT: CPT | Performed by: SURGERY

## 2017-10-30 RX ORDER — OXYCODONE AND ACETAMINOPHEN 7.5; 325 MG/1; MG/1
1 TABLET ORAL EVERY 4 HOURS PRN
Qty: 40 TABLET | Refills: 0 | Status: SHIPPED | OUTPATIENT
Start: 2017-10-30 | End: 2017-11-09 | Stop reason: SDUPTHER

## 2017-11-02 ENCOUNTER — OFFICE VISIT (OUTPATIENT)
Dept: SURGERY | Facility: CLINIC | Age: 62
End: 2017-11-02

## 2017-11-02 VITALS
HEIGHT: 61 IN | DIASTOLIC BLOOD PRESSURE: 60 MMHG | WEIGHT: 106 LBS | BODY MASS INDEX: 20.01 KG/M2 | SYSTOLIC BLOOD PRESSURE: 128 MMHG

## 2017-11-02 DIAGNOSIS — Z09 FOLLOW UP: Primary | ICD-10-CM

## 2017-11-02 PROCEDURE — 99024 POSTOP FOLLOW-UP VISIT: CPT | Performed by: SURGERY

## 2017-11-02 NOTE — PROGRESS NOTES
CHIEF COMPLAINT:    Chief Complaint   Patient presents with   • Follow-up     4 day bebeto, S/P Removal of abdominal wall mesh on 10/13/17.       HISTORY OF PRESENT ILLNESS:    Maria Yepez is a 62 y.o. female who underwent removal of infected abdominal wall mesh on 10/13.  She has been doing well. Had a small serous collection in the wound drained at her last visit.  She has done well since that time.  Reports no fevers or chills. Eating and drinking well.     EXAM:  Vitals:    11/02/17 1329   BP: 128/60         Healing midline wound. Old gtube site granulated.    ASSESSMENT:    S/p Mesh Removal for infection    PLAN:    Overall appears to be healing well.  Will see in one week.          This document has been electronically signed by Grant Morris MD on November 2, 2017 1:42 PM

## 2017-11-03 RX ORDER — GABAPENTIN 400 MG/1
400 CAPSULE ORAL NIGHTLY
Qty: 30 CAPSULE | Refills: 5 | Status: SHIPPED | OUTPATIENT
Start: 2017-11-03 | End: 2018-05-07 | Stop reason: SDUPTHER

## 2017-11-09 ENCOUNTER — OFFICE VISIT (OUTPATIENT)
Dept: SURGERY | Facility: CLINIC | Age: 62
End: 2017-11-09

## 2017-11-09 VITALS
SYSTOLIC BLOOD PRESSURE: 142 MMHG | BODY MASS INDEX: 19.63 KG/M2 | WEIGHT: 104 LBS | HEIGHT: 61 IN | DIASTOLIC BLOOD PRESSURE: 84 MMHG

## 2017-11-09 DIAGNOSIS — Z09 FOLLOW UP: Primary | ICD-10-CM

## 2017-11-09 PROCEDURE — 99024 POSTOP FOLLOW-UP VISIT: CPT | Performed by: SURGERY

## 2017-11-09 RX ORDER — TIZANIDINE 4 MG/1
TABLET ORAL
COMMUNITY
Start: 2017-11-08 | End: 2018-03-05

## 2017-11-09 RX ORDER — OXYCODONE AND ACETAMINOPHEN 7.5; 325 MG/1; MG/1
1 TABLET ORAL EVERY 4 HOURS PRN
Qty: 40 TABLET | Refills: 0 | Status: SHIPPED | OUTPATIENT
Start: 2017-11-09 | End: 2018-03-05

## 2017-11-15 NOTE — PROGRESS NOTES
CHIEF COMPLAINT:    Chief Complaint   Patient presents with   • Follow-up     S/P Removal of abdominal wall mesh on 10/13/17.       HISTORY OF PRESENT ILLNESS:    Maria Yepez is a 62 y.o. female who underwent Removal of abdominal wall mesh for infection on 10/13/2017.  She returns today for follow-up.  She's had some serous drainage from the upper midportion of her abdominal wound.  She states that overall she feels better and is eating and drinking well.  She notes no fevers or chills at home.    EXAM:  Vitals:    11/09/17 1337   BP: 142/84         G-tube site well healed.  Midline wound healing with small area of serous drainage and upper portion of wound    ASSESSMENT:    Status post removal of infected hernia mesh    PLAN:    Overall she appears to be doing well.  I did refill her pain medicine today.  We will see her back in 2 weeks.        This document has been electronically signed by Grant Morris MD on November 15, 2017 3:59 PM

## 2017-11-20 RX ORDER — FLUOXETINE HYDROCHLORIDE 20 MG/1
20 CAPSULE ORAL DAILY
Qty: 30 CAPSULE | Refills: 5 | Status: SHIPPED | OUTPATIENT
Start: 2017-11-20 | End: 2021-10-26

## 2017-11-20 NOTE — PROGRESS NOTES
CHIEF COMPLAINT:    Chief Complaint   Patient presents with   • Follow-up     Ryann- abd. wounds.       HISTORY OF PRESENT ILLNESS:    Maria Yepez is a 62 y.o. female who underwent revision of nissen and gtube previously. 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  She reports continued abdominal pain and drainage from her wound.  She says she is eating well at home.    EXAM:  Vitals:    09/28/17 1329   BP: 142/70         Two open areas or wound with mesh exposed in one area.    ASSESSMENT:    Mesh infection    PLAN:    Continue local wound care.  We have discussed that she will require mesh excision at some point in the future.  Follow up as scheduled to discuss/plan surgery.        This document has been electronically signed by Grant Morris MD on November 20, 2017 2:24 PM

## 2017-11-20 NOTE — PROGRESS NOTES
CHIEF COMPLAINT:    Chief Complaint   Patient presents with   • Follow-up     S/P Removal of infected abdominal wall mesh on 10/13/17. Staple removal.       HISTORY OF PRESENT ILLNESS:    Maria Yepez is a 62 y.o. female who underwent removal of infected mesh from the abdominal wall on 10/13/17.  She returns today with incisional soreness but no other complaints.    EXAM:  Vitals:    10/30/17 1038   BP: 150/80         Healing midline wound. Staples removed today.    ASSESSMENT:    S/p mesh removal    PLAN:    Overall doing well.  Pain meds refilled.  Will see in one week to recheck.        This document has been electronically signed by Grant Morris MD on November 20, 2017 2:26 PM

## 2017-11-30 ENCOUNTER — OFFICE VISIT (OUTPATIENT)
Dept: SURGERY | Facility: CLINIC | Age: 62
End: 2017-11-30

## 2017-11-30 VITALS
DIASTOLIC BLOOD PRESSURE: 82 MMHG | WEIGHT: 105 LBS | SYSTOLIC BLOOD PRESSURE: 122 MMHG | BODY MASS INDEX: 19.83 KG/M2 | HEIGHT: 61 IN

## 2017-11-30 DIAGNOSIS — Z09 FOLLOW UP: Primary | ICD-10-CM

## 2017-11-30 PROCEDURE — 99024 POSTOP FOLLOW-UP VISIT: CPT | Performed by: SURGERY

## 2017-11-30 NOTE — PROGRESS NOTES
CHIEF COMPLAINT:    Chief Complaint   Patient presents with   • Follow-up     S/P Removal of abdominal wall mesh on 10/13/17.       HISTORY OF PRESENT ILLNESS:    Maria Yepez is a 62 y.o. female who underwent Prior revision of a Nissen, followed by excision of infected hernia mesh.  Her mesh excision was on 10/13/2017.  She returns today for follow-up.  She states that overall she is feeling well.  She is eating and drinking well at home.  She has been able to gain a little bit of weight.  She has mild intermittent pain in her abdomen which is substantially improved over just a few weeks ago.  Overall she feels significantly improved.    EXAM:  Vitals:    11/30/17 1322   BP: 122/82         Abdomen soft, incision well healed    ASSESSMENT:    Status post mesh removal    PLAN:    Overall she appears to be doing well.  I will see her back in 1 month for reassessment.        This document has been electronically signed by Grant Morris MD on November 30, 2017 4:31 PM

## 2017-12-07 ENCOUNTER — OFFICE VISIT (OUTPATIENT)
Dept: SURGERY | Facility: CLINIC | Age: 62
End: 2017-12-07

## 2017-12-07 VITALS
BODY MASS INDEX: 20.39 KG/M2 | WEIGHT: 108 LBS | HEIGHT: 61 IN | SYSTOLIC BLOOD PRESSURE: 122 MMHG | DIASTOLIC BLOOD PRESSURE: 72 MMHG

## 2017-12-07 DIAGNOSIS — R10.84 ABDOMINAL PAIN, GENERALIZED: Primary | ICD-10-CM

## 2017-12-07 DIAGNOSIS — Z09 FOLLOW UP: Primary | ICD-10-CM

## 2017-12-07 PROCEDURE — 99024 POSTOP FOLLOW-UP VISIT: CPT | Performed by: SURGERY

## 2018-01-04 ENCOUNTER — OFFICE VISIT (OUTPATIENT)
Dept: SURGERY | Facility: CLINIC | Age: 63
End: 2018-01-04

## 2018-01-04 VITALS
HEIGHT: 61 IN | SYSTOLIC BLOOD PRESSURE: 136 MMHG | BODY MASS INDEX: 20.96 KG/M2 | WEIGHT: 111 LBS | DIASTOLIC BLOOD PRESSURE: 70 MMHG

## 2018-01-04 DIAGNOSIS — Z09 FOLLOW UP: Primary | ICD-10-CM

## 2018-01-04 PROCEDURE — 99024 POSTOP FOLLOW-UP VISIT: CPT | Performed by: SURGERY

## 2018-02-11 NOTE — PROGRESS NOTES
CHIEF COMPLAINT:    Chief Complaint   Patient presents with   • Follow-up     Ryann-S/P Removal of abd. wall mesh 10-13-17.       HISTORY OF PRESENT ILLNESS:    Maria Yepez is a 62 y.o. female who underwent Excision of infected abdominal wall hernia mesh on 10/13/2017.  This was following a revision of Nissen fundoplication.  She returns today for follow-up without any complaints.  She is eating and drinking well at home.  Her abdominal pain has essentially resolved.    EXAM:  Vitals:    01/04/18 1329   BP: 136/70         Abdomen soft, incision well healed    ASSESSMENT:    Status post excision of infected hernia mesh    PLAN:    Overall she is doing well.  She can follow-up as needed.        This document has been electronically signed by Grant Morris MD on February 11, 2018 4:24 PM

## 2018-02-11 NOTE — PROGRESS NOTES
"CHIEF COMPLAINT:    Chief Complaint   Patient presents with   • Follow-up     \"Thinks hernia has come back\"       HISTORY OF PRESENT ILLNESS:    Maria Yepez is a 62 y.o. female who underwent Excision of infected hernia mesh following open revision of a Nissen fundoplication.  She returns today for follow-up.  She's had some right lower quadrant abdominal pain with bending and movement.  She states this began last Thursday.  She's noted no fevers or chills.  She's having no difficulty with eating.  She describes a band of pain in the right lower quadrant.    EXAM:  Vitals:    12/07/17 1325   BP: 122/72         Abdomen soft, minimal tenderness in right lower quadrant    ASSESSMENT:    Status post excision of infected hernia mesh    PLAN:    Overall she is doing fairly well.  I advised her to use rest and warm compresses.  She'll follow up in 1 month, sooner if needed.        This document has been electronically signed by Grant Morris MD on February 11, 2018 4:25 PM      "

## 2018-02-19 RX ORDER — TRAZODONE HYDROCHLORIDE 150 MG/1
TABLET ORAL
Qty: 30 TABLET | Refills: 5 | Status: SHIPPED | OUTPATIENT
Start: 2018-02-19 | End: 2018-03-05 | Stop reason: SDUPTHER

## 2018-02-27 RX ORDER — AMLODIPINE BESYLATE 2.5 MG/1
2.5 TABLET ORAL DAILY
Qty: 30 TABLET | Refills: 5 | Status: SHIPPED | OUTPATIENT
Start: 2018-02-27 | End: 2021-10-26

## 2018-03-05 ENCOUNTER — OFFICE VISIT (OUTPATIENT)
Dept: FAMILY MEDICINE CLINIC | Facility: CLINIC | Age: 63
End: 2018-03-05

## 2018-03-05 VITALS
BODY MASS INDEX: 21.71 KG/M2 | WEIGHT: 115 LBS | HEART RATE: 70 BPM | TEMPERATURE: 97.9 F | HEIGHT: 61 IN | DIASTOLIC BLOOD PRESSURE: 74 MMHG | SYSTOLIC BLOOD PRESSURE: 118 MMHG

## 2018-03-05 DIAGNOSIS — I10 ESSENTIAL HYPERTENSION: Chronic | ICD-10-CM

## 2018-03-05 DIAGNOSIS — E11.8 TYPE 2 DIABETES MELLITUS WITH COMPLICATION, WITHOUT LONG-TERM CURRENT USE OF INSULIN (HCC): ICD-10-CM

## 2018-03-05 DIAGNOSIS — M79.671 RIGHT FOOT PAIN: Primary | ICD-10-CM

## 2018-03-05 DIAGNOSIS — E78.01 FAMILIAL HYPERCHOLESTEROLEMIA: Chronic | ICD-10-CM

## 2018-03-05 PROCEDURE — 99214 OFFICE O/P EST MOD 30 MIN: CPT | Performed by: INTERNAL MEDICINE

## 2018-03-05 RX ORDER — HYDROCODONE BITARTRATE AND IBUPROFEN 5; 200 MG/1; MG/1
1 TABLET ORAL EVERY 12 HOURS
COMMUNITY
End: 2018-03-05

## 2018-03-19 RX ORDER — PRAVASTATIN SODIUM 80 MG/1
80 TABLET ORAL DAILY
Qty: 30 TABLET | Refills: 5 | Status: SHIPPED | OUTPATIENT
Start: 2018-03-19 | End: 2021-10-20

## 2018-04-16 ENCOUNTER — OFFICE VISIT (OUTPATIENT)
Dept: PODIATRY | Facility: CLINIC | Age: 63
End: 2018-04-16

## 2018-04-16 VITALS — HEIGHT: 61 IN | WEIGHT: 117 LBS | BODY MASS INDEX: 22.09 KG/M2 | OXYGEN SATURATION: 99 % | HEART RATE: 82 BPM

## 2018-04-16 DIAGNOSIS — M79.671 RIGHT FOOT PAIN: ICD-10-CM

## 2018-04-16 DIAGNOSIS — E11.42 DIABETIC POLYNEUROPATHY ASSOCIATED WITH TYPE 2 DIABETES MELLITUS (HCC): Primary | ICD-10-CM

## 2018-04-16 DIAGNOSIS — M19.079 ARTHRITIS OF MIDFOOT: ICD-10-CM

## 2018-04-16 PROCEDURE — 99213 OFFICE O/P EST LOW 20 MIN: CPT | Performed by: PODIATRIST

## 2018-04-16 PROCEDURE — 20600 DRAIN/INJ JOINT/BURSA W/O US: CPT | Performed by: PODIATRIST

## 2018-04-16 RX ORDER — GABAPENTIN 400 MG/1
CAPSULE ORAL
Qty: 30 CAPSULE | Refills: 5 | OUTPATIENT
Start: 2018-04-16

## 2018-04-16 RX ORDER — LISINOPRIL 5 MG/1
5 TABLET ORAL DAILY
Qty: 30 TABLET | Refills: 5 | Status: SHIPPED | OUTPATIENT
Start: 2018-04-16 | End: 2018-04-16 | Stop reason: SDUPTHER

## 2018-04-16 RX ORDER — LISINOPRIL 5 MG/1
5 TABLET ORAL DAILY
Qty: 30 TABLET | Refills: 5 | Status: SHIPPED | OUTPATIENT
Start: 2018-04-16 | End: 2021-10-26

## 2018-04-16 RX ORDER — BETAMETHASONE SODIUM PHOSPHATE AND BETAMETHASONE ACETATE 3; 3 MG/ML; MG/ML
6 INJECTION, SUSPENSION INTRA-ARTICULAR; INTRALESIONAL; INTRAMUSCULAR; SOFT TISSUE ONCE
Status: COMPLETED | OUTPATIENT
Start: 2018-04-16 | End: 2018-04-16

## 2018-04-16 RX ORDER — MELOXICAM 7.5 MG/1
7.5 TABLET ORAL DAILY
Qty: 30 TABLET | Refills: 0 | Status: SHIPPED | OUTPATIENT
Start: 2018-04-16 | End: 2021-10-26

## 2018-04-16 RX ORDER — OMEPRAZOLE 20 MG/1
CAPSULE, DELAYED RELEASE ORAL
Qty: 60 CAPSULE | Refills: 5 | Status: SHIPPED | OUTPATIENT
Start: 2018-04-16 | End: 2021-10-26

## 2018-04-16 RX ADMIN — BETAMETHASONE SODIUM PHOSPHATE AND BETAMETHASONE ACETATE 6 MG: 3; 3 INJECTION, SUSPENSION INTRA-ARTICULAR; INTRALESIONAL; INTRAMUSCULAR; SOFT TISSUE at 08:53

## 2018-04-16 NOTE — PROGRESS NOTES
"Maria Yepez  1955  62 y.o. female   PCP: Adi last seen 3/5/2018.  A1C: 6.1 on 7/14/2016.    BS-90 on 4/13/2018  Patient presents today for right foot pain.     04/16/2018    Chief Complaint   Patient presents with   • Right Foot - Pain           History of Present Illness    Ms. Yepez is a 62-year-old female who presents for diabetic foot exam and evaluation of Continued right foot pain.  She states she has significant pain to the outside of her right foot which is been present and worsening for a couple of years.  She states there is some associated numbness over top of the area and radiating towards her toes.  She feels it gives out on her at times.    Past Medical History:   Diagnosis Date   • Abdominal pain    • Acute bronchitis    • Acute pancreatitis    • Acute posthemorrhagic anemia    • Acute sinusitis    • Anal pain    • Anemia    • Anemia due to blood loss    • Anxiety    • Backache     mid and lower back     • Carotid artery disease    • Chronic back pain    • Chronic obstructive lung disease    • Coronary atherosclerosis    • Diabetic neuropathy    • Diarrhea    • Diverticulosis of colon without diverticulitis    • Dysphagia    • Dysuria    • Emphysema/COPD     \"Emphysema\"   • Encounter for immunization    • Epigastric pain    • Esophagitis     with stricture   • Essential hypertension    • Fatigue    • Foot pain    • Ganglion cyst of wrist     left wrist     • Gastroesophageal reflux disease    • Generalized abdominal pain    • Gout    • Headache    • Herpes simplex    • Hip pain, bilateral    • Hoarse    • Hyperlipidemia    • Incontinence of feces    • Insomnia    • Irritable bowel syndrome    • Joint pain    • Knee pain    • Left lower quadrant pain    • Left upper quadrant pain    • Leg pain    • Leukorrhea     not specified as infective    • Low back pain     injury   • Muscle pain    • Nausea    • Nausea    • Nausea and vomiting    • Neck pain    • Neck pain    • Noncompliance with " medication regimen    • Osteopenia    • Pain in lower back    • Pain in right femur    • Palpitations    • Productive cough    • PTSD (post-traumatic stress disorder)    • Scoliosis deformity of spine    • Screening examination for venereal disease    • Shoulder pain, left    • Stricture of esophagus    • Symptomatic menopausal or female climacteric states    • Synovitis and tenosynovitis     left forearm   • Tenderness     Tenderness of left upper quadrant of abdomen      • Thoracic back pain    • Type 2 diabetes mellitus    • Upper abdominal pain    • Urgency of urination     Urgent desire to urinate      • Urinary frequency     due to benign prostatic hypertrophy      • Urinary tract infection     site not specified   • Urinary tract infectious disease    • Venous varices    • Weakness     General symptom - weakness    • Weight gain          Past Surgical History:   Procedure Laterality Date   • BREAST BIOPSY      Stereotactic breast biopsy (1)  left   • CARDIAC CATHETERIZATION  2014    Cardiac cath 81157 (1)  Non obstructive coronary artery disease. Normal left ventricular systolic function. Performed at [facility].   • CARPAL TUNNEL RELEASE     •  SECTION     • CHOLECYSTECTOMY      laparoscopic   • COLONOSCOPY N/A 2017    Procedure: COLONOSCOPY;  Surgeon: Elia Cheatham MD;  Location: [facility] ENDOSCOPY;  Service:    • COLONOSCOPY W/ POLYPECTOMY  2015    Colonoscopy remove polyps 28153 (1)  Internal and external hemorrhoids found. Colonoscopy with biopsy.   • ENDOSCOPY  10/24/2012    Colon endoscopy 12429 (2)  internal & external hemorrhoids found. Diverticulum in sigmoid colon.   • ENDOSCOPY  2014    EGD w/ Dilatation 18793 (1)  Esophageal stricture was present. Dilatation performed. Gastritis found in the stomach. Biopsy taken. Normal duodenum.   • ENDOSCOPY  2013    EGD w/ tube 92888 (3)  Normal esophagus. Gastritis in stomach. Biopsy taken.  Normal duodenum. Biopsy taken.   • ENDOSCOPY N/A 5/23/2017    Procedure: ESOPHAGOGASTRODUODENOSCOPY, possible dilation;  Surgeon: Grant Morris MD;  Location: [facility] [place] ENDOSCOPY;  Service:    • FRACTURE SURGERY Right     Arm Surgery (1)  right, arm fracture   • HERNIA REPAIR      Hernia repair w/mesh (1)   • HYSTERECTOMY      Anesth, hysterectomy (1)   • INCISION AND DRAINAGE ABSCESS N/A 9/5/2017    Procedure: INCISION AND DRAINAGE OF ABDOMINAL WOUND;  Surgeon: Grant Morris MD;  Location: [facility] [place] OR;  Service:    • INJECTION OF MEDICATION  05/27/2016    Depo Medrol (Methylprednisone) 80mg (2)  Ordered By: BRIAN LOU ([facility])    • INJECTION OF MEDICATION  03/10/2016    Kenalog (4)  Ordered By: ARNOLDO MURDOCK ([facility])    • INJECTION OF MEDICATION  01/07/2016    Rocephin (1)  Ordered By: ARNOLDO MURDOCK ([facility])    • LYMPH NODE BIOPSY      Biopsy/removal, lymph node(s) (1)  cervical node biopsy   • NECK SURGERY  2013   • NISSEN FUNDOPLICATION     • NISSEN FUNDOPLICATION N/A 6/7/2017    Procedure: OPEN REVISION OF NISSEN FUNDOPLICATION WITH GASTROSTOMY TUBE PLACEMENT ;  Surgeon: Grant Morris MD;  Location: [facility] [place] OR;  Service:    • OTHER SURGICAL HISTORY      Tubal ligation (1)   • PARAESOPHAGEAL HERNIA REPAIR     • TOTAL ABDOMINAL HYSTERECTOMY      RUPALI BSO   • UPPER GASTROINTESTINAL ENDOSCOPY  05/23/2017   • VENTRAL/INCISIONAL HERNIA REPAIR N/A 10/13/2017    Procedure: REMOVAL OF INFECTED MESH, REPAIR OF ABDOMINAL WALL   (Will need Strattice mesh in room).;  Surgeon: Grant Morris MD;  Location: [facility] [place] OR;  Service:          Family History   Problem Relation Age of Onset   • Lung cancer Mother    • Hypertension Mother    • Lymphoma Father      NonHodgkins   • Hypertension Father    • Diabetes Other    • Hypertension Other    • Hypertension Sister    • Diabetes Sister    • Hypertension Brother    • Diabetes Brother          Social History     Social History   • Marital status:       Spouse name: N/A   • Number of children: N/A   • Years of education: N/A     Occupational History   • Not on file.     Social History Main Topics   • Smoking status: Former Smoker     Years: 0.00     Quit date: 2007   • Smokeless tobacco: Never Used   • Alcohol use Yes      Comment: socially   • Drug use: No   • Sexual activity: Defer     Other Topics Concern   • Not on file     Social History Narrative   • No narrative on file         Current Outpatient Prescriptions   Medication Sig Dispense Refill   • amLODIPine (NORVASC) 2.5 MG tablet Take 1 tablet by mouth Daily. 30 tablet 5   • aspirin 81 MG EC tablet Take 1 tablet by mouth Every Night. Hx of CAD 30 tablet 5   • FLUoxetine (PROzac) 20 MG capsule Take 1 capsule by mouth Daily. 30 capsule 5   • furosemide (LASIX) 20 MG tablet Take 1 tablet by mouth Daily. (unconfirmed) (Patient taking differently: Take 20 mg by mouth Daily As Needed. (unconfirmed) ) 30 tablet 5   • gabapentin (NEURONTIN) 400 MG capsule Take 1 capsule by mouth Every Night. 30 capsule 5   • guaiFENesin (MUCINEX) 600 MG 12 hr tablet Take 600 mg by mouth Daily.     • levalbuterol (XOPENEX HFA) 45 MCG/ACT inhaler Inhale 1 puff Every 6 (Six) Hours As Needed for wheezing. 15 g 11   • nitroglycerin (NITROSTAT) 0.4 MG SL tablet Place 1 tablet under the tongue Every 5 (Five) Minutes As Needed for Chest Pain. 20 tablet 5   • Omega-3 Fatty Acids (FISH OIL) 1000 MG capsule capsule Take 1,000 mg by mouth Daily With Breakfast.     • ondansetron (ZOFRAN) 4 MG tablet Take 1 tablet by mouth Every 8 (Eight) Hours As Needed for Nausea or Vomiting. 20 tablet 1   • pravastatin (PRAVACHOL) 80 MG tablet TAKE 1 TABLET BY MOUTH DAILY. 30 tablet 5   • SPIRIVA HANDIHALER 18 MCG per inhalation capsule Place 1 capsule into inhaler and inhale Daily. 30 capsule 5   • traZODone (DESYREL) 150 MG tablet Take 150 mg by mouth Every Night.     • vitamin D (ERGOCALCIFEROL) 30758 units capsule capsule Take 1 capsule by mouth Every 14  "(Fourteen) Days. 2 capsule 5   • lisinopril (PRINIVIL,ZESTRIL) 5 MG tablet TAKE 1 TABLET BY MOUTH DAILY. 30 tablet 5   • meloxicam (MOBIC) 7.5 MG tablet Take 1 tablet by mouth Daily. 30 tablet 0   • metFORMIN (GLUCOPHAGE) 500 MG tablet TAKE 1 TABLET BY MOUTH 2 (TWO) TIMES A DAY WITH MEALS. 60 tablet 5   • omeprazole (priLOSEC) 20 MG capsule TAKE 1 CAPSULE BY MOUTH 2 (TWO) TIMES A DAY. 60 capsule 5     No current facility-administered medications for this visit.          OBJECTIVE    Pulse 82   Ht 154.9 cm (61\")   Wt 53.1 kg (117 lb)   SpO2 99%   BMI 22.11 kg/m²       Review of Systems   Constitutional:  Denies recent weight loss, weight gain, fever or chills, no change in exercise tolerance  Musculoskeletal: Foot pain. Arthritis. Back pain.  Skin:  Thickened nails. Shin discoloration.  Neurological:  Tingling sensations to feet b/l.  Psychiatric/Behavioral: Depression      Physical Exam   Constitutional: she appears well-developed and well-nourished.   HEENT: Normocephalic. Atraumatic  CV: No tenderness. RRR  Resp: Non-labored respiration. No wheezes.   Psychiatric: she has a normal mood and affect. her   behavior is normal.      Lower Extremity Exam:  Vascular: PT pulses palpable 1+. DP non- palpable  Negative hair growth.   Minimal perimalleolar edema  Neuro: Protective sensation diminished to lesser toes, b/l.  DTRs intact  Integument: No open wounds or lesions.  Atrophic skin noted b/l with xerosis  Musculoskeletal: LE muscle strength 5/5.   Gait normal  Semi-rigid hammertoe deformity toes 2-5 b/l.  Nails 1-5 b/l thickened  Tenderness over right Fourth and fifth tarsometatarsal joint.  Positive tenderness to palpation.  Pain on dorsiflexion of fourth and fifth ray.  No gross ankle instability    Small joint injection:  Risks and benefits discussed. Written consent obtained.  Skin prepped with alcohol  Right 4th/5th TMTJ injection performed with 1cc 0.5% Marcaine, 6mg celestone with 27g needle  Band aid " applied. Patient tolerated well.      ASSESSMENT AND PLAN    Maria was seen today for pain.    Diagnoses and all orders for this visit:    Arthritis of midfoot    Right foot pain    Diabetic polyneuropathy associated with type 2 diabetes mellitus    Other orders  -     meloxicam (MOBIC) 7.5 MG tablet; Take 1 tablet by mouth Daily.  -     betamethasone acetate-betamethasone sodium phosphate (CELESTONE SOLUSPAN) injection 6 mg; Inject 1 mL into the joint 1 (One) Time.      -Comprehensive DM foot exam performed. Pt educated on importance of tight glucose control and daily foot checks. Pt education materials dispensed.  -Pt educated on proper extra depth, supportive DM shoe gear.  -Corticosteroid injection as above for treatment of midfoot arthrosis related pain  -Will trial on meloxicam 7.5 mg daily for general treatment of arthritis pain.  Patient does have history of Nissen, advised her to stop medication if increased reflux.  -Follow up 4 weeks          This document has been electronically signed by Kalin Martínez DPM on April 16, 2018 12:19 PM     Kalin Martínez DPM  4/16/2018  12:19 PM

## 2018-04-23 ENCOUNTER — HOSPITAL ENCOUNTER (OUTPATIENT)
Dept: PHYSICAL THERAPY | Facility: HOSPITAL | Age: 63
Setting detail: THERAPIES SERIES
Discharge: HOME | End: 2018-04-23

## 2018-04-23 ENCOUNTER — TRANSCRIBE ORDERS (OUTPATIENT)
Dept: PODIATRY | Facility: CLINIC | Age: 63
End: 2018-04-23

## 2018-04-23 DIAGNOSIS — M19.079 ARTHRITIS, MIDFOOT: Primary | ICD-10-CM

## 2018-04-23 DIAGNOSIS — E11.40 DIABETIC NEUROPATHY, PAINFUL (HCC): ICD-10-CM

## 2018-05-07 RX ORDER — GABAPENTIN 400 MG/1
400 CAPSULE ORAL NIGHTLY
Qty: 30 CAPSULE | Refills: 0 | Status: SHIPPED | OUTPATIENT
Start: 2018-05-07 | End: 2021-10-26

## 2018-05-25 ENCOUNTER — TRANSCRIBE ORDERS (OUTPATIENT)
Dept: PHYSICAL THERAPY | Facility: CLINIC | Age: 63
End: 2018-05-25

## 2018-05-25 DIAGNOSIS — M54.2 CERVICALGIA: Primary | ICD-10-CM

## 2018-06-04 ENCOUNTER — CONSULT (OUTPATIENT)
Dept: PHYSICAL THERAPY | Facility: CLINIC | Age: 63
End: 2018-06-04

## 2018-06-04 DIAGNOSIS — M54.2 CERVICAL PAIN: Primary | ICD-10-CM

## 2018-06-04 PROCEDURE — 97110 THERAPEUTIC EXERCISES: CPT | Performed by: PHYSICAL THERAPIST

## 2018-06-04 PROCEDURE — 97162 PT EVAL MOD COMPLEX 30 MIN: CPT | Performed by: PHYSICAL THERAPIST

## 2018-06-04 NOTE — PROGRESS NOTES
"     Outpatient Physical Therapy Ortho Initial Evaluation       Patient Name: Maria Yepez  : 1955  MRN: [id number]  Today's Date: 2018      Visit Date: 2018  Visit   Return to MD: CELINA  Re-cert date: 17    TIME IN 1235    TIME OUT 1321      Patient Active Problem List   Diagnosis   • Right foot pain   • Plantar fasciitis of right foot   • Dysphagia   • Elevated liver function tests   • Wound infection   • Follow up   • Infected hernioplasty mesh        Past Medical History:   Diagnosis Date   • Abdominal pain    • Acute bronchitis    • Acute pancreatitis    • Acute posthemorrhagic anemia    • Acute sinusitis    • Anal pain    • Anemia    • Anemia due to blood loss    • Anxiety    • Backache     mid and lower back     • Carotid artery disease    • Chronic back pain    • Chronic obstructive lung disease    • Coronary atherosclerosis    • Diabetic neuropathy    • Diarrhea    • Diverticulosis of colon without diverticulitis    • Dysphagia    • Dysuria    • Emphysema/COPD     \"Emphysema\"   • Encounter for immunization    • Epigastric pain    • Esophagitis     with stricture   • Essential hypertension    • Fatigue    • Foot pain    • Ganglion cyst of wrist     left wrist     • Gastroesophageal reflux disease    • Generalized abdominal pain    • Gout    • Headache    • Herpes simplex    • Hip pain, bilateral    • Hoarse    • Hyperlipidemia    • Incontinence of feces    • Insomnia    • Irritable bowel syndrome    • Joint pain    • Knee pain    • Left lower quadrant pain    • Left upper quadrant pain    • Leg pain    • Leukorrhea     not specified as infective    • Low back pain     injury   • Muscle pain    • Nausea    • Nausea    • Nausea and vomiting    • Neck pain    • Neck pain    • Noncompliance with medication regimen    • Osteopenia    • Pain in lower back    • Pain in right femur    • Palpitations    • Productive cough    • PTSD (post-traumatic stress disorder)    • Scoliosis " deformity of spine    • Screening examination for venereal disease    • Shoulder pain, left    • Stricture of esophagus    • Symptomatic menopausal or female climacteric states    • Synovitis and tenosynovitis     left forearm   • Tenderness     Tenderness of left upper quadrant of abdomen      • Thoracic back pain    • Type 2 diabetes mellitus    • Upper abdominal pain    • Urgency of urination     Urgent desire to urinate      • Urinary frequency     due to benign prostatic hypertrophy      • Urinary tract infection     site not specified   • Urinary tract infectious disease    • Venous varices    • Weakness     General symptom - weakness    • Weight gain         Past Surgical History:   Procedure Laterality Date   • BREAST BIOPSY      Stereotactic breast biopsy (1)  left   • CARDIAC CATHETERIZATION  2014    Cardiac cath 14196 (1)  Non obstructive coronary artery disease. Normal left ventricular systolic function. Performed at [facility].   • CARPAL TUNNEL RELEASE     •  SECTION     • CHOLECYSTECTOMY      laparoscopic   • COLONOSCOPY N/A 2017    Procedure: COLONOSCOPY;  Surgeon: Elia Cheatham MD;  Location: [facility] ENDOSCOPY;  Service:    • COLONOSCOPY W/ POLYPECTOMY  2015    Colonoscopy remove polyps 01123 (1)  Internal and external hemorrhoids found. Colonoscopy with biopsy.   • ENDOSCOPY  10/24/2012    Colon endoscopy 14309 (2)  internal & external hemorrhoids found. Diverticulum in sigmoid colon.   • ENDOSCOPY  2014    EGD w/ Dilatation 21777 (1)  Esophageal stricture was present. Dilatation performed. Gastritis found in the stomach. Biopsy taken. Normal duodenum.   • ENDOSCOPY  2013    EGD w/ tube 73785 (3)  Normal esophagus. Gastritis in stomach. Biopsy taken. Normal duodenum. Biopsy taken.   • ENDOSCOPY N/A 2017    Procedure: ESOPHAGOGASTRODUODENOSCOPY, possible dilation;  Surgeon: Grant Morris MD;  Location: [facility]  ENDOSCOPY;  Service:    • FRACTURE SURGERY Right     Arm Surgery (1)  right, arm fracture   • HERNIA REPAIR      Hernia repair w/mesh (1)   • HYSTERECTOMY      Anesth, hysterectomy (1)   • INCISION AND DRAINAGE ABSCESS N/A 9/5/2017    Procedure: INCISION AND DRAINAGE OF ABDOMINAL WOUND;  Surgeon: Grant Morris MD;  Location: [facility];  Service:    • INJECTION OF MEDICATION  05/27/2016    Depo Medrol (Methylprednisone) 80mg (2)  Ordered By: BRIAN LOU ([facility])    • INJECTION OF MEDICATION  03/10/2016    Kenalog (4)  Ordered By: ARNOLDO MURDOCK ([facility])    • INJECTION OF MEDICATION  01/07/2016    Rocephin (1)  Ordered By: ARNOLDO MURDOCK ([facility])    • LYMPH NODE BIOPSY      Biopsy/removal, lymph node(s) (1)  cervical node biopsy   • NECK SURGERY  2013   • NISSEN FUNDOPLICATION     • NISSEN FUNDOPLICATION N/A 6/7/2017    Procedure: OPEN REVISION OF NISSEN FUNDOPLICATION WITH GASTROSTOMY TUBE PLACEMENT ;  Surgeon: Grant Morris MD;  Location: [facility];  Service:    • OTHER SURGICAL HISTORY      Tubal ligation (1)   • PARAESOPHAGEAL HERNIA REPAIR     • TOTAL ABDOMINAL HYSTERECTOMY      RUPALI BSO   • UPPER GASTROINTESTINAL ENDOSCOPY  05/23/2017   • VENTRAL/INCISIONAL HERNIA REPAIR N/A 10/13/2017    Procedure: REMOVAL OF INFECTED MESH, REPAIR OF ABDOMINAL WALL   (Will need Strattice mesh in room).;  Surgeon: Grant Morris MD;  Location: [facility];  Service:        Visit Dx:     ICD-10-CM ICD-9-CM   1. Cervical pain M54.2 723.1     Medications     amLODIPine (NORVASC) 2.5 MG tablet     aspirin 81 MG EC tablet     FLUoxetine (PROzac) 20 MG capsule     furosemide (LASIX) 20 MG tablet     gabapentin (NEURONTIN) 400 MG capsule     levalbuterol (XOPENEX HFA) 45 MCG/ACT inhaler     lisinopril (PRINIVIL,ZESTRIL) 5 MG tablet     meloxicam (MOBIC) 7.5 MG tablet     metFORMIN (GLUCOPHAGE) 500 MG tablet     nitroglycerin (NITROSTAT) 0.4 MG SL tablet     Omega-3 Fatty Acids (FISH OIL) 1000 MG capsule capsule      omeprazole (priLOSEC) 20 MG capsule     ondansetron (ZOFRAN) 4 MG tablet     pravastatin (PRAVACHOL) 80 MG tablet     SPIRIVA HANDIHALER 18 MCG per inhalation capsule     traZODone (DESYREL) 150 MG tablet     vitamin D (ERGOCALCIFEROL) 82419 units capsule capsule      Allergies: Albuterol, Adhesive Tape, Claritin, Ibuprofen, Lactose intolerance, Shellfish-derived products  Precautions: Raynaud's-no ice             Osteoporosis-no aggressive joint mobs    Subjective Evaluation    History of Present Illness  Mechanism of injury: 63 yo female with chronic neck pain for many years, has severe headache for the past year. Had cervical surgery 5 years ago. C/o B UE weakness and has difficulty lifting items. Has had 1 yr h/o progressively worsening bilat scapula pain, left> right.      Patient Occupation: on disability Quality of life: fair    Pain  Current pain ratin  At best pain ratin  At worst pain rating: 10  Location: neck, B scapula  Quality: throbbing  Relieving factors: change in position, heat, ice and medications (laying down in sidelying position)  Exacerbated by: driving, sitting on her couch, lookig overhead or downward.  Progression: worsening    Social Support  Lives in: trailer  Lives with: grandson.    Treatments  Previous treatment: physical therapy  Current treatment: injection treatment, medication and physical therapy  Patient Goals  Patient goals for therapy: decreased pain, increased strength, increased motion and return to sport/leisure activities                  RUE Strength  RUE Overall Strength: Within Functional Limits - strength 5/5              LUE Strength  LUE Overall Strength: Within Functional Limits - strength 5/5                     Body Part  Body Part: Cervical               Cervical Spine Observations  Cervical Spine Tenderness: Left:, Upper trapezius (R medial border of the scapula)     Cervical Spine Range of Motion  Cervical Spine Flexion:  (mod limit-50%)  Cervical Spine  Extension:  (mod limit-50%)  Cervical Spine Sidebend Right:  (25% severe limit)  Cervical Spine Sidebend Left:  (25% severe limit)  Cervical Spine Rotation Right:  (severe limit 25%)  Cervical Spine Rotation Left:  (severe limit 25%)  Cervical/Thoracic Special Tests  Other:  (- ULTT 2a bilat, negative Spurling's test B )                          Exercises     Row Name 06/04/18 1300             Exercise 1    Exercise Name 1 supine chin tucks  -BS         Exercise 2    Exercise Name 2 supine cervical rotations  -BS         Exercise 3    Exercise Name 3 pt ed sitting posture  -BS         Exercise 4    Exercise Name 4 seated cervical extensions  -BS        User Key  (r) = Recorded By, (t) = Taken By, (c) = Cosigned By    Initials Name Provider Type    BS Dale Salamanca, PT Physical Therapist             Therapueutic ex: 10 min               Assessment & Plan     Assessment  Impairments: abnormal or restricted ROM, activity intolerance, impaired physical strength, lacks appropriate home exercise program and pain with function  Assessment details: Chronic neck pain with paraspinal muscle tightness.  Prognosis: fair  Functional Limitations: carrying objects, lifting, sleeping, uncomfortable because of pain and moving in bed  Goals  Plan Goals: Goals:  1. Pt indep with demonstrating good sitting posture with or without lumbar support.  2. Improve cervical spine AROM to WFL (100%).  3. Reduce neck pain and B scapular pain to 2/10 or less performing ADL's.  4. Reduce NDI score to 15 or less.    Plan  Therapy options: will be seen for skilled physical therapy services  Planned modality interventions: cryotherapy, electrical stimulation/Russian stimulation, thermotherapy (hydrocollator packs) and ultrasound  Planned therapy interventions: home exercise program, flexibility, stretching, strengthening, soft tissue mobilization, therapeutic activities, functional ROM exercises and postural training  Frequency: 2x week  Duration in  weeks: 3  Treatment plan discussed with: patient  Plan details: F/u with postural education on neutral spine. Modalities/ice prn for pain management. Stretch cervical paraspinals.        Time Calculation: 46        NDI 22/50-44%               Dale Salamanca, PT  6/4/2018        Maria Yepez    1955

## 2018-06-14 ENCOUNTER — OFFICE VISIT (OUTPATIENT)
Dept: SURGERY | Facility: CLINIC | Age: 63
End: 2018-06-14

## 2018-06-14 VITALS
WEIGHT: 124 LBS | HEIGHT: 61 IN | BODY MASS INDEX: 23.41 KG/M2 | DIASTOLIC BLOOD PRESSURE: 70 MMHG | SYSTOLIC BLOOD PRESSURE: 130 MMHG

## 2018-06-14 DIAGNOSIS — R10.13 EPIGASTRIC PAIN: Primary | ICD-10-CM

## 2018-06-14 PROCEDURE — 99213 OFFICE O/P EST LOW 20 MIN: CPT | Performed by: SURGERY

## 2018-06-19 NOTE — PROGRESS NOTES
CHIEF COMPLAINT:    Chief Complaint   Patient presents with   • Follow-up     C/O Cramping esophageal pain x2 months.       HISTORY OF PRESENT ILLNESS:    Maria Yepez is a 62 y.o. female who underwent prior revision of Nissen and subsequent removal of infected mesh from the abdominal wall.  She is here today with complaints of new onset burning pain in the esophagus and some nausea vomiting.  Overall she notes that she has been eating fairly well but does have increasing intermittent pain.    EXAM:  Vitals:    06/14/18 1420   BP: 130/70         Abdomen soft, well healed. No palpable hernia    ASSESSMENT:    Epigastric pain, nausea    PLAN:    Will get UGI to evaluate Nissen wrap.  Will see after study is completed.          This document has been electronically signed by Grant Morris MD on June 19, 2018 10:52 AM

## 2018-06-21 ENCOUNTER — APPOINTMENT (OUTPATIENT)
Dept: GENERAL RADIOLOGY | Facility: HOSPITAL | Age: 63
End: 2018-06-21

## 2018-07-05 ENCOUNTER — TELEPHONE (OUTPATIENT)
Dept: GENERAL RADIOLOGY | Facility: HOSPITAL | Age: 63
End: 2018-07-05

## 2019-05-24 NOTE — PROGRESS NOTES
CHIEF COMPLAINT:    Chief Complaint   Patient presents with   • Post-op     S/P Open revision of Nissen and G-tube placement on 6/7/17.       HISTORY OF PRESENT ILLNESS:    Maria Yepez is a 61 y.o. female who underwent open revision of a Nissen.  She returns today for follow up.  Overall she is doing well.  She has been tolerating soft diet at home without issue.  Her only complaint is discomfort from her Gtube.      EXAM:  Vitals:    06/20/17 0946   BP: 104/60         Abdomen soft, incision well healed, Gtube in place    ASSESSMENT:    S/p Nissen Gtube    PLAN:    Continue soft diet, see back 7/13/17 in [place] for recheck and possible gtube removal.          This document has been electronically signed by Grant Morris MD on June 20, 2017 10:14 AM         Flap Type: V-Y Flap

## 2020-03-05 ENCOUNTER — OFFICE VISIT (OUTPATIENT)
Dept: SURGERY | Facility: CLINIC | Age: 65
End: 2020-03-05

## 2020-03-05 VITALS
HEIGHT: 61 IN | BODY MASS INDEX: 25.49 KG/M2 | SYSTOLIC BLOOD PRESSURE: 128 MMHG | HEART RATE: 71 BPM | DIASTOLIC BLOOD PRESSURE: 68 MMHG | WEIGHT: 135 LBS | TEMPERATURE: 98.6 F

## 2020-03-05 DIAGNOSIS — R10.13 EPIGASTRIC PAIN: ICD-10-CM

## 2020-03-05 DIAGNOSIS — R11.0 NAUSEA WITHOUT VOMITING: Primary | ICD-10-CM

## 2020-03-05 PROCEDURE — 99213 OFFICE O/P EST LOW 20 MIN: CPT | Performed by: SURGERY

## 2020-03-05 RX ORDER — DEXTROSE AND SODIUM CHLORIDE 5; .45 G/100ML; G/100ML
30 INJECTION, SOLUTION INTRAVENOUS CONTINUOUS PRN
Status: CANCELLED | OUTPATIENT
Start: 2020-03-10

## 2020-03-05 NOTE — H&P (VIEW-ONLY)
"CHIEF COMPLAINT:    Epigastric burning, nausea following meals    HISTORY OF PRESENT ILLNESS:    Maria Yepez is a 64 y.o. female who is known to me for history of a prior slipped Nissen fundoplication which underwent open revision several years ago.  She then had complications related to previously placed hernia mesh as well.  She returns today with complaints of burning epigastric pain for the last few months as well as nausea following meals which apparently has been going on longer than that.  She has gained a significant amount of weight back following her prior procedures.  She has not had any work-up for her current complaints.    Past Medical History:   Diagnosis Date   • Abdominal pain    • Acute bronchitis    • Acute pancreatitis    • Acute posthemorrhagic anemia    • Acute sinusitis    • Anal pain    • Anemia    • Anemia due to blood loss    • Anxiety    • Backache     mid and lower back     • Carotid artery disease (CMS/HCC)    • Chronic back pain    • Chronic obstructive lung disease (CMS/HCC)    • Coronary atherosclerosis    • Diabetic neuropathy (CMS/HCC)    • Diarrhea    • Diverticulosis of colon without diverticulitis    • Dysphagia    • Dysuria    • Emphysema/COPD (CMS/HCC)     \"Emphysema\"   • Encounter for immunization    • Epigastric pain    • Esophagitis     with stricture   • Essential hypertension    • Fatigue    • Foot pain    • Ganglion cyst of wrist     left wrist     • Gastroesophageal reflux disease    • Generalized abdominal pain    • Gout    • Headache    • Herpes simplex    • Hip pain, bilateral    • Hoarse    • Hyperlipidemia    • Incontinence of feces    • Insomnia    • Irritable bowel syndrome    • Joint pain    • Knee pain    • Left lower quadrant pain    • Left upper quadrant pain    • Leg pain    • Leukorrhea     not specified as infective    • Low back pain     injury   • Muscle pain    • Nausea    • Nausea    • Nausea and vomiting    • Neck pain    • Neck pain    • " Noncompliance with medication regimen    • Osteopenia    • Pain in lower back    • Pain in right femur    • Palpitations    • Productive cough    • PTSD (post-traumatic stress disorder)    • Scoliosis deformity of spine    • Screening examination for venereal disease    • Shoulder pain, left    • Stricture of esophagus    • Symptomatic menopausal or female climacteric states    • Synovitis and tenosynovitis     left forearm   • Tenderness     Tenderness of left upper quadrant of abdomen      • Thoracic back pain    • Type 2 diabetes mellitus (CMS/HCC)    • Upper abdominal pain    • Urgency of urination     Urgent desire to urinate      • Urinary frequency     due to benign prostatic hypertrophy      • Urinary tract infection     site not specified   • Urinary tract infectious disease    • Venous varices    • Weakness     General symptom - weakness    • Weight gain        Past Surgical History:   Procedure Laterality Date   • BREAST BIOPSY      Stereotactic breast biopsy (1)  left   • CARDIAC CATHETERIZATION  2014    Cardiac cath 69595 (1)  Non obstructive coronary artery disease. Normal left ventricular systolic function. Performed at [facility].   • CARPAL TUNNEL RELEASE     •  SECTION     • CHOLECYSTECTOMY      laparoscopic   • COLONOSCOPY N/A 2017    Procedure: COLONOSCOPY;  Surgeon: Elia Cheatham MD;  Location: [facility] ENDOSCOPY;  Service:    • COLONOSCOPY W/ POLYPECTOMY  2015    Colonoscopy remove polyps 25465 (1)  Internal and external hemorrhoids found. Colonoscopy with biopsy.   • ENDOSCOPY  10/24/2012    Colon endoscopy 92347 (2)  internal & external hemorrhoids found. Diverticulum in sigmoid colon.   • ENDOSCOPY  2014    EGD w/ Dilatation 71539 (1)  Esophageal stricture was present. Dilatation performed. Gastritis found in the stomach. Biopsy taken. Normal duodenum.   • ENDOSCOPY  2013    EGD w/ tube 21362 (3)  Normal esophagus.  Gastritis in stomach. Biopsy taken. Normal duodenum. Biopsy taken.   • ENDOSCOPY N/A 5/23/2017    Procedure: ESOPHAGOGASTRODUODENOSCOPY, possible dilation;  Surgeon: Grant Morris MD;  Location: [facility] ENDOSCOPY;  Service:    • FRACTURE SURGERY Right     Arm Surgery (1)  right, arm fracture   • HERNIA REPAIR      Hernia repair w/mesh (1)   • HYSTERECTOMY      Anesth, hysterectomy (1)   • INCISION AND DRAINAGE ABSCESS N/A 9/5/2017    Procedure: INCISION AND DRAINAGE OF ABDOMINAL WOUND;  Surgeon: Grant Morris MD;  Location: [facility] OR;  Service:    • INJECTION OF MEDICATION  05/27/2016    Depo Medrol (Methylprednisone) 80mg (2)  Ordered By: BRIAN LOU ([facility])    • INJECTION OF MEDICATION  03/10/2016    Kenalog (4)  Ordered By: ARNOLDO JOSHI ([facility])    • INJECTION OF MEDICATION  01/07/2016    Rocephin (1)  Ordered By: ARNOLDO JOSHI ([facility])    • LYMPH NODE BIOPSY      Biopsy/removal, lymph node(s) (1)  cervical node biopsy   • NECK SURGERY  2013   • NISSEN FUNDOPLICATION     • NISSEN FUNDOPLICATION N/A 6/7/2017    Procedure: OPEN REVISION OF NISSEN FUNDOPLICATION WITH GASTROSTOMY TUBE PLACEMENT ;  Surgeon: Grant Morris MD;  Location: [facility] OR;  Service:    • OTHER SURGICAL HISTORY      Tubal ligation (1)   • PARAESOPHAGEAL HERNIA REPAIR     • TOTAL ABDOMINAL HYSTERECTOMY      RUPALI BSO   • UPPER GASTROINTESTINAL ENDOSCOPY  05/23/2017   • VENTRAL/INCISIONAL HERNIA REPAIR N/A 10/13/2017    Procedure: REMOVAL OF INFECTED MESH, REPAIR OF ABDOMINAL WALL   (Will need Strattice mesh in room).;  Surgeon: Grant Morris MD;  Location: [facility] OR;  Service:        Prior to Admission medications    Medication Sig Start Date End Date Taking? Authorizing Provider   amLODIPine (NORVASC) 2.5 MG tablet Take 1 tablet by mouth Daily. 2/27/18  Yes Manjinder Joshi MD   aspirin 81 MG EC tablet Take 1 tablet by mouth Every Night. Hx of CAD 10/25/17  Yes Manjinder Joshi MD   FLUoxetine (PROzac) 20 MG  capsule Take 1 capsule by mouth Daily. 11/20/17  Yes Manjinder Joshi MD   furosemide (LASIX) 20 MG tablet Take 1 tablet by mouth Daily. (unconfirmed)  Patient taking differently: Take 20 mg by mouth Daily As Needed. (unconfirmed)  5/25/17  Yes Manjinder Joshi MD   gabapentin (NEURONTIN) 400 MG capsule Take 1 capsule by mouth Every Night. 5/7/18  Yes Mikayla Navarrete MD   levalbuterol (XOPENEX HFA) 45 MCG/ACT inhaler Inhale 1 puff Every 6 (Six) Hours As Needed for wheezing. 2/13/17  Yes Manjinder Joshi MD   lisinopril (PRINIVIL,ZESTRIL) 5 MG tablet Take 1 tablet by mouth Daily. 4/16/18  Yes Manjinder Joshi MD   meloxicam (MOBIC) 7.5 MG tablet Take 1 tablet by mouth Daily. 4/16/18  Yes Kalin Martínez DPM   metFORMIN (GLUCOPHAGE) 500 MG tablet Take 1 tablet by mouth 2 (Two) Times a Day With Meals. 4/16/18  Yes Manjinder Joshi MD   nitroglycerin (NITROSTAT) 0.4 MG SL tablet Place 1 tablet under the tongue Every 5 (Five) Minutes As Needed for Chest Pain. 8/24/17  Yes Manjinder Joshi MD   Omega-3 Fatty Acids (FISH OIL) 1000 MG capsule capsule Take 1,000 mg by mouth Daily With Breakfast.   Yes Bettie Panda MD   omeprazole (priLOSEC) 20 MG capsule TAKE 1 CAPSULE BY MOUTH 2 (TWO) TIMES A DAY. 4/16/18  Yes Manjinder Joshi MD   ondansetron (ZOFRAN) 4 MG tablet Take 1 tablet by mouth Every 8 (Eight) Hours As Needed for Nausea or Vomiting. 10/25/17  Yes Manjinder Joshi MD   pravastatin (PRAVACHOL) 80 MG tablet TAKE 1 TABLET BY MOUTH DAILY. 3/19/18  Yes Manjinder Joshi MD   SPIRIVA HANDIHALER 18 MCG per inhalation capsule Place 1 capsule into inhaler and inhale Daily. 8/15/17  Yes Manjinder Joshi MD   traZODone (DESYREL) 150 MG tablet Take 150 mg by mouth Every Night.   Yes Provider, MD Bettie   vitamin D (ERGOCALCIFEROL) 74220 units capsule capsule Take 1 capsule by mouth Every 14 (Fourteen) Days. 10/25/17  Yes Manjinder Joshi MD       Allergies   Allergen Reactions   •  "Albuterol Shortness Of Breath and Palpitations     Heart rate shot up   • Adhesive Tape Itching   • Claritin [Loratadine] Other (See Comments)     \"wierd feeling\"   • Ibuprofen GI Intolerance   • Lactose Intolerance (Gi) GI Intolerance   • Latex Itching   • Shellfish-Derived Products Itching       Family History   Problem Relation Age of Onset   • Lung cancer Mother    • Hypertension Mother    • Lymphoma Father         NonHodgkins   • Hypertension Father    • Diabetes Other    • Hypertension Other    • Hypertension Sister    • Diabetes Sister    • Hypertension Brother    • Diabetes Brother        Social History     Socioeconomic History   • Marital status:      Spouse name: Not on file   • Number of children: Not on file   • Years of education: Not on file   • Highest education level: Not on file   Tobacco Use   • Smoking status: Former Smoker     Years: 0.00     Last attempt to quit:      Years since quittin.1   • Smokeless tobacco: Never Used   Substance and Sexual Activity   • Alcohol use: Yes     Comment: socially   • Drug use: No   • Sexual activity: Defer       Review of Systems   HENT: Positive for trouble swallowing.    Gastrointestinal: Positive for abdominal pain and nausea.       Objective     /68   Pulse 71   Temp 98.6 °F (37 °C) (Oral)   Ht 154.9 cm (61\")   Wt 61.2 kg (135 lb)   BMI 25.51 kg/m²     Physical Exam   Constitutional: She is oriented to person, place, and time. She appears well-developed and well-nourished. No distress.   HENT:   Head: Normocephalic and atraumatic.   Eyes: Pupils are equal, round, and reactive to light. Conjunctivae and EOM are normal. Right eye exhibits no discharge. Left eye exhibits no discharge.   Neck: Normal range of motion. Neck supple. No JVD present. No tracheal deviation present. No thyromegaly present.   Cardiovascular: Normal rate, regular rhythm and normal heart sounds. Exam reveals no gallop and no friction rub.   No murmur " heard.  Pulmonary/Chest: Effort normal and breath sounds normal. No respiratory distress. She has no wheezes. She has no rales. She exhibits no tenderness.   Abdominal: Soft. She exhibits no distension and no mass. There is no tenderness. There is no rebound and no guarding. No hernia.       Musculoskeletal: Normal range of motion. She exhibits no edema, tenderness or deformity.   Neurological: She is alert and oriented to person, place, and time. No cranial nerve deficit.   Skin: Skin is warm and dry. No rash noted. She is not diaphoretic. No erythema. No pallor.   Psychiatric: She has a normal mood and affect. Her behavior is normal. Judgment and thought content normal.         ASSESSMENT:    Epigastric pain and nausea in a patient with history of Nissen fundoplication    PLAN:    We will plan for upper endoscopy to further evaluate her anatomy.  She understands the risks and benefits of esophagogastroduodenoscopy and she is scheduled for this on 3/10/2020.          This document has been electronically signed by Grant Morris MD on March 5, 2020 10:24 AM

## 2020-03-05 NOTE — PROGRESS NOTES
"CHIEF COMPLAINT:    Epigastric burning, nausea following meals    HISTORY OF PRESENT ILLNESS:    Maria Yepez is a 64 y.o. female who is known to me for history of a prior slipped Nissen fundoplication which underwent open revision several years ago.  She then had complications related to previously placed hernia mesh as well.  She returns today with complaints of burning epigastric pain for the last few months as well as nausea following meals which apparently has been going on longer than that.  She has gained a significant amount of weight back following her prior procedures.  She has not had any work-up for her current complaints.    Past Medical History:   Diagnosis Date   • Abdominal pain    • Acute bronchitis    • Acute pancreatitis    • Acute posthemorrhagic anemia    • Acute sinusitis    • Anal pain    • Anemia    • Anemia due to blood loss    • Anxiety    • Backache     mid and lower back     • Carotid artery disease (CMS/HCC)    • Chronic back pain    • Chronic obstructive lung disease (CMS/HCC)    • Coronary atherosclerosis    • Diabetic neuropathy (CMS/HCC)    • Diarrhea    • Diverticulosis of colon without diverticulitis    • Dysphagia    • Dysuria    • Emphysema/COPD (CMS/HCC)     \"Emphysema\"   • Encounter for immunization    • Epigastric pain    • Esophagitis     with stricture   • Essential hypertension    • Fatigue    • Foot pain    • Ganglion cyst of wrist     left wrist     • Gastroesophageal reflux disease    • Generalized abdominal pain    • Gout    • Headache    • Herpes simplex    • Hip pain, bilateral    • Hoarse    • Hyperlipidemia    • Incontinence of feces    • Insomnia    • Irritable bowel syndrome    • Joint pain    • Knee pain    • Left lower quadrant pain    • Left upper quadrant pain    • Leg pain    • Leukorrhea     not specified as infective    • Low back pain     injury   • Muscle pain    • Nausea    • Nausea    • Nausea and vomiting    • Neck pain    • Neck pain    • " Noncompliance with medication regimen    • Osteopenia    • Pain in lower back    • Pain in right femur    • Palpitations    • Productive cough    • PTSD (post-traumatic stress disorder)    • Scoliosis deformity of spine    • Screening examination for venereal disease    • Shoulder pain, left    • Stricture of esophagus    • Symptomatic menopausal or female climacteric states    • Synovitis and tenosynovitis     left forearm   • Tenderness     Tenderness of left upper quadrant of abdomen      • Thoracic back pain    • Type 2 diabetes mellitus (CMS/HCC)    • Upper abdominal pain    • Urgency of urination     Urgent desire to urinate      • Urinary frequency     due to benign prostatic hypertrophy      • Urinary tract infection     site not specified   • Urinary tract infectious disease    • Venous varices    • Weakness     General symptom - weakness    • Weight gain        Past Surgical History:   Procedure Laterality Date   • BREAST BIOPSY      Stereotactic breast biopsy (1)  left   • CARDIAC CATHETERIZATION  2014    Cardiac cath 75093 (1)  Non obstructive coronary artery disease. Normal left ventricular systolic function. Performed at [facility].   • CARPAL TUNNEL RELEASE     •  SECTION     • CHOLECYSTECTOMY      laparoscopic   • COLONOSCOPY N/A 2017    Procedure: COLONOSCOPY;  Surgeon: Elia Cheatham MD;  Location: [facility] ENDOSCOPY;  Service:    • COLONOSCOPY W/ POLYPECTOMY  2015    Colonoscopy remove polyps 30058 (1)  Internal and external hemorrhoids found. Colonoscopy with biopsy.   • ENDOSCOPY  10/24/2012    Colon endoscopy 71543 (2)  internal & external hemorrhoids found. Diverticulum in sigmoid colon.   • ENDOSCOPY  2014    EGD w/ Dilatation 24907 (1)  Esophageal stricture was present. Dilatation performed. Gastritis found in the stomach. Biopsy taken. Normal duodenum.   • ENDOSCOPY  2013    EGD w/ tube 69649 (3)  Normal esophagus.  Gastritis in stomach. Biopsy taken. Normal duodenum. Biopsy taken.   • ENDOSCOPY N/A 5/23/2017    Procedure: ESOPHAGOGASTRODUODENOSCOPY, possible dilation;  Surgeon: Grant Morris MD;  Location: [facility] ENDOSCOPY;  Service:    • FRACTURE SURGERY Right     Arm Surgery (1)  right, arm fracture   • HERNIA REPAIR      Hernia repair w/mesh (1)   • HYSTERECTOMY      Anesth, hysterectomy (1)   • INCISION AND DRAINAGE ABSCESS N/A 9/5/2017    Procedure: INCISION AND DRAINAGE OF ABDOMINAL WOUND;  Surgeon: Grant Morris MD;  Location: [facility] OR;  Service:    • INJECTION OF MEDICATION  05/27/2016    Depo Medrol (Methylprednisone) 80mg (2)  Ordered By: BRIAN LOU ([facility])    • INJECTION OF MEDICATION  03/10/2016    Kenalog (4)  Ordered By: ARNOLDO JOSHI ([facility])    • INJECTION OF MEDICATION  01/07/2016    Rocephin (1)  Ordered By: ARNOLDO JOSHI ([facility])    • LYMPH NODE BIOPSY      Biopsy/removal, lymph node(s) (1)  cervical node biopsy   • NECK SURGERY  2013   • NISSEN FUNDOPLICATION     • NISSEN FUNDOPLICATION N/A 6/7/2017    Procedure: OPEN REVISION OF NISSEN FUNDOPLICATION WITH GASTROSTOMY TUBE PLACEMENT ;  Surgeon: Grant Morris MD;  Location: [facility] OR;  Service:    • OTHER SURGICAL HISTORY      Tubal ligation (1)   • PARAESOPHAGEAL HERNIA REPAIR     • TOTAL ABDOMINAL HYSTERECTOMY      RUPALI BSO   • UPPER GASTROINTESTINAL ENDOSCOPY  05/23/2017   • VENTRAL/INCISIONAL HERNIA REPAIR N/A 10/13/2017    Procedure: REMOVAL OF INFECTED MESH, REPAIR OF ABDOMINAL WALL   (Will need Strattice mesh in room).;  Surgeon: Grant Morris MD;  Location: [facility] OR;  Service:        Prior to Admission medications    Medication Sig Start Date End Date Taking? Authorizing Provider   amLODIPine (NORVASC) 2.5 MG tablet Take 1 tablet by mouth Daily. 2/27/18  Yes Manjinder Joshi MD   aspirin 81 MG EC tablet Take 1 tablet by mouth Every Night. Hx of CAD 10/25/17  Yes Manjinder Joshi MD   FLUoxetine (PROzac) 20 MG  capsule Take 1 capsule by mouth Daily. 11/20/17  Yes Manjinder Joshi MD   furosemide (LASIX) 20 MG tablet Take 1 tablet by mouth Daily. (unconfirmed)  Patient taking differently: Take 20 mg by mouth Daily As Needed. (unconfirmed)  5/25/17  Yes Manjinder Joshi MD   gabapentin (NEURONTIN) 400 MG capsule Take 1 capsule by mouth Every Night. 5/7/18  Yes Mikayla Navarrete MD   levalbuterol (XOPENEX HFA) 45 MCG/ACT inhaler Inhale 1 puff Every 6 (Six) Hours As Needed for wheezing. 2/13/17  Yes Manjinder Joshi MD   lisinopril (PRINIVIL,ZESTRIL) 5 MG tablet Take 1 tablet by mouth Daily. 4/16/18  Yes Manjinder Joshi MD   meloxicam (MOBIC) 7.5 MG tablet Take 1 tablet by mouth Daily. 4/16/18  Yes Kalin Martínez DPM   metFORMIN (GLUCOPHAGE) 500 MG tablet Take 1 tablet by mouth 2 (Two) Times a Day With Meals. 4/16/18  Yes Manjinder Joshi MD   nitroglycerin (NITROSTAT) 0.4 MG SL tablet Place 1 tablet under the tongue Every 5 (Five) Minutes As Needed for Chest Pain. 8/24/17  Yes Manjinder Joshi MD   Omega-3 Fatty Acids (FISH OIL) 1000 MG capsule capsule Take 1,000 mg by mouth Daily With Breakfast.   Yes Bettie Panda MD   omeprazole (priLOSEC) 20 MG capsule TAKE 1 CAPSULE BY MOUTH 2 (TWO) TIMES A DAY. 4/16/18  Yes Manjinder Joshi MD   ondansetron (ZOFRAN) 4 MG tablet Take 1 tablet by mouth Every 8 (Eight) Hours As Needed for Nausea or Vomiting. 10/25/17  Yes Manjinder Joshi MD   pravastatin (PRAVACHOL) 80 MG tablet TAKE 1 TABLET BY MOUTH DAILY. 3/19/18  Yes Manjinder Joshi MD   SPIRIVA HANDIHALER 18 MCG per inhalation capsule Place 1 capsule into inhaler and inhale Daily. 8/15/17  Yes Manjinder Joshi MD   traZODone (DESYREL) 150 MG tablet Take 150 mg by mouth Every Night.   Yes Provider, MD Bettie   vitamin D (ERGOCALCIFEROL) 44811 units capsule capsule Take 1 capsule by mouth Every 14 (Fourteen) Days. 10/25/17  Yes Manjinder Joshi MD       Allergies   Allergen Reactions   •  "Albuterol Shortness Of Breath and Palpitations     Heart rate shot up   • Adhesive Tape Itching   • Claritin [Loratadine] Other (See Comments)     \"wierd feeling\"   • Ibuprofen GI Intolerance   • Lactose Intolerance (Gi) GI Intolerance   • Latex Itching   • Shellfish-Derived Products Itching       Family History   Problem Relation Age of Onset   • Lung cancer Mother    • Hypertension Mother    • Lymphoma Father         NonHodgkins   • Hypertension Father    • Diabetes Other    • Hypertension Other    • Hypertension Sister    • Diabetes Sister    • Hypertension Brother    • Diabetes Brother        Social History     Socioeconomic History   • Marital status:      Spouse name: Not on file   • Number of children: Not on file   • Years of education: Not on file   • Highest education level: Not on file   Tobacco Use   • Smoking status: Former Smoker     Years: 0.00     Last attempt to quit:      Years since quittin.1   • Smokeless tobacco: Never Used   Substance and Sexual Activity   • Alcohol use: Yes     Comment: socially   • Drug use: No   • Sexual activity: Defer       Review of Systems   HENT: Positive for trouble swallowing.    Gastrointestinal: Positive for abdominal pain and nausea.       Objective     /68   Pulse 71   Temp 98.6 °F (37 °C) (Oral)   Ht 154.9 cm (61\")   Wt 61.2 kg (135 lb)   BMI 25.51 kg/m²     Physical Exam   Constitutional: She is oriented to person, place, and time. She appears well-developed and well-nourished. No distress.   HENT:   Head: Normocephalic and atraumatic.   Eyes: Pupils are equal, round, and reactive to light. Conjunctivae and EOM are normal. Right eye exhibits no discharge. Left eye exhibits no discharge.   Neck: Normal range of motion. Neck supple. No JVD present. No tracheal deviation present. No thyromegaly present.   Cardiovascular: Normal rate, regular rhythm and normal heart sounds. Exam reveals no gallop and no friction rub.   No murmur " heard.  Pulmonary/Chest: Effort normal and breath sounds normal. No respiratory distress. She has no wheezes. She has no rales. She exhibits no tenderness.   Abdominal: Soft. She exhibits no distension and no mass. There is no tenderness. There is no rebound and no guarding. No hernia.       Musculoskeletal: Normal range of motion. She exhibits no edema, tenderness or deformity.   Neurological: She is alert and oriented to person, place, and time. No cranial nerve deficit.   Skin: Skin is warm and dry. No rash noted. She is not diaphoretic. No erythema. No pallor.   Psychiatric: She has a normal mood and affect. Her behavior is normal. Judgment and thought content normal.         ASSESSMENT:    Epigastric pain and nausea in a patient with history of Nissen fundoplication    PLAN:    We will plan for upper endoscopy to further evaluate her anatomy.  She understands the risks and benefits of esophagogastroduodenoscopy and she is scheduled for this on 3/10/2020.          This document has been electronically signed by Grant Morris MD on March 5, 2020 10:24 AM

## 2020-03-09 RX ORDER — HYDROCODONE BITARTRATE AND ACETAMINOPHEN 7.5; 325 MG/1; MG/1
1 TABLET ORAL EVERY 6 HOURS PRN
COMMUNITY
End: 2021-10-28 | Stop reason: HOSPADM

## 2020-03-10 ENCOUNTER — ANESTHESIA EVENT (OUTPATIENT)
Dept: GASTROENTEROLOGY | Facility: HOSPITAL | Age: 65
End: 2020-03-10

## 2020-03-10 ENCOUNTER — ANESTHESIA (OUTPATIENT)
Dept: GASTROENTEROLOGY | Facility: HOSPITAL | Age: 65
End: 2020-03-10

## 2020-03-10 ENCOUNTER — HOSPITAL ENCOUNTER (OUTPATIENT)
Facility: HOSPITAL | Age: 65
Setting detail: HOSPITAL OUTPATIENT SURGERY
Discharge: HOME | End: 2020-03-10
Attending: SURGERY | Admitting: SURGERY

## 2020-03-10 VITALS
WEIGHT: 134.38 LBS | HEART RATE: 64 BPM | SYSTOLIC BLOOD PRESSURE: 106 MMHG | HEIGHT: 61 IN | BODY MASS INDEX: 25.37 KG/M2 | RESPIRATION RATE: 18 BRPM | TEMPERATURE: 97.1 F | DIASTOLIC BLOOD PRESSURE: 69 MMHG | OXYGEN SATURATION: 96 %

## 2020-03-10 DIAGNOSIS — R11.0 NAUSEA WITHOUT VOMITING: ICD-10-CM

## 2020-03-10 LAB — GLUCOSE BLDC GLUCOMTR-MCNC: 119 MG/DL (ref 70–130)

## 2020-03-10 PROCEDURE — 88305 TISSUE EXAM BY PATHOLOGIST: CPT | Performed by: PATHOLOGY

## 2020-03-10 PROCEDURE — 25010000002 PROPOFOL 10 MG/ML EMULSION: Performed by: NURSE ANESTHETIST, CERTIFIED REGISTERED

## 2020-03-10 PROCEDURE — 88305 TISSUE EXAM BY PATHOLOGIST: CPT | Performed by: SURGERY

## 2020-03-10 PROCEDURE — 43239 EGD BIOPSY SINGLE/MULTIPLE: CPT | Performed by: SURGERY

## 2020-03-10 PROCEDURE — 82962 GLUCOSE BLOOD TEST: CPT

## 2020-03-10 RX ORDER — LIDOCAINE HYDROCHLORIDE 20 MG/ML
INJECTION, SOLUTION INTRAVENOUS AS NEEDED
Status: DISCONTINUED | OUTPATIENT
Start: 2020-03-10 | End: 2020-03-10 | Stop reason: SURG

## 2020-03-10 RX ORDER — METOCLOPRAMIDE 5 MG/1
5 TABLET ORAL 3 TIMES DAILY
Qty: 90 TABLET | Refills: 1 | Status: SHIPPED | OUTPATIENT
Start: 2020-03-10 | End: 2020-05-09

## 2020-03-10 RX ORDER — DEXTROSE AND SODIUM CHLORIDE 5; .45 G/100ML; G/100ML
30 INJECTION, SOLUTION INTRAVENOUS CONTINUOUS PRN
Status: DISCONTINUED | OUTPATIENT
Start: 2020-03-10 | End: 2020-03-10 | Stop reason: HOSPADM

## 2020-03-10 RX ORDER — PROPOFOL 10 MG/ML
VIAL (ML) INTRAVENOUS AS NEEDED
Status: DISCONTINUED | OUTPATIENT
Start: 2020-03-10 | End: 2020-03-10 | Stop reason: SURG

## 2020-03-10 RX ADMIN — PROPOFOL 80 MG: 10 INJECTION, EMULSION INTRAVENOUS at 09:51

## 2020-03-10 RX ADMIN — LIDOCAINE HYDROCHLORIDE 80 MG: 20 INJECTION, SOLUTION INTRAVENOUS at 09:51

## 2020-03-10 RX ADMIN — PROPOFOL 20 MG: 10 INJECTION, EMULSION INTRAVENOUS at 09:54

## 2020-03-10 RX ADMIN — DEXTROSE AND SODIUM CHLORIDE 30 ML/HR: 5; 450 INJECTION, SOLUTION INTRAVENOUS at 09:08

## 2020-03-10 NOTE — ANESTHESIA POSTPROCEDURE EVALUATION
Patient: Maria Yepez    Procedure Summary     Date:  03/10/20 Room / Location:  [facility] ENDOSCOPY 1 / [facility] ENDOSCOPY    Anesthesia Start:  0949 Anesthesia Stop:  0957    Procedure:  ESOPHAGOGASTRODUODENOSCOPY (N/A ) Diagnosis:       Nausea without vomiting      (Nausea without vomiting [R11.0])    Surgeon:  Grant Morris MD Provider:  Darren Scott CRNA    Anesthesia Type:  MAC ASA Status:  4          Anesthesia Type: MAC    Vitals  No vitals data found for the desired time range.          Post Anesthesia Care and Evaluation    Patient location during evaluation: bedside  Patient participation: complete - patient participated  Level of consciousness: awake and alert  Pain score: 0  Pain management: adequate  Airway patency: patent  Anesthetic complications: No anesthetic complications  PONV Status: none  Cardiovascular status: acceptable  Respiratory status: acceptable and spontaneous ventilation  Hydration status: acceptable

## 2020-03-10 NOTE — ANESTHESIA PREPROCEDURE EVALUATION
Anesthesia Evaluation     Patient summary reviewed   NPO Solid Status: > 8 hours  NPO Liquid Status: > 2 hours           Airway   Mallampati: III  TM distance: >3 FB  Neck ROM: full  No difficulty expected  Dental    (+) edentulous and upper dentures    Pulmonary    (+) COPD,   Cardiovascular     (+) hypertension 2 medications or greater, CAD, hyperlipidemia,  carotid artery disease      Neuro/Psych  (+) headaches, weakness, psychiatric history Anxiety and PTSD,     GI/Hepatic/Renal/Endo    (+)  GERD,  diabetes mellitus type 2,     Musculoskeletal     (+) neck pain,   Abdominal    Substance History      OB/GYN          Other   arthritis,                    Anesthesia Plan    ASA 4     MAC     intravenous induction     Anesthetic plan, all risks, benefits, and alternatives have been provided, discussed and informed consent has been obtained with: patient.

## 2020-03-10 NOTE — INTERVAL H&P NOTE
H&P reviewed. The patient was examined and there are no changes to the H&P.           This document has been electronically signed by Grant Morris MD on March 10, 2020 09:45

## 2020-03-11 LAB
LAB AP CASE REPORT: NORMAL
PATH REPORT.FINAL DX SPEC: NORMAL
PATH REPORT.GROSS SPEC: NORMAL

## 2021-10-20 ENCOUNTER — HOSPITAL ENCOUNTER (EMERGENCY)
Facility: HOSPITAL | Age: 66
Discharge: HOME | End: 2021-10-20
Attending: FAMILY MEDICINE | Admitting: FAMILY MEDICINE

## 2021-10-20 ENCOUNTER — OFFICE VISIT (OUTPATIENT)
Dept: PODIATRY | Facility: CLINIC | Age: 66
End: 2021-10-20

## 2021-10-20 ENCOUNTER — APPOINTMENT (OUTPATIENT)
Dept: GENERAL RADIOLOGY | Facility: HOSPITAL | Age: 66
End: 2021-10-20

## 2021-10-20 VITALS
HEART RATE: 78 BPM | HEIGHT: 60 IN | BODY MASS INDEX: 21.99 KG/M2 | DIASTOLIC BLOOD PRESSURE: 75 MMHG | RESPIRATION RATE: 18 BRPM | SYSTOLIC BLOOD PRESSURE: 165 MMHG | TEMPERATURE: 98.2 F | OXYGEN SATURATION: 98 % | WEIGHT: 112 LBS

## 2021-10-20 VITALS — WEIGHT: 112 LBS | BODY MASS INDEX: 21.99 KG/M2 | HEIGHT: 60 IN

## 2021-10-20 DIAGNOSIS — T21.25XA BURN OF BUTTOCK, SECOND DEGREE, INITIAL ENCOUNTER: ICD-10-CM

## 2021-10-20 DIAGNOSIS — S82.61XA DISPLACED FRACTURE OF LATERAL MALLEOLUS OF RIGHT FIBULA, INITIAL ENCOUNTER FOR CLOSED FRACTURE: Primary | ICD-10-CM

## 2021-10-20 DIAGNOSIS — S82.891A CLOSED FRACTURE OF RIGHT ANKLE, INITIAL ENCOUNTER: Primary | ICD-10-CM

## 2021-10-20 PROCEDURE — 99204 OFFICE O/P NEW MOD 45 MIN: CPT | Performed by: PODIATRIST

## 2021-10-20 PROCEDURE — 73610 X-RAY EXAM OF ANKLE: CPT

## 2021-10-20 PROCEDURE — 99283 EMERGENCY DEPT VISIT LOW MDM: CPT

## 2021-10-20 RX ORDER — LEVALBUTEROL INHALATION SOLUTION 1.25 MG/3ML
1.25 SOLUTION RESPIRATORY (INHALATION)
COMMUNITY
Start: 2020-11-12 | End: 2021-10-26

## 2021-10-20 RX ORDER — AMLODIPINE BESYLATE 2.5 MG/1
1 TABLET ORAL 2 TIMES DAILY
COMMUNITY
Start: 2021-06-09 | End: 2022-01-10 | Stop reason: HOSPADM

## 2021-10-20 RX ORDER — TIZANIDINE 4 MG/1
4 TABLET ORAL 2 TIMES DAILY
COMMUNITY
Start: 2021-04-26

## 2021-10-20 RX ORDER — METOCLOPRAMIDE 5 MG/1
5 TABLET ORAL 3 TIMES DAILY
COMMUNITY
End: 2021-10-26

## 2021-10-20 RX ORDER — PRAVASTATIN SODIUM 80 MG/1
1 TABLET ORAL NIGHTLY
COMMUNITY
Start: 2021-08-04

## 2021-10-20 RX ORDER — GABAPENTIN 400 MG/1
800 CAPSULE ORAL NIGHTLY
COMMUNITY
Start: 2021-09-28

## 2021-10-20 RX ORDER — BUPIVACAINE HCL/0.9 % NACL/PF 0.1 %
2 PLASTIC BAG, INJECTION (ML) EPIDURAL ONCE
Status: CANCELLED | OUTPATIENT
Start: 2021-10-28 | End: 2021-10-20

## 2021-10-20 RX ORDER — ONDANSETRON 4 MG/1
1 TABLET, FILM COATED ORAL EVERY 8 HOURS PRN
COMMUNITY
Start: 2021-08-03 | End: 2021-10-20

## 2021-10-20 RX ORDER — HYDROCODONE BITARTRATE AND ACETAMINOPHEN 7.5; 325 MG/1; MG/1
1 TABLET ORAL ONCE
Status: COMPLETED | OUTPATIENT
Start: 2021-10-20 | End: 2021-10-20

## 2021-10-20 RX ORDER — PANTOPRAZOLE SODIUM 40 MG/1
1 TABLET, DELAYED RELEASE ORAL
COMMUNITY
Start: 2021-06-09 | End: 2021-10-20

## 2021-10-20 RX ORDER — LISINOPRIL 5 MG/1
1 TABLET ORAL DAILY
COMMUNITY
Start: 2021-09-27 | End: 2021-10-20

## 2021-10-20 RX ADMIN — HYDROCODONE BITARTRATE AND ACETAMINOPHEN 1 TABLET: 7.5; 325 TABLET ORAL at 13:36

## 2021-10-20 NOTE — ED NOTES
Pt presents to ED with a heating pad burn to the top of her right buttock. Pt states she was sitting on heating pad and states she did not notice she had been on it for so long. pt also states she broke her right ankle but continues to walk on it because the crutches ortho gave her were too big.      Latricia Thompson, RN  10/20/21 3536

## 2021-10-20 NOTE — PROGRESS NOTES
"Maria Yepez  1955  66 y.o. female   PCP: Lisa Cochran 4/26/2021  A1C: 6.1 on 7/14/2016.    BS-125 on 4/13/2018    Patient presents today for right ankle pain      10/20/2021     Chief Complaint   Patient presents with   • Right Ankle - Pain       History of Present Illness    66-year-old female presents to clinic today as a referral from the emergency department for right ankle injury.  Patient states that approximately 5 days ago she fell twisting and injuring her right ankle.  She was evaluated by [facility] and [facility] who gave her a splint and crutches.  She states that she has been walking on the splint because her crutches are ill fitting.  There is significant swelling and bruising.  She has no other complaints.    Past Medical History:   Diagnosis Date   • Abdominal pain    • Acute bronchitis    • Acute pancreatitis    • Acute posthemorrhagic anemia    • Acute sinusitis    • Anal pain    • Anemia    • Anemia due to blood loss    • Anxiety    • Backache     mid and lower back     • Carotid artery disease (HCC)    • Chronic back pain    • Chronic obstructive lung disease (HCC)    • Coronary atherosclerosis    • Diabetic neuropathy (HCC)    • Diarrhea    • Diverticulosis of colon without diverticulitis    • Dysphagia    • Dysuria    • Emphysema/COPD (HCC)     \"Emphysema\"   • Encounter for immunization    • Epigastric pain    • Esophagitis     with stricture   • Essential hypertension    • Fatigue    • Foot pain    • Ganglion cyst of wrist     left wrist     • Gastroesophageal reflux disease    • Generalized abdominal pain    • Gout    • Headache    • Herpes simplex    • Hip pain, bilateral    • History of transfusion    • Hoarse    • Hyperlipidemia    • Incontinence of feces    • Insomnia    • Irritable bowel syndrome    • Joint pain    • Knee pain    • Left lower quadrant pain    • Left upper quadrant pain    • Leg pain    • Leukorrhea     not specified as infective    • Low back pain  "    injury   • Muscle pain    • Nausea    • Nausea    • Nausea and vomiting    • Neck pain    • Neck pain    • Noncompliance with medication regimen    • Osteopenia    • Pain in lower back    • Pain in right femur    • Palpitations    • Productive cough    • PTSD (post-traumatic stress disorder)    • Scoliosis deformity of spine    • Screening examination for venereal disease    • Shoulder pain, left    • Stricture of esophagus    • Symptomatic menopausal or female climacteric states    • Synovitis and tenosynovitis     left forearm   • Tenderness     Tenderness of left upper quadrant of abdomen      • Thoracic back pain    • Type 2 diabetes mellitus (HCC)    • Upper abdominal pain    • Urgency of urination     Urgent desire to urinate      • Urinary frequency     due to benign prostatic hypertrophy      • Urinary tract infection     site not specified   • Urinary tract infectious disease    • Venous varices    • Weakness     General symptom - weakness    • Weight gain          Past Surgical History:   Procedure Laterality Date   • BREAST BIOPSY      Stereotactic breast biopsy (1)  left   • CARDIAC CATHETERIZATION  2014    Cardiac cath 39261 (1)  Non obstructive coronary artery disease. Normal left ventricular systolic function. Performed at [facility].   • CARPAL TUNNEL RELEASE     •  SECTION     • CHOLECYSTECTOMY      laparoscopic   • COLONOSCOPY N/A 2017    Procedure: COLONOSCOPY;  Surgeon: Elia Cheatham MD;  Location: [facility] ENDOSCOPY;  Service:    • COLONOSCOPY W/ POLYPECTOMY  2015    Colonoscopy remove polyps 97188 (1)  Internal and external hemorrhoids found. Colonoscopy with biopsy.   • ENDOSCOPY  10/24/2012    Colon endoscopy 17022 (2)  internal & external hemorrhoids found. Diverticulum in sigmoid colon.   • ENDOSCOPY  2014    EGD w/ Dilatation 27533 (1)  Esophageal stricture was present. Dilatation performed. Gastritis found in the stomach.  Biopsy taken. Normal duodenum.   • ENDOSCOPY  07/11/2013    EGD w/ tube 77628 (3)  Normal esophagus. Gastritis in stomach. Biopsy taken. Normal duodenum. Biopsy taken.   • ENDOSCOPY N/A 5/23/2017    Procedure: ESOPHAGOGASTRODUODENOSCOPY, possible dilation;  Surgeon: Grant Morris MD;  Location: [facility] ENDOSCOPY;  Service:    • ENDOSCOPY N/A 3/10/2020    Procedure: ESOPHAGOGASTRODUODENOSCOPY;  Surgeon: Grant Morris MD;  Location: [facility] ENDOSCOPY;  Service: General;  Laterality: N/A;   • FRACTURE SURGERY Right     Arm Surgery (1)  right, arm fracture   • HERNIA REPAIR      Hernia repair w/mesh (1)   • HYSTERECTOMY      Anesth, hysterectomy (1)   • INCISION AND DRAINAGE ABSCESS N/A 9/5/2017    Procedure: INCISION AND DRAINAGE OF ABDOMINAL WOUND;  Surgeon: Grant Morris MD;  Location: [facility] OR;  Service:    • INJECTION OF MEDICATION  05/27/2016    Depo Medrol (Methylprednisone) 80mg (2)  Ordered By: BRIAN LOU ([facility])    • INJECTION OF MEDICATION  03/10/2016    Kenalog (4)  Ordered By: ARNOLDO MURDOCK ([facility])    • INJECTION OF MEDICATION  01/07/2016    Rocephin (1)  Ordered By: ARNOLDO MURDOCK ([facility])    • LYMPH NODE BIOPSY      Biopsy/removal, lymph node(s) (1)  cervical node biopsy   • NECK SURGERY  2013   • NISSEN FUNDOPLICATION     • NISSEN FUNDOPLICATION N/A 6/7/2017    Procedure: OPEN REVISION OF NISSEN FUNDOPLICATION WITH GASTROSTOMY TUBE PLACEMENT ;  Surgeon: Grant Morris MD;  Location: [facility] OR;  Service:    • OTHER SURGICAL HISTORY      Tubal ligation (1)   • PARAESOPHAGEAL HERNIA REPAIR     • TOTAL ABDOMINAL HYSTERECTOMY      RUPALI BSO   • UPPER GASTROINTESTINAL ENDOSCOPY  05/23/2017   • VENTRAL/INCISIONAL HERNIA REPAIR N/A 10/13/2017    Procedure: REMOVAL OF INFECTED MESH, REPAIR OF ABDOMINAL WALL   (Will need Strattice mesh in room).;  Surgeon: Grant Morris MD;  Location: [facility] OR;  Service:          Family History   Problem Relation Age of Onset   • Lung  cancer Mother    • Hypertension Mother    • Cancer Mother    • Lymphoma Father         NonHodgkins   • Hypertension Father    • Diabetes Other    • Hypertension Other    • Hypertension Sister    • Diabetes Sister    • Hypertension Brother    • Diabetes Brother          Social History     Socioeconomic History   • Marital status: Single   Tobacco Use   • Smoking status: Former Smoker     Years: 0.00     Quit date:      Years since quittin.8   • Smokeless tobacco: Never Used   Vaping Use   • Vaping Use: Never used   Substance and Sexual Activity   • Alcohol use: Yes     Comment: socially   • Drug use: No   • Sexual activity: Defer         Current Outpatient Medications   Medication Sig Dispense Refill   • amLODIPine (NORVASC) 2.5 MG tablet Take 1 tablet by mouth Daily. 30 tablet 5   • amLODIPine (NORVASC) 2.5 MG tablet Take 1 tablet by mouth Daily.     • aspirin 81 MG EC tablet Take 1 tablet by mouth Every Night. Hx of CAD 30 tablet 5   • FLUoxetine (PROzac) 20 MG capsule Take 1 capsule by mouth Daily. 30 capsule 5   • furosemide (LASIX) 20 MG tablet Take 1 tablet by mouth Daily. (unconfirmed) (Patient taking differently: Take 20 mg by mouth Daily As Needed. (unconfirmed) ) 30 tablet 5   • gabapentin (NEURONTIN) 400 MG capsule Take 1 capsule by mouth Every Night. 30 capsule 0   • gabapentin (NEURONTIN) 400 MG capsule Take 400 mg by mouth.     • HYDROcodone-acetaminophen (NORCO) 7.5-325 MG per tablet Take 1 tablet by mouth Every 6 (Six) Hours As Needed for Moderate Pain .     • levalbuterol (XOPENEX HFA) 45 MCG/ACT inhaler Inhale 1 puff Every 6 (Six) Hours As Needed for wheezing. 15 g 11   • levalbuterol (XOPENEX) 1.25 MG/3ML nebulizer solution Inhale 1.25 mg.     • lisinopril (PRINIVIL,ZESTRIL) 5 MG tablet Take 1 tablet by mouth Daily. 30 tablet 5   • metFORMIN (GLUCOPHAGE) 500 MG tablet Take 1 tablet by mouth 2 (Two) Times a Day With Meals. 60 tablet 5   • nitroglycerin (NITROSTAT) 0.4 MG SL tablet Place 1  "tablet under the tongue Every 5 (Five) Minutes As Needed for Chest Pain. 20 tablet 5   • Omega-3 Fatty Acids (FISH OIL) 1000 MG capsule capsule Take 1,000 mg by mouth Daily With Breakfast.     • omeprazole (priLOSEC) 20 MG capsule TAKE 1 CAPSULE BY MOUTH 2 (TWO) TIMES A DAY. 60 capsule 5   • ondansetron (ZOFRAN) 4 MG tablet Take 1 tablet by mouth Every 8 (Eight) Hours As Needed for Nausea or Vomiting. 20 tablet 1   • pravastatin (PRAVACHOL) 80 MG tablet Take 1 tablet by mouth Daily.     • silver sulfadiazine (SILVADENE, SSD) 1 % cream Apply 1 application topically to the appropriate area as directed 2 (Two) Times a Day. 25 g 0   • SPIRIVA HANDIHALER 18 MCG per inhalation capsule Place 1 capsule into inhaler and inhale Daily. 30 capsule 5   • tiZANidine (ZANAFLEX) 4 MG tablet Take 4 mg by mouth.     • traZODone (DESYREL) 150 MG tablet Take 150 mg by mouth Every Night.     • vitamin D (ERGOCALCIFEROL) 35388 units capsule capsule Take 1 capsule by mouth Every 14 (Fourteen) Days. 2 capsule 5   • meloxicam (MOBIC) 7.5 MG tablet Take 1 tablet by mouth Daily. 30 tablet 0   • metoclopramide (REGLAN) 5 MG tablet Take 5 mg by mouth 3 (Three) Times a Day.       No current facility-administered medications for this visit.     Review of Systems   Constitutional: Positive for activity change. Negative for chills and fever.   HENT: Negative.    Respiratory: Negative.    Cardiovascular: Positive for leg swelling. Negative for chest pain.   Gastrointestinal: Negative.    Musculoskeletal:        Right ankle pain    Skin: Positive for color change. Negative for wound.   Psychiatric/Behavioral: Negative.          OBJECTIVE    Ht 152.4 cm (60\")   Wt 50.8 kg (112 lb)   BMI 21.87 kg/m²     Physical Exam  Vitals reviewed.   Constitutional:       General: She is not in acute distress.     Appearance: She is well-developed.   HENT:      Head: Normocephalic and atraumatic.      Nose: Nose normal.   Eyes:      Conjunctiva/sclera: " Conjunctivae normal.      Pupils: Pupils are equal, round, and reactive to light.   Pulmonary:      Effort: Pulmonary effort is normal. No respiratory distress.      Breath sounds: No wheezing.   Musculoskeletal:         General: Tenderness and signs of injury present. No deformity.   Skin:     General: Skin is warm and dry.      Capillary Refill: Capillary refill takes less than 2 seconds.   Neurological:      Mental Status: She is alert and oriented to person, place, and time.   Psychiatric:         Behavior: Behavior normal.         Thought Content: Thought content normal.          Right Lower Extremity Exam:    Vascular:   Palpable pedal pulses  Edema and ecchymosis surrounding ankle and foot  Dermatological:  Skin warm dry and intact.  Musculoskeletal:  Muscle strength deferred secondary to pain  Fou pain on nd palpation to medial deltoid ligament complex  Pain on palpation to distal lateral malleolus  Pain with ankle joint range of motion    Small joint injection:  Risks and benefits discussed. Written consent obtained.  Skin prepped with alcohol  Right 4th/5th TMTJ injection performed with 1cc 0.5% Marcaine, 6mg celestone with 27g needle  Band aid applied. Patient tolerated well.      ASSESSMENT AND PLAN    Diagnoses and all orders for this visit:    1. Closed fracture of right ankle, initial encounter (Primary)  -     Case Request; Standing  -     COVID PRE-OP / PRE-PROCEDURE SCREENING ORDER (NO ISOLATION) - Swab, Nasopharynx; Future  -     Case Request    Other orders  -     Follow Anesthesia Guidelines / Standing Orders; Future      - Comprehensive foot and ankle exam performed.   -Radiographs reviewed.  Displaced oblique fibular.  Appear to be widening of the medial gutter indicating lateral shift of the talus.  -Lengthy discussion held with patient regarding treatment options both conservative and surgical.  Patient elected for surgical intervention.  -Surgical plan is open reduction internal fixation of  right ankle fracture.  -Risks and benefits of the procedure including but not limited to postoperative infection, incisional dehiscence, numbness, swelling, residual pain and postoperative blood clot discussed in detail.  No guarantees were given or implied.  -Tentative date for the surgery October 28, 2021  -Unna boot applied today for edema control.  Dispensed tall cam boot.  Dispensed properly fitting crutches.  -All questions were answered  -Recheck Monday before surgery
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
## 2021-10-26 ENCOUNTER — APPOINTMENT (OUTPATIENT)
Dept: PREADMISSION TESTING | Facility: HOSPITAL | Age: 66
End: 2021-10-26

## 2021-10-26 ENCOUNTER — LAB (OUTPATIENT)
Dept: LAB | Facility: HOSPITAL | Age: 66
End: 2021-10-26

## 2021-10-26 ENCOUNTER — PRE-ADMISSION TESTING (OUTPATIENT)
Dept: PREADMISSION TESTING | Facility: HOSPITAL | Age: 66
End: 2021-10-26

## 2021-10-26 VITALS
SYSTOLIC BLOOD PRESSURE: 148 MMHG | HEART RATE: 74 BPM | OXYGEN SATURATION: 96 % | BODY MASS INDEX: 21.99 KG/M2 | RESPIRATION RATE: 18 BRPM | HEIGHT: 60 IN | WEIGHT: 112 LBS | DIASTOLIC BLOOD PRESSURE: 76 MMHG

## 2021-10-26 DIAGNOSIS — S82.891A CLOSED FRACTURE OF RIGHT ANKLE, INITIAL ENCOUNTER: ICD-10-CM

## 2021-10-26 LAB
ANION GAP SERPL CALCULATED.3IONS-SCNC: 10 MMOL/L (ref 5–15)
BUN SERPL-MCNC: 15 MG/DL (ref 8–23)
BUN/CREAT SERPL: 19.2 (ref 7–25)
CALCIUM SPEC-SCNC: 8.5 MG/DL (ref 8.6–10.5)
CHLORIDE SERPL-SCNC: 102 MMOL/L (ref 98–107)
CO2 SERPL-SCNC: 29 MMOL/L (ref 22–29)
CREAT SERPL-MCNC: 0.78 MG/DL (ref 0.57–1)
GFR SERPL CREATININE-BSD FRML MDRD: 74 ML/MIN/1.73
GLUCOSE SERPL-MCNC: 107 MG/DL (ref 65–99)
MRSA DNA SPEC QL NAA+PROBE: NEGATIVE
POTASSIUM SERPL-SCNC: 4.3 MMOL/L (ref 3.5–5.2)
SARS-COV-2 N GENE RESP QL NAA+PROBE: DETECTED
SODIUM SERPL-SCNC: 141 MMOL/L (ref 136–145)

## 2021-10-26 PROCEDURE — 87641 MR-STAPH DNA AMP PROBE: CPT

## 2021-10-26 PROCEDURE — 36415 COLL VENOUS BLD VENIPUNCTURE: CPT

## 2021-10-26 PROCEDURE — 80048 BASIC METABOLIC PNL TOTAL CA: CPT

## 2021-10-26 PROCEDURE — C9803 HOPD COVID-19 SPEC COLLECT: HCPCS

## 2021-10-26 PROCEDURE — 87635 SARS-COV-2 COVID-19 AMP PRB: CPT

## 2021-10-26 PROCEDURE — 93005 ELECTROCARDIOGRAM TRACING: CPT

## 2021-10-26 PROCEDURE — 93010 ELECTROCARDIOGRAM REPORT: CPT | Performed by: INTERNAL MEDICINE

## 2021-10-26 RX ORDER — LISINOPRIL 5 MG/1
5 TABLET ORAL NIGHTLY
COMMUNITY
End: 2022-01-10 | Stop reason: HOSPADM

## 2021-10-26 RX ORDER — FUROSEMIDE 20 MG/1
20 TABLET ORAL EVERY OTHER DAY
Status: ON HOLD | COMMUNITY
End: 2022-01-10 | Stop reason: SDUPTHER

## 2021-10-26 RX ORDER — SODIUM CHLORIDE 9 MG/ML
1000 INJECTION, SOLUTION INTRAVENOUS CONTINUOUS
Status: CANCELLED | OUTPATIENT
Start: 2021-10-28

## 2021-10-26 RX ORDER — FLUOXETINE 10 MG/1
10 CAPSULE ORAL NIGHTLY
COMMUNITY

## 2021-10-26 RX ORDER — ATENOLOL 25 MG/1
25 TABLET ORAL NIGHTLY
COMMUNITY
End: 2022-01-10 | Stop reason: HOSPADM

## 2021-10-26 RX ORDER — PANTOPRAZOLE SODIUM 40 MG/1
40 TABLET, DELAYED RELEASE ORAL NIGHTLY
COMMUNITY

## 2021-10-26 NOTE — PAT
Chlorhexidine scrub not given related to patient leg being wrapped by MD and wrap not to be removed.    Patient takes Aspirin for prevention only. Instructed to hold Aspirin prior to procedure, understanding verbalized.

## 2021-10-26 NOTE — DISCHARGE INSTRUCTIONS
[facility]  Pre-op Information and Guidelines    You will be called after 2 p.m. the day before your surgery (Friday for Monday surgery) and notified of your time for arrival and approximate surgery time.  If you have not received a call by 4P.M., please contact Same Day Surgery at [phone] of if outside [facility] call [phone].    Please Follow these Important Safety Guidelines:    • The morning of your procedure, take only the medications listed below with   A sip of water:_____________________________________________       ______________________________________________    • DO NOT eat or drink anything after 12:00 midnight the night before surgery  Specific instructions concerning drinking clear liquids will be discussed during  the pre-surgery instruction call the day before your surgery.    • If you take a blood thinner (ex. Plavix, Coumadin, aspirin), ask your doctor when to stop it before surgery  STOP DATE: _________________    • Only 2 visitors are allowed in patient rooms at a time  Your visitors will be asked to wait in the lobby until the admission process is complete with the exception of a parent with a child and patients in need of special assistance.    • YOU CANNOT DRIVE YOURSELF HOME  You must be accompanied by someone who will be responsible for driving you home after surgery and for your care at home.    • DO NOT chew gum, use breath mints, hard candy, or smoke the day of surgery  • DO NOT drink alcohol for at least 24 hours before your surgery  • DO NOT wear any jewelry and remove all body piercing before coming to the hospital  • DO NOT wear make-up to the hospital  • If you are having surgery on an extremity (arm/leg/foot) remove nail polish/artificial nails on the surgical side  • Clothing, glasses, contacts, dentures, and hairpieces must be removed before surgery  • Bathe the night before or the morning of your surgery and do not use powders/lotions on  skin.

## 2021-10-27 LAB
QT INTERVAL: 410 MS
QTC INTERVAL: 445 MS

## 2021-10-28 ENCOUNTER — HOSPITAL ENCOUNTER (OUTPATIENT)
Facility: HOSPITAL | Age: 66
Setting detail: HOSPITAL OUTPATIENT SURGERY
Discharge: HOME | End: 2021-10-28
Attending: PODIATRIST | Admitting: PODIATRIST

## 2021-10-28 ENCOUNTER — ANESTHESIA EVENT (OUTPATIENT)
Dept: PERIOP | Facility: HOSPITAL | Age: 66
End: 2021-10-28

## 2021-10-28 ENCOUNTER — APPOINTMENT (OUTPATIENT)
Dept: GENERAL RADIOLOGY | Facility: HOSPITAL | Age: 66
End: 2021-10-28

## 2021-10-28 ENCOUNTER — ANESTHESIA (OUTPATIENT)
Dept: PERIOP | Facility: HOSPITAL | Age: 66
End: 2021-10-28

## 2021-10-28 VITALS
HEART RATE: 91 BPM | DIASTOLIC BLOOD PRESSURE: 83 MMHG | BODY MASS INDEX: 21.34 KG/M2 | HEIGHT: 60 IN | RESPIRATION RATE: 18 BRPM | TEMPERATURE: 98.2 F | OXYGEN SATURATION: 94 % | WEIGHT: 108.69 LBS | SYSTOLIC BLOOD PRESSURE: 178 MMHG

## 2021-10-28 DIAGNOSIS — S82.891A CLOSED FRACTURE OF RIGHT ANKLE, INITIAL ENCOUNTER: ICD-10-CM

## 2021-10-28 LAB
GLUCOSE BLDC GLUCOMTR-MCNC: 78 MG/DL (ref 70–130)
GLUCOSE BLDC GLUCOMTR-MCNC: 86 MG/DL (ref 70–130)

## 2021-10-28 PROCEDURE — 82962 GLUCOSE BLOOD TEST: CPT

## 2021-10-28 PROCEDURE — 27792 TREATMENT OF ANKLE FRACTURE: CPT | Performed by: PODIATRIST

## 2021-10-28 PROCEDURE — 76000 FLUOROSCOPY <1 HR PHYS/QHP: CPT

## 2021-10-28 PROCEDURE — 25010000002 DEXAMETHASONE PER 1 MG: Performed by: NURSE ANESTHETIST, CERTIFIED REGISTERED

## 2021-10-28 PROCEDURE — C1713 ANCHOR/SCREW BN/BN,TIS/BN: HCPCS | Performed by: PODIATRIST

## 2021-10-28 PROCEDURE — 25010000002 PROPOFOL 10 MG/ML EMULSION: Performed by: NURSE ANESTHETIST, CERTIFIED REGISTERED

## 2021-10-28 PROCEDURE — 25010000002 FENTANYL CITRATE (PF) 50 MCG/ML SOLUTION: Performed by: NURSE ANESTHETIST, CERTIFIED REGISTERED

## 2021-10-28 PROCEDURE — 25010000002 HYDROMORPHONE 1 MG/ML SOLUTION: Performed by: NURSE ANESTHETIST, CERTIFIED REGISTERED

## 2021-10-28 PROCEDURE — 25010000002 ONDANSETRON PER 1 MG: Performed by: NURSE ANESTHETIST, CERTIFIED REGISTERED

## 2021-10-28 PROCEDURE — 25010000002 CEFAZOLIN PER 500 MG: Performed by: PODIATRIST

## 2021-10-28 PROCEDURE — 25010000002 MIDAZOLAM PER 1 MG: Performed by: NURSE ANESTHETIST, CERTIFIED REGISTERED

## 2021-10-28 DEVICE — IMPLANTABLE DEVICE: Type: IMPLANTABLE DEVICE | Site: ANKLE | Status: FUNCTIONAL

## 2021-10-28 DEVICE — SCRW CORT LP SS 3.5X18MM: Type: IMPLANTABLE DEVICE | Site: ANKLE | Status: FUNCTIONAL

## 2021-10-28 RX ORDER — LIDOCAINE HYDROCHLORIDE 20 MG/ML
INJECTION, SOLUTION INFILTRATION; PERINEURAL AS NEEDED
Status: DISCONTINUED | OUTPATIENT
Start: 2021-10-28 | End: 2021-10-28 | Stop reason: SURG

## 2021-10-28 RX ORDER — PROPOFOL 10 MG/ML
VIAL (ML) INTRAVENOUS AS NEEDED
Status: DISCONTINUED | OUTPATIENT
Start: 2021-10-28 | End: 2021-10-28 | Stop reason: SURG

## 2021-10-28 RX ORDER — ONDANSETRON 2 MG/ML
INJECTION INTRAMUSCULAR; INTRAVENOUS AS NEEDED
Status: DISCONTINUED | OUTPATIENT
Start: 2021-10-28 | End: 2021-10-28 | Stop reason: SURG

## 2021-10-28 RX ORDER — PROMETHAZINE HYDROCHLORIDE 25 MG/1
25 TABLET ORAL ONCE AS NEEDED
Status: DISCONTINUED | OUTPATIENT
Start: 2021-10-28 | End: 2021-10-28 | Stop reason: HOSPADM

## 2021-10-28 RX ORDER — OXYCODONE AND ACETAMINOPHEN 10; 325 MG/1; MG/1
1 TABLET ORAL EVERY 4 HOURS PRN
Qty: 30 TABLET | Refills: 0 | Status: SHIPPED | OUTPATIENT
Start: 2021-10-28 | End: 2021-11-29

## 2021-10-28 RX ORDER — DIPHENHYDRAMINE HYDROCHLORIDE 50 MG/ML
12.5 INJECTION INTRAMUSCULAR; INTRAVENOUS
Status: DISCONTINUED | OUTPATIENT
Start: 2021-10-28 | End: 2021-10-28 | Stop reason: HOSPADM

## 2021-10-28 RX ORDER — EPHEDRINE SULFATE 50 MG/ML
5 INJECTION, SOLUTION INTRAVENOUS ONCE AS NEEDED
Status: DISCONTINUED | OUTPATIENT
Start: 2021-10-28 | End: 2021-10-28 | Stop reason: HOSPADM

## 2021-10-28 RX ORDER — DEXAMETHASONE SODIUM PHOSPHATE 4 MG/ML
INJECTION, SOLUTION INTRA-ARTICULAR; INTRALESIONAL; INTRAMUSCULAR; INTRAVENOUS; SOFT TISSUE AS NEEDED
Status: DISCONTINUED | OUTPATIENT
Start: 2021-10-28 | End: 2021-10-28 | Stop reason: SURG

## 2021-10-28 RX ORDER — FLUMAZENIL 0.1 MG/ML
0.2 INJECTION INTRAVENOUS AS NEEDED
Status: DISCONTINUED | OUTPATIENT
Start: 2021-10-28 | End: 2021-10-28 | Stop reason: HOSPADM

## 2021-10-28 RX ORDER — MEPERIDINE HYDROCHLORIDE 25 MG/ML
12.5 INJECTION INTRAMUSCULAR; INTRAVENOUS; SUBCUTANEOUS
Status: DISCONTINUED | OUTPATIENT
Start: 2021-10-28 | End: 2021-10-28 | Stop reason: HOSPADM

## 2021-10-28 RX ORDER — PROMETHAZINE HYDROCHLORIDE 25 MG/1
25 SUPPOSITORY RECTAL ONCE AS NEEDED
Status: DISCONTINUED | OUTPATIENT
Start: 2021-10-28 | End: 2021-10-28 | Stop reason: HOSPADM

## 2021-10-28 RX ORDER — SODIUM CHLORIDE 9 MG/ML
1000 INJECTION, SOLUTION INTRAVENOUS CONTINUOUS
Status: DISCONTINUED | OUTPATIENT
Start: 2021-10-28 | End: 2021-10-28 | Stop reason: HOSPADM

## 2021-10-28 RX ORDER — MIDAZOLAM HYDROCHLORIDE 1 MG/ML
INJECTION INTRAMUSCULAR; INTRAVENOUS AS NEEDED
Status: DISCONTINUED | OUTPATIENT
Start: 2021-10-28 | End: 2021-10-28 | Stop reason: SURG

## 2021-10-28 RX ORDER — FENTANYL CITRATE 50 UG/ML
INJECTION, SOLUTION INTRAMUSCULAR; INTRAVENOUS AS NEEDED
Status: DISCONTINUED | OUTPATIENT
Start: 2021-10-28 | End: 2021-10-28 | Stop reason: SURG

## 2021-10-28 RX ORDER — NALOXONE HCL 0.4 MG/ML
0.4 VIAL (ML) INJECTION AS NEEDED
Status: DISCONTINUED | OUTPATIENT
Start: 2021-10-28 | End: 2021-10-28 | Stop reason: HOSPADM

## 2021-10-28 RX ORDER — ACETAMINOPHEN 650 MG/1
650 SUPPOSITORY RECTAL ONCE AS NEEDED
Status: DISCONTINUED | OUTPATIENT
Start: 2021-10-28 | End: 2021-10-28 | Stop reason: HOSPADM

## 2021-10-28 RX ORDER — BUPIVACAINE HCL/0.9 % NACL/PF 0.1 %
2 PLASTIC BAG, INJECTION (ML) EPIDURAL ONCE
Status: COMPLETED | OUTPATIENT
Start: 2021-10-28 | End: 2021-10-28

## 2021-10-28 RX ORDER — ACETAMINOPHEN 325 MG/1
650 TABLET ORAL ONCE AS NEEDED
Status: DISCONTINUED | OUTPATIENT
Start: 2021-10-28 | End: 2021-10-28 | Stop reason: HOSPADM

## 2021-10-28 RX ORDER — ONDANSETRON 2 MG/ML
4 INJECTION INTRAMUSCULAR; INTRAVENOUS ONCE AS NEEDED
Status: DISCONTINUED | OUTPATIENT
Start: 2021-10-28 | End: 2021-10-28 | Stop reason: HOSPADM

## 2021-10-28 RX ORDER — BUPIVACAINE HYDROCHLORIDE 2.5 MG/ML
INJECTION, SOLUTION EPIDURAL; INFILTRATION; INTRACAUDAL AS NEEDED
Status: DISCONTINUED | OUTPATIENT
Start: 2021-10-28 | End: 2021-10-28 | Stop reason: HOSPADM

## 2021-10-28 RX ADMIN — HYDROMORPHONE HYDROCHLORIDE 0.5 MG: 1 INJECTION, SOLUTION INTRAMUSCULAR; INTRAVENOUS; SUBCUTANEOUS at 12:38

## 2021-10-28 RX ADMIN — HYDROMORPHONE HYDROCHLORIDE 0.5 MG: 1 INJECTION, SOLUTION INTRAMUSCULAR; INTRAVENOUS; SUBCUTANEOUS at 12:53

## 2021-10-28 RX ADMIN — FENTANYL CITRATE 25 MCG: 50 INJECTION INTRAMUSCULAR; INTRAVENOUS at 12:04

## 2021-10-28 RX ADMIN — DEXAMETHASONE SODIUM PHOSPHATE 4 MG: 4 INJECTION, SOLUTION INTRAMUSCULAR; INTRAVENOUS at 11:16

## 2021-10-28 RX ADMIN — MIDAZOLAM HYDROCHLORIDE 1 MG: 1 INJECTION, SOLUTION INTRAMUSCULAR; INTRAVENOUS at 10:28

## 2021-10-28 RX ADMIN — LIDOCAINE HYDROCHLORIDE 50 MG: 20 INJECTION, SOLUTION INFILTRATION; PERINEURAL at 10:33

## 2021-10-28 RX ADMIN — ONDANSETRON 4 MG: 2 INJECTION INTRAMUSCULAR; INTRAVENOUS at 11:43

## 2021-10-28 RX ADMIN — PROPOFOL 30 MG: 10 INJECTION, EMULSION INTRAVENOUS at 11:58

## 2021-10-28 RX ADMIN — FENTANYL CITRATE 25 MCG: 50 INJECTION INTRAMUSCULAR; INTRAVENOUS at 10:44

## 2021-10-28 RX ADMIN — FENTANYL CITRATE 25 MCG: 50 INJECTION INTRAMUSCULAR; INTRAVENOUS at 11:55

## 2021-10-28 RX ADMIN — SODIUM CHLORIDE 1000 ML: 9 INJECTION, SOLUTION INTRAVENOUS at 07:54

## 2021-10-28 RX ADMIN — HYDROMORPHONE HYDROCHLORIDE 0.5 MG: 1 INJECTION, SOLUTION INTRAMUSCULAR; INTRAVENOUS; SUBCUTANEOUS at 12:20

## 2021-10-28 RX ADMIN — PROPOFOL 30 MG: 10 INJECTION, EMULSION INTRAVENOUS at 10:44

## 2021-10-28 RX ADMIN — Medication 2 G: at 10:37

## 2021-10-28 RX ADMIN — FENTANYL CITRATE 25 MCG: 50 INJECTION INTRAMUSCULAR; INTRAVENOUS at 11:20

## 2021-10-28 RX ADMIN — PROPOFOL 120 MG: 10 INJECTION, EMULSION INTRAVENOUS at 10:33

## 2021-10-28 RX ADMIN — PROPOFOL 50 MG: 10 INJECTION, EMULSION INTRAVENOUS at 10:34

## 2021-10-28 RX ADMIN — MIDAZOLAM HYDROCHLORIDE 1 MG: 1 INJECTION, SOLUTION INTRAMUSCULAR; INTRAVENOUS at 10:30

## 2021-10-28 NOTE — ANESTHESIA PREPROCEDURE EVALUATION
Anesthesia Evaluation     Patient summary reviewed and Nursing notes reviewed   no history of anesthetic complications:  NPO Solid Status: > 8 hours  NPO Liquid Status: > 8 hours           Airway   Mallampati: II  TM distance: >3 FB  Neck ROM: full  no difficulty expected  Dental    (+) edentulous    Pulmonary     breath sounds clear to auscultation  (+) a smoker Former, COPD mild, asthma,  (-) sleep apnea, rhonchi, decreased breath sounds, wheezes, no home oxygen  Cardiovascular   Exercise tolerance: good (4-7 METS)    ECG reviewed  Patient on routine beta blocker and Beta blocker given within 24 hours of surgery  Rhythm: regular  Rate: normal    (+) hypertension poorly controlled 2 medications or greater, CAD, PVD, hyperlipidemia,   (-) pacemaker, valvular problems/murmurs, past MI, dysrhythmias, angina, murmur, cardiac stents, DVT    ROS comment: EKG 10/26/2021:  Normal sinus rhythm  Normal ECG    Neuro/Psych  (+) headaches, weakness, psychiatric history Anxiety,     (-) seizures, TIA, CVA  GI/Hepatic/Renal/Endo    (+)  GERD well controlled,  diabetes mellitus type 2 well controlled,   (-)  obesity, no renal disease    Musculoskeletal     (+) back pain, chronic pain, neck pain,   Abdominal     Abdomen: tender.   Substance History      OB/GYN          Other   arthritis,      ROS/Med Hx Other: Hx of GERD since having nissen. Still takes daily protonix    Hx of asthma/mild COPD- quit smoking 9 years ago. Uses inhaler very minimally. Hasn't used in 3-4 months.     Pt does not take lasix anymore- no longer retains fluid    Chronic pain- only norco 7.5mg QID- for back and neck pain    Hx of DM- controlled on metformin. Last XocC2A=6.8                    Anesthesia Plan    ASA 3     general     intravenous induction     Anesthetic plan, all risks, benefits, and alternatives have been provided, discussed and informed consent has been obtained with: patient.

## 2021-10-28 NOTE — ANESTHESIA PROCEDURE NOTES
Airway  Urgency: elective    Date/Time: 10/28/2021 10:34 AM  Airway not difficult    General Information and Staff    Patient location during procedure: OR    Indications and Patient Condition  Indications for airway management: airway protection    Preoxygenated: yes  MILS maintained throughout  Mask difficulty assessment: 0 - not attempted    Final Airway Details  Final airway type: supraglottic airway      Successful airway: I-gel  Size 3    Number of attempts at approach: 1  Assessment: lips, teeth, and gum same as pre-op

## 2021-10-28 NOTE — ANESTHESIA POSTPROCEDURE EVALUATION
Patient: Maria Yepez    Procedure Summary     Date: 10/28/21 Room / Location: [facility] OR  / [facility] OR    Anesthesia Start: 1027 Anesthesia Stop: 1218    Procedure: OPEN REDUCTION AND INTERNAL FIXATION OF RIGHT ANKLE FRACTURE (Right Ankle) Diagnosis:       Closed fracture of right ankle, initial encounter      (Closed fracture of right ankle, initial encounter [S82.926P])    Surgeons: Danial Carter DPM Provider: Karsten Keen MD    Anesthesia Type: general ASA Status: 3          Anesthesia Type: general    Vitals  Vitals Value Taken Time   /75 10/28/21 1210   Temp 97.8 °F (36.6 °C) 10/28/21 1210   Pulse 88 10/28/21 1210   Resp 18 10/28/21 1210   SpO2 96 % 10/28/21 1210           Post Anesthesia Care and Evaluation    Patient location during evaluation: PHASE II  Patient participation: complete - patient participated  Level of consciousness: awake and alert  Pain score: 0  Pain management: adequate  Airway patency: patent  Anesthetic complications: No anesthetic complications  PONV Status: none  Cardiovascular status: acceptable and hemodynamically stable  Respiratory status: acceptable, face mask and spontaneous ventilation  Hydration status: acceptable    Comments: ---------------------------               10/28/21                      1210         ---------------------------   BP:          173/75         Pulse:         88           Resp:          18           Temp:   97.8 °F (36.6 °C)   SpO2:          96%         ---------------------------

## 2021-11-02 ENCOUNTER — TELEPHONE (OUTPATIENT)
Dept: PODIATRY | Facility: CLINIC | Age: 66
End: 2021-11-02

## 2021-11-02 NOTE — TELEPHONE ENCOUNTER
I TRIED TO CONTACT PT ON 11/01/2021 TO LET HER KNOW TO GIVE US A CALL ONCE SHE ARRIVED FOR HER APPT THAT DAY. I WAS UNABLE TO REACH THE PT SO I CALLED HER EMERGENCY CONTACT/DAUGHTER TO LET HER KNOW. THE DAUGHTER TOLD ME THAT HER MOTHER WOULD BE CALLING TO CANCEL DUE TO NOT HAVING A RIDE TO THE APPT.

## 2021-11-02 NOTE — TELEPHONE ENCOUNTER
PT WAS NO SHOW FOR 11-1-21 POST OP VISIT.  TRIED TO REACH OUT TO PT TWICE AND LEFT VOICEMAIL FOR PT TO CALL OFFICE.  ALSO, CALLED PT DAUGHTER,  EMERGENCY CONTACT, CHRISSY RICO [phone].  DAUGHTER STATED NOONE WILL BRING PT TO VISIT THIS WEEK DUE TO HER HAVING COVID.  I REACHED OUT TO PT 11-2-21   TODAY AND SPOKE WITH HER TO LET HER KNOW IT IS OKAY TO COME TO HER NEXT SCHEDULED APPT ON 11-10-21 AS SHE WILL BE PAST THE COVID QUARANTINE FOR THIS  APPOINTMENT.  PT SAID SHE WILL HAVE PAX BRING HER TO HER NEXT APPOINTMENT.  I TOLD PT IF SHE HAS ANY PROBLEMS OR CONCERNS RE HER POST SURGERY CONDITION, PLEASE CALL US AND LET US KNOW.  ALFREDA

## 2021-11-15 ENCOUNTER — OFFICE VISIT (OUTPATIENT)
Dept: PODIATRY | Facility: CLINIC | Age: 66
End: 2021-11-15

## 2021-11-15 VITALS — HEART RATE: 95 BPM | HEIGHT: 60 IN | WEIGHT: 108 LBS | OXYGEN SATURATION: 96 % | BODY MASS INDEX: 21.2 KG/M2

## 2021-11-15 DIAGNOSIS — S82.891D CLOSED FRACTURE OF RIGHT ANKLE WITH ROUTINE HEALING, SUBSEQUENT ENCOUNTER: Primary | ICD-10-CM

## 2021-11-15 PROCEDURE — 99024 POSTOP FOLLOW-UP VISIT: CPT | Performed by: PODIATRIST

## 2021-11-15 PROCEDURE — 29405 APPL SHORT LEG CAST: CPT | Performed by: PODIATRIST

## 2021-11-15 NOTE — PROGRESS NOTES
"Maria Yepez  1955  66 y.o. female   PCP: Lisa Cochran 4/26/2021  A1C: 6.1 on 7/14/2016.    BS- 120     11/15/2021     Chief Complaint   Patient presents with   • Right Foot - Follow-up       History of Present Illness    Patient presents to clinic for her first outpatient postoperative visit.  Patient had open reduction internal fixation of the right ankle fracture on October 28.  She has missed her last couple of postoperative appointments due to being sick.    Past Medical History:   Diagnosis Date   • Abdominal pain    • Acute bronchitis    • Acute pancreatitis    • Acute posthemorrhagic anemia    • Acute sinusitis    • Anal pain    • Anemia    • Anemia due to blood loss    • Anxiety    • Backache     mid and lower back     • Carotid artery disease (HCC)    • Chronic back pain    • Chronic obstructive lung disease (HCC)    • Coronary atherosclerosis    • Diabetic neuropathy (HCC)    • Diarrhea    • Diverticulosis of colon without diverticulitis    • Dysphagia    • Dysuria    • Emphysema/COPD (HCC)     \"Emphysema\"   • Encounter for immunization    • Epigastric pain    • Esophagitis     with stricture   • Essential hypertension    • Fatigue    • Foot pain    • Ganglion cyst of wrist     left wrist     • Gastroesophageal reflux disease    • Generalized abdominal pain    • Gout    • Headache    • Herpes simplex    • Hip pain, bilateral    • History of transfusion    • Hoarse    • Hyperlipidemia    • Incontinence of feces    • Insomnia    • Irritable bowel syndrome    • Joint pain    • Knee pain    • Left lower quadrant pain    • Left upper quadrant pain    • Leg pain    • Leukorrhea     not specified as infective    • Low back pain     injury   • Muscle pain    • Nausea    • Nausea    • Nausea and vomiting    • Neck pain    • Neck pain    • Noncompliance with medication regimen    • Osteopenia    • Pain in lower back    • Pain in right femur    • Palpitations    • Productive cough    • PTSD " (post-traumatic stress disorder)    • Scoliosis deformity of spine    • Screening examination for venereal disease    • Shoulder pain, left    • Stricture of esophagus    • Symptomatic menopausal or female climacteric states    • Synovitis and tenosynovitis     left forearm   • Tenderness     Tenderness of left upper quadrant of abdomen      • Thoracic back pain    • Type 2 diabetes mellitus (HCC)    • Upper abdominal pain    • Urgency of urination     Urgent desire to urinate      • Urinary frequency     due to benign prostatic hypertrophy      • Urinary tract infection     site not specified   • Urinary tract infectious disease    • Venous varices    • Weakness     General symptom - weakness    • Weight gain          Past Surgical History:   Procedure Laterality Date   • ANKLE OPEN REDUCTION INTERNAL FIXATION Right 10/28/2021    Procedure: OPEN REDUCTION AND INTERNAL FIXATION OF RIGHT ANKLE FRACTURE;  Surgeon: Danial Carter DPM;  Location: [facility] OR;  Service: Podiatry;  Laterality: Right;   • BREAST BIOPSY      Stereotactic breast biopsy (1)  left   • CARDIAC CATHETERIZATION  2014    Cardiac cath 86914 (1)  Non obstructive coronary artery disease. Normal left ventricular systolic function. Performed at [facility].   • CARPAL TUNNEL RELEASE Bilateral    •  SECTION     • CHOLECYSTECTOMY      laparoscopic   • COLONOSCOPY N/A 2017    Procedure: COLONOSCOPY;  Surgeon: Elia Cheatham MD;  Location: [facility] ENDOSCOPY;  Service:    • ENDOSCOPY  10/24/2012    Colon endoscopy 43817 (2)  internal & external hemorrhoids found. Diverticulum in sigmoid colon.   • ENDOSCOPY  2014    EGD w/ Dilatation 96435 (1)  Esophageal stricture was present. Dilatation performed. Gastritis found in the stomach. Biopsy taken. Normal duodenum.   • ENDOSCOPY  2013    EGD w/ tube 16979 (3)  Normal esophagus. Gastritis in stomach. Biopsy taken. Normal duodenum. Biopsy taken.   •  ENDOSCOPY N/A 5/23/2017    Procedure: ESOPHAGOGASTRODUODENOSCOPY, possible dilation;  Surgeon: Grant Morris MD;  Location: [facility] ENDOSCOPY;  Service:    • ENDOSCOPY N/A 3/10/2020    Procedure: ESOPHAGOGASTRODUODENOSCOPY;  Surgeon: Grant Morris MD;  Location: [facility] ENDOSCOPY;  Service: General;  Laterality: N/A;   • FRACTURE SURGERY Right     Arm Surgery (1)  right, arm fracture   • HERNIA REPAIR      Hernia repair w/mesh (1)   • HYSTERECTOMY      Anesth, hysterectomy (1)   • INCISION AND DRAINAGE ABSCESS N/A 9/5/2017    Procedure: INCISION AND DRAINAGE OF ABDOMINAL WOUND;  Surgeon: Grant Morris MD;  Location: [facility] OR;  Service:    • INJECTION OF MEDICATION  05/27/2016    Depo Medrol (Methylprednisone) 80mg (2)  Ordered By: BRIAN LOU ([facility])    • INJECTION OF MEDICATION  03/10/2016    Kenalog (4)  Ordered By: ARNOLDO MURDOCK ([facility])    • INJECTION OF MEDICATION  01/07/2016    Rocephin (1)  Ordered By: ARNOLDO MURDOCK ([facility])    • LYMPH NODE BIOPSY      Biopsy/removal, lymph node(s) (1)  cervical node biopsy   • NECK SURGERY  2013   • NISSEN FUNDOPLICATION     • NISSEN FUNDOPLICATION N/A 6/7/2017    Procedure: OPEN REVISION OF NISSEN FUNDOPLICATION WITH GASTROSTOMY TUBE PLACEMENT ;  Surgeon: Grant Morris MD;  Location: [facility] OR;  Service:    • TOTAL ABDOMINAL HYSTERECTOMY      RUPALI BSO   • TRIGGER FINGER RELEASE Bilateral    • TUBAL ABDOMINAL LIGATION     • UPPER GASTROINTESTINAL ENDOSCOPY  05/23/2017   • VENTRAL/INCISIONAL HERNIA REPAIR N/A 10/13/2017    Procedure: REMOVAL OF INFECTED MESH, REPAIR OF ABDOMINAL WALL   (Will need Strattice mesh in room).;  Surgeon: Grant Morris MD;  Location: [facility] OR;  Service:          Family History   Problem Relation Age of Onset   • Lung cancer Mother    • Hypertension Mother    • Cancer Mother    • Lymphoma Father         NonHodgkins   • Hypertension Father    • Diabetes Other    • Hypertension Other    • Hypertension Sister     • Diabetes Sister    • Hypertension Brother    • Diabetes Brother          Social History     Socioeconomic History   • Marital status: Single   Tobacco Use   • Smoking status: Former Smoker     Years: 0.00     Quit date:      Years since quittin.8   • Smokeless tobacco: Never Used   Vaping Use   • Vaping Use: Never used   Substance and Sexual Activity   • Alcohol use: Not Currently   • Drug use: No   • Sexual activity: Defer         Current Outpatient Medications   Medication Sig Dispense Refill   • amLODIPine (NORVASC) 2.5 MG tablet Take 1 tablet by mouth 2 (Two) Times a Day.     • aspirin 81 MG EC tablet Take 1 tablet by mouth Every Night. Hx of CAD 30 tablet 5   • atenolol (TENORMIN) 25 MG tablet Take 25 mg by mouth Every Night.     • FLUoxetine (PROzac) 10 MG capsule Take 10 mg by mouth Every Night.     • furosemide (LASIX) 20 MG tablet Take 20 mg by mouth Every Other Day. Takes in the morning     • gabapentin (NEURONTIN) 400 MG capsule Take 800 mg by mouth Every Night.     • lisinopril (PRINIVIL,ZESTRIL) 5 MG tablet Take 5 mg by mouth Every Night.     • metFORMIN (GLUCOPHAGE) 500 MG tablet Take 1 tablet by mouth 2 (Two) Times a Day With Meals. 60 tablet 5   • nitroglycerin (NITROSTAT) 0.4 MG SL tablet Place 1 tablet under the tongue Every 5 (Five) Minutes As Needed for Chest Pain. 20 tablet 5   • Omega-3 Fatty Acids (FISH OIL) 1000 MG capsule capsule Take 1,000 mg by mouth Daily With Breakfast.     • ondansetron (ZOFRAN) 4 MG tablet Take 1 tablet by mouth Every 8 (Eight) Hours As Needed for Nausea or Vomiting. 20 tablet 1   • oxyCODONE-acetaminophen (Percocet)  MG per tablet Take 1 tablet by mouth Every 4 (Four) Hours As Needed for Moderate Pain. 30 tablet 0   • pantoprazole (PROTONIX) 40 MG EC tablet Take 40 mg by mouth Every Night.     • pravastatin (PRAVACHOL) 80 MG tablet Take 1 tablet by mouth Every Night.     • silver sulfadiazine (SILVADENE, SSD) 1 % cream Apply 1 application topically  "to the appropriate area as directed 2 (Two) Times a Day. 25 g 0   • tiZANidine (ZANAFLEX) 4 MG tablet Take 4 mg by mouth 2 (Two) Times a Day.     • traZODone (DESYREL) 150 MG tablet Take 150-300 mg by mouth Every Night.     • vitamin D (ERGOCALCIFEROL) 43137 units capsule capsule Take 1 capsule by mouth Every 14 (Fourteen) Days. 2 capsule 5     No current facility-administered medications for this visit.     Review of Systems   Constitutional: Positive for activity change. Negative for chills and fever.   HENT: Negative.    Respiratory: Negative.    Cardiovascular: Negative for chest pain.   Gastrointestinal: Negative.    Musculoskeletal:        Right ankle pain    Skin: Positive for color change. Negative for wound.   Psychiatric/Behavioral: Negative.          OBJECTIVE    Pulse 95   Ht 152.4 cm (60\")   Wt 49 kg (108 lb)   SpO2 96%   BMI 21.09 kg/m²     Physical Exam  Vitals reviewed.   Constitutional:       General: She is not in acute distress.     Appearance: She is well-developed.   HENT:      Head: Normocephalic and atraumatic.      Nose: Nose normal.   Eyes:      Conjunctiva/sclera: Conjunctivae normal.      Pupils: Pupils are equal, round, and reactive to light.   Pulmonary:      Effort: Pulmonary effort is normal. No respiratory distress.      Breath sounds: No wheezing.   Musculoskeletal:         General: Tenderness and signs of injury present. No deformity.   Skin:     General: Skin is warm and dry.      Capillary Refill: Capillary refill takes less than 2 seconds.   Neurological:      Mental Status: She is alert and oriented to person, place, and time.   Psychiatric:         Behavior: Behavior normal.         Thought Content: Thought content normal.          Right Lower Extremity Exam: Incision site well approximated.  No signs of dehiscence.  CFT immediate to distal digits.  Minimal edema.  No erythema.  Negative Homans.      ASSESSMENT AND PLAN    Diagnoses and all orders for this visit:    1. " Closed fracture of right ankle with routine healing, subsequent encounter (Primary)  -     XR Ankle 3+ View Right      Patient is doing well postoperative.  Staples removed.  Applied nonweightbearing short leg fiberglass cast.  Recheck 2 weeks          This document has been electronically signed by Danial Carter DPM on November 17, 2021 12:47 CST

## 2021-11-29 ENCOUNTER — OFFICE VISIT (OUTPATIENT)
Dept: PODIATRY | Facility: CLINIC | Age: 66
End: 2021-11-29

## 2021-11-29 VITALS — HEART RATE: 78 BPM | BODY MASS INDEX: 21.2 KG/M2 | OXYGEN SATURATION: 94 % | HEIGHT: 60 IN | WEIGHT: 108 LBS

## 2021-11-29 DIAGNOSIS — S82.891D CLOSED FRACTURE OF RIGHT ANKLE WITH ROUTINE HEALING, SUBSEQUENT ENCOUNTER: Primary | ICD-10-CM

## 2021-11-29 PROCEDURE — 99024 POSTOP FOLLOW-UP VISIT: CPT | Performed by: PODIATRIST

## 2021-11-29 PROCEDURE — 29405 APPL SHORT LEG CAST: CPT | Performed by: PODIATRIST

## 2021-11-29 RX ORDER — HYDROCODONE BITARTRATE AND ACETAMINOPHEN 7.5; 325 MG/1; MG/1
TABLET ORAL
COMMUNITY
Start: 2021-11-02

## 2021-11-29 NOTE — PROGRESS NOTES
"Maria Yepez  1955  66 y.o. female   PCP: Lisa Cochran 4/26/2021  A1C: 6.1 on 7/14/2016.    BS-  120 per patient     Right foot follow up     11/29/2021     Chief Complaint   Patient presents with   • Right Foot - Follow-up       History of Present Illness    Patient presents to clinic for her second outpatient postoperative visit.  Patient had open reduction internal fixation of the right ankle fracture on October 28.     Past Medical History:   Diagnosis Date   • Abdominal pain    • Acute bronchitis    • Acute pancreatitis    • Acute posthemorrhagic anemia    • Acute sinusitis    • Anal pain    • Anemia    • Anemia due to blood loss    • Anxiety    • Backache     mid and lower back     • Carotid artery disease (HCC)    • Chronic back pain    • Chronic obstructive lung disease (HCC)    • Coronary atherosclerosis    • Diabetic neuropathy (HCC)    • Diarrhea    • Diverticulosis of colon without diverticulitis    • Dysphagia    • Dysuria    • Emphysema/COPD (HCC)     \"Emphysema\"   • Encounter for immunization    • Epigastric pain    • Esophagitis     with stricture   • Essential hypertension    • Fatigue    • Foot pain    • Ganglion cyst of wrist     left wrist     • Gastroesophageal reflux disease    • Generalized abdominal pain    • Gout    • Headache    • Herpes simplex    • Hip pain, bilateral    • History of transfusion    • Hoarse    • Hyperlipidemia    • Incontinence of feces    • Insomnia    • Irritable bowel syndrome    • Joint pain    • Knee pain    • Left lower quadrant pain    • Left upper quadrant pain    • Leg pain    • Leukorrhea     not specified as infective    • Low back pain     injury   • Muscle pain    • Nausea    • Nausea    • Nausea and vomiting    • Neck pain    • Neck pain    • Noncompliance with medication regimen    • Osteopenia    • Pain in lower back    • Pain in right femur    • Palpitations    • Productive cough    • PTSD (post-traumatic stress disorder)    • Scoliosis " deformity of spine    • Screening examination for venereal disease    • Shoulder pain, left    • Stricture of esophagus    • Symptomatic menopausal or female climacteric states    • Synovitis and tenosynovitis     left forearm   • Tenderness     Tenderness of left upper quadrant of abdomen      • Thoracic back pain    • Type 2 diabetes mellitus (HCC)    • Upper abdominal pain    • Urgency of urination     Urgent desire to urinate      • Urinary frequency     due to benign prostatic hypertrophy      • Urinary tract infection     site not specified   • Urinary tract infectious disease    • Venous varices    • Weakness     General symptom - weakness    • Weight gain          Past Surgical History:   Procedure Laterality Date   • ANKLE OPEN REDUCTION INTERNAL FIXATION Right 10/28/2021    Procedure: OPEN REDUCTION AND INTERNAL FIXATION OF RIGHT ANKLE FRACTURE;  Surgeon: Danial Carter DPM;  Location: [facility] OR;  Service: Podiatry;  Laterality: Right;   • BREAST BIOPSY      Stereotactic breast biopsy (1)  left   • CARDIAC CATHETERIZATION  2014    Cardiac cath 78148 (1)  Non obstructive coronary artery disease. Normal left ventricular systolic function. Performed at [facility].   • CARPAL TUNNEL RELEASE Bilateral    •  SECTION     • CHOLECYSTECTOMY      laparoscopic   • COLONOSCOPY N/A 2017    Procedure: COLONOSCOPY;  Surgeon: Elia Cheatham MD;  Location: [facility] ENDOSCOPY;  Service:    • ENDOSCOPY  10/24/2012    Colon endoscopy 43558 (2)  internal & external hemorrhoids found. Diverticulum in sigmoid colon.   • ENDOSCOPY  2014    EGD w/ Dilatation 78679 (1)  Esophageal stricture was present. Dilatation performed. Gastritis found in the stomach. Biopsy taken. Normal duodenum.   • ENDOSCOPY  2013    EGD w/ tube 13578 (3)  Normal esophagus. Gastritis in stomach. Biopsy taken. Normal duodenum. Biopsy taken.   • ENDOSCOPY N/A 2017    Procedure:  ESOPHAGOGASTRODUODENOSCOPY, possible dilation;  Surgeon: Grant Morris MD;  Location: [facility] ENDOSCOPY;  Service:    • ENDOSCOPY N/A 3/10/2020    Procedure: ESOPHAGOGASTRODUODENOSCOPY;  Surgeon: Grant Morris MD;  Location: [facility] ENDOSCOPY;  Service: General;  Laterality: N/A;   • FRACTURE SURGERY Right     Arm Surgery (1)  right, arm fracture   • HERNIA REPAIR      Hernia repair w/mesh (1)   • HYSTERECTOMY      Anesth, hysterectomy (1)   • INCISION AND DRAINAGE ABSCESS N/A 9/5/2017    Procedure: INCISION AND DRAINAGE OF ABDOMINAL WOUND;  Surgeon: Grant Morris MD;  Location: [facility] OR;  Service:    • INJECTION OF MEDICATION  05/27/2016    Depo Medrol (Methylprednisone) 80mg (2)  Ordered By: BRIAN LOU ([facility])    • INJECTION OF MEDICATION  03/10/2016    Kenalog (4)  Ordered By: ARNOLDO MURDOCK ([facility])    • INJECTION OF MEDICATION  01/07/2016    Rocephin (1)  Ordered By: ARNOLDO MURDOCK ([facility])    • LYMPH NODE BIOPSY      Biopsy/removal, lymph node(s) (1)  cervical node biopsy   • NECK SURGERY  2013   • NISSEN FUNDOPLICATION     • NISSEN FUNDOPLICATION N/A 6/7/2017    Procedure: OPEN REVISION OF NISSEN FUNDOPLICATION WITH GASTROSTOMY TUBE PLACEMENT ;  Surgeon: Grant Morris MD;  Location: [facility] OR;  Service:    • TOTAL ABDOMINAL HYSTERECTOMY      RUPALI BSO   • TRIGGER FINGER RELEASE Bilateral    • TUBAL ABDOMINAL LIGATION     • UPPER GASTROINTESTINAL ENDOSCOPY  05/23/2017   • VENTRAL/INCISIONAL HERNIA REPAIR N/A 10/13/2017    Procedure: REMOVAL OF INFECTED MESH, REPAIR OF ABDOMINAL WALL   (Will need Strattice mesh in room).;  Surgeon: Grant Morris MD;  Location: [facility] OR;  Service:          Family History   Problem Relation Age of Onset   • Lung cancer Mother    • Hypertension Mother    • Cancer Mother    • Lymphoma Father         NonHodgkins   • Hypertension Father    • Diabetes Other    • Hypertension Other    • Hypertension Sister    • Diabetes Sister    • Hypertension  Brother    • Diabetes Brother          Social History     Socioeconomic History   • Marital status: Single   Tobacco Use   • Smoking status: Former Smoker     Years: 0.00     Quit date:      Years since quittin.9   • Smokeless tobacco: Never Used   Vaping Use   • Vaping Use: Never used   Substance and Sexual Activity   • Alcohol use: Not Currently   • Drug use: No   • Sexual activity: Defer         Current Outpatient Medications   Medication Sig Dispense Refill   • amLODIPine (NORVASC) 2.5 MG tablet Take 1 tablet by mouth 2 (Two) Times a Day.     • aspirin 81 MG EC tablet Take 1 tablet by mouth Every Night. Hx of CAD 30 tablet 5   • atenolol (TENORMIN) 25 MG tablet Take 25 mg by mouth Every Night.     • FLUoxetine (PROzac) 10 MG capsule Take 10 mg by mouth Every Night.     • furosemide (LASIX) 20 MG tablet Take 20 mg by mouth Every Other Day. Takes in the morning     • gabapentin (NEURONTIN) 400 MG capsule Take 800 mg by mouth Every Night.     • lisinopril (PRINIVIL,ZESTRIL) 5 MG tablet Take 5 mg by mouth Every Night.     • metFORMIN (GLUCOPHAGE) 500 MG tablet Take 1 tablet by mouth 2 (Two) Times a Day With Meals. 60 tablet 5   • nitroglycerin (NITROSTAT) 0.4 MG SL tablet Place 1 tablet under the tongue Every 5 (Five) Minutes As Needed for Chest Pain. 20 tablet 5   • Omega-3 Fatty Acids (FISH OIL) 1000 MG capsule capsule Take 1,000 mg by mouth Daily With Breakfast.     • ondansetron (ZOFRAN) 4 MG tablet Take 1 tablet by mouth Every 8 (Eight) Hours As Needed for Nausea or Vomiting. 20 tablet 1   • pantoprazole (PROTONIX) 40 MG EC tablet Take 40 mg by mouth Every Night.     • pravastatin (PRAVACHOL) 80 MG tablet Take 1 tablet by mouth Every Night.     • silver sulfadiazine (SILVADENE, SSD) 1 % cream Apply 1 application topically to the appropriate area as directed 2 (Two) Times a Day. 25 g 0   • tiZANidine (ZANAFLEX) 4 MG tablet Take 4 mg by mouth 2 (Two) Times a Day.     • traZODone (DESYREL) 150 MG tablet  "Take 150-300 mg by mouth Every Night.     • vitamin D (ERGOCALCIFEROL) 11464 units capsule capsule Take 1 capsule by mouth Every 14 (Fourteen) Days. 2 capsule 5   • HYDROcodone-acetaminophen (NORCO) 7.5-325 MG per tablet        No current facility-administered medications for this visit.     Review of Systems   Constitutional: Positive for activity change. Negative for chills and fever.   HENT: Negative.    Respiratory: Negative.    Cardiovascular: Negative for chest pain.   Gastrointestinal: Negative.    Musculoskeletal:        Right ankle pain    Skin: Positive for color change. Negative for wound.   Psychiatric/Behavioral: Negative.          OBJECTIVE    Pulse 78   Ht 152.4 cm (60\")   Wt 49 kg (108 lb)   SpO2 94%   BMI 21.09 kg/m²     Physical Exam  Vitals reviewed.   Constitutional:       General: She is not in acute distress.     Appearance: She is well-developed.   HENT:      Head: Normocephalic and atraumatic.      Nose: Nose normal.   Eyes:      Conjunctiva/sclera: Conjunctivae normal.      Pupils: Pupils are equal, round, and reactive to light.   Pulmonary:      Effort: Pulmonary effort is normal. No respiratory distress.      Breath sounds: No wheezing.   Musculoskeletal:         General: Tenderness and signs of injury present. No deformity.   Skin:     General: Skin is warm and dry.      Capillary Refill: Capillary refill takes less than 2 seconds.   Neurological:      Mental Status: She is alert and oriented to person, place, and time.   Psychiatric:         Behavior: Behavior normal.         Thought Content: Thought content normal.          Right Lower Extremity Exam: Incision site healed.  No signs of dehiscence.  CFT immediate to distal digits.  Minimal edema.  No erythema.  Negative Homans.      ASSESSMENT AND PLAN    Diagnoses and all orders for this visit:    1. Closed fracture of right ankle with routine healing, subsequent encounter (Primary)      Patient is doing well postoperative.   " Applied nonweightbearing short leg fiberglass cast.  Recheck 2 weeks, repeat radiographs           This document has been electronically signed by Danial Carter DPM on November 29, 2021 16:12 CST

## 2021-12-14 ENCOUNTER — OFFICE VISIT (OUTPATIENT)
Dept: PODIATRY | Facility: CLINIC | Age: 66
End: 2021-12-14

## 2021-12-14 VITALS — WEIGHT: 108 LBS | HEIGHT: 60 IN | BODY MASS INDEX: 21.2 KG/M2 | OXYGEN SATURATION: 97 % | HEART RATE: 78 BPM

## 2021-12-14 DIAGNOSIS — S82.891D CLOSED FRACTURE OF RIGHT ANKLE WITH ROUTINE HEALING, SUBSEQUENT ENCOUNTER: Primary | ICD-10-CM

## 2021-12-14 PROCEDURE — 99024 POSTOP FOLLOW-UP VISIT: CPT | Performed by: PODIATRIST

## 2021-12-14 NOTE — PROGRESS NOTES
"Maria Yepez  1955  66 y.o. female   PCP: Lisa Cochran 4/26/2021  A1C: 6.1 on 7/14/2016.    BS-  97 per patient     Right foot follow up     12/14/2021     Chief Complaint   Patient presents with   • Right Foot - Follow-up       History of Present Illness    Patient presents to clinic for her third outpatient postoperative visit.  Patient had open reduction internal fixation of the right ankle fracture on October 28.     Past Medical History:   Diagnosis Date   • Abdominal pain    • Acute bronchitis    • Acute pancreatitis    • Acute posthemorrhagic anemia    • Acute sinusitis    • Anal pain    • Anemia    • Anemia due to blood loss    • Anxiety    • Backache     mid and lower back     • Carotid artery disease (HCC)    • Chronic back pain    • Chronic obstructive lung disease (HCC)    • Coronary atherosclerosis    • Diabetic neuropathy (HCC)    • Diarrhea    • Diverticulosis of colon without diverticulitis    • Dysphagia    • Dysuria    • Emphysema/COPD (HCC)     \"Emphysema\"   • Encounter for immunization    • Epigastric pain    • Esophagitis     with stricture   • Essential hypertension    • Fatigue    • Foot pain    • Ganglion cyst of wrist     left wrist     • Gastroesophageal reflux disease    • Generalized abdominal pain    • Gout    • Headache    • Herpes simplex    • Hip pain, bilateral    • History of transfusion    • Hoarse    • Hyperlipidemia    • Incontinence of feces    • Insomnia    • Irritable bowel syndrome    • Joint pain    • Knee pain    • Left lower quadrant pain    • Left upper quadrant pain    • Leg pain    • Leukorrhea     not specified as infective    • Low back pain     injury   • Muscle pain    • Nausea    • Nausea    • Nausea and vomiting    • Neck pain    • Neck pain    • Noncompliance with medication regimen    • Osteopenia    • Pain in lower back    • Pain in right femur    • Palpitations    • Productive cough    • PTSD (post-traumatic stress disorder)    • Scoliosis " deformity of spine    • Screening examination for venereal disease    • Shoulder pain, left    • Stricture of esophagus    • Symptomatic menopausal or female climacteric states    • Synovitis and tenosynovitis     left forearm   • Tenderness     Tenderness of left upper quadrant of abdomen      • Thoracic back pain    • Type 2 diabetes mellitus (HCC)    • Upper abdominal pain    • Urgency of urination     Urgent desire to urinate      • Urinary frequency     due to benign prostatic hypertrophy      • Urinary tract infection     site not specified   • Urinary tract infectious disease    • Venous varices    • Weakness     General symptom - weakness    • Weight gain          Past Surgical History:   Procedure Laterality Date   • ANKLE OPEN REDUCTION INTERNAL FIXATION Right 10/28/2021    Procedure: OPEN REDUCTION AND INTERNAL FIXATION OF RIGHT ANKLE FRACTURE;  Surgeon: Danial Carter DPM;  Location: [facility] OR;  Service: Podiatry;  Laterality: Right;   • BREAST BIOPSY      Stereotactic breast biopsy (1)  left   • CARDIAC CATHETERIZATION  2014    Cardiac cath 06108 (1)  Non obstructive coronary artery disease. Normal left ventricular systolic function. Performed at [facility].   • CARPAL TUNNEL RELEASE Bilateral    •  SECTION     • CHOLECYSTECTOMY      laparoscopic   • COLONOSCOPY N/A 2017    Procedure: COLONOSCOPY;  Surgeon: Elia Cheatham MD;  Location: [facility] ENDOSCOPY;  Service:    • ENDOSCOPY  10/24/2012    Colon endoscopy 21709 (2)  internal & external hemorrhoids found. Diverticulum in sigmoid colon.   • ENDOSCOPY  2014    EGD w/ Dilatation 69070 (1)  Esophageal stricture was present. Dilatation performed. Gastritis found in the stomach. Biopsy taken. Normal duodenum.   • ENDOSCOPY  2013    EGD w/ tube 42576 (3)  Normal esophagus. Gastritis in stomach. Biopsy taken. Normal duodenum. Biopsy taken.   • ENDOSCOPY N/A 2017    Procedure:  ESOPHAGOGASTRODUODENOSCOPY, possible dilation;  Surgeon: Grant Morris MD;  Location: [facility] ENDOSCOPY;  Service:    • ENDOSCOPY N/A 3/10/2020    Procedure: ESOPHAGOGASTRODUODENOSCOPY;  Surgeon: Grant Morris MD;  Location: [facility] ENDOSCOPY;  Service: General;  Laterality: N/A;   • FRACTURE SURGERY Right     Arm Surgery (1)  right, arm fracture   • HERNIA REPAIR      Hernia repair w/mesh (1)   • HYSTERECTOMY      Anesth, hysterectomy (1)   • INCISION AND DRAINAGE ABSCESS N/A 9/5/2017    Procedure: INCISION AND DRAINAGE OF ABDOMINAL WOUND;  Surgeon: Grant Morris MD;  Location: [facility] OR;  Service:    • INJECTION OF MEDICATION  05/27/2016    Depo Medrol (Methylprednisone) 80mg (2)  Ordered By: BRIAN LOU ([facility])    • INJECTION OF MEDICATION  03/10/2016    Kenalog (4)  Ordered By: ARNOLDO MURDOCK ([facility])    • INJECTION OF MEDICATION  01/07/2016    Rocephin (1)  Ordered By: ARNOLDO MURDOCK ([facility])    • LYMPH NODE BIOPSY      Biopsy/removal, lymph node(s) (1)  cervical node biopsy   • NECK SURGERY  2013   • NISSEN FUNDOPLICATION     • NISSEN FUNDOPLICATION N/A 6/7/2017    Procedure: OPEN REVISION OF NISSEN FUNDOPLICATION WITH GASTROSTOMY TUBE PLACEMENT ;  Surgeon: Grant Morris MD;  Location: [facility] OR;  Service:    • TOTAL ABDOMINAL HYSTERECTOMY      RUPALI BSO   • TRIGGER FINGER RELEASE Bilateral    • TUBAL ABDOMINAL LIGATION     • UPPER GASTROINTESTINAL ENDOSCOPY  05/23/2017   • VENTRAL/INCISIONAL HERNIA REPAIR N/A 10/13/2017    Procedure: REMOVAL OF INFECTED MESH, REPAIR OF ABDOMINAL WALL   (Will need Strattice mesh in room).;  Surgeon: Grant Morris MD;  Location: [facility] OR;  Service:          Family History   Problem Relation Age of Onset   • Lung cancer Mother    • Hypertension Mother    • Cancer Mother    • Lymphoma Father         NonHodgkins   • Hypertension Father    • Diabetes Other    • Hypertension Other    • Hypertension Sister    • Diabetes Sister    • Hypertension  Brother    • Diabetes Brother          Social History     Socioeconomic History   • Marital status: Single   Tobacco Use   • Smoking status: Former Smoker     Years: 0.00     Quit date:      Years since quittin.9   • Smokeless tobacco: Never Used   Vaping Use   • Vaping Use: Never used   Substance and Sexual Activity   • Alcohol use: Not Currently   • Drug use: No   • Sexual activity: Defer         Current Outpatient Medications   Medication Sig Dispense Refill   • amLODIPine (NORVASC) 2.5 MG tablet Take 1 tablet by mouth 2 (Two) Times a Day.     • aspirin 81 MG EC tablet Take 1 tablet by mouth Every Night. Hx of CAD 30 tablet 5   • atenolol (TENORMIN) 25 MG tablet Take 25 mg by mouth Every Night.     • FLUoxetine (PROzac) 10 MG capsule Take 10 mg by mouth Every Night.     • furosemide (LASIX) 20 MG tablet Take 20 mg by mouth Every Other Day. Takes in the morning     • gabapentin (NEURONTIN) 400 MG capsule Take 800 mg by mouth Every Night.     • HYDROcodone-acetaminophen (NORCO) 7.5-325 MG per tablet      • lisinopril (PRINIVIL,ZESTRIL) 5 MG tablet Take 5 mg by mouth Every Night.     • metFORMIN (GLUCOPHAGE) 500 MG tablet Take 1 tablet by mouth 2 (Two) Times a Day With Meals. 60 tablet 5   • nitroglycerin (NITROSTAT) 0.4 MG SL tablet Place 1 tablet under the tongue Every 5 (Five) Minutes As Needed for Chest Pain. 20 tablet 5   • Omega-3 Fatty Acids (FISH OIL) 1000 MG capsule capsule Take 1,000 mg by mouth Daily With Breakfast.     • ondansetron (ZOFRAN) 4 MG tablet Take 1 tablet by mouth Every 8 (Eight) Hours As Needed for Nausea or Vomiting. 20 tablet 1   • pantoprazole (PROTONIX) 40 MG EC tablet Take 40 mg by mouth Every Night.     • pravastatin (PRAVACHOL) 80 MG tablet Take 1 tablet by mouth Every Night.     • silver sulfadiazine (SILVADENE, SSD) 1 % cream Apply 1 application topically to the appropriate area as directed 2 (Two) Times a Day. 25 g 0   • tiZANidine (ZANAFLEX) 4 MG tablet Take 4 mg by mouth  "2 (Two) Times a Day.     • traZODone (DESYREL) 150 MG tablet Take 150-300 mg by mouth Every Night.     • vitamin D (ERGOCALCIFEROL) 86902 units capsule capsule Take 1 capsule by mouth Every 14 (Fourteen) Days. 2 capsule 5     No current facility-administered medications for this visit.     Review of Systems   Constitutional: Positive for activity change. Negative for chills and fever.   HENT: Negative.    Respiratory: Negative.    Cardiovascular: Negative for chest pain.   Gastrointestinal: Negative.    Musculoskeletal:        Right ankle pain    Skin: Positive for color change. Negative for wound.   Psychiatric/Behavioral: Negative.          OBJECTIVE    Pulse 78   Ht 152.4 cm (60\")   Wt 49 kg (108 lb)   SpO2 97%   BMI 21.09 kg/m²     Physical Exam  Vitals reviewed.   Constitutional:       General: She is not in acute distress.     Appearance: She is well-developed.   HENT:      Head: Normocephalic and atraumatic.      Nose: Nose normal.   Eyes:      Conjunctiva/sclera: Conjunctivae normal.      Pupils: Pupils are equal, round, and reactive to light.   Pulmonary:      Effort: Pulmonary effort is normal. No respiratory distress.      Breath sounds: No wheezing.   Musculoskeletal:         General: Tenderness and signs of injury present. No deformity.   Skin:     General: Skin is warm and dry.      Capillary Refill: Capillary refill takes less than 2 seconds.   Neurological:      Mental Status: She is alert and oriented to person, place, and time.   Psychiatric:         Behavior: Behavior normal.         Thought Content: Thought content normal.          Right Lower Extremity Exam: Incision site healed.  No signs of dehiscence.  CFT immediate to distal digits.  No edema.  No erythema.  Negative Homans.  Ankle joint range of motion slightly decreased.  No pain on palpation to the medial or lateral malleolus.      ASSESSMENT AND PLAN    Diagnoses and all orders for this visit:    1. Closed fracture of right ankle " with routine healing, subsequent encounter (Primary)  -     XR Ankle 3+ View Right      - Patient to transition to weightbearing in cam boot.  Recheck 2 weeks          This document has been electronically signed by Danial Carter DPM on December 14, 2021 16:13 CST

## 2022-01-03 ENCOUNTER — OFFICE VISIT (OUTPATIENT)
Dept: PODIATRY | Facility: CLINIC | Age: 67
End: 2022-01-03

## 2022-01-03 VITALS — WEIGHT: 108 LBS | BODY MASS INDEX: 21.2 KG/M2 | HEART RATE: 77 BPM | HEIGHT: 60 IN | OXYGEN SATURATION: 99 %

## 2022-01-03 DIAGNOSIS — S82.891D CLOSED FRACTURE OF RIGHT ANKLE WITH ROUTINE HEALING, SUBSEQUENT ENCOUNTER: Primary | ICD-10-CM

## 2022-01-03 PROCEDURE — 99024 POSTOP FOLLOW-UP VISIT: CPT | Performed by: PODIATRIST

## 2022-01-03 NOTE — PROGRESS NOTES
"Maria Yepez  1955  66 y.o. female   PCP: Lisa Cochran 4/26/2021  A1C: 6.1 on 7/14/2016.    BS-  70 per patient     01/03/2022     Chief Complaint   Patient presents with   • Right Foot - Follow-up       History of Present Illness    Patient presents to clinic for her fourth outpatient postoperative visit.  Patient had open reduction internal fixation of the right ankle fracture on October 28.     Past Medical History:   Diagnosis Date   • Abdominal pain    • Acute bronchitis    • Acute pancreatitis    • Acute posthemorrhagic anemia    • Acute sinusitis    • Anal pain    • Anemia    • Anemia due to blood loss    • Anxiety    • Backache     mid and lower back     • Carotid artery disease (HCC)    • Chronic back pain    • Chronic obstructive lung disease (HCC)    • Coronary atherosclerosis    • Diabetic neuropathy (HCC)    • Diarrhea    • Diverticulosis of colon without diverticulitis    • Dysphagia    • Dysuria    • Emphysema/COPD (HCC)     \"Emphysema\"   • Encounter for immunization    • Epigastric pain    • Esophagitis     with stricture   • Essential hypertension    • Fatigue    • Foot pain    • Ganglion cyst of wrist     left wrist     • Gastroesophageal reflux disease    • Generalized abdominal pain    • Gout    • Headache    • Herpes simplex    • Hip pain, bilateral    • History of transfusion    • Hoarse    • Hyperlipidemia    • Incontinence of feces    • Insomnia    • Irritable bowel syndrome    • Joint pain    • Knee pain    • Left lower quadrant pain    • Left upper quadrant pain    • Leg pain    • Leukorrhea     not specified as infective    • Low back pain     injury   • Muscle pain    • Nausea    • Nausea    • Nausea and vomiting    • Neck pain    • Neck pain    • Noncompliance with medication regimen    • Osteopenia    • Pain in lower back    • Pain in right femur    • Palpitations    • Productive cough    • PTSD (post-traumatic stress disorder)    • Scoliosis deformity of spine    • " Screening examination for venereal disease    • Shoulder pain, left    • Stricture of esophagus    • Symptomatic menopausal or female climacteric states    • Synovitis and tenosynovitis     left forearm   • Tenderness     Tenderness of left upper quadrant of abdomen      • Thoracic back pain    • Type 2 diabetes mellitus (HCC)    • Upper abdominal pain    • Urgency of urination     Urgent desire to urinate      • Urinary frequency     due to benign prostatic hypertrophy      • Urinary tract infection     site not specified   • Urinary tract infectious disease    • Venous varices    • Weakness     General symptom - weakness    • Weight gain          Past Surgical History:   Procedure Laterality Date   • ANKLE OPEN REDUCTION INTERNAL FIXATION Right 10/28/2021    Procedure: OPEN REDUCTION AND INTERNAL FIXATION OF RIGHT ANKLE FRACTURE;  Surgeon: Danial Carter DPM;  Location: [facility] OR;  Service: Podiatry;  Laterality: Right;   • BREAST BIOPSY      Stereotactic breast biopsy (1)  left   • CARDIAC CATHETERIZATION  2014    Cardiac cath 87315 (1)  Non obstructive coronary artery disease. Normal left ventricular systolic function. Performed at [facility].   • CARPAL TUNNEL RELEASE Bilateral    •  SECTION     • CHOLECYSTECTOMY      laparoscopic   • COLONOSCOPY N/A 2017    Procedure: COLONOSCOPY;  Surgeon: Elia Cheatham MD;  Location: [facility] ENDOSCOPY;  Service:    • ENDOSCOPY  10/24/2012    Colon endoscopy 35889 (2)  internal & external hemorrhoids found. Diverticulum in sigmoid colon.   • ENDOSCOPY  2014    EGD w/ Dilatation 42231 (1)  Esophageal stricture was present. Dilatation performed. Gastritis found in the stomach. Biopsy taken. Normal duodenum.   • ENDOSCOPY  2013    EGD w/ tube 93369 (3)  Normal esophagus. Gastritis in stomach. Biopsy taken. Normal duodenum. Biopsy taken.   • ENDOSCOPY N/A 2017    Procedure: ESOPHAGOGASTRODUODENOSCOPY, possible  dilation;  Surgeon: Grant Morris MD;  Location: [facility] ENDOSCOPY;  Service:    • ENDOSCOPY N/A 3/10/2020    Procedure: ESOPHAGOGASTRODUODENOSCOPY;  Surgeon: Grant Morris MD;  Location: [facility] ENDOSCOPY;  Service: General;  Laterality: N/A;   • FRACTURE SURGERY Right     Arm Surgery (1)  right, arm fracture   • HERNIA REPAIR      Hernia repair w/mesh (1)   • HYSTERECTOMY      Anesth, hysterectomy (1)   • INCISION AND DRAINAGE ABSCESS N/A 9/5/2017    Procedure: INCISION AND DRAINAGE OF ABDOMINAL WOUND;  Surgeon: Grant Morris MD;  Location: [facility] OR;  Service:    • INJECTION OF MEDICATION  05/27/2016    Depo Medrol (Methylprednisone) 80mg (2)  Ordered By: BRIAN LOU ([facility])    • INJECTION OF MEDICATION  03/10/2016    Kenalog (4)  Ordered By: ARNOLDO MURDOCK ([facility])    • INJECTION OF MEDICATION  01/07/2016    Rocephin (1)  Ordered By: ARNOLDO MURDOCK ([facility])    • LYMPH NODE BIOPSY      Biopsy/removal, lymph node(s) (1)  cervical node biopsy   • NECK SURGERY  2013   • NISSEN FUNDOPLICATION     • NISSEN FUNDOPLICATION N/A 6/7/2017    Procedure: OPEN REVISION OF NISSEN FUNDOPLICATION WITH GASTROSTOMY TUBE PLACEMENT ;  Surgeon: Grant Morris MD;  Location: [facility] OR;  Service:    • TOTAL ABDOMINAL HYSTERECTOMY      RUPALI BSO   • TRIGGER FINGER RELEASE Bilateral    • TUBAL ABDOMINAL LIGATION     • UPPER GASTROINTESTINAL ENDOSCOPY  05/23/2017   • VENTRAL/INCISIONAL HERNIA REPAIR N/A 10/13/2017    Procedure: REMOVAL OF INFECTED MESH, REPAIR OF ABDOMINAL WALL   (Will need Strattice mesh in room).;  Surgeon: Grant Morris MD;  Location: [facility] OR;  Service:          Family History   Problem Relation Age of Onset   • Lung cancer Mother    • Hypertension Mother    • Cancer Mother    • Lymphoma Father         NonHodgkins   • Hypertension Father    • Diabetes Other    • Hypertension Other    • Hypertension Sister    • Diabetes Sister    • Hypertension Brother    • Diabetes Brother           Social History     Socioeconomic History   • Marital status: Single   Tobacco Use   • Smoking status: Former Smoker     Years: 0.00     Quit date: 2007     Years since quitting: 15.0   • Smokeless tobacco: Never Used   Vaping Use   • Vaping Use: Never used   Substance and Sexual Activity   • Alcohol use: Not Currently   • Drug use: No   • Sexual activity: Defer         Current Outpatient Medications   Medication Sig Dispense Refill   • amLODIPine (NORVASC) 2.5 MG tablet Take 1 tablet by mouth 2 (Two) Times a Day.     • aspirin 81 MG EC tablet Take 1 tablet by mouth Every Night. Hx of CAD 30 tablet 5   • atenolol (TENORMIN) 25 MG tablet Take 25 mg by mouth Every Night.     • FLUoxetine (PROzac) 10 MG capsule Take 10 mg by mouth Every Night.     • furosemide (LASIX) 20 MG tablet Take 20 mg by mouth Every Other Day. Takes in the morning     • gabapentin (NEURONTIN) 400 MG capsule Take 800 mg by mouth Every Night.     • HYDROcodone-acetaminophen (NORCO) 7.5-325 MG per tablet      • lisinopril (PRINIVIL,ZESTRIL) 5 MG tablet Take 5 mg by mouth Every Night.     • metFORMIN (GLUCOPHAGE) 500 MG tablet Take 1 tablet by mouth 2 (Two) Times a Day With Meals. 60 tablet 5   • nitroglycerin (NITROSTAT) 0.4 MG SL tablet Place 1 tablet under the tongue Every 5 (Five) Minutes As Needed for Chest Pain. 20 tablet 5   • Omega-3 Fatty Acids (FISH OIL) 1000 MG capsule capsule Take 1,000 mg by mouth Daily With Breakfast.     • ondansetron (ZOFRAN) 4 MG tablet Take 1 tablet by mouth Every 8 (Eight) Hours As Needed for Nausea or Vomiting. 20 tablet 1   • pantoprazole (PROTONIX) 40 MG EC tablet Take 40 mg by mouth Every Night.     • pravastatin (PRAVACHOL) 80 MG tablet Take 1 tablet by mouth Every Night.     • silver sulfadiazine (SILVADENE, SSD) 1 % cream Apply 1 application topically to the appropriate area as directed 2 (Two) Times a Day. 25 g 0   • tiZANidine (ZANAFLEX) 4 MG tablet Take 4 mg by mouth 2 (Two) Times a Day.     •  "traZODone (DESYREL) 150 MG tablet Take 150-300 mg by mouth Every Night.     • vitamin D (ERGOCALCIFEROL) 47733 units capsule capsule Take 1 capsule by mouth Every 14 (Fourteen) Days. 2 capsule 5     No current facility-administered medications for this visit.     Review of Systems   Constitutional: Positive for activity change. Negative for chills and fever.   HENT: Negative.    Respiratory: Negative.    Cardiovascular: Negative for chest pain.   Gastrointestinal: Negative.    Musculoskeletal:        Right ankle pain    Skin: Positive for color change. Negative for wound.   Psychiatric/Behavioral: Negative.          OBJECTIVE    Pulse 77   Ht 152.4 cm (60\")   Wt 49 kg (108 lb)   SpO2 99%   BMI 21.09 kg/m²     Physical Exam  Vitals reviewed.   Constitutional:       General: She is not in acute distress.     Appearance: She is well-developed.   HENT:      Head: Normocephalic and atraumatic.      Nose: Nose normal.   Eyes:      Conjunctiva/sclera: Conjunctivae normal.      Pupils: Pupils are equal, round, and reactive to light.   Pulmonary:      Effort: Pulmonary effort is normal. No respiratory distress.      Breath sounds: No wheezing.   Musculoskeletal:         General: Tenderness and signs of injury present. No deformity.   Skin:     General: Skin is warm and dry.      Capillary Refill: Capillary refill takes less than 2 seconds.   Neurological:      Mental Status: She is alert and oriented to person, place, and time.   Psychiatric:         Behavior: Behavior normal.         Thought Content: Thought content normal.          Right Lower Extremity Exam: Incision site healed.  No signs of dehiscence.  CFT immediate to distal digits.  No edema.  No erythema.  Negative Homans.  Ankle joint range of motion WNL.  No pain on palpation to the medial or lateral malleolus.      ASSESSMENT AND PLAN    Diagnoses and all orders for this visit:    1. Closed fracture of right ankle with routine healing, subsequent encounter " (Primary)  -     XR Ankle 3+ View Right      -Patient is doing well. Transition to athletic shoe gear with ankle brace. Dispensed ankle brace. Recheck 4 weeks, repeat radiographs          This document has been electronically signed by Danial Carter DPM on January 3, 2022 15:53 CST

## 2022-01-07 ENCOUNTER — APPOINTMENT (OUTPATIENT)
Dept: CT IMAGING | Facility: HOSPITAL | Age: 67
End: 2022-01-07

## 2022-01-07 ENCOUNTER — HOSPITAL ENCOUNTER (INPATIENT)
Facility: HOSPITAL | Age: 67
LOS: 2 days | Discharge: HOME HEALTH | End: 2022-01-10
Attending: FAMILY MEDICINE | Admitting: FAMILY MEDICINE

## 2022-01-07 ENCOUNTER — APPOINTMENT (OUTPATIENT)
Dept: GENERAL RADIOLOGY | Facility: HOSPITAL | Age: 67
End: 2022-01-07

## 2022-01-07 DIAGNOSIS — Z78.9 IMPAIRED MOBILITY AND ADLS: ICD-10-CM

## 2022-01-07 DIAGNOSIS — Z74.09 IMPAIRED MOBILITY AND ADLS: ICD-10-CM

## 2022-01-07 DIAGNOSIS — R94.31 ABNORMAL EKG: ICD-10-CM

## 2022-01-07 DIAGNOSIS — I51.81 TAKOTSUBO CARDIOMYOPATHY: ICD-10-CM

## 2022-01-07 DIAGNOSIS — J96.01 ACUTE RESPIRATORY FAILURE WITH HYPOXIA: ICD-10-CM

## 2022-01-07 DIAGNOSIS — R77.8 ELEVATED TROPONIN: ICD-10-CM

## 2022-01-07 DIAGNOSIS — R07.2 PRECORDIAL PAIN: Primary | ICD-10-CM

## 2022-01-07 DIAGNOSIS — R07.9 CHEST PAIN, UNSPECIFIED TYPE: ICD-10-CM

## 2022-01-07 DIAGNOSIS — Z74.09 IMPAIRED FUNCTIONAL MOBILITY, BALANCE, GAIT, AND ENDURANCE: ICD-10-CM

## 2022-01-07 LAB
ALBUMIN SERPL-MCNC: 4.2 G/DL (ref 3.5–5.2)
ALBUMIN/GLOB SERPL: 1.4 G/DL
ALP SERPL-CCNC: 137 U/L (ref 39–117)
ALT SERPL W P-5'-P-CCNC: 75 U/L (ref 1–33)
ANION GAP SERPL CALCULATED.3IONS-SCNC: 14 MMOL/L (ref 5–15)
AST SERPL-CCNC: 220 U/L (ref 1–32)
BASOPHILS # BLD AUTO: 0.04 10*3/MM3 (ref 0–0.2)
BASOPHILS NFR BLD AUTO: 0.3 % (ref 0–1.5)
BILIRUB SERPL-MCNC: 0.4 MG/DL (ref 0–1.2)
BUN SERPL-MCNC: 13 MG/DL (ref 8–23)
BUN/CREAT SERPL: 17.1 (ref 7–25)
CALCIUM SPEC-SCNC: 9.3 MG/DL (ref 8.6–10.5)
CHLORIDE SERPL-SCNC: 97 MMOL/L (ref 98–107)
CK SERPL-CCNC: 264 U/L (ref 20–180)
CO2 SERPL-SCNC: 24 MMOL/L (ref 22–29)
CREAT SERPL-MCNC: 0.76 MG/DL (ref 0.57–1)
D-DIMER, QUANTITATIVE (MAD,POW, STR): 484 NG/ML (FEU) (ref 0–470)
DEPRECATED RDW RBC AUTO: 43 FL (ref 37–54)
EOSINOPHIL # BLD AUTO: 0.02 10*3/MM3 (ref 0–0.4)
EOSINOPHIL NFR BLD AUTO: 0.2 % (ref 0.3–6.2)
ERYTHROCYTE [DISTWIDTH] IN BLOOD BY AUTOMATED COUNT: 13.2 % (ref 12.3–15.4)
FLUAV RNA RESP QL NAA+PROBE: NOT DETECTED
FLUBV RNA RESP QL NAA+PROBE: NOT DETECTED
GFR SERPL CREATININE-BSD FRML MDRD: 76 ML/MIN/1.73
GLOBULIN UR ELPH-MCNC: 3.1 GM/DL
GLUCOSE SERPL-MCNC: 157 MG/DL (ref 65–99)
HCT VFR BLD AUTO: 31.6 % (ref 34–46.6)
HGB BLD-MCNC: 10.5 G/DL (ref 12–15.9)
HOLD SPECIMEN: NORMAL
HOLD SPECIMEN: NORMAL
IMM GRANULOCYTES # BLD AUTO: 0.04 10*3/MM3 (ref 0–0.05)
IMM GRANULOCYTES NFR BLD AUTO: 0.3 % (ref 0–0.5)
LIPASE SERPL-CCNC: 6 U/L (ref 13–60)
LYMPHOCYTES # BLD AUTO: 1.06 10*3/MM3 (ref 0.7–3.1)
LYMPHOCYTES NFR BLD AUTO: 9 % (ref 19.6–45.3)
MAGNESIUM SERPL-MCNC: 1.2 MG/DL (ref 1.6–2.4)
MCH RBC QN AUTO: 29.8 PG (ref 26.6–33)
MCHC RBC AUTO-ENTMCNC: 33.2 G/DL (ref 31.5–35.7)
MCV RBC AUTO: 89.8 FL (ref 79–97)
MONOCYTES # BLD AUTO: 0.43 10*3/MM3 (ref 0.1–0.9)
MONOCYTES NFR BLD AUTO: 3.6 % (ref 5–12)
NEUTROPHILS NFR BLD AUTO: 10.25 10*3/MM3 (ref 1.7–7)
NEUTROPHILS NFR BLD AUTO: 86.6 % (ref 42.7–76)
NRBC BLD AUTO-RTO: 0 /100 WBC (ref 0–0.2)
NT-PROBNP SERPL-MCNC: 554.9 PG/ML (ref 0–900)
PLATELET # BLD AUTO: 376 10*3/MM3 (ref 140–450)
PMV BLD AUTO: 9 FL (ref 6–12)
POTASSIUM SERPL-SCNC: 4.3 MMOL/L (ref 3.5–5.2)
PROT SERPL-MCNC: 7.3 G/DL (ref 6–8.5)
RBC # BLD AUTO: 3.52 10*6/MM3 (ref 3.77–5.28)
SARS-COV-2 RNA RESP QL NAA+PROBE: NOT DETECTED
SODIUM SERPL-SCNC: 135 MMOL/L (ref 136–145)
TROPONIN T SERPL-MCNC: 0.49 NG/ML (ref 0–0.03)
TROPONIN T SERPL-MCNC: 0.75 NG/ML (ref 0–0.03)
WBC NRBC COR # BLD: 11.84 10*3/MM3 (ref 3.4–10.8)
WHOLE BLOOD HOLD SPECIMEN: NORMAL
WHOLE BLOOD HOLD SPECIMEN: NORMAL

## 2022-01-07 PROCEDURE — 85610 PROTHROMBIN TIME: CPT | Performed by: FAMILY MEDICINE

## 2022-01-07 PROCEDURE — 83735 ASSAY OF MAGNESIUM: CPT | Performed by: FAMILY MEDICINE

## 2022-01-07 PROCEDURE — 93010 ELECTROCARDIOGRAM REPORT: CPT | Performed by: INTERNAL MEDICINE

## 2022-01-07 PROCEDURE — 0 IOPAMIDOL PER 1 ML: Performed by: FAMILY MEDICINE

## 2022-01-07 PROCEDURE — 85379 FIBRIN DEGRADATION QUANT: CPT | Performed by: FAMILY MEDICINE

## 2022-01-07 PROCEDURE — 93005 ELECTROCARDIOGRAM TRACING: CPT | Performed by: FAMILY MEDICINE

## 2022-01-07 PROCEDURE — 83880 ASSAY OF NATRIURETIC PEPTIDE: CPT | Performed by: FAMILY MEDICINE

## 2022-01-07 PROCEDURE — 82550 ASSAY OF CK (CPK): CPT | Performed by: FAMILY MEDICINE

## 2022-01-07 PROCEDURE — 71045 X-RAY EXAM CHEST 1 VIEW: CPT

## 2022-01-07 PROCEDURE — 87636 SARSCOV2 & INF A&B AMP PRB: CPT | Performed by: FAMILY MEDICINE

## 2022-01-07 PROCEDURE — 85025 COMPLETE CBC W/AUTO DIFF WBC: CPT | Performed by: FAMILY MEDICINE

## 2022-01-07 PROCEDURE — 25010000002 MAGNESIUM SULFATE 2 GM/50ML SOLUTION: Performed by: FAMILY MEDICINE

## 2022-01-07 PROCEDURE — 84484 ASSAY OF TROPONIN QUANT: CPT | Performed by: FAMILY MEDICINE

## 2022-01-07 PROCEDURE — 99285 EMERGENCY DEPT VISIT HI MDM: CPT

## 2022-01-07 PROCEDURE — 71275 CT ANGIOGRAPHY CHEST: CPT

## 2022-01-07 PROCEDURE — 25010000002 MORPHINE PER 10 MG: Performed by: FAMILY MEDICINE

## 2022-01-07 PROCEDURE — 80053 COMPREHEN METABOLIC PANEL: CPT | Performed by: FAMILY MEDICINE

## 2022-01-07 PROCEDURE — 83690 ASSAY OF LIPASE: CPT | Performed by: FAMILY MEDICINE

## 2022-01-07 PROCEDURE — 25010000002 FUROSEMIDE PER 20 MG: Performed by: FAMILY MEDICINE

## 2022-01-07 PROCEDURE — 74177 CT ABD & PELVIS W/CONTRAST: CPT

## 2022-01-07 RX ORDER — ASPIRIN 81 MG/1
324 TABLET, CHEWABLE ORAL ONCE
Status: COMPLETED | OUTPATIENT
Start: 2022-01-07 | End: 2022-01-07

## 2022-01-07 RX ORDER — MORPHINE SULFATE 2 MG/ML
2 INJECTION, SOLUTION INTRAMUSCULAR; INTRAVENOUS ONCE
Status: COMPLETED | OUTPATIENT
Start: 2022-01-07 | End: 2022-01-07

## 2022-01-07 RX ORDER — SODIUM CHLORIDE 0.9 % (FLUSH) 0.9 %
10 SYRINGE (ML) INJECTION AS NEEDED
Status: DISCONTINUED | OUTPATIENT
Start: 2022-01-07 | End: 2022-01-10 | Stop reason: HOSPADM

## 2022-01-07 RX ORDER — FUROSEMIDE 10 MG/ML
80 INJECTION INTRAMUSCULAR; INTRAVENOUS ONCE
Status: COMPLETED | OUTPATIENT
Start: 2022-01-07 | End: 2022-01-07

## 2022-01-07 RX ORDER — MAGNESIUM SULFATE HEPTAHYDRATE 40 MG/ML
2 INJECTION, SOLUTION INTRAVENOUS ONCE
Status: COMPLETED | OUTPATIENT
Start: 2022-01-07 | End: 2022-01-07

## 2022-01-07 RX ADMIN — MAGNESIUM SULFATE HEPTAHYDRATE 2 G: 40 INJECTION, SOLUTION INTRAVENOUS at 22:26

## 2022-01-07 RX ADMIN — MORPHINE SULFATE 2 MG: 2 INJECTION, SOLUTION INTRAMUSCULAR; INTRAVENOUS at 22:04

## 2022-01-07 RX ADMIN — ASPIRIN 324 MG: 81 TABLET, CHEWABLE ORAL at 20:50

## 2022-01-07 RX ADMIN — FUROSEMIDE 80 MG: 10 INJECTION, SOLUTION INTRAVENOUS at 23:15

## 2022-01-07 RX ADMIN — IOPAMIDOL 80 ML: 755 INJECTION, SOLUTION INTRAVENOUS at 21:36

## 2022-01-08 ENCOUNTER — APPOINTMENT (OUTPATIENT)
Dept: CARDIOLOGY | Facility: HOSPITAL | Age: 67
End: 2022-01-08

## 2022-01-08 ENCOUNTER — APPOINTMENT (OUTPATIENT)
Dept: GENERAL RADIOLOGY | Facility: HOSPITAL | Age: 67
End: 2022-01-08

## 2022-01-08 PROBLEM — R07.2 PRECORDIAL PAIN: Status: ACTIVE | Noted: 2022-01-08

## 2022-01-08 PROBLEM — R07.2 CHEST PAIN, PRECORDIAL: Status: ACTIVE | Noted: 2022-01-08

## 2022-01-08 PROBLEM — I51.81 TAKOTSUBO CARDIOMYOPATHY: Status: ACTIVE | Noted: 2022-01-08

## 2022-01-08 PROBLEM — R07.9 CHEST PAIN WITH HIGH RISK FOR CARDIAC ETIOLOGY: Status: ACTIVE | Noted: 2022-01-08

## 2022-01-08 LAB
ALBUMIN SERPL-MCNC: 3.8 G/DL (ref 3.5–5.2)
ALBUMIN/GLOB SERPL: 1.2 G/DL
ALP SERPL-CCNC: 139 U/L (ref 39–117)
ALT SERPL W P-5'-P-CCNC: 68 U/L (ref 1–33)
ANION GAP SERPL CALCULATED.3IONS-SCNC: 20 MMOL/L (ref 5–15)
APTT PPP: 26.7 SECONDS (ref 20–40.3)
AST SERPL-CCNC: 163 U/L (ref 1–32)
BASOPHILS # BLD AUTO: 0.03 10*3/MM3 (ref 0–0.2)
BASOPHILS NFR BLD AUTO: 0.2 % (ref 0–1.5)
BH CV ECHO MEAS - ACS: 1.5 CM
BH CV ECHO MEAS - AO MAX PG (FULL): 0.69 MMHG
BH CV ECHO MEAS - AO MAX PG: 3 MMHG
BH CV ECHO MEAS - AO MEAN PG (FULL): 1 MMHG
BH CV ECHO MEAS - AO MEAN PG: 2 MMHG
BH CV ECHO MEAS - AO ROOT AREA (BSA CORRECTED): 1.8
BH CV ECHO MEAS - AO ROOT AREA: 5.7 CM^2
BH CV ECHO MEAS - AO ROOT DIAM: 2.7 CM
BH CV ECHO MEAS - AO V2 MAX: 86.2 CM/SEC
BH CV ECHO MEAS - AO V2 MEAN: 60.5 CM/SEC
BH CV ECHO MEAS - AO V2 VTI: 9.7 CM
BH CV ECHO MEAS - ASC AORTA: 2.9 CM
BH CV ECHO MEAS - AVA(I,A): 2.1 CM^2
BH CV ECHO MEAS - AVA(I,D): 2.1 CM^2
BH CV ECHO MEAS - AVA(V,A): 2 CM^2
BH CV ECHO MEAS - AVA(V,D): 2 CM^2
BH CV ECHO MEAS - BSA(HAYCOCK): 1.5 M^2
BH CV ECHO MEAS - BSA: 1.5 M^2
BH CV ECHO MEAS - BZI_BMI: 24 KILOGRAMS/M^2
BH CV ECHO MEAS - BZI_METRIC_HEIGHT: 152.4 CM
BH CV ECHO MEAS - BZI_METRIC_WEIGHT: 55.8 KG
BH CV ECHO MEAS - EDV(CUBED): 66.9 ML
BH CV ECHO MEAS - EDV(MOD-SP2): 80.8 ML
BH CV ECHO MEAS - EDV(MOD-SP4): 58.9 ML
BH CV ECHO MEAS - EDV(TEICH): 72.5 ML
BH CV ECHO MEAS - EF(CUBED): 63.2 %
BH CV ECHO MEAS - EF(MOD-SP2): 27.1 %
BH CV ECHO MEAS - EF(MOD-SP4): 30.1 %
BH CV ECHO MEAS - EF(TEICH): 55.2 %
BH CV ECHO MEAS - ESV(CUBED): 24.6 ML
BH CV ECHO MEAS - ESV(MOD-SP2): 58.9 ML
BH CV ECHO MEAS - ESV(MOD-SP4): 41.2 ML
BH CV ECHO MEAS - ESV(TEICH): 32.5 ML
BH CV ECHO MEAS - FS: 28.3 %
BH CV ECHO MEAS - IVS/LVPW: 1.2
BH CV ECHO MEAS - IVSD: 1.1 CM
BH CV ECHO MEAS - LA DIMENSION: 3.9 CM
BH CV ECHO MEAS - LA/AO: 1.4
BH CV ECHO MEAS - LV DIASTOLIC VOL/BSA (35-75): 38.8 ML/M^2
BH CV ECHO MEAS - LV MASS(C)D: 128.7 GRAMS
BH CV ECHO MEAS - LV MASS(C)DI: 84.8 GRAMS/M^2
BH CV ECHO MEAS - LV MAX PG: 2.3 MMHG
BH CV ECHO MEAS - LV MEAN PG: 1 MMHG
BH CV ECHO MEAS - LV SYSTOLIC VOL/BSA (12-30): 27.1 ML/M^2
BH CV ECHO MEAS - LV V1 MAX: 75.5 CM/SEC
BH CV ECHO MEAS - LV V1 MEAN: 52.9 CM/SEC
BH CV ECHO MEAS - LV V1 VTI: 9.1 CM
BH CV ECHO MEAS - LVIDD: 4.1 CM
BH CV ECHO MEAS - LVIDS: 2.9 CM
BH CV ECHO MEAS - LVLD AP2: 7.6 CM
BH CV ECHO MEAS - LVLD AP4: 7.1 CM
BH CV ECHO MEAS - LVLS AP2: 7.7 CM
BH CV ECHO MEAS - LVLS AP4: 6.6 CM
BH CV ECHO MEAS - LVOT AREA (M): 2.3 CM^2
BH CV ECHO MEAS - LVOT AREA: 2.3 CM^2
BH CV ECHO MEAS - LVOT DIAM: 1.7 CM
BH CV ECHO MEAS - LVPWD: 0.92 CM
BH CV ECHO MEAS - MR MAX PG: 85.7 MMHG
BH CV ECHO MEAS - MR MAX VEL: 463 CM/SEC
BH CV ECHO MEAS - MV A MAX VEL: 86.2 CM/SEC
BH CV ECHO MEAS - MV DEC SLOPE: 898 CM/SEC^2
BH CV ECHO MEAS - MV E MAX VEL: 169 CM/SEC
BH CV ECHO MEAS - MV E/A: 2
BH CV ECHO MEAS - MV MAX PG: 14.9 MMHG
BH CV ECHO MEAS - MV MEAN PG: 7 MMHG
BH CV ECHO MEAS - MV P1/2T MAX VEL: 137 CM/SEC
BH CV ECHO MEAS - MV P1/2T: 44.7 MSEC
BH CV ECHO MEAS - MV V2 MAX: 193 CM/SEC
BH CV ECHO MEAS - MV V2 MEAN: 121 CM/SEC
BH CV ECHO MEAS - MV V2 VTI: 25 CM
BH CV ECHO MEAS - MVA P1/2T LCG: 1.6 CM^2
BH CV ECHO MEAS - MVA(P1/2T): 4.9 CM^2
BH CV ECHO MEAS - MVA(VTI): 0.82 CM^2
BH CV ECHO MEAS - PA MAX PG: 2.4 MMHG
BH CV ECHO MEAS - PA V2 MAX: 77.6 CM/SEC
BH CV ECHO MEAS - RAP SYSTOLE: 5 MMHG
BH CV ECHO MEAS - RVDD: 2.4 CM
BH CV ECHO MEAS - RVSP: 45.2 MMHG
BH CV ECHO MEAS - SI(AO): 36.5 ML/M^2
BH CV ECHO MEAS - SI(CUBED): 27.8 ML/M^2
BH CV ECHO MEAS - SI(LVOT): 13.5 ML/M^2
BH CV ECHO MEAS - SI(MOD-SP2): 14.4 ML/M^2
BH CV ECHO MEAS - SI(MOD-SP4): 11.7 ML/M^2
BH CV ECHO MEAS - SI(TEICH): 26.4 ML/M^2
BH CV ECHO MEAS - SV(AO): 55.4 ML
BH CV ECHO MEAS - SV(CUBED): 42.3 ML
BH CV ECHO MEAS - SV(LVOT): 20.5 ML
BH CV ECHO MEAS - SV(MOD-SP2): 21.9 ML
BH CV ECHO MEAS - SV(MOD-SP4): 17.7 ML
BH CV ECHO MEAS - SV(TEICH): 40 ML
BH CV ECHO MEAS - TR MAX VEL: 317 CM/SEC
BILIRUB SERPL-MCNC: 0.4 MG/DL (ref 0–1.2)
BUN SERPL-MCNC: 13 MG/DL (ref 8–23)
BUN/CREAT SERPL: 16.7 (ref 7–25)
CALCIUM SPEC-SCNC: 8.8 MG/DL (ref 8.6–10.5)
CHLORIDE SERPL-SCNC: 94 MMOL/L (ref 98–107)
CO2 SERPL-SCNC: 20 MMOL/L (ref 22–29)
CREAT SERPL-MCNC: 0.78 MG/DL (ref 0.57–1)
D-LACTATE SERPL-SCNC: 1.9 MMOL/L (ref 0.5–2)
DEPRECATED RDW RBC AUTO: 40.9 FL (ref 37–54)
EOSINOPHIL # BLD AUTO: 0 10*3/MM3 (ref 0–0.4)
EOSINOPHIL NFR BLD AUTO: 0 % (ref 0.3–6.2)
ERYTHROCYTE [DISTWIDTH] IN BLOOD BY AUTOMATED COUNT: 13 % (ref 12.3–15.4)
GFR SERPL CREATININE-BSD FRML MDRD: 74 ML/MIN/1.73
GLOBULIN UR ELPH-MCNC: 3.3 GM/DL
GLUCOSE BLDC GLUCOMTR-MCNC: 136 MG/DL (ref 70–130)
GLUCOSE BLDC GLUCOMTR-MCNC: 155 MG/DL (ref 70–130)
GLUCOSE BLDC GLUCOMTR-MCNC: 156 MG/DL (ref 70–130)
GLUCOSE BLDC GLUCOMTR-MCNC: 193 MG/DL (ref 70–130)
GLUCOSE SERPL-MCNC: 153 MG/DL (ref 65–99)
HCT VFR BLD AUTO: 34.9 % (ref 34–46.6)
HGB BLD-MCNC: 11.8 G/DL (ref 12–15.9)
IMM GRANULOCYTES # BLD AUTO: 0.06 10*3/MM3 (ref 0–0.05)
IMM GRANULOCYTES NFR BLD AUTO: 0.4 % (ref 0–0.5)
INR PPP: 0.93 (ref 0.8–1.2)
INR PPP: 0.95 (ref 0.8–1.2)
L PNEUMO1 AG UR QL IA: NEGATIVE
LYMPHOCYTES # BLD AUTO: 0.59 10*3/MM3 (ref 0.7–3.1)
LYMPHOCYTES NFR BLD AUTO: 4 % (ref 19.6–45.3)
MAGNESIUM SERPL-MCNC: 1.6 MG/DL (ref 1.6–2.4)
MAXIMAL PREDICTED HEART RATE: 155 BPM
MCH RBC QN AUTO: 29.2 PG (ref 26.6–33)
MCHC RBC AUTO-ENTMCNC: 33.8 G/DL (ref 31.5–35.7)
MCV RBC AUTO: 86.4 FL (ref 79–97)
MONOCYTES # BLD AUTO: 0.38 10*3/MM3 (ref 0.1–0.9)
MONOCYTES NFR BLD AUTO: 2.6 % (ref 5–12)
MYCOPLASMAE PNEUMONIAE BY PCR: NEGATIVE
NEUTROPHILS NFR BLD AUTO: 13.58 10*3/MM3 (ref 1.7–7)
NEUTROPHILS NFR BLD AUTO: 92.8 % (ref 42.7–76)
NRBC BLD AUTO-RTO: 0 /100 WBC (ref 0–0.2)
PLATELET # BLD AUTO: 485 10*3/MM3 (ref 140–450)
PMV BLD AUTO: 9.6 FL (ref 6–12)
POTASSIUM SERPL-SCNC: 4.3 MMOL/L (ref 3.5–5.2)
PROCALCITONIN SERPL-MCNC: 0.14 NG/ML (ref 0–0.25)
PROT SERPL-MCNC: 7.1 G/DL (ref 6–8.5)
PROTHROMBIN TIME: 12.4 SECONDS (ref 11.1–15.3)
PROTHROMBIN TIME: 12.6 SECONDS (ref 11.1–15.3)
RBC # BLD AUTO: 4.04 10*6/MM3 (ref 3.77–5.28)
S PNEUM AG SPEC QL LA: NEGATIVE
SODIUM SERPL-SCNC: 134 MMOL/L (ref 136–145)
STRESS TARGET HR: 132 BPM
TROPONIN T SERPL-MCNC: 0.99 NG/ML (ref 0–0.03)
WBC NRBC COR # BLD: 14.64 10*3/MM3 (ref 3.4–10.8)

## 2022-01-08 PROCEDURE — 0 IOPAMIDOL PER 1 ML: Performed by: INTERNAL MEDICINE

## 2022-01-08 PROCEDURE — 82962 GLUCOSE BLOOD TEST: CPT

## 2022-01-08 PROCEDURE — 85730 THROMBOPLASTIN TIME PARTIAL: CPT | Performed by: FAMILY MEDICINE

## 2022-01-08 PROCEDURE — 25010000002 MORPHINE PER 10 MG: Performed by: STUDENT IN AN ORGANIZED HEALTH CARE EDUCATION/TRAINING PROGRAM

## 2022-01-08 PROCEDURE — 93454 CORONARY ARTERY ANGIO S&I: CPT | Performed by: INTERNAL MEDICINE

## 2022-01-08 PROCEDURE — 85025 COMPLETE CBC W/AUTO DIFF WBC: CPT | Performed by: STUDENT IN AN ORGANIZED HEALTH CARE EDUCATION/TRAINING PROGRAM

## 2022-01-08 PROCEDURE — 63710000001 INSULIN ASPART PER 5 UNITS: Performed by: INTERNAL MEDICINE

## 2022-01-08 PROCEDURE — 93306 TTE W/DOPPLER COMPLETE: CPT

## 2022-01-08 PROCEDURE — C1894 INTRO/SHEATH, NON-LASER: HCPCS | Performed by: INTERNAL MEDICINE

## 2022-01-08 PROCEDURE — 94799 UNLISTED PULMONARY SVC/PX: CPT

## 2022-01-08 PROCEDURE — B2111ZZ FLUOROSCOPY OF MULTIPLE CORONARY ARTERIES USING LOW OSMOLAR CONTRAST: ICD-10-PCS | Performed by: INTERNAL MEDICINE

## 2022-01-08 PROCEDURE — 83735 ASSAY OF MAGNESIUM: CPT | Performed by: STUDENT IN AN ORGANIZED HEALTH CARE EDUCATION/TRAINING PROGRAM

## 2022-01-08 PROCEDURE — 84145 PROCALCITONIN (PCT): CPT | Performed by: FAMILY MEDICINE

## 2022-01-08 PROCEDURE — 83605 ASSAY OF LACTIC ACID: CPT | Performed by: FAMILY MEDICINE

## 2022-01-08 PROCEDURE — 25010000002 MIDAZOLAM PER 1 MG: Performed by: INTERNAL MEDICINE

## 2022-01-08 PROCEDURE — 25010000002 HYDROCORTISONE SODIUM SUCCINATE 100 MG RECONSTITUTED SOLUTION: Performed by: INTERNAL MEDICINE

## 2022-01-08 PROCEDURE — 25010000002 ONDANSETRON PER 1 MG: Performed by: INTERNAL MEDICINE

## 2022-01-08 PROCEDURE — 80053 COMPREHEN METABOLIC PANEL: CPT | Performed by: STUDENT IN AN ORGANIZED HEALTH CARE EDUCATION/TRAINING PROGRAM

## 2022-01-08 PROCEDURE — 71045 X-RAY EXAM CHEST 1 VIEW: CPT

## 2022-01-08 PROCEDURE — 87899 AGENT NOS ASSAY W/OPTIC: CPT | Performed by: STUDENT IN AN ORGANIZED HEALTH CARE EDUCATION/TRAINING PROGRAM

## 2022-01-08 PROCEDURE — 87581 M.PNEUMON DNA AMP PROBE: CPT | Performed by: STUDENT IN AN ORGANIZED HEALTH CARE EDUCATION/TRAINING PROGRAM

## 2022-01-08 PROCEDURE — C1760 CLOSURE DEV, VASC: HCPCS | Performed by: INTERNAL MEDICINE

## 2022-01-08 PROCEDURE — 25010000002 FUROSEMIDE PER 20 MG: Performed by: STUDENT IN AN ORGANIZED HEALTH CARE EDUCATION/TRAINING PROGRAM

## 2022-01-08 PROCEDURE — 99222 1ST HOSP IP/OBS MODERATE 55: CPT | Performed by: STUDENT IN AN ORGANIZED HEALTH CARE EDUCATION/TRAINING PROGRAM

## 2022-01-08 PROCEDURE — 25010000002 FUROSEMIDE PER 20 MG: Performed by: INTERNAL MEDICINE

## 2022-01-08 PROCEDURE — 4A023N7 MEASUREMENT OF CARDIAC SAMPLING AND PRESSURE, LEFT HEART, PERCUTANEOUS APPROACH: ICD-10-PCS | Performed by: INTERNAL MEDICINE

## 2022-01-08 PROCEDURE — 93010 ELECTROCARDIOGRAM REPORT: CPT | Performed by: INTERNAL MEDICINE

## 2022-01-08 PROCEDURE — 25010000002 ONDANSETRON PER 1 MG: Performed by: STUDENT IN AN ORGANIZED HEALTH CARE EDUCATION/TRAINING PROGRAM

## 2022-01-08 PROCEDURE — 36415 COLL VENOUS BLD VENIPUNCTURE: CPT | Performed by: FAMILY MEDICINE

## 2022-01-08 PROCEDURE — 84484 ASSAY OF TROPONIN QUANT: CPT | Performed by: STUDENT IN AN ORGANIZED HEALTH CARE EDUCATION/TRAINING PROGRAM

## 2022-01-08 PROCEDURE — 25010000002 DIPHENHYDRAMINE PER 50 MG: Performed by: INTERNAL MEDICINE

## 2022-01-08 PROCEDURE — 87040 BLOOD CULTURE FOR BACTERIA: CPT | Performed by: FAMILY MEDICINE

## 2022-01-08 PROCEDURE — 25010000002 AZITHROMYCIN PER 500 MG: Performed by: FAMILY MEDICINE

## 2022-01-08 PROCEDURE — 25010000002 CEFTRIAXONE PER 250 MG: Performed by: FAMILY MEDICINE

## 2022-01-08 PROCEDURE — 25010000002 DIGOXIN PER 500 MCG: Performed by: INTERNAL MEDICINE

## 2022-01-08 PROCEDURE — 94761 N-INVAS EAR/PLS OXIMETRY MLT: CPT

## 2022-01-08 PROCEDURE — 25010000002 MORPHINE PER 10 MG: Performed by: FAMILY MEDICINE

## 2022-01-08 PROCEDURE — B41G1ZZ FLUOROSCOPY OF LEFT LOWER EXTREMITY ARTERIES USING LOW OSMOLAR CONTRAST: ICD-10-PCS | Performed by: INTERNAL MEDICINE

## 2022-01-08 PROCEDURE — 25010000002 HEPARIN (PORCINE) PER 1000 UNITS: Performed by: INTERNAL MEDICINE

## 2022-01-08 PROCEDURE — 93005 ELECTROCARDIOGRAM TRACING: CPT | Performed by: STUDENT IN AN ORGANIZED HEALTH CARE EDUCATION/TRAINING PROGRAM

## 2022-01-08 PROCEDURE — 85610 PROTHROMBIN TIME: CPT | Performed by: FAMILY MEDICINE

## 2022-01-08 PROCEDURE — C1769 GUIDE WIRE: HCPCS | Performed by: INTERNAL MEDICINE

## 2022-01-08 RX ORDER — ASPIRIN 81 MG/1
81 TABLET ORAL NIGHTLY
Status: DISCONTINUED | OUTPATIENT
Start: 2022-01-08 | End: 2022-01-10 | Stop reason: HOSPADM

## 2022-01-08 RX ORDER — DEXTROSE MONOHYDRATE 25 G/50ML
25 INJECTION, SOLUTION INTRAVENOUS
Status: DISCONTINUED | OUTPATIENT
Start: 2022-01-08 | End: 2022-01-10 | Stop reason: HOSPADM

## 2022-01-08 RX ORDER — MORPHINE SULFATE 2 MG/ML
2 INJECTION, SOLUTION INTRAMUSCULAR; INTRAVENOUS ONCE
Status: COMPLETED | OUTPATIENT
Start: 2022-01-08 | End: 2022-01-08

## 2022-01-08 RX ORDER — FLUOXETINE 10 MG/1
10 CAPSULE ORAL NIGHTLY
Status: DISCONTINUED | OUTPATIENT
Start: 2022-01-08 | End: 2022-01-10 | Stop reason: HOSPADM

## 2022-01-08 RX ORDER — FUROSEMIDE 10 MG/ML
60 INJECTION INTRAMUSCULAR; INTRAVENOUS ONCE
Status: COMPLETED | OUTPATIENT
Start: 2022-01-08 | End: 2022-01-08

## 2022-01-08 RX ORDER — NALOXONE HCL 0.4 MG/ML
0.4 VIAL (ML) INJECTION
Status: DISCONTINUED | OUTPATIENT
Start: 2022-01-08 | End: 2022-01-10 | Stop reason: HOSPADM

## 2022-01-08 RX ORDER — LISINOPRIL 5 MG/1
5 TABLET ORAL NIGHTLY
Status: DISCONTINUED | OUTPATIENT
Start: 2022-01-08 | End: 2022-01-08

## 2022-01-08 RX ORDER — SODIUM CHLORIDE 9 MG/ML
INJECTION, SOLUTION INTRAVENOUS CONTINUOUS PRN
Status: COMPLETED | OUTPATIENT
Start: 2022-01-08 | End: 2022-01-08

## 2022-01-08 RX ORDER — FUROSEMIDE 10 MG/ML
INJECTION INTRAMUSCULAR; INTRAVENOUS AS NEEDED
Status: DISCONTINUED | OUTPATIENT
Start: 2022-01-08 | End: 2022-01-08 | Stop reason: HOSPADM

## 2022-01-08 RX ORDER — LIDOCAINE HYDROCHLORIDE 20 MG/ML
INJECTION, SOLUTION INFILTRATION; PERINEURAL AS NEEDED
Status: DISCONTINUED | OUTPATIENT
Start: 2022-01-08 | End: 2022-01-08 | Stop reason: HOSPADM

## 2022-01-08 RX ORDER — MIDAZOLAM HYDROCHLORIDE 1 MG/ML
INJECTION INTRAMUSCULAR; INTRAVENOUS AS NEEDED
Status: DISCONTINUED | OUTPATIENT
Start: 2022-01-08 | End: 2022-01-08 | Stop reason: HOSPADM

## 2022-01-08 RX ORDER — SODIUM CHLORIDE 9 MG/ML
100 INJECTION, SOLUTION INTRAVENOUS CONTINUOUS
Status: DISCONTINUED | OUTPATIENT
Start: 2022-01-08 | End: 2022-01-08

## 2022-01-08 RX ORDER — ONDANSETRON 2 MG/ML
INJECTION INTRAMUSCULAR; INTRAVENOUS AS NEEDED
Status: DISCONTINUED | OUTPATIENT
Start: 2022-01-08 | End: 2022-01-08 | Stop reason: HOSPADM

## 2022-01-08 RX ORDER — ATENOLOL 25 MG/1
25 TABLET ORAL NIGHTLY
Status: DISCONTINUED | OUTPATIENT
Start: 2022-01-08 | End: 2022-01-08

## 2022-01-08 RX ORDER — GABAPENTIN 400 MG/1
400 CAPSULE ORAL EVERY 12 HOURS SCHEDULED
Status: DISCONTINUED | OUTPATIENT
Start: 2022-01-08 | End: 2022-01-10 | Stop reason: HOSPADM

## 2022-01-08 RX ORDER — SODIUM CHLORIDE 9 MG/ML
30 INJECTION, SOLUTION INTRAVENOUS CONTINUOUS
Status: DISCONTINUED | OUTPATIENT
Start: 2022-01-08 | End: 2022-01-08

## 2022-01-08 RX ORDER — HYDROCODONE BITARTRATE AND ACETAMINOPHEN 7.5; 325 MG/1; MG/1
1 TABLET ORAL EVERY 6 HOURS PRN
Status: DISCONTINUED | OUTPATIENT
Start: 2022-01-08 | End: 2022-01-10 | Stop reason: HOSPADM

## 2022-01-08 RX ORDER — DIGOXIN 0.25 MG/ML
250 INJECTION INTRAMUSCULAR; INTRAVENOUS
Status: DISPENSED | OUTPATIENT
Start: 2022-01-08 | End: 2022-01-09

## 2022-01-08 RX ORDER — TRAZODONE HYDROCHLORIDE 150 MG/1
150 TABLET ORAL NIGHTLY
Status: DISCONTINUED | OUTPATIENT
Start: 2022-01-08 | End: 2022-01-10 | Stop reason: HOSPADM

## 2022-01-08 RX ORDER — DOBUTAMINE HYDROCHLORIDE 100 MG/100ML
2-20 INJECTION INTRAVENOUS
Status: DISCONTINUED | OUTPATIENT
Start: 2022-01-09 | End: 2022-01-10 | Stop reason: HOSPADM

## 2022-01-08 RX ORDER — LOSARTAN POTASSIUM 25 MG/1
25 TABLET ORAL
Status: DISCONTINUED | OUTPATIENT
Start: 2022-01-08 | End: 2022-01-10 | Stop reason: HOSPADM

## 2022-01-08 RX ORDER — NICOTINE POLACRILEX 4 MG
15 LOZENGE BUCCAL
Status: DISCONTINUED | OUTPATIENT
Start: 2022-01-08 | End: 2022-01-10 | Stop reason: HOSPADM

## 2022-01-08 RX ORDER — SODIUM CHLORIDE 0.9 % (FLUSH) 0.9 %
10 SYRINGE (ML) INJECTION EVERY 12 HOURS SCHEDULED
Status: DISCONTINUED | OUTPATIENT
Start: 2022-01-08 | End: 2022-01-10 | Stop reason: HOSPADM

## 2022-01-08 RX ORDER — ONDANSETRON 4 MG/1
4 TABLET, FILM COATED ORAL EVERY 6 HOURS PRN
Status: DISCONTINUED | OUTPATIENT
Start: 2022-01-08 | End: 2022-01-10 | Stop reason: HOSPADM

## 2022-01-08 RX ORDER — GUAIFENESIN 600 MG/1
1200 TABLET, EXTENDED RELEASE ORAL EVERY 12 HOURS SCHEDULED
Status: DISCONTINUED | OUTPATIENT
Start: 2022-01-08 | End: 2022-01-10 | Stop reason: HOSPADM

## 2022-01-08 RX ORDER — AMLODIPINE BESYLATE 2.5 MG/1
2.5 TABLET ORAL 2 TIMES DAILY
Status: DISCONTINUED | OUTPATIENT
Start: 2022-01-08 | End: 2022-01-08

## 2022-01-08 RX ORDER — DIPHENHYDRAMINE HYDROCHLORIDE 50 MG/ML
25 INJECTION INTRAMUSCULAR; INTRAVENOUS ONCE
Status: COMPLETED | OUTPATIENT
Start: 2022-01-08 | End: 2022-01-08

## 2022-01-08 RX ORDER — MORPHINE SULFATE 2 MG/ML
1 INJECTION, SOLUTION INTRAMUSCULAR; INTRAVENOUS EVERY 4 HOURS PRN
Status: DISCONTINUED | OUTPATIENT
Start: 2022-01-08 | End: 2022-01-10 | Stop reason: HOSPADM

## 2022-01-08 RX ORDER — ACETAMINOPHEN 325 MG/1
650 TABLET ORAL EVERY 4 HOURS PRN
Status: DISCONTINUED | OUTPATIENT
Start: 2022-01-08 | End: 2022-01-10 | Stop reason: HOSPADM

## 2022-01-08 RX ORDER — ONDANSETRON 2 MG/ML
4 INJECTION INTRAMUSCULAR; INTRAVENOUS EVERY 6 HOURS PRN
Status: DISCONTINUED | OUTPATIENT
Start: 2022-01-08 | End: 2022-01-10 | Stop reason: HOSPADM

## 2022-01-08 RX ORDER — SODIUM CHLORIDE 0.9 % (FLUSH) 0.9 %
10 SYRINGE (ML) INJECTION AS NEEDED
Status: DISCONTINUED | OUTPATIENT
Start: 2022-01-08 | End: 2022-01-10 | Stop reason: HOSPADM

## 2022-01-08 RX ADMIN — MORPHINE SULFATE 1 MG: 2 INJECTION, SOLUTION INTRAMUSCULAR; INTRAVENOUS at 13:58

## 2022-01-08 RX ADMIN — GUAIFENESIN 1200 MG: 600 TABLET, EXTENDED RELEASE ORAL at 20:12

## 2022-01-08 RX ADMIN — HYDROCORTISONE SODIUM SUCCINATE 100 MG: 100 INJECTION, POWDER, FOR SOLUTION INTRAMUSCULAR; INTRAVENOUS at 02:55

## 2022-01-08 RX ADMIN — INSULIN ASPART 2 UNITS: 100 INJECTION, SOLUTION INTRAVENOUS; SUBCUTANEOUS at 11:33

## 2022-01-08 RX ADMIN — DIPHENHYDRAMINE HYDROCHLORIDE 25 MG: 50 INJECTION INTRAMUSCULAR; INTRAVENOUS at 02:50

## 2022-01-08 RX ADMIN — CEFTRIAXONE SODIUM 2 G: 2 INJECTION, POWDER, FOR SOLUTION INTRAMUSCULAR; INTRAVENOUS at 01:06

## 2022-01-08 RX ADMIN — DIGOXIN 250 MCG: 0.25 INJECTION INTRAMUSCULAR; INTRAVENOUS at 19:43

## 2022-01-08 RX ADMIN — ASPIRIN 81 MG: 81 TABLET, FILM COATED ORAL at 20:12

## 2022-01-08 RX ADMIN — INSULIN ASPART 2 UNITS: 100 INJECTION, SOLUTION INTRAVENOUS; SUBCUTANEOUS at 17:11

## 2022-01-08 RX ADMIN — AMLODIPINE BESYLATE 2.5 MG: 2.5 TABLET ORAL at 08:34

## 2022-01-08 RX ADMIN — GABAPENTIN 400 MG: 400 CAPSULE ORAL at 20:12

## 2022-01-08 RX ADMIN — SODIUM CHLORIDE 75 ML/HR: 9 INJECTION, SOLUTION INTRAVENOUS at 04:42

## 2022-01-08 RX ADMIN — HYDROCODONE BITARTRATE AND ACETAMINOPHEN 1 TABLET: 7.5; 325 TABLET ORAL at 11:33

## 2022-01-08 RX ADMIN — DIGOXIN 250 MCG: 0.25 INJECTION INTRAMUSCULAR; INTRAVENOUS at 21:48

## 2022-01-08 RX ADMIN — MORPHINE SULFATE 1 MG: 2 INJECTION, SOLUTION INTRAMUSCULAR; INTRAVENOUS at 20:13

## 2022-01-08 RX ADMIN — GUAIFENESIN 1200 MG: 600 TABLET, EXTENDED RELEASE ORAL at 09:45

## 2022-01-08 RX ADMIN — MORPHINE SULFATE 1 MG: 2 INJECTION, SOLUTION INTRAMUSCULAR; INTRAVENOUS at 04:41

## 2022-01-08 RX ADMIN — MORPHINE SULFATE 1 MG: 2 INJECTION, SOLUTION INTRAMUSCULAR; INTRAVENOUS at 09:01

## 2022-01-08 RX ADMIN — METOPROLOL TARTRATE 25 MG: 25 TABLET, FILM COATED ORAL at 20:12

## 2022-01-08 RX ADMIN — DOBUTAMINE HYDROCHLORIDE 2 MCG/KG/MIN: 100 INJECTION INTRAVENOUS at 23:36

## 2022-01-08 RX ADMIN — FLUOXETINE 10 MG: 10 CAPSULE ORAL at 20:12

## 2022-01-08 RX ADMIN — GABAPENTIN 400 MG: 400 CAPSULE ORAL at 08:33

## 2022-01-08 RX ADMIN — LOSARTAN POTASSIUM 25 MG: 25 TABLET, FILM COATED ORAL at 18:01

## 2022-01-08 RX ADMIN — HYDROCODONE BITARTRATE AND ACETAMINOPHEN 1 TABLET: 7.5; 325 TABLET ORAL at 17:37

## 2022-01-08 RX ADMIN — MORPHINE SULFATE 2 MG: 2 INJECTION, SOLUTION INTRAMUSCULAR; INTRAVENOUS at 01:06

## 2022-01-08 RX ADMIN — TRAZODONE HYDROCHLORIDE 150 MG: 150 TABLET ORAL at 21:30

## 2022-01-08 RX ADMIN — SODIUM CHLORIDE, PRESERVATIVE FREE 10 ML: 5 INJECTION INTRAVENOUS at 21:48

## 2022-01-08 RX ADMIN — AZITHROMYCIN MONOHYDRATE 500 MG: 500 INJECTION, POWDER, LYOPHILIZED, FOR SOLUTION INTRAVENOUS at 01:06

## 2022-01-08 RX ADMIN — ONDANSETRON 4 MG: 2 INJECTION INTRAMUSCULAR; INTRAVENOUS at 04:41

## 2022-01-08 RX ADMIN — FUROSEMIDE 60 MG: 10 INJECTION, SOLUTION INTRAMUSCULAR; INTRAVENOUS at 15:54

## 2022-01-08 RX ADMIN — SODIUM CHLORIDE, PRESERVATIVE FREE 10 ML: 5 INJECTION INTRAVENOUS at 08:34

## 2022-01-08 NOTE — PLAN OF CARE
Goal Outcome Evaluation:           Progress: no change   Alert with some memory lapse noted at times. Voiced concerns over a teenage grandson living in her home with her. Case management referral placed. Heart rate has remained elevated throughout the day, BP wnl. Continues to require 7L high flow nasal canula to maintain O2 sats, dry cough noted. MD aware, reviewing test results.  Rt groin site soft/intact s/p heart cath. Urine output adequate, pulmonary hygiene encouraged.

## 2022-01-08 NOTE — PROGRESS NOTES
CRITICAL CARE DAILY PROGRESS NOTE  Family Medicine Residency Service  NAME: Maria Yepez  : 1955  MRN: [id number]      LOS: 0 days     PROVIDER OF SERVICE: Grant Ignacio MD    Chief Complaint: Chest pain with high risk for cardiac etiology    Subjective:     Interval History: History provided by: patient chart RN  Nurse reported no overnight events.  Nurse does state that the patient seems to be suffering with some anxiety and nausea.  She is receiving treatment with Zofran and morphine.  She had taken the nonrebreather down from 15 L to 10 L with oxygen saturation 94%.  While in room we changed to nasal cannula and left the patient on 4 l saturating at 92%.  Catheter site is clean and dry.  Patient endorses mild chest pain at this time she has a dry cough.  She also is complaining of the right ankle pain from the fracture in October.    Review of Systems:   Review of Systems   Constitutional: Negative for chills, diaphoresis, fatigue and fever.   HENT: Negative for congestion, sore throat and trouble swallowing.    Respiratory: Positive for cough.    Cardiovascular: Positive for chest pain. Negative for palpitations.   Gastrointestinal: Negative for abdominal pain.   Musculoskeletal: Positive for arthralgias and gait problem.   Skin: Positive for wound.        Burn on right buttock   Neurological: Negative for dizziness, light-headedness and headaches.       Objective:     Vital Signs  Temp:  [97.2 °F (36.2 °C)-97.8 °F (36.6 °C)] 97.2 °F (36.2 °C)  Heart Rate:  [102-126] 124  Resp:  [20-26] 26  BP: (112-166)/(61-99) 136/78  Body mass index is 24.11 kg/m².         I/O last 3 completed shifts:  In: 250 [Other:250]  Out:  [Urine:2000]    Physical Exam  Physical Exam  Constitutional:       General: She is not in acute distress.     Appearance: She is not diaphoretic.   HENT:      Nose: No rhinorrhea.   Eyes:      General: No scleral icterus.  Neck:      Vascular: No carotid bruit.    Cardiovascular:      Rate and Rhythm: Regular rhythm. Tachycardia present.      Pulses: Normal pulses.   Pulmonary:      Effort: No respiratory distress.      Breath sounds: No wheezing or rales.   Abdominal:      General: Abdomen is flat.      Palpations: Abdomen is soft.      Tenderness: There is no abdominal tenderness.   Skin:     Findings: Lesion present.      Comments: Small lesion right hip gluteal region secondary to patient sleeping on a heating pad while at home.  Healing well.   Neurological:      Mental Status: She is alert.   Psychiatric:         Mood and Affect: Mood normal.         Thought Content: Thought content normal.         Medication Review    Current Facility-Administered Medications:   •  acetaminophen (TYLENOL) tablet 650 mg, 650 mg, Oral, Q4H PRN, Inder Walker MD  •  amLODIPine (NORVASC) tablet 2.5 mg, 2.5 mg, Oral, BID, Savage Batista MD  •  aspirin EC tablet 81 mg, 81 mg, Oral, Nightly, Savage Batista MD  •  atenolol (TENORMIN) tablet 25 mg, 25 mg, Oral, Nightly, Savage Batista MD  •  [START ON 1/9/2022] AZITHROMYCIN 500 MG/250 ML 0.9% NS IVPB (vial-mate), 500 mg, Intravenous, Q24H, Savage Batista MD  •  [START ON 1/9/2022] cefTRIAXone (ROCEPHIN) 1 g/100 mL 0.9% NS (MBP), 1 g, Intravenous, Q24H, Savage Batista MD  •  dextrose (D50W) (25 g/50 mL) IV injection 25 g, 25 g, Intravenous, Q15 Min PRN, Inder Walker MD  •  dextrose (GLUTOSE) oral gel 15 g, 15 g, Oral, Q15 Min PRN, Inder Walker MD  •  FLUoxetine (PROzac) capsule 10 mg, 10 mg, Oral, Nightly, Savage Batista MD  •  gabapentin (NEURONTIN) capsule 400 mg, 400 mg, Oral, Q12H, Savage Batista MD  •  glucagon (human recombinant) (GLUCAGEN DIAGNOSTIC) injection 1 mg, 1 mg, Subcutaneous, Q15 Min PRN, Inder Walker MD  •  HYDROcodone-acetaminophen (NORCO) 7.5-325 MG per tablet 1 tablet, 1 tablet, Oral, Q6H PRN, Savage Batista MD  •  insulin aspart (novoLOG) injection 0-7 Units, 0-7 Units, Subcutaneous,  TID AC, Inder Walker MD  •  lisinopril (PRINIVIL,ZESTRIL) tablet 5 mg, 5 mg, Oral, Nightly, Savage Batista MD  •  morphine injection 1 mg, 1 mg, Intravenous, Q4H PRN, 1 mg at 01/08/22 0441 **AND** naloxone (NARCAN) injection 0.4 mg, 0.4 mg, Intravenous, Q5 Min PRN, Savage Batista MD  •  ondansetron (ZOFRAN) tablet 4 mg, 4 mg, Oral, Q6H PRN **OR** ondansetron (ZOFRAN) injection 4 mg, 4 mg, Intravenous, Q6H PRN, Savage Batista MD, 4 mg at 01/08/22 0441  •  sodium chloride 0.9 % flush 10 mL, 10 mL, Intravenous, PRN, Savage Batista MD  •  sodium chloride 0.9 % flush 10 mL, 10 mL, Intravenous, Q12H, Savage Batista MD  •  sodium chloride 0.9 % flush 10 mL, 10 mL, Intravenous, PRN, Savage Batista MD  •  sodium chloride 0.9 % infusion, 75 mL/hr, Intravenous, Continuous, Savage Batista MD, Last Rate: 75 mL/hr at 01/08/22 0500, 75 mL/hr at 01/08/22 0500  •  sodium chloride 0.9 % infusion, 100 mL/hr, Intravenous, Continuous, Inder Walker MD  •  traZODone (DESYREL) tablet 150 mg, 150 mg, Oral, Nightly, Savage Batista MD     Diagnostic Data    Lab Results (last 24 hours)     Procedure Component Value Units Date/Time    Magnesium [[id number]]  (Normal) Collected: 01/08/22 0523    Specimen: Blood Updated: 01/08/22 0740     Magnesium 1.6 mg/dL     Comprehensive Metabolic Panel [[id number]]  (Abnormal) Collected: 01/08/22 0523    Specimen: Blood Updated: 01/08/22 0656     Glucose 153 mg/dL      BUN 13 mg/dL      Creatinine 0.78 mg/dL      Sodium 134 mmol/L      Potassium 4.3 mmol/L      Chloride 94 mmol/L      CO2 20.0 mmol/L      Calcium 8.8 mg/dL      Total Protein 7.1 g/dL      Albumin 3.80 g/dL      ALT (SGPT) 68 U/L      AST (SGOT) 163 U/L      Alkaline Phosphatase 139 U/L      Total Bilirubin 0.4 mg/dL      eGFR Non African Amer 74 mL/min/1.73      Globulin 3.3 gm/dL      A/G Ratio 1.2 g/dL      BUN/Creatinine Ratio 16.7     Anion Gap 20.0 mmol/L     Narrative:      GFR Normal >60  Chronic Kidney  Disease <60  Kidney Failure <15      POC Glucose Once [[id number]]  (Abnormal) Collected: 01/08/22 0626    Specimen: Blood Updated: 01/08/22 0637     Glucose 136 mg/dL      Comment: RN NotifiedOperator: [id number] SANDY HEATHERMeter ID: [id number]       CBC & Differential [[id number]]  (Abnormal) Collected: 01/08/22 0523    Specimen: Blood Updated: 01/08/22 0626    Narrative:      The following orders were created for panel order CBC & Differential.  Procedure                               Abnormality         Status                     ---------                               -----------         ------                     CBC Auto Differential[[id number]]        Abnormal            Final result                 Please view results for these tests on the individual orders.    CBC Auto Differential [[id number]]  (Abnormal) Collected: 01/08/22 0523    Specimen: Blood Updated: 01/08/22 0626     WBC 14.64 10*3/mm3      RBC 4.04 10*6/mm3      Hemoglobin 11.8 g/dL      Hematocrit 34.9 %      MCV 86.4 fL      MCH 29.2 pg      MCHC 33.8 g/dL      RDW 13.0 %      RDW-SD 40.9 fl      MPV 9.6 fL      Platelets 485 10*3/mm3      Neutrophil % 92.8 %      Lymphocyte % 4.0 %      Monocyte % 2.6 %      Eosinophil % 0.0 %      Basophil % 0.2 %      Immature Grans % 0.4 %      Neutrophils, Absolute 13.58 10*3/mm3      Lymphocytes, Absolute 0.59 10*3/mm3      Monocytes, Absolute 0.38 10*3/mm3      Eosinophils, Absolute 0.00 10*3/mm3      Basophils, Absolute 0.03 10*3/mm3      Immature Grans, Absolute 0.06 10*3/mm3      nRBC 0.0 /100 WBC     Procalcitonin [[id number]]  (Normal) Collected: 01/08/22 0214    Specimen: Blood Updated: 01/08/22 0619     Procalcitonin 0.14 ng/mL     Narrative:      As a Marker for Sepsis (Non-Neonates):     1. <0.5 ng/mL represents a low risk of severe sepsis and/or septic shock.  2. >2 ng/mL represents a high risk of severe sepsis and/or septic shock.    As a Marker for Lower Respiratory Tract Infections that  "require antibiotic therapy:  PCT on Admission     Antibiotic Therapy             6-12 Hrs later  >0.5                          Strongly Recommended            >0.25 - <0.5             Recommended  0.1 - 0.25                  Discouraged                       Remeasure/reassess PCT  <0.1                         Strongly Discouraged         Remeasure/reassess PCT      As 28 day mortality risk marker: \"Change in Procalcitonin Result\" (>80% or <=80%) if Day 0 (or Day 1) and Day 4 values are available. Refer to [email or web address]/    Change in PCT <=80 %   A decrease of PCT levels below or equal to 80% defines a positive change in PCT test result representing a higher risk for 28-day all-cause mortality of patients diagnosed with severe sepsis or septic shock.    Change in PCT >80 %   A decrease of PCT levels of more than 80% defines a negative change in PCT result representing a lower risk for 28-day all-cause mortality of patients diagnosed with severe sepsis or septic shock.                Troponin [[id number]]  (Abnormal) Collected: 01/08/22 0214    Specimen: Blood Updated: 01/08/22 0259     Troponin T 0.992 ng/mL     Narrative:      Troponin T Reference Range:  <= 0.03 ng/mL-   Negative for AMI  >0.03 ng/mL-     Abnormal for myocardial necrosis.  Clinicians would have to utilize clinical acumen, EKG, Troponin and serial changes to determine if it is an Acute Myocardial Infarction or myocardial injury due to an underlying chronic condition.       Results may be falsely decreased if patient taking Biotin.      Protime-INR [[id number]]  (Normal) Collected: 01/08/22 0214    Specimen: Blood Updated: 01/08/22 0240     Protime 12.6 Seconds      INR 0.95    Narrative:      Therapeutic range for most indications is 2.0-3.0 INR,  or 2.5-3.5 for mechanical heart valves.    aPTT [[id number]]  (Normal) Collected: 01/08/22 0214    Specimen: Blood Updated: 01/08/22 0240     PTT 26.7 seconds     Narrative:      The " recommended Heparin therapeutic range is 68-97 seconds.    Protime-INR [[id number]]  (Normal) Collected: 01/07/22 2046    Specimen: Blood Updated: 01/08/22 0201     Protime 12.4 Seconds      INR 0.93    Narrative:      Therapeutic range for most indications is 2.0-3.0 INR,  or 2.5-3.5 for mechanical heart valves.    Lactic Acid, Plasma [[id number]]  (Normal) Collected: 01/08/22 0043    Specimen: Blood Updated: 01/08/22 0147     Lactate 1.9 mmol/L     Blood Culture - Blood, Wrist, Left [[id number]] Collected: 01/08/22 0100    Specimen: Blood from Wrist, Left Updated: 01/08/22 0110    Blood Culture - Blood, Hand, Right [[id number]] Collected: 01/08/22 0043    Specimen: Blood from Hand, Right Updated: 01/08/22 0101    Troponin [[id number]]  (Abnormal) Collected: 01/07/22 2251    Specimen: Blood Updated: 01/07/22 2334     Troponin T 0.754 ng/mL     Narrative:      Troponin T Reference Range:  <= 0.03 ng/mL-   Negative for AMI  >0.03 ng/mL-     Abnormal for myocardial necrosis.  Clinicians would have to utilize clinical acumen, EKG, Troponin and serial changes to determine if it is an Acute Myocardial Infarction or myocardial injury due to an underlying chronic condition.       Results may be falsely decreased if patient taking Biotin.      COVID-19 and FLU A/B PCR - Swab, Nasopharynx [[id number]]  (Normal) Collected: 01/07/22 2219    Specimen: Swab from Nasopharynx Updated: 01/07/22 2255     COVID19 Not Detected     Influenza A PCR Not Detected     Influenza B PCR Not Detected    Narrative:      Fact sheet for providers: https://www.fda.gov/media/632939/download    Fact sheet for patients: https://www.fda.gov/media/388561/download    Test performed by PCR.    [place] Draw [[id number]] Collected: 01/07/22 2046    Specimen: Blood Updated: 01/07/22 2200    Narrative:      The following orders were created for panel order [place] Draw.  Procedure                               Abnormality         Status                      ---------                               -----------         ------                     Green Top (Gel)[[id number]]                                  Final result               Lavender Top[[id number]]                                     Final result               Gold Top - SST[[id number]]                                   Final result               Light Blue Top[[id number]]                                   Final result                 Please view results for these tests on the individual orders.    Lavender Top [[id number]] Collected: 01/07/22 2046    Specimen: Blood Updated: 01/07/22 2200     Extra Tube hold for add-on     Comment: Auto resulted       Gold Top - SST [[id number]] Collected: 01/07/22 2046    Specimen: Blood Updated: 01/07/22 2200     Extra Tube Hold for add-ons.     Comment: Auto resulted.       Light Blue Top [[id number]] Collected: 01/07/22 2046    Specimen: Blood Updated: 01/07/22 2200     Extra Tube hold for add-on     Comment: Auto resulted       Green Top (Gel) [[id number]] Collected: 01/07/22 2046    Specimen: Blood Updated: 01/07/22 2200     Extra Tube Hold for add-ons.     Comment: Auto resulted.       Lipase [[id number]]  (Abnormal) Collected: 01/07/22 2046    Specimen: Blood Updated: 01/07/22 2130     Lipase 6 U/L     CK [[id number]]  (Abnormal) Collected: 01/07/22 2046    Specimen: Blood Updated: 01/07/22 2130     Creatine Kinase 264 U/L     Magnesium [[id number]]  (Abnormal) Collected: 01/07/22 2046    Specimen: Blood Updated: 01/07/22 2130     Magnesium 1.2 mg/dL     D-dimer, Quantitative [[id number]]  (Abnormal) Collected: 01/07/22 2046    Specimen: Blood Updated: 01/07/22 2127     D-Dimer, Quantitative 484 ng/mL (FEU)     Narrative:      Dimer values <500 ng/ml FEU are FDA approved as aid in diagnosis of deep venous thrombosis and pulmonary embolism.  This test should not be used in an exclusion strategy with pretest probability alone.    A recent guideline regarding diagnosis for pulmonary  thromboembolism recommends an adjusted exclusion criterion of age x 10 ng/ml FEU for patients >50 years of age ([employer] Intern Med 2015; 163: 701-711).      Comprehensive Metabolic Panel [[id number]]  (Abnormal) Collected: 01/07/22 2046    Specimen: Blood Updated: 01/07/22 2115     Glucose 157 mg/dL      BUN 13 mg/dL      Creatinine 0.76 mg/dL      Sodium 135 mmol/L      Potassium 4.3 mmol/L      Chloride 97 mmol/L      CO2 24.0 mmol/L      Calcium 9.3 mg/dL      Total Protein 7.3 g/dL      Albumin 4.20 g/dL      ALT (SGPT) 75 U/L      AST (SGOT) 220 U/L      Alkaline Phosphatase 137 U/L      Total Bilirubin 0.4 mg/dL      eGFR Non African Amer 76 mL/min/1.73      Globulin 3.1 gm/dL      A/G Ratio 1.4 g/dL      BUN/Creatinine Ratio 17.1     Anion Gap 14.0 mmol/L     Narrative:      GFR Normal >60  Chronic Kidney Disease <60  Kidney Failure <15      Troponin [[id number]]  (Abnormal) Collected: 01/07/22 2046    Specimen: Blood Updated: 01/07/22 2114     Troponin T 0.493 ng/mL     Narrative:      Troponin T Reference Range:  <= 0.03 ng/mL-   Negative for AMI  >0.03 ng/mL-     Abnormal for myocardial necrosis.  Clinicians would have to utilize clinical acumen, EKG, Troponin and serial changes to determine if it is an Acute Myocardial Infarction or myocardial injury due to an underlying chronic condition.       Results may be falsely decreased if patient taking Biotin.      BNP [[id number]]  (Normal) Collected: 01/07/22 2046    Specimen: Blood Updated: 01/07/22 2113     proBNP 554.9 pg/mL     Narrative:      Among patients with dyspnea, NT-proBNP is highly sensitive for the detection of acute congestive heart failure. In addition NT-proBNP of <300 pg/ml effectively rules out acute congestive heart failure with 99% negative predictive value.    Results may be falsely decreased if patient taking Biotin.      CBC & Differential [[id number]]  (Abnormal) Collected: 01/07/22 2046    Specimen: Blood Updated: 01/07/22 2052    Narrative:       The following orders were created for panel order CBC & Differential.  Procedure                               Abnormality         Status                     ---------                               -----------         ------                     CBC Auto Differential[[id number]]        Abnormal            Final result                 Please view results for these tests on the individual orders.    CBC Auto Differential [[id number]]  (Abnormal) Collected: 01/07/22 2046    Specimen: Blood Updated: 01/07/22 2052     WBC 11.84 10*3/mm3      RBC 3.52 10*6/mm3      Hemoglobin 10.5 g/dL      Hematocrit 31.6 %      MCV 89.8 fL      MCH 29.8 pg      MCHC 33.2 g/dL      RDW 13.2 %      RDW-SD 43.0 fl      MPV 9.0 fL      Platelets 376 10*3/mm3      Neutrophil % 86.6 %      Lymphocyte % 9.0 %      Monocyte % 3.6 %      Eosinophil % 0.2 %      Basophil % 0.3 %      Immature Grans % 0.3 %      Neutrophils, Absolute 10.25 10*3/mm3      Lymphocytes, Absolute 1.06 10*3/mm3      Monocytes, Absolute 0.43 10*3/mm3      Eosinophils, Absolute 0.02 10*3/mm3      Basophils, Absolute 0.04 10*3/mm3      Immature Grans, Absolute 0.04 10*3/mm3      nRBC 0.0 /100 WBC           I reviewed the patient's new clinical results.    Assessment/Plan:     Active Hospital Problems    Diagnosis  POA   • **Chest pain with high risk for cardiac etiology [R07.9]  Yes     STEMI vs Acute Pericarditis.   -Cath in 2014 did not show obstructive disease  -Initial CXR (1/7; 2057) showed no acute disease. Repeat CXR (1/7; 2235) later showed bilateral opacities with concern for acute pulmonary edema.  -Initial EKG (1/7; 2032) showed ST elevation in inferior leads. Repeat EKG (1/7; 2047) showed ST elevations, tachycardia, concern for pericarditis  -TroponinL 0.493 > 0.754>0.992  -HARPREET therapy  -NPO  -IVF @ 75 ml/hr  -Dr. Bustos consulted did coronary cath early this morning, no blockage identified.  -Femoral site for insertion is clean and dry.-     • Type 2  diabetes mellitus (HCC) [E11.9]  Yes     -HbA1c (4/2021): 5.8%  -Hold home Metformin 500mg BID  -SSI low dose  -Glucose checks QID  -Consistent carb diet once no longer NPO  -Glucose this morning 136       • Anxiety [F41.9]  Yes     -Continue home Prozac 10mg  -Nurse endorses some anxiety on part of patient     • Chronic bronchitis (HCC) [J42]  Yes     -Not on any inhalers currently  -Former smoker quit tobacco use >15 years ago  -Supplemental O2 to maintain sats 88-92%  -Patient currently on 4 L via nasal cannula satting in low 90s.     • Primary hypertension [I10]  Yes     -Continue home Lisinopril 10mg, Norvasc 2.5mg  -Monitor per floor policy  -Continue IV fluids at 75 mL's per hour normal saline         Code status is   Code Status and Medical Interventions:   Ordered at: 01/08/22 0233     Level Of Support Discussed With:    Patient     Code Status (Patient has no pulse and is not breathing):    CPR (Attempt to Resuscitate)     Medical Interventions (Patient has pulse or is breathing):    Full Support       Prognosis: good  Plan for disposition:Where: home    Time: Critical care 30 min        This document has been electronically signed by Grant Ignacio MD on January 8, 2022 08:16 CST

## 2022-01-08 NOTE — CONSULTS
Cardiology Consultation Note.        Patient Name: Maria Yepez  Age/Sex: 66 y.o. female  : 1955  MRN: [id number]    Date of consultation: 2022  Consulting Physician: Inder Walker MD  Primary care Physician: Sammie Gonzalez DO  Requesting Physician:   Alvaro Johnson MD   Reason for consultation: Chest pain positive troponin      Subjective:       Chief Complaint: Chest pain    History of Present Illness:  Maria Yepez is a 66 y.o. female     Body mass index is 21.09 kg/m². With a past medical history significant for coronary angiogram done in  which had not reveal of any evidence of any obstructive epicardial coronary artery disease, nonobstructive bilateral carotid stenosis, chronic back pain, low body mask index, chronic obstructive lung disease, diverticulosis, hyperlipidemia, history of gouty arthritis, history of esophageal stricture status post dilatation, history of pancreatitis, arterial hypertension, hypertensive heart disease, non-insulin-dependent diabetes and COVID-19 infection in 2021.    Patient presented to the emergency room with symptoms of shortness of breath along with symptoms of chest pain.    Patient had sustained an injury into the right ankle. Patient complained of having right ankle discomfort.    Due to the patient's symptoms of chest pain and shortness of breath patient had presented to the emergency room. Patient initial resting electrocardiogram in the emergency room had revealed subtle ST-T wave changes in the inferior wall distribution without any reciprocal ST depression. Patient had mild elevation in the troponin. Patient underwent a CTA of the chest with did not reveal of any evidence of any pulmonary embolism. Patient CTA of the chest had revealed bibasilar pneumonitis. Patient on questioning had complained of having a productive cough. Patient symptoms of shortness of breath has improved. Patient continued to have symptoms of  chest pain which is nonpleuritic in nature. Patient underwent repeat EKG which revealed diffuse ST T wave changes in the inferolateral lead. Patient subsequent troponin was higher than on admission. Patient requiring 100% nonrebreather. Patient repeat Covid testing was negative.    Due to the patient ongoing symptoms of chest discomfort with rising troponin after prolonged discussion with the patient the plan was to consider coronary angiogram.    Patient has been followed by cardiology in [place]. Patient had not followed up with cardiology since the Covid pandemic.    Patient on questioning denies any fever chill patient denies any hemoptysis hematuria bright red blood per rectum. Patient denies any symptoms of lightheaded dizziness or any tingling sensation in the upper or lower extremity or any symptoms of amaurosis fugax. Patient was noted to have nonobstructive bilateral carotid stenosis which has been treated medically.    Patient 10 point review of system except for what stated in the history of present is negative        Concurrent Medical History:  1. Chest pain positive troponin.  2. Coronary angiogram done in 2014 which had not reveal of any evidence of any obstructive epicardial coronary artery disease.  3. Arterial hypertension.  4. Hypertensive heart disease.  5. Hyperlipidemia.  6. Peripheral vascular disease.  7. Nonobstructive bilateral carotid stenosis.  8. Non-insulin-dependent diabetes.  9. History of anemia.  10. Low body mask index.  11. Esophageal stricture s/p dilatation.  12. History of chronic obstructive lung disease with previous tobacco abuse.  13. History of pancreatitis.  14. Gastroesophageal reflux disease.  15. Posttraumatic stress disorder.  16. History of gouty arthritis.  17. History of scoliosis  18. Diabetic neuropathy  19. Vitamin D deficiency  20. COVID-19 infection October 2021      Past Surgical History:  1. Coronary angiogram.  2. Open reduction and internal fixation of  "the right ankle fracture.  3. Left breast biopsy.  4. Bilateral carpal tunnel release.  5.  section.  6. Cholecystectomy.  7. Colonoscopy.  8. Esophagogastroduodenoscopy.  9. Esophageal stricture dilatation.  10. Nissen fundoplication.  11. Trigger finger release  12. Hysterectomy.  13. Ventral incisional hernia repair.  14. Tubal ligation      Family History: Family history is significant for father who had coronary artery disease      Social History: Previous history of tobacco abuse denies any current tobacco alcohol intake       Cardiac Risk Factors:   1. Postmenopausal.  2. Previous history of tobacco abuse.  3. Family history for coronary artery disease.  4. Hyperlipidemia    Allergies:  Allergies   Allergen Reactions   • Albuterol Shortness Of Breath and Palpitations     Heart rate shot up   • Adhesive Tape Itching   • Claritin [Loratadine] Other (See Comments)     \"weird feeling\"   • Ibuprofen GI Intolerance and Unknown - High Severity   • Lactose Intolerance (Gi) GI Intolerance   • Latex Itching   • Shellfish-Derived Products Itching       Medication:  (Not in a hospital admission)          Review of Systems:       Constitutional:  Denies recent weight loss, weight gain, fever or chills, no change in exercise tolerance.     HENT:  Denies any hearing loss, epistaxis, hoarseness, or difficulty speaking.     Eyes: Wears eyeglasses or contact lenses     Respiratory:  Denies dyspnea with exertion,no cough, wheezing, or hemoptysis.     Cardiovascular: Positive for chest pain. Negative for palpitations, orthopnea, PND, peripheral edema, syncope, or claudication.     Gastrointestinal:  Denies change in bowel habits, dyspepsia, ulcer disease, hematochezia, or melena.  No nausea, no vomiting, no hematemesis, no diarrhea or constipation.    Endocrine: Negative for cold intolerance, heat intolerance, polydipsia, polyphagia or polyuria. Denies any history of weight change or unintended weight " loss.    Genitourinary: Negative for hematuria.  No frequent urination or nocturia.      Musculoskeletal: Denies any history of arthritic symptoms or back problems .  No joint pain, joint stiffness, joint swelling, muscle pain, muscle weakness or neck pain.    Skin:  Denies any change in hair or nails, rashes, or skin lesions.     Allergic/Immunologic: Negative.  Negative for environmental allergies, food allergies or immunocompromised state.     Neurological:  Denies any history of recurrent headaches, strokes, TIA, or seizure disorder.     Hematological: Denies excessive bleeding, easy bruising, fatigue, lymphadenopathy or petechiae or any bleeding disorders.     Psychiatric/Behavioral: Denies any history of depression, substance abuse, or change in cognitive function. Denies any psychomotor reaction or tangential thought.  No depression, homicidal ideations or suicidal ideations.          Objective:     Objective:  Temp:  [97.6 °F (36.4 °C)] 97.6 °F (36.4 °C)  Heart Rate:  [102-126] 126  Resp:  [20-22] 22  BP: (112-166)/(65-99) 112/65      Body mass index is 21.09 kg/m².           Physical Exam:   General Appearance:    Alert, oriented, cooperative, in no acute distress.   Head:    Normocephalic, atraumatic, without obvious abnormality.   Eyes:           CIPRIANO.  Lids and lashes normal, conjunctivae and sclerae normal, no icterus, no pallor.   Ears:    Ears appear intact with no abnormalities noted.   Throat:   Mucous membranes pink and moist.   Neck:   Supple, trachea midline, no carotid bruit, no organomegaly or JVD.   Lungs:     Clear to auscultation and percussion, respirations regular, even and unlabored. No wheezes, rales or rhonchi.    Heart:    Regular rhythm and normal rate, normal S1 and S2, no murmur, no gallop, no rub, no click.   Abdomen:     Soft, nontender, nondistended, no guarding, no rebound tenderness, normal bowel sounds in all four quadrants, no masses, liver and spleen nonpalpable.    Genitalia:    Deferred.   Extremities:   Moves all extremities well, no edema, no cyanosis, no  redness, no clubbing.   Pulses:   Pulses palpable and equal bilaterally.   Skin:   Moist and warm. No bleeding, bruising or rash.   Neurologic/Psychiatric:   Alert and oriented to person, place, and time.  Motor, power and tone in upper and lower extremities are grossly intact. No focal neurological deficits. Normal cognitive function. No psychomotor reaction or tangential thought. No depression, homicidal ideations and suicidal ideations.       Medication Review:   Current Facility-Administered Medications   Medication Dose Route Frequency Provider Last Rate Last Admin   • sodium chloride 0.9 % flush 10 mL  10 mL Intravenous PRN Alvaro Johnson MD         Current Outpatient Medications   Medication Sig Dispense Refill   • amLODIPine (NORVASC) 2.5 MG tablet Take 1 tablet by mouth 2 (Two) Times a Day.     • aspirin 81 MG EC tablet Take 1 tablet by mouth Every Night. Hx of CAD 30 tablet 5   • atenolol (TENORMIN) 25 MG tablet Take 25 mg by mouth Every Night.     • FLUoxetine (PROzac) 10 MG capsule Take 10 mg by mouth Every Night.     • furosemide (LASIX) 20 MG tablet Take 20 mg by mouth Every Other Day. Takes in the morning     • gabapentin (NEURONTIN) 400 MG capsule Take 800 mg by mouth Every Night.     • HYDROcodone-acetaminophen (NORCO) 7.5-325 MG per tablet      • lisinopril (PRINIVIL,ZESTRIL) 5 MG tablet Take 5 mg by mouth Every Night.     • metFORMIN (GLUCOPHAGE) 500 MG tablet Take 1 tablet by mouth 2 (Two) Times a Day With Meals. 60 tablet 5   • nitroglycerin (NITROSTAT) 0.4 MG SL tablet Place 1 tablet under the tongue Every 5 (Five) Minutes As Needed for Chest Pain. 20 tablet 5   • Omega-3 Fatty Acids (FISH OIL) 1000 MG capsule capsule Take 1,000 mg by mouth Daily With Breakfast.     • ondansetron (ZOFRAN) 4 MG tablet Take 1 tablet by mouth Every 8 (Eight) Hours As Needed for Nausea or Vomiting. 20 tablet 1    • pantoprazole (PROTONIX) 40 MG EC tablet Take 40 mg by mouth Every Night.     • pravastatin (PRAVACHOL) 80 MG tablet Take 1 tablet by mouth Every Night.     • silver sulfadiazine (SILVADENE, SSD) 1 % cream Apply 1 application topically to the appropriate area as directed 2 (Two) Times a Day. 25 g 0   • tiZANidine (ZANAFLEX) 4 MG tablet Take 4 mg by mouth 2 (Two) Times a Day.     • traZODone (DESYREL) 150 MG tablet Take 150-300 mg by mouth Every Night.     • vitamin D (ERGOCALCIFEROL) 84497 units capsule capsule Take 1 capsule by mouth Every 14 (Fourteen) Days. 2 capsule 5       Lab Review:     Results from last 7 days   Lab Units 01/07/22 2046   SODIUM mmol/L 135*   POTASSIUM mmol/L 4.3   CHLORIDE mmol/L 97*   CO2 mmol/L 24.0   BUN mg/dL 13   CREATININE mg/dL 0.76   CALCIUM mg/dL 9.3   BILIRUBIN mg/dL 0.4   ALK PHOS U/L 137*   ALT (SGPT) U/L 75*   AST (SGOT) U/L 220*   GLUCOSE mg/dL 157*     Results from last 7 days   Lab Units 01/07/22  2251 01/07/22 2046   CK TOTAL U/L  --  264*   TROPONIN T ng/mL 0.754* 0.493*         Results from last 7 days   Lab Units 01/07/22 2046   WBC 10*3/mm3 11.84*   HEMOGLOBIN g/dL 10.5*   HEMATOCRIT % 31.6*   PLATELETS 10*3/mm3 376     Results from last 7 days   Lab Units 01/07/22 2046   INR  0.93     Results from last 7 days   Lab Units 01/07/22 2046   MAGNESIUM mg/dL 1.2*                   EKG:   ECG/EMG Results (last 24 hours)     Procedure Component Value Units Date/Time    ECG 12 Lead [[id number]] Collected: 01/07/22 2032     Updated: 01/07/22 2043    ECG 12 Lead [[id number]] Collected: 01/07/22 2047     Updated: 01/07/22 2101          ECHO:       Imaging:  Imaging Results (Last 24 Hours)     Procedure Component Value Units Date/Time    CT Abdomen Pelvis With Contrast [[id number]] Collected: 01/07/22 2132     Updated: 01/07/22 2354    Narrative:      CT ABDOMEN PELVIS WITH IV CONTRAST    INDICATION: 66 years Female; Abdominal pain, acute, nonlocalized    TECHNIQUE:  CT scan of  the abdomen and pelvis was performed with  IV contrast.  This exam was performed according to our  departmental dose-optimization program, which includes automated  exposure control, adjustment of the mA and/or kV according to  patient size and/or use of iterative reconstruction technique.     Comparison: 12/7/2017.    FINDINGS:  Liver: Hepatic steatosis.  Gallbladder/Biliary tree: Status post cholecystectomy. The common  bile duct is dilated measuring up to 12 mm similar to previous  study and may be within normal limits for postcholecystectomy  changes.  Pancreas: Unremarkable.  Spleen: Unremarkable.  Adrenals: Unremarkable.  Genitourinary: No hydronephrosis. No bladder wall thickening.  Status post hysterectomy. No adnexal masses.  Gastrointestinal: Diverticulosis of the sigmoid and descending  colon without diverticulitis. Mild to moderate stool in the  colon. Normal appendix. No gastric wall thickening. Multiple  surgical clips surrounding the stomach. No free air or free  fluid.  Peritoneum: Unremarkable.  Vasculature: Mild atherosclerosis of the aorta.  Lymph nodes: No pathologically enlarged lymph nodes.  Bones: Mild degenerative changes of the right hip. Multilevel  degenerative changes of the lumbar spine most prominent at L4-5..  Soft tissues: Unremarkable.  Incidental findings: None.      Impression:      1.  No acute abdominal or pelvic findings.  2.  Hepatic steatosis.  3.  Diverticulosis without diverticulitis.    Electronically signed by:  Chanel Salcido  1/7/2022 11:53 PM CST  Workstation: [id number]    XR Chest 1 View [[id number]] Collected: 01/07/22 2235     Updated: 01/07/22 2258    Narrative:      PORTABLE CHEST X-RAY    CLINICAL HISTORY: de sating, change in status.    COMPARISON: 8:28 PM.    FINDINGS: Portable AP view of the chest was obtained with  overlying monitor leads in place. There has been development of  rather significant interstitial and groundglass opacities  bilaterally most consistent  with acute pulmonary edema. Heart is  enlarged. No significant pleural fluid. Surgical clips and  metallic densities again noted in the visualized upper abdomen.      Impression:      Development of significant bilateral opacities likely  acute pulmonary edema      Electronically signed by:  Basim Puentes MD  1/7/2022 10:57 PM  CST Workstation: [id number]    CT Angiogram Chest [[id number]] Collected: 01/07/22 2132     Updated: 01/07/22 2213    Narrative:      CT ANGIOGRAM CHEST  RPID:CT CHEST ANGIOGRAPHY WITH IV CONTRAST    History: 66 years-year-oldFemale with history of chest pain, SOA  COMPARISON: Chest radiograph on this date.  TECHNIQUE: CT acquisition through the chest the uneventful of IV  contrast. Bolus timing per protocol. Coronal reformats, sagittal  reformats, and MIP imaging were performed per local departmental  protocol.    This exam was performed according to our departmental  dose-optimization program, which includes automated exposure  control, adjustment of the mA and/or kV according to patient size  and/or use of iterative reconstruction technique.    Intravenous contrast (agent and volume): 80 mL of Isovue-370    FINDINGS:    Diagnostic quality: Excellent pulmonary arterial opacification,  limited aortic opacification.    No pulmonary artery filling defects. The thoracic aorta is  grossly unremarkable other than some atherosclerotic plaquing on  this study which is essentially unenhanced. No pericardial or  pleural fluid is seen.    Bibasilar groundglass attenuation opacities are seen. Focal  triangular-shaped scarring is seen at the tip of the lingula  medially.    ABDOMEN:  No acute process in the visualized portions of the abdomen.         Impression:          1. No pulmonary embolus is seen.  2. Bilateral groundglass attenuation opacities are noted.  Findings are nonspecific but could be seen with atypical  infection.     Electronically signed by:  Eric Kerns MD  1/7/2022 10:12 PM  CST  Workstation: [id number]    XR Chest 1 View [[id number]] Collected: 01/07/22 2055     Updated: 01/07/22 2113    Narrative:      PORTABLE CHEST X-RAY    CLINICAL HISTORY: Chest pain protocol  chest pain protocol    COMPARISON:  None that are available at the time of  interpretation. .    FINDINGS:  Single portable view of the chest obtained.  There are  various overlying monitor leads/artifacts in place. The lungs are  well expanded and clear where they can be visualized.  Cardiac  size is within normal limits.  Vascularity is normal considering  technique.  No pleural fluid is demonstrated by portable imaging.   There are vascular calculi. There are surgical clips as well as  small rounded and linear metallic densities in the upper abdomen.        Impression:        No active disease by portable imaging.    Electronically signed by:  Basim Puentes MD  1/7/2022 9:11 PM [facility]  Workstation: [id number]          I personally viewed and interpreted the patient's EKG/Telemetry data.    Assessment:   1. Chest pain positive troponin with abnormal resting electrocardiogram.  2. Previous coronary angiogram which did not reveal of any evidence of any obstructive epicardial coronary artery disease.  3. Negative CT a of the chest for any evidence of any pulmonary embolism.  4. Arterial hypertension.  5. Hypertensive heart disease.  6. Hyperlipidemia.  7. Diabetes.          Plan:   1. Chest pain. Patient has symptoms of chest pain which she describes as a dull aching chest discomfort which is nonpleuritic in nature. Patient repeat EKG revealed diffuse ST elevation in the inferolateral lead without any reciprocal ST depression. Due to the patient rising troponin at the present time have recommended the patient to undergo coronary angiogram. Risk-benefit treatment option for the coronary angiogram were discussed with the patient and an informed consent was obtained. Patient Mallampati score #2 patient ASA classification 1 #2.  Risk for minimal sedation was also discussed with the patient. Patient does have allergy to shellfish and patient would be premedicated prior to the coronary angiogram with Solu-Medrol Benadryl.    2. Arterial hypertension. Patient blood pressure is currently stable. Patient is on lisinopril at home. Patient would be continued on the home dose of the antihypertensive medication.    3. Hypertensive heart disease. Patient would undergo a transthoracic echocardiogram to evaluate the left ventricular systolic function.    4. Bibasilar opacity noted on the CTA of the chest patient has elevated white blood cell count. Patient would need broad-spectrum antibiotic coverage. Would obtain sputum culture. Patient would be evaluated by the family practice service.    5. Low body mask index is noted. Patient has been recommended to increase the caloric intake    6. COVID-19 infection in October 2021 is noted. Patient repeat Covid testing today was negative.    7. Non-insulin-dependent diabetes. Patient would receive IV hydration and Mucomyst and would follow the renal function.    Thank you for the consultation.    The above plan of management were discussed with the patient                Time: Time spent in face-to-face evaluation of greater than 55 minutes interacting, formulating, examining and discussing the plan with the patient with 50% of greater time spent in face-to-face interaction.    Electronically signed by Inder Walker MD, 01/08/22, 2:18 AM CST.    Dictated utilizing Dragon dictation.

## 2022-01-08 NOTE — PROGRESS NOTES
Cardiology Progress Note     LOS: 0 days   Patient Care Team:  Sammie Gonzalez DO as PCP - General (Internal Medicine)  Grant Morris MD as Surgeon (General Surgery)  Kalin Martínez DPM as Consulting Physician (Podiatry)    Subjective:    Chart reviewed. Patient seen and examined. Patient denies any chest pain, shortness of breath, or palpitation.  Patient underwent transthoracic echocardiogram which revealed anteroapical wall severely hypokinetic to akinetic with decreased left ventricular systolic function with an ejection fraction of  30%.  Patient had Takotsubo cardiomyopathy.  Patient is out of bed in chair.  Patient resting heart rate is 102 bpm.  Patient would be initiated on metoprolol and be administered digoxin to slow the heart rate down and as the patient does have a dry cough would consider changing the lisinopril to losartan.  Patient would be continued with gentle diuresis and would follow the renal function.      Objective:  Temp:  [97.2 °F (36.2 °C)-97.8 °F (36.6 °C)] 97.2 °F (36.2 °C)  Heart Rate:  [102-128] 126  Resp:  [20-26] 26  BP: (112-166)/(61-99) 121/65    Intake/Output Summary (Last 24 hours) at 1/8/2022 1324  Last data filed at 1/8/2022 1000  Gross per 24 hour   Intake 250 ml   Output 2300 ml   Net -2050 ml       Physical Exam:   General Appearance:    Alert, oriented, cooperative, in no acute distress.   Head:    Normocephalic, atraumatic, without obvious abnormality   Eyes:             CIPRIANO. Lids and lashes normal, conjunctivae and sclerae normal, no icterus, no pallor.   Ears:    Ears appear intact with no abnormalities noted.   Throat:   Mucous membranes pink and moist.   Neck:  Supple, trachea midline, no carotid bruit, no organomegaly or JVD.   Lungs:    Clear to auscultation and percussion.  Respirations regular, even and unlabored. No wheezes, rales, or rhonchi.    Heart:    Regular rhythm and normal rate, normal S1 and S2, no      murmur, no gallop, no rub, no  click.   Abdomen:    Soft, nontender, nondistended, no guarding, no rebound tenderness. Normal bowel sounds in all four quadrants, no masses, liver and spleen nonpalpable.    Genitalia:    Deferred.   Extremities:   Moves all extremities well, no edema, no cyanosis, no       redness, no clubbing.   Pulses:   Pulses palpable and equal bilaterally.   Skin:   Moist and warm. No bleeding, bruising or rash.   Neurologic/Psychiatric:   Alert and oriented to person, place, and time.  Motor, power and tone in upper and lower extremities are grossly intact.  No focal neurological deficits. Normal cognitive function. No psychomotor reaction or tangential thought. No depression, homicidal ideations and suicidal ideations.          Results Review:    Results from last 7 days   Lab Units 01/08/22  0523   SODIUM mmol/L 134*   POTASSIUM mmol/L 4.3   CHLORIDE mmol/L 94*   CO2 mmol/L 20.0*   BUN mg/dL 13   CREATININE mg/dL 0.78   CALCIUM mg/dL 8.8   BILIRUBIN mg/dL 0.4   ALK PHOS U/L 139*   ALT (SGPT) U/L 68*   AST (SGOT) U/L 163*   GLUCOSE mg/dL 153*     Results from last 7 days   Lab Units 01/08/22  0214 01/07/22  2251 01/07/22  2046   CK TOTAL U/L  --   --  264*   TROPONIN T ng/mL 0.992* 0.754* 0.493*         Results from last 7 days   Lab Units 01/08/22  0523   WBC 10*3/mm3 14.64*   HEMOGLOBIN g/dL 11.8*   HEMATOCRIT % 34.9   PLATELETS 10*3/mm3 485*     Results from last 7 days   Lab Units 01/08/22  0214 01/07/22  2046   INR  0.95 0.93   APTT seconds 26.7  --      Results from last 7 days   Lab Units 01/08/22  0523   MAGNESIUM mg/dL 1.6                   ECHO:      ECG 12 Lead   Preliminary Result   Test Reason : tachycardia   Blood Pressure :   */*   mmHG   Vent. Rate : 121 BPM     Atrial Rate : 121 BPM      P-R Int : 160 ms          QRS Dur :  68 ms       QT Int : 326 ms       P-R-T Axes :  78  61  81 degrees      QTc Int : 462 ms      Sinus tachycardia   Low voltage QRS   Borderline ECG   When compared with ECG of 07-JAN-2022  22:18,   No significant change was found      Referred By:            Confirmed By:       ECG 12 Lead         ECG 12 Lead         ECG 12 Lead    (Results Pending)        Medication Review:   Current Facility-Administered Medications   Medication Dose Route Frequency Provider Last Rate Last Admin   • acetaminophen (TYLENOL) tablet 650 mg  650 mg Oral Q4H PRN Inder Walker MD       • amLODIPine (NORVASC) tablet 2.5 mg  2.5 mg Oral BID Savage Batista MD   2.5 mg at 01/08/22 0834   • aspirin EC tablet 81 mg  81 mg Oral Nightly Savage Batista MD       • atenolol (TENORMIN) tablet 25 mg  25 mg Oral Nightly Savage Batista MD       • [START ON 1/9/2022] AZITHROMYCIN 500 MG/250 ML 0.9% NS IVPB (vial-mate)  500 mg Intravenous Q24H Savage Batista MD       • [START ON 1/9/2022] cefTRIAXone (ROCEPHIN) 1 g/100 mL 0.9% NS (MBP)  1 g Intravenous Q24H Savage Batista MD       • dextrose (D50W) (25 g/50 mL) IV injection 25 g  25 g Intravenous Q15 Min PRN Inder Walker MD       • dextrose (GLUTOSE) oral gel 15 g  15 g Oral Q15 Min PRN Inder Walker MD       • FLUoxetine (PROzac) capsule 10 mg  10 mg Oral Nightly Savage Batista MD       • gabapentin (NEURONTIN) capsule 400 mg  400 mg Oral Q12H Savage Batista MD   400 mg at 01/08/22 0833   • glucagon (human recombinant) (GLUCAGEN DIAGNOSTIC) injection 1 mg  1 mg Subcutaneous Q15 Min PRN Inder Walker MD       • guaiFENesin (MUCINEX) 12 hr tablet 1,200 mg  1,200 mg Oral Q12H Madalyn Erickson MD   1,200 mg at 01/08/22 0945   • HYDROcodone-acetaminophen (NORCO) 7.5-325 MG per tablet 1 tablet  1 tablet Oral Q6H PRN Savage Batista MD   1 tablet at 01/08/22 1133   • insulin aspart (novoLOG) injection 0-7 Units  0-7 Units Subcutaneous TID AC Inder Walker MD   2 Units at 01/08/22 1133   • lisinopril (PRINIVIL,ZESTRIL) tablet 5 mg  5 mg Oral Nightly Savage Batista MD       • morphine injection 1 mg  1 mg Intravenous Q4H PRN Savage Batista MD   1 mg at  01/08/22 0901    And   • naloxone (NARCAN) injection 0.4 mg  0.4 mg Intravenous Q5 Min PRN Savage Batista MD       • ondansetron (ZOFRAN) tablet 4 mg  4 mg Oral Q6H PRN Savage Batista MD        Or   • ondansetron (ZOFRAN) injection 4 mg  4 mg Intravenous Q6H PRN Savage Batista MD   4 mg at 01/08/22 0441   • sodium chloride 0.9 % flush 10 mL  10 mL Intravenous PRN Savage Batista MD       • sodium chloride 0.9 % flush 10 mL  10 mL Intravenous Q12H Savage Batista MD   10 mL at 01/08/22 0834   • sodium chloride 0.9 % flush 10 mL  10 mL Intravenous PRN Savage Batista MD       • sodium chloride 0.9 % infusion  30 mL/hr Intravenous Continuous Kaitlyn Dodson MD 30 mL/hr at 01/08/22 0943 30 mL/hr at 01/08/22 0943   • traZODone (DESYREL) tablet 150 mg  150 mg Oral Nightly Savage Batista MD           Assessment and Plan:      Chest pain with high risk for cardiac etiology    Precordial pain    Type 2 diabetes mellitus (HCC)    Anxiety    Chronic bronchitis (HCC)    Primary hypertension  1.  Chest pain.  Patient had positive troponin on admission with symptoms of chest pain with electrocardiographic changes suggestive of possible ischemia.  Patient underwent coronary angiogram which did not reveal of any evidence of any obstructive epicardial coronary artery disease.  Patient transthoracic echocardiogram was suggestive of anteroapical and inferoapical wall akinesis with Takotsubo cardiomyopathy.  Patient had COVID-19 infection recently.  Patient resting heart rate is mildly elevated.  Patient has had persistent cough.  Patient has been reassured of the patient's symptoms of chest pain with coronary angiogram which did not reveal of any evidence of any obstructive epicardial coronary artery disease.    2.  Nonischemic cardiomyopathy Takotsubo cardiomyopathy with left ventricular systolic dysfunction.  Patient would be initiated on metoprolol digoxin and losartan.  Patient does have a dry cough and will  stop the lisinopril.  Patient would be diuresed and will follow the electrolytes and renal function.  Patient does have a low body mask index.  Patient has not had any evidence of any arrhythmia noted on telemetry monitor.    3.  Recent COVID-19 infection with bibasilar opacity.  Patient has not had any febrile episode.  Patient continued to have persistent cough.  Patient is currently on Rocephin and Zithromax.    4.  Low body mass index is noted.  Patient was recommended to increase the caloric intake.      The above plan of management were discussed with the patient and the nursing staff            Electronically signed by Inder Walker MD, 01/08/22, 1:24 PM CST.      Time: Time spent on face-to-face interaction 20 minutes      Dictated utilizing Dragon dictation.

## 2022-01-08 NOTE — CONSULTS
Adult Nutrition  Assessment    Patient Name:  Maria Yepez  YOB: 1955  MRN: [id number]  Admit Date:  1/7/2022    Assessment Date:  1/8/2022    Comments: Brief visit to clarify dairy food allergy. Is lactose intolerant (does tolerates small amounts in recipes).          Anthropometrics     Row Name 01/08/22 0422          Anthropometrics    Weight 56 kg (123 lb 7.3 oz)                               Electronically signed by:  Keena Mathias RD  01/08/22 11:14 CST

## 2022-01-08 NOTE — SIGNIFICANT NOTE
Review of patient     Patient having acute st segment elevation changes noted on ekg   And elevated troponin's that have doubled in less than 2 hours while here and patient has developed pulmonary edema and shortness of breath   And this to me is an unstable cv situation  Communicated to Dr Walker that what I am seeing is consistent with st segment elevation   While I am not a cardiologist and do not by any means think to supercede his authority regarding the interpretation of a cardiologist.  My small humble opinion is that is a a stemi and could be pericarditis   However, that is a dx of exclusion after   stemi has been ruled out.     Patient appears unstable with parameters of shortness of breath   Acutely occurring and this to me, states, that some form of myocardial injury is being caused as there is presence of flash pulmonary edema   (not again superceding the authority of a specialty consultant though by any means).     Will let dr. Walker handle and evaluate this patient and if my help is needed   I am happy to do a consult on this patient.

## 2022-01-08 NOTE — H&P
"    HISTORY AND PHYSICAL  NAME: Maria Yepez  : 1955  MRN: [id number]    DATE OF ADMISSION:  2022     DATE & TIME SEEN: 22 at 138    PCP: Smamie Gonzalez DO    CODE STATUS: Full code    CHIEF COMPLAINT:  Chest pain    HPI:  Maria Yepez is a 66 y.o. female with a CMH of DM, HTN, COPD who presents complaining of  Chest pain. Symptoms started approximately 1900, ~2 hours prior to presentation. Patient is somewhat of a poor historian. Describes substernal, pressure like sensation that is 8-9/10 in intensity without radiation. Pain is not worsened by deep breathing. Associated symptoms include SOA, palpitations, and nausea. Denies diaphoresis, syncope, fever, chills. Reports a productive cough that started 1 day ago. Currently on NRB; reports breathing and chest pain are improved. Of note, she had heart cath in  that did not show obstruction in [place].    CONCURRENT MEDICAL HISTORY:  Past Medical History:   Diagnosis Date   • Abdominal pain    • Acute bronchitis    • Acute pancreatitis    • Acute posthemorrhagic anemia    • Acute sinusitis    • Anal pain    • Anemia    • Anemia due to blood loss    • Anxiety    • Backache     mid and lower back     • Carotid artery disease (HCC)    • Chronic back pain    • Chronic obstructive lung disease (HCC)    • Coronary atherosclerosis    • Diabetic neuropathy (HCC)    • Diarrhea    • Diverticulosis of colon without diverticulitis    • Dysphagia    • Dysuria    • Emphysema/COPD (HCC)     \"Emphysema\"   • Encounter for immunization    • Epigastric pain    • Esophagitis     with stricture   • Essential hypertension    • Fatigue    • Foot pain    • Ganglion cyst of wrist     left wrist     • Gastroesophageal reflux disease    • Generalized abdominal pain    • Gout    • Headache    • Herpes simplex    • Hip pain, bilateral    • History of transfusion    • Hoarse    • Hyperlipidemia    • Incontinence of feces    • Insomnia    • Irritable bowel " syndrome    • Joint pain    • Knee pain    • Left lower quadrant pain    • Left upper quadrant pain    • Leg pain    • Leukorrhea     not specified as infective    • Low back pain     injury   • Muscle pain    • Nausea    • Nausea    • Nausea and vomiting    • Neck pain    • Neck pain    • Noncompliance with medication regimen    • Osteopenia    • Pain in lower back    • Pain in right femur    • Palpitations    • Productive cough    • PTSD (post-traumatic stress disorder)    • Scoliosis deformity of spine    • Screening examination for venereal disease    • Shoulder pain, left    • Stricture of esophagus    • Symptomatic menopausal or female climacteric states    • Synovitis and tenosynovitis     left forearm   • Tenderness     Tenderness of left upper quadrant of abdomen      • Thoracic back pain    • Type 2 diabetes mellitus (HCC)    • Upper abdominal pain    • Urgency of urination     Urgent desire to urinate      • Urinary frequency     due to benign prostatic hypertrophy      • Urinary tract infection     site not specified   • Urinary tract infectious disease    • Venous varices    • Weakness     General symptom - weakness    • Weight gain        PAST SURGICAL HISTORY:  Past Surgical History:   Procedure Laterality Date   • ANKLE OPEN REDUCTION INTERNAL FIXATION Right 10/28/2021    Procedure: OPEN REDUCTION AND INTERNAL FIXATION OF RIGHT ANKLE FRACTURE;  Surgeon: Danial Carter DPM;  Location: [facility];  Service: Podiatry;  Laterality: Right;   • BREAST BIOPSY      Stereotactic breast biopsy (1)  left   • CARDIAC CATHETERIZATION  2014    Cardiac cath 48659 (1)  Non obstructive coronary artery disease. Normal left ventricular systolic function. Performed at [facility].   • CARPAL TUNNEL RELEASE Bilateral    •  SECTION     • CHOLECYSTECTOMY      laparoscopic   • COLONOSCOPY N/A 2017    Procedure: COLONOSCOPY;  Surgeon: Elia Cheatham MD;  Location: [facility]  ENDOSCOPY;  Service:    • ENDOSCOPY  10/24/2012    Colon endoscopy 83250 (2)  internal & external hemorrhoids found. Diverticulum in sigmoid colon.   • ENDOSCOPY  11/05/2014    EGD w/ Dilatation 28933 (1)  Esophageal stricture was present. Dilatation performed. Gastritis found in the stomach. Biopsy taken. Normal duodenum.   • ENDOSCOPY  07/11/2013    EGD w/ tube 44343 (3)  Normal esophagus. Gastritis in stomach. Biopsy taken. Normal duodenum. Biopsy taken.   • ENDOSCOPY N/A 5/23/2017    Procedure: ESOPHAGOGASTRODUODENOSCOPY, possible dilation;  Surgeon: Grant Morris MD;  Location: [facility] ENDOSCOPY;  Service:    • ENDOSCOPY N/A 3/10/2020    Procedure: ESOPHAGOGASTRODUODENOSCOPY;  Surgeon: Grant Morris MD;  Location: [facility] ENDOSCOPY;  Service: General;  Laterality: N/A;   • FRACTURE SURGERY Right     Arm Surgery (1)  right, arm fracture   • HERNIA REPAIR      Hernia repair w/mesh (1)   • HYSTERECTOMY      Anesth, hysterectomy (1)   • INCISION AND DRAINAGE ABSCESS N/A 9/5/2017    Procedure: INCISION AND DRAINAGE OF ABDOMINAL WOUND;  Surgeon: Grant Morris MD;  Location: [facility] OR;  Service:    • INJECTION OF MEDICATION  05/27/2016    Depo Medrol (Methylprednisone) 80mg (2)  Ordered By: BRIAN LOU ([facility])    • INJECTION OF MEDICATION  03/10/2016    Kenalog (4)  Ordered By: ARNOLDO MRUDOCK ([facility])    • INJECTION OF MEDICATION  01/07/2016    Rocephin (1)  Ordered By: ARNOLDO MURDOCK ([facility])    • LYMPH NODE BIOPSY      Biopsy/removal, lymph node(s) (1)  cervical node biopsy   • NECK SURGERY  2013   • NISSEN FUNDOPLICATION     • NISSEN FUNDOPLICATION N/A 6/7/2017    Procedure: OPEN REVISION OF NISSEN FUNDOPLICATION WITH GASTROSTOMY TUBE PLACEMENT ;  Surgeon: Grant Morris MD;  Location: [facility] OR;  Service:    • TOTAL ABDOMINAL HYSTERECTOMY      RUPALI BSO   • TRIGGER FINGER RELEASE Bilateral    • TUBAL ABDOMINAL LIGATION     • UPPER GASTROINTESTINAL ENDOSCOPY  05/23/2017   •  VENTRAL/INCISIONAL HERNIA REPAIR N/A 10/13/2017    Procedure: REMOVAL OF INFECTED MESH, REPAIR OF ABDOMINAL WALL   (Will need Strattice mesh in room).;  Surgeon: Grant Morris MD;  Location: [facility] OR;  Service:        FAMILY HISTORY:  Family History   Problem Relation Age of Onset   • Lung cancer Mother    • Hypertension Mother    • Cancer Mother    • Lymphoma Father         NonHodgkins   • Hypertension Father    • Diabetes Other    • Hypertension Other    • Hypertension Sister    • Diabetes Sister    • Hypertension Brother    • Diabetes Brother         SOCIAL HISTORY:  Social History     Socioeconomic History   • Marital status: Single   Tobacco Use   • Smoking status: Former Smoker     Years: 0.00     Quit date: 2007     Years since quitting: 15.0   • Smokeless tobacco: Never Used   Vaping Use   • Vaping Use: Never used   Substance and Sexual Activity   • Alcohol use: Not Currently   • Drug use: No   • Sexual activity: Defer       HOME MEDICATIONS:  Prior to Admission medications    Medication Sig Start Date End Date Taking? Authorizing Provider   amLODIPine (NORVASC) 2.5 MG tablet Take 1 tablet by mouth 2 (Two) Times a Day. 6/9/21   Bettie Panda MD   aspirin 81 MG EC tablet Take 1 tablet by mouth Every Night. Hx of CAD 10/25/17   Manjinder Joshi MD   atenolol (TENORMIN) 25 MG tablet Take 25 mg by mouth Every Night.    Bettie Panda MD   FLUoxetine (PROzac) 10 MG capsule Take 10 mg by mouth Every Night.    Bettie Panda MD   furosemide (LASIX) 20 MG tablet Take 20 mg by mouth Every Other Day. Takes in the morning    Bettie Panda MD   gabapentin (NEURONTIN) 400 MG capsule Take 800 mg by mouth Every Night. 9/28/21   Bettie Panda MD   HYDROcodone-acetaminophen (NORCO) 7.5-325 MG per tablet  11/2/21   Bettie Panda MD   lisinopril (PRINIVIL,ZESTRIL) 5 MG tablet Take 5 mg by mouth Every Night.    Bettie Panda MD   metFORMIN (GLUCOPHAGE) 500 MG  tablet Take 1 tablet by mouth 2 (Two) Times a Day With Meals. 4/16/18   Manjinder Joshi MD   nitroglycerin (NITROSTAT) 0.4 MG SL tablet Place 1 tablet under the tongue Every 5 (Five) Minutes As Needed for Chest Pain. 8/24/17   Manjinder Joshi MD   Omega-3 Fatty Acids (FISH OIL) 1000 MG capsule capsule Take 1,000 mg by mouth Daily With Breakfast.    Bettie Panda MD   ondansetron (ZOFRAN) 4 MG tablet Take 1 tablet by mouth Every 8 (Eight) Hours As Needed for Nausea or Vomiting. 10/25/17   Manjinder Joshi MD   pantoprazole (PROTONIX) 40 MG EC tablet Take 40 mg by mouth Every Night.    Bettie Panda MD   pravastatin (PRAVACHOL) 80 MG tablet Take 1 tablet by mouth Every Night. 8/4/21   Bettie Panda MD   silver sulfadiazine (SILVADENE, SSD) 1 % cream Apply 1 application topically to the appropriate area as directed 2 (Two) Times a Day. 10/20/21   Cesar Shi PA-C   tiZANidine (ZANAFLEX) 4 MG tablet Take 4 mg by mouth 2 (Two) Times a Day. 4/26/21   Bettie Panda MD   traZODone (DESYREL) 150 MG tablet Take 150-300 mg by mouth Every Night.    Bettie Panda MD   vitamin D (ERGOCALCIFEROL) 75952 units capsule capsule Take 1 capsule by mouth Every 14 (Fourteen) Days. 10/25/17   Manjinder Joshi MD       ALLERGIES:  Albuterol, Adhesive tape, Claritin [loratadine], Ibuprofen, Lactose intolerance (gi), Latex, and Shellfish-derived products    REVIEW OF SYSTEMS  Review of Systems   Constitutional: Negative for activity change, appetite change, chills, diaphoresis, fatigue and fever.   HENT: Negative for congestion, rhinorrhea, sinus pain and sore throat.    Eyes: Negative for pain, redness and visual disturbance.   Respiratory: Positive for cough and shortness of breath. Negative for wheezing.    Cardiovascular: Positive for chest pain and palpitations. Negative for leg swelling.   Gastrointestinal: Positive for diarrhea. Negative for abdominal pain, constipation,  nausea and vomiting.   Genitourinary: Negative for dysuria and flank pain.   Musculoskeletal: Negative for arthralgias, back pain, joint swelling and myalgias.   Skin: Negative for color change, pallor and rash.   Neurological: Negative for dizziness, light-headedness and headaches.       PHYSICAL EXAM:  Temp:  [97.6 °F (36.4 °C)] 97.6 °F (36.4 °C)  Heart Rate:  [102-126] 126  Resp:  [20-22] 22  BP: (112-166)/(65-99) 112/65  Body mass index is 21.09 kg/m².  Physical Exam  Vitals and nursing note reviewed.   Constitutional:       General: She is not in acute distress.     Appearance: Normal appearance. She is well-developed. She is ill-appearing. She is not diaphoretic.   HENT:      Head: Normocephalic and atraumatic.      Right Ear: Hearing normal.      Left Ear: Hearing normal.   Eyes:      General: Lids are normal.      Conjunctiva/sclera: Conjunctivae normal.   Cardiovascular:      Rate and Rhythm: Regular rhythm. Tachycardia present.      Heart sounds: Normal heart sounds.   Pulmonary:      Effort: Tachypnea and accessory muscle usage present. No respiratory distress.      Breath sounds: Normal breath sounds. No wheezing or rales.      Comments: 15L NRB  Abdominal:      General: Bowel sounds are normal. There is no distension.      Palpations: Abdomen is soft.      Tenderness: There is no abdominal tenderness.   Musculoskeletal:         General: No tenderness or deformity. Normal range of motion.      Right lower leg: No edema.      Left lower leg: No edema.   Skin:     General: Skin is warm and dry.      Coloration: Skin is not pale.      Findings: No erythema or rash.   Neurological:      Mental Status: She is alert and oriented to person, place, and time. She is not disoriented.         DIAGNOSTIC DATA:   Lab Results (last 24 hours)     Procedure Component Value Units Date/Time    Troponin [[id number]]  (Abnormal) Collected: 01/08/22 0214    Specimen: Blood Updated: 01/08/22 0259     Troponin T 0.992 ng/mL      Narrative:      Troponin T Reference Range:  <= 0.03 ng/mL-   Negative for AMI  >0.03 ng/mL-     Abnormal for myocardial necrosis.  Clinicians would have to utilize clinical acumen, EKG, Troponin and serial changes to determine if it is an Acute Myocardial Infarction or myocardial injury due to an underlying chronic condition.       Results may be falsely decreased if patient taking Biotin.      Protime-INR [[id number]]  (Normal) Collected: 01/08/22 0214    Specimen: Blood Updated: 01/08/22 0240     Protime 12.6 Seconds      INR 0.95    Narrative:      Therapeutic range for most indications is 2.0-3.0 INR,  or 2.5-3.5 for mechanical heart valves.    aPTT [[id number]]  (Normal) Collected: 01/08/22 0214    Specimen: Blood Updated: 01/08/22 0240     PTT 26.7 seconds     Narrative:      The recommended Heparin therapeutic range is 68-97 seconds.    Procalcitonin [[id number]] Collected: 01/08/22 0214    Specimen: Blood Updated: 01/08/22 0217    Protime-INR [[id number]]  (Normal) Collected: 01/07/22 2046    Specimen: Blood Updated: 01/08/22 0201     Protime 12.4 Seconds      INR 0.93    Narrative:      Therapeutic range for most indications is 2.0-3.0 INR,  or 2.5-3.5 for mechanical heart valves.    Lactic Acid, Plasma [[id number]]  (Normal) Collected: 01/08/22 0043    Specimen: Blood Updated: 01/08/22 0147     Lactate 1.9 mmol/L     Blood Culture - Blood, Wrist, Left [[id number]] Collected: 01/08/22 0100    Specimen: Blood from Wrist, Left Updated: 01/08/22 0110    Blood Culture - Blood, Hand, Right [[id number]] Collected: 01/08/22 0043    Specimen: Blood from Hand, Right Updated: 01/08/22 0101    Troponin [[id number]]  (Abnormal) Collected: 01/07/22 2251    Specimen: Blood Updated: 01/07/22 2334     Troponin T 0.754 ng/mL     Narrative:      Troponin T Reference Range:  <= 0.03 ng/mL-   Negative for AMI  >0.03 ng/mL-     Abnormal for myocardial necrosis.  Clinicians would have to utilize clinical acumen, EKG, Troponin and  serial changes to determine if it is an Acute Myocardial Infarction or myocardial injury due to an underlying chronic condition.       Results may be falsely decreased if patient taking Biotin.      COVID-19 and FLU A/B PCR - Swab, Nasopharynx [[id number]]  (Normal) Collected: 01/07/22 2219    Specimen: Swab from Nasopharynx Updated: 01/07/22 2255     COVID19 Not Detected     Influenza A PCR Not Detected     Influenza B PCR Not Detected    Narrative:      Fact sheet for providers: https://www.fda.gov/media/919249/download    Fact sheet for patients: https://www.fda.gov/media/840996/download    Test performed by PCR.    [place] Draw [[id number]] Collected: 01/07/22 2046    Specimen: Blood Updated: 01/07/22 2200    Narrative:      The following orders were created for panel order [place] Draw.  Procedure                               Abnormality         Status                     ---------                               -----------         ------                     Green Top (Gel)[[id number]]                                  Final result               Lavender Top[[id number]]                                     Final result               Gold Top - SST[[id number]]                                   Final result               Light Blue Top[[id number]]                                   Final result                 Please view results for these tests on the individual orders.    Lavender Top [[id number]] Collected: 01/07/22 2046    Specimen: Blood Updated: 01/07/22 2200     Extra Tube hold for add-on     Comment: Auto resulted       Gold Top - SST [[id number]] Collected: 01/07/22 2046    Specimen: Blood Updated: 01/07/22 2200     Extra Tube Hold for add-ons.     Comment: Auto resulted.       Light Blue Top [[id number]] Collected: 01/07/22 2046    Specimen: Blood Updated: 01/07/22 2200     Extra Tube hold for add-on     Comment: Auto resulted       Green Top (Gel) [[id number]] Collected: 01/07/22 2046    Specimen: Blood Updated:  01/07/22 2200     Extra Tube Hold for add-ons.     Comment: Auto resulted.       Lipase [[id number]]  (Abnormal) Collected: 01/07/22 2046    Specimen: Blood Updated: 01/07/22 2130     Lipase 6 U/L     CK [[id number]]  (Abnormal) Collected: 01/07/22 2046    Specimen: Blood Updated: 01/07/22 2130     Creatine Kinase 264 U/L     Magnesium [[id number]]  (Abnormal) Collected: 01/07/22 2046    Specimen: Blood Updated: 01/07/22 2130     Magnesium 1.2 mg/dL     D-dimer, Quantitative [[id number]]  (Abnormal) Collected: 01/07/22 2046    Specimen: Blood Updated: 01/07/22 2127     D-Dimer, Quantitative 484 ng/mL (FEU)     Narrative:      Dimer values <500 ng/ml FEU are FDA approved as aid in diagnosis of deep venous thrombosis and pulmonary embolism.  This test should not be used in an exclusion strategy with pretest probability alone.    A recent guideline regarding diagnosis for pulmonary thromboembolism recommends an adjusted exclusion criterion of age x 10 ng/ml FEU for patients >50 years of age ([employer] Intern Med 2015; 163: 701-711).      Comprehensive Metabolic Panel [[id number]]  (Abnormal) Collected: 01/07/22 2046    Specimen: Blood Updated: 01/07/22 2115     Glucose 157 mg/dL      BUN 13 mg/dL      Creatinine 0.76 mg/dL      Sodium 135 mmol/L      Potassium 4.3 mmol/L      Chloride 97 mmol/L      CO2 24.0 mmol/L      Calcium 9.3 mg/dL      Total Protein 7.3 g/dL      Albumin 4.20 g/dL      ALT (SGPT) 75 U/L      AST (SGOT) 220 U/L      Alkaline Phosphatase 137 U/L      Total Bilirubin 0.4 mg/dL      eGFR Non African Amer 76 mL/min/1.73      Globulin 3.1 gm/dL      A/G Ratio 1.4 g/dL      BUN/Creatinine Ratio 17.1     Anion Gap 14.0 mmol/L     Narrative:      GFR Normal >60  Chronic Kidney Disease <60  Kidney Failure <15      Troponin [[id number]]  (Abnormal) Collected: 01/07/22 2046    Specimen: Blood Updated: 01/07/22 2114     Troponin T 0.493 ng/mL     Narrative:      Troponin T Reference Range:  <= 0.03 ng/mL-   Negative for  AMI  >0.03 ng/mL-     Abnormal for myocardial necrosis.  Clinicians would have to utilize clinical acumen, EKG, Troponin and serial changes to determine if it is an Acute Myocardial Infarction or myocardial injury due to an underlying chronic condition.       Results may be falsely decreased if patient taking Biotin.      BNP [[id number]]  (Normal) Collected: 01/07/22 2046    Specimen: Blood Updated: 01/07/22 2113     proBNP 554.9 pg/mL     Narrative:      Among patients with dyspnea, NT-proBNP is highly sensitive for the detection of acute congestive heart failure. In addition NT-proBNP of <300 pg/ml effectively rules out acute congestive heart failure with 99% negative predictive value.    Results may be falsely decreased if patient taking Biotin.      CBC & Differential [[id number]]  (Abnormal) Collected: 01/07/22 2046    Specimen: Blood Updated: 01/07/22 2052    Narrative:      The following orders were created for panel order CBC & Differential.  Procedure                               Abnormality         Status                     ---------                               -----------         ------                     CBC Auto Differential[[id number]]        Abnormal            Final result                 Please view results for these tests on the individual orders.    CBC Auto Differential [[id number]]  (Abnormal) Collected: 01/07/22 2046    Specimen: Blood Updated: 01/07/22 2052     WBC 11.84 10*3/mm3      RBC 3.52 10*6/mm3      Hemoglobin 10.5 g/dL      Hematocrit 31.6 %      MCV 89.8 fL      MCH 29.8 pg      MCHC 33.2 g/dL      RDW 13.2 %      RDW-SD 43.0 fl      MPV 9.0 fL      Platelets 376 10*3/mm3      Neutrophil % 86.6 %      Lymphocyte % 9.0 %      Monocyte % 3.6 %      Eosinophil % 0.2 %      Basophil % 0.3 %      Immature Grans % 0.3 %      Neutrophils, Absolute 10.25 10*3/mm3      Lymphocytes, Absolute 1.06 10*3/mm3      Monocytes, Absolute 0.43 10*3/mm3      Eosinophils, Absolute 0.02 10*3/mm3       Basophils, Absolute 0.04 10*3/mm3      Immature Grans, Absolute 0.04 10*3/mm3      nRBC 0.0 /100 WBC            Imaging Results (Last 24 Hours)     Procedure Component Value Units Date/Time    CT Abdomen Pelvis With Contrast [[id number]] Collected: 01/07/22 2132     Updated: 01/07/22 2354    Narrative:      CT ABDOMEN PELVIS WITH IV CONTRAST    INDICATION: 66 years Female; Abdominal pain, acute, nonlocalized    TECHNIQUE:  CT scan of the abdomen and pelvis was performed with  IV contrast.  This exam was performed according to our  departmental dose-optimization program, which includes automated  exposure control, adjustment of the mA and/or kV according to  patient size and/or use of iterative reconstruction technique.     Comparison: 12/7/2017.    FINDINGS:  Liver: Hepatic steatosis.  Gallbladder/Biliary tree: Status post cholecystectomy. The common  bile duct is dilated measuring up to 12 mm similar to previous  study and may be within normal limits for postcholecystectomy  changes.  Pancreas: Unremarkable.  Spleen: Unremarkable.  Adrenals: Unremarkable.  Genitourinary: No hydronephrosis. No bladder wall thickening.  Status post hysterectomy. No adnexal masses.  Gastrointestinal: Diverticulosis of the sigmoid and descending  colon without diverticulitis. Mild to moderate stool in the  colon. Normal appendix. No gastric wall thickening. Multiple  surgical clips surrounding the stomach. No free air or free  fluid.  Peritoneum: Unremarkable.  Vasculature: Mild atherosclerosis of the aorta.  Lymph nodes: No pathologically enlarged lymph nodes.  Bones: Mild degenerative changes of the right hip. Multilevel  degenerative changes of the lumbar spine most prominent at L4-5..  Soft tissues: Unremarkable.  Incidental findings: None.      Impression:      1.  No acute abdominal or pelvic findings.  2.  Hepatic steatosis.  3.  Diverticulosis without diverticulitis.    Electronically signed by:  Chanel Salcido  1/7/2022 11:53 PM  CST  Workstation: [id number]    XR Chest 1 View [[id number]] Collected: 01/07/22 2235     Updated: 01/07/22 2258    Narrative:      PORTABLE CHEST X-RAY    CLINICAL HISTORY: de sating, change in status.    COMPARISON: 8:28 PM.    FINDINGS: Portable AP view of the chest was obtained with  overlying monitor leads in place. There has been development of  rather significant interstitial and groundglass opacities  bilaterally most consistent with acute pulmonary edema. Heart is  enlarged. No significant pleural fluid. Surgical clips and  metallic densities again noted in the visualized upper abdomen.      Impression:      Development of significant bilateral opacities likely  acute pulmonary edema      Electronically signed by:  Basim Puentes MD  1/7/2022 10:57 PM  CST Workstation: [id number]    CT Angiogram Chest [[id number]] Collected: 01/07/22 2132     Updated: 01/07/22 2213    Narrative:      CT ANGIOGRAM CHEST  RPID:CT CHEST ANGIOGRAPHY WITH IV CONTRAST    History: 66 years-year-oldFemale with history of chest pain, SOA  COMPARISON: Chest radiograph on this date.  TECHNIQUE: CT acquisition through the chest the uneventful of IV  contrast. Bolus timing per protocol. Coronal reformats, sagittal  reformats, and MIP imaging were performed per local departmental  protocol.    This exam was performed according to our departmental  dose-optimization program, which includes automated exposure  control, adjustment of the mA and/or kV according to patient size  and/or use of iterative reconstruction technique.    Intravenous contrast (agent and volume): 80 mL of Isovue-370    FINDINGS:    Diagnostic quality: Excellent pulmonary arterial opacification,  limited aortic opacification.    No pulmonary artery filling defects. The thoracic aorta is  grossly unremarkable other than some atherosclerotic plaquing on  this study which is essentially unenhanced. No pericardial or  pleural fluid is seen.    Bibasilar groundglass  attenuation opacities are seen. Focal  triangular-shaped scarring is seen at the tip of the lingula  medially.    ABDOMEN:  No acute process in the visualized portions of the abdomen.         Impression:          1. No pulmonary embolus is seen.  2. Bilateral groundglass attenuation opacities are noted.  Findings are nonspecific but could be seen with atypical  infection.     Electronically signed by:  Eric Kerns MD  1/7/2022 10:12 PM CST  Workstation: [id number]    XR Chest 1 View [[id number]] Collected: 01/07/22 2055     Updated: 01/07/22 2113    Narrative:      PORTABLE CHEST X-RAY    CLINICAL HISTORY: Chest pain protocol  chest pain protocol    COMPARISON:  None that are available at the time of  interpretation. .    FINDINGS:  Single portable view of the chest obtained.  There are  various overlying monitor leads/artifacts in place. The lungs are  well expanded and clear where they can be visualized.  Cardiac  size is within normal limits.  Vascularity is normal considering  technique.  No pleural fluid is demonstrated by portable imaging.   There are vascular calculi. There are surgical clips as well as  small rounded and linear metallic densities in the upper abdomen.        Impression:        No active disease by portable imaging.    Electronically signed by:  Basim Puentes MD  1/7/2022 9:11 PM CST  Workstation: [id number]            I reviewed the patient's new clinical results.    ASSESSMENT AND PLAN: This is a 66 y.o. female with:    Active Hospital Problems    Diagnosis  POA   • **Chest pain with high risk for cardiac etiology [R07.9]  Yes     STEMI vs Acute Pericarditis.   -Cath in 2014 did not show obstructive disease  -Initial CXR (1/7; 2057) showed no acute disease. Repeat CXR (1/7; 2235) later showed bilateral opacities with concern for acute pulmonary edema.  -Initial EKG (1/7; 2032) showed ST elevation in inferior leads. Repeat EKG (1/7; 2047) showed ST elevations, tachycardia, concern for  pericarditis  -TroponinL 0.493 -> 0.754  -HARPREET therapy  -NPO  -IVF @ 75 ml/hr  -EKG prn for chest pain  -Cardiology consulted, Dr. Walker. Recommendations apprecaited.   +Cath lab on admission 1/8/22     • Type 2 diabetes mellitus (HCC) [E11.9]  Yes     -HbA1c (4/2021): 5.8%  -Hold home Metformin 500mg BID  -SSI low dose  -Glucose checks QID  -Consistent carb diet once no longer NPO       • Anxiety [F41.9]  Yes     -Continue home Prozac 10mg     • Chronic bronchitis (HCC) [J42]  Yes     -Not on any inhalers currently  -Quit tobacco use >5 years ago  -Supplemental O2 to maintain sats 88-92%     • Primary hypertension [I10]  Yes     -Continue home Lisinopril 10mg, Norvasc 2.5mg  -Monitor per floor policy         DVT prophylaxis: Heparin     Maria Yepez and I have discussed pain goals for this hospitalization after reviewing her current clinical condition, medical history and prior pain experiences.  The goal is to keep the pain level <5/10.  To help achieve this, I plan to norco, morphine.    MILES reviewed via Epic PDMP, reviewed and consistent with patient reported medications.    Expected Length of Stay: Where: home and When:  2 days    I discussed the patient's findings and my recommendations with patient and consulting provider.     Hu To MD is the attending on record at time of admission, He is aware of the patient's status and agrees with the above history and physical.      Savage Batista M.D. PGY3  [facility]  [address] [place] [address]  Office: [phone]  This document has been electronically signed by Savage Batista MD on January 8, 2022 03:02 CST

## 2022-01-08 NOTE — ED PROVIDER NOTES
Subjective   Patient presents to the emergency department via EMS with chest pain that began roughly 2 hours prior to arrival.      Chest Pain  Pain location:  Substernal area  Pain quality: aching    Pain severity:  Moderate  Onset quality:  Sudden  Duration:  2 hours  Timing:  Constant  Progression:  Waxing and waning  Relieved by:  Nothing  Worsened by:  Nothing  Associated symptoms: abdominal pain and shortness of breath    Associated symptoms: no cough, no diaphoresis, no dizziness, no dysphagia, no fatigue, no fever, no headache, no nausea, no vomiting and no weakness    Risk factors: coronary artery disease, diabetes mellitus, high cholesterol and hypertension    Risk factors: not obese and no smoking (quit smoking 5 years ago)        Review of Systems   Constitutional: Positive for activity change. Negative for appetite change, chills, diaphoresis, fatigue and fever.   HENT: Negative for congestion, ear discharge, ear pain, nosebleeds, rhinorrhea, sinus pressure, sore throat and trouble swallowing.    Eyes: Negative for discharge and redness.   Respiratory: Positive for shortness of breath. Negative for apnea, cough, chest tightness and wheezing.    Cardiovascular: Positive for chest pain.   Gastrointestinal: Positive for abdominal pain. Negative for diarrhea, nausea and vomiting.   Endocrine: Negative for polyuria.   Genitourinary: Negative for dysuria, frequency and urgency.   Musculoskeletal: Negative for myalgias and neck pain.   Skin: Negative for color change and rash.   Allergic/Immunologic: Negative for immunocompromised state.   Neurological: Negative for dizziness, seizures, syncope, weakness, light-headedness and headaches.   Hematological: Negative for adenopathy. Does not bruise/bleed easily.   Psychiatric/Behavioral: Negative for behavioral problems and confusion.   All other systems reviewed and are negative.      Past Medical History:   Diagnosis Date   • Abdominal pain    • Acute bronchitis   "  • Acute pancreatitis    • Acute posthemorrhagic anemia    • Acute sinusitis    • Anal pain    • Anemia    • Anemia due to blood loss    • Anxiety    • Backache     mid and lower back     • Carotid artery disease (HCC)    • Chronic back pain    • Chronic obstructive lung disease (HCC)    • Coronary atherosclerosis    • Diabetic neuropathy (HCC)    • Diarrhea    • Diverticulosis of colon without diverticulitis    • Dysphagia    • Dysuria    • Emphysema/COPD (HCC)     \"Emphysema\"   • Encounter for immunization    • Epigastric pain    • Esophagitis     with stricture   • Essential hypertension    • Fatigue    • Foot pain    • Ganglion cyst of wrist     left wrist     • Gastroesophageal reflux disease    • Generalized abdominal pain    • Gout    • Headache    • Herpes simplex    • Hip pain, bilateral    • History of transfusion    • Hoarse    • Hyperlipidemia    • Incontinence of feces    • Insomnia    • Irritable bowel syndrome    • Joint pain    • Knee pain    • Left lower quadrant pain    • Left upper quadrant pain    • Leg pain    • Leukorrhea     not specified as infective    • Low back pain     injury   • Muscle pain    • Nausea    • Nausea    • Nausea and vomiting    • Neck pain    • Neck pain    • Noncompliance with medication regimen    • Osteopenia    • Pain in lower back    • Pain in right femur    • Palpitations    • Productive cough    • PTSD (post-traumatic stress disorder)    • Scoliosis deformity of spine    • Screening examination for venereal disease    • Shoulder pain, left    • Stricture of esophagus    • Symptomatic menopausal or female climacteric states    • Synovitis and tenosynovitis     left forearm   • Tenderness     Tenderness of left upper quadrant of abdomen      • Thoracic back pain    • Type 2 diabetes mellitus (HCC)    • Upper abdominal pain    • Urgency of urination     Urgent desire to urinate      • Urinary frequency     due to benign prostatic hypertrophy      • Urinary tract " "infection     site not specified   • Urinary tract infectious disease    • Venous varices    • Weakness     General symptom - weakness    • Weight gain        Allergies   Allergen Reactions   • Albuterol Shortness Of Breath and Palpitations     Heart rate shot up   • Adhesive Tape Itching   • Claritin [Loratadine] Other (See Comments)     \"weird feeling\"   • Ibuprofen GI Intolerance and Unknown - High Severity   • Lactose Intolerance (Gi) GI Intolerance   • Latex Itching   • Shellfish-Derived Products Itching       Past Surgical History:   Procedure Laterality Date   • ANKLE OPEN REDUCTION INTERNAL FIXATION Right 10/28/2021    Procedure: OPEN REDUCTION AND INTERNAL FIXATION OF RIGHT ANKLE FRACTURE;  Surgeon: Danial Carter DPM;  Location: [facility] OR;  Service: Podiatry;  Laterality: Right;   • BREAST BIOPSY      Stereotactic breast biopsy (1)  left   • CARDIAC CATHETERIZATION  2014    Cardiac cath 45195 (1)  Non obstructive coronary artery disease. Normal left ventricular systolic function. Performed at [facility].   • CARPAL TUNNEL RELEASE Bilateral    •  SECTION     • CHOLECYSTECTOMY      laparoscopic   • COLONOSCOPY N/A 2017    Procedure: COLONOSCOPY;  Surgeon: Elia Cheatham MD;  Location: [facility] ENDOSCOPY;  Service:    • ENDOSCOPY  10/24/2012    Colon endoscopy 66126 (2)  internal & external hemorrhoids found. Diverticulum in sigmoid colon.   • ENDOSCOPY  2014    EGD w/ Dilatation 94895 (1)  Esophageal stricture was present. Dilatation performed. Gastritis found in the stomach. Biopsy taken. Normal duodenum.   • ENDOSCOPY  2013    EGD w/ tube 36999 (3)  Normal esophagus. Gastritis in stomach. Biopsy taken. Normal duodenum. Biopsy taken.   • ENDOSCOPY N/A 2017    Procedure: ESOPHAGOGASTRODUODENOSCOPY, possible dilation;  Surgeon: Grant Morris MD;  Location: [facility] ENDOSCOPY;  Service:    • ENDOSCOPY N/A 3/10/2020    Procedure: " ESOPHAGOGASTRODUODENOSCOPY;  Surgeon: Grant Morris MD;  Location: [facility] ENDOSCOPY;  Service: General;  Laterality: N/A;   • FRACTURE SURGERY Right     Arm Surgery (1)  right, arm fracture   • HERNIA REPAIR      Hernia repair w/mesh (1)   • HYSTERECTOMY      Anesth, hysterectomy (1)   • INCISION AND DRAINAGE ABSCESS N/A 9/5/2017    Procedure: INCISION AND DRAINAGE OF ABDOMINAL WOUND;  Surgeon: Grant Morris MD;  Location: [facility] OR;  Service:    • INJECTION OF MEDICATION  05/27/2016    Depo Medrol (Methylprednisone) 80mg (2)  Ordered By: BRIAN LOU ([facility])    • INJECTION OF MEDICATION  03/10/2016    Kenalog (4)  Ordered By: ARNOLDO MURDOCK ([facility])    • INJECTION OF MEDICATION  01/07/2016    Rocephin (1)  Ordered By: ARNOLDO MURDOCK ([facility])    • LYMPH NODE BIOPSY      Biopsy/removal, lymph node(s) (1)  cervical node biopsy   • NECK SURGERY  2013   • NISSEN FUNDOPLICATION     • NISSEN FUNDOPLICATION N/A 6/7/2017    Procedure: OPEN REVISION OF NISSEN FUNDOPLICATION WITH GASTROSTOMY TUBE PLACEMENT ;  Surgeon: Grant Morris MD;  Location: [facility] OR;  Service:    • TOTAL ABDOMINAL HYSTERECTOMY      RUPALI BSO   • TRIGGER FINGER RELEASE Bilateral    • TUBAL ABDOMINAL LIGATION     • UPPER GASTROINTESTINAL ENDOSCOPY  05/23/2017   • VENTRAL/INCISIONAL HERNIA REPAIR N/A 10/13/2017    Procedure: REMOVAL OF INFECTED MESH, REPAIR OF ABDOMINAL WALL   (Will need Strattice mesh in room).;  Surgeon: Grant Morris MD;  Location: [facility];  Service:        Family History   Problem Relation Age of Onset   • Lung cancer Mother    • Hypertension Mother    • Cancer Mother    • Lymphoma Father         NonHodgkins   • Hypertension Father    • Diabetes Other    • Hypertension Other    • Hypertension Sister    • Diabetes Sister    • Hypertension Brother    • Diabetes Brother        Social History     Socioeconomic History   • Marital status: Single   Tobacco Use   • Smoking status: Former Smoker     Years: 0.00      Quit date: 2007     Years since quitting: 15.0   • Smokeless tobacco: Never Used   Vaping Use   • Vaping Use: Never used   Substance and Sexual Activity   • Alcohol use: Not Currently   • Drug use: No   • Sexual activity: Defer           Objective   Physical Exam  Vitals and nursing note reviewed.   Constitutional:       Appearance: She is well-developed. She is ill-appearing.   HENT:      Head: Normocephalic and atraumatic.      Nose: Nose normal.   Eyes:      General: No scleral icterus.        Right eye: No discharge.         Left eye: No discharge.      Conjunctiva/sclera: Conjunctivae normal.      Pupils: Pupils are equal, round, and reactive to light.   Neck:      Trachea: No tracheal deviation.   Cardiovascular:      Rate and Rhythm: Regular rhythm. Tachycardia present.      Heart sounds: Normal heart sounds. No murmur heard.      Pulmonary:      Effort: Pulmonary effort is normal. No respiratory distress.      Breath sounds: Normal breath sounds. No stridor. No wheezing or rales.   Abdominal:      General: Bowel sounds are normal. There is no distension.      Palpations: Abdomen is soft. There is no mass.      Tenderness: There is no abdominal tenderness. There is no guarding or rebound.   Musculoskeletal:      Cervical back: Normal range of motion and neck supple.   Skin:     General: Skin is warm and dry.      Findings: No erythema or rash.   Neurological:      Mental Status: She is alert and oriented to person, place, and time.      Coordination: Coordination normal.   Psychiatric:         Behavior: Behavior normal.         Thought Content: Thought content normal.         Procedures           ED Course  ED Course as of 01/08/22 0109   Fri Jan 07, 2022 2047 EKG findings discussed with Dr. Walker.  We will wait for labs and repeat imaging to reassess patient. [CB]   Sat Jan 08, 2022   0056 All three EKGs were reviewed by cardiologist as well as upward trending troponins. Hospitalist (Dr Yoder) and  cardiologist (Dr. Walker) have spoken, and Dr. Walker will admit. Heparin initiated in the ED.  [CB]      ED Course User Index  [CB] Alvaro Johnson MD                   Labs Reviewed   TROPONIN (IN-HOUSE) - Abnormal; Notable for the following components:       Result Value    Troponin T 0.493 (*)     All other components within normal limits    Narrative:     Troponin T Reference Range:  <= 0.03 ng/mL-   Negative for AMI  >0.03 ng/mL-     Abnormal for myocardial necrosis.  Clinicians would have to utilize clinical acumen, EKG, Troponin and serial changes to determine if it is an Acute Myocardial Infarction or myocardial injury due to an underlying chronic condition.       Results may be falsely decreased if patient taking Biotin.     TROPONIN (IN-HOUSE) - Abnormal; Notable for the following components:    Troponin T 0.754 (*)     All other components within normal limits    Narrative:     Troponin T Reference Range:  <= 0.03 ng/mL-   Negative for AMI  >0.03 ng/mL-     Abnormal for myocardial necrosis.  Clinicians would have to utilize clinical acumen, EKG, Troponin and serial changes to determine if it is an Acute Myocardial Infarction or myocardial injury due to an underlying chronic condition.       Results may be falsely decreased if patient taking Biotin.     COMPREHENSIVE METABOLIC PANEL - Abnormal; Notable for the following components:    Glucose 157 (*)     Sodium 135 (*)     Chloride 97 (*)     ALT (SGPT) 75 (*)     AST (SGOT) 220 (*)     Alkaline Phosphatase 137 (*)     All other components within normal limits    Narrative:     GFR Normal >60  Chronic Kidney Disease <60  Kidney Failure <15     CBC WITH AUTO DIFFERENTIAL - Abnormal; Notable for the following components:    WBC 11.84 (*)     RBC 3.52 (*)     Hemoglobin 10.5 (*)     Hematocrit 31.6 (*)     Neutrophil % 86.6 (*)     Lymphocyte % 9.0 (*)     Monocyte % 3.6 (*)     Eosinophil % 0.2 (*)     Neutrophils, Absolute 10.25 (*)     All other  components within normal limits   LIPASE - Abnormal; Notable for the following components:    Lipase 6 (*)     All other components within normal limits   CK - Abnormal; Notable for the following components:    Creatine Kinase 264 (*)     All other components within normal limits   MAGNESIUM - Abnormal; Notable for the following components:    Magnesium 1.2 (*)     All other components within normal limits   D-DIMER, QUANTITATIVE - Abnormal; Notable for the following components:    D-Dimer, Quantitative 484 (*)     All other components within normal limits    Narrative:     Dimer values <500 ng/ml FEU are FDA approved as aid in diagnosis of deep venous thrombosis and pulmonary embolism.  This test should not be used in an exclusion strategy with pretest probability alone.    A recent guideline regarding diagnosis for pulmonary thromboembolism recommends an adjusted exclusion criterion of age x 10 ng/ml FEU for patients >50 years of age ([employer]ja Intern Med 2015; 163: 701-711).     COVID-19 AND FLU A/B, NP SWAB IN TRANSPORT MEDIA 8-12 HR TAT - Normal    Narrative:     Fact sheet for providers: https://www.fda.gov/media/595379/download    Fact sheet for patients: https://www.fda.gov/media/265038/download    Test performed by PCR.   BNP (IN-HOUSE) - Normal    Narrative:     Among patients with dyspnea, NT-proBNP is highly sensitive for the detection of acute congestive heart failure. In addition NT-proBNP of <300 pg/ml effectively rules out acute congestive heart failure with 99% negative predictive value.    Results may be falsely decreased if patient taking Biotin.     BLOOD CULTURE   BLOOD CULTURE   RAINBOW DRAW    Narrative:     The following orders were created for panel order [place] Draw.  Procedure                               Abnormality         Status                     ---------                               -----------         ------                     Green Top (Gel)[[id number]]                                   Final result               Lavender Top[[id number]]                                     Final result               Gold Top - SST[[id number]]                                   Final result               Light Blue Top[[id number]]                                   Final result                 Please view results for these tests on the individual orders.   PROCALCITONIN   LACTIC ACID, PLASMA   PROTIME-INR   APTT   CBC WITH AUTO DIFFERENTIAL   CBC AND DIFFERENTIAL    Narrative:     The following orders were created for panel order CBC & Differential.  Procedure                               Abnormality         Status                     ---------                               -----------         ------                     CBC Auto Differential[[id number]]        Abnormal            Final result                 Please view results for these tests on the individual orders.   GREEN TOP   LAVENDER TOP   GOLD TOP - SST   LIGHT BLUE TOP   CBC AND DIFFERENTIAL    Narrative:     The following orders were created for panel order CBC & Differential.  Procedure                               Abnormality         Status                     ---------                               -----------         ------                     CBC Auto Differential[[id number]]                                                         Please view results for these tests on the individual orders.       XR Chest 1 View   Final Result   Development of significant bilateral opacities likely   acute pulmonary edema         Electronically signed by:  Basim Puentes MD  1/7/2022 10:57 PM   CST Workstation: [id number]      CT Angiogram Chest   Final Result         1. No pulmonary embolus is seen.   2. Bilateral groundglass attenuation opacities are noted.   Findings are nonspecific but could be seen with atypical   infection.       Electronically signed by:  Eric Kerns MD  1/7/2022 10:12 PM CST   Workstation: [id number]      CT Abdomen Pelvis With Contrast    Final Result   1.  No acute abdominal or pelvic findings.   2.  Hepatic steatosis.   3.  Diverticulosis without diverticulitis.      Electronically signed by:  Chanel Salcido  1/7/2022 11:53 PM CST   Workstation: [id number]      XR Chest 1 View   Final Result      No active disease by portable imaging.      Electronically signed by:  Basim Puentes MD  1/7/2022 9:11 PM CST   Workstation: [id number]                                             MDM    Final diagnoses:   Precordial pain       ED Disposition  ED Disposition     ED Disposition Condition Comment    Decision to Admit  Level of Care: Critical Care [6]   Diagnosis: Precordial pain [786.51.ICD-9-CM]   Admitting Physician: KWABENA NORRIS [[id number]]   Attending Physician: KWABENA NORRIS [[id number]]   Certification: I Certify That Inpatient Hospital Services Are Medically Necessary For Greater Than 2 Midnights            No follow-up provider specified.       Medication List      No changes were made to your prescriptions during this visit.          Alvaro Johnson MD  01/08/22 0109

## 2022-01-08 NOTE — PLAN OF CARE
Goal Outcome Evaluation:  Plan of Care Reviewed With: patient        Progress: no change  Outcome Summary: Patient is a new admit to CCU from cath lab post cardiac cath. Right groin site is clean and dry, soft and nontender with no evidence of bleeding; tachycardic in the 120's, on a NRB being titrated down as tolerated; Patient appears to be very anxious; Scab located on outer right hip, patient states this is from a heating pad burn; will continue to monitor

## 2022-01-08 NOTE — PAYOR COMM NOTE
"Kacy rivera rn [facility] phone [phone]   fax [phone]  Inpatient admission          Maria Yepez (66 y.o. Female)             Date of Birth Social Security Number Address Home Phone MRN    [id number]  [address]  [facility] [address] [phone] [id number]    [place] Marital Status             None Single       Admission Date Admission Type Admitting Provider Attending Provider Department, Room/Bed    1/7/22 Emergency Hu To MD McNevin, James, MD [facility] CRITICAL CARE STEPDOWN, 16/A    Discharge Date Discharge Disposition Discharge Destination                         Attending Provider: Grant Ignacio MD    Allergies: Albuterol, Adhesive Tape, Claritin [Loratadine], Ibuprofen, Lactose Intolerance (Gi), Latex, Shellfish-derived Products    Isolation: None   Infection: COVID (confirmed) (10/26/21)   Code Status: CPR   Advance Care Planning Activity    Ht: 152.4 cm (60\")   Wt: 56 kg (123 lb 7.3 oz)    Admission Cmt: None   Principal Problem: Chest pain with high risk for cardiac etiology [R07.9] More...                 Active Insurance as of 1/7/2022     Primary Coverage     Payor Plan Insurance Group Employer/Plan Group    [facility] MEDICARE REPLACEMENT [facility] MEDICARE REPLACEMENT      Payor Plan Address Payor Plan Phone Number Payor Plan Fax Number Effective Dates    [address] [address] [phone]  10/1/2021 - None Entered    [facility] [id number]       Subscriber Name Subscriber Birth Date Member ID       MARIA YEPEZ [id number]                 Emergency Contacts      (Rel.) Home Phone Work Phone Mobile Phone    Kim Medley (Daughter) [phone] -- [phone]    Nohelia Martínez (Friend) [phone] -- [phone]               History & Physical      Savage Batista MD at 01/08/22 0233     Attestation signed by Hu To MD at 01/08/22 1115    I have performed a " history and physical examination of the patient. I have discussed the management of the patient with the resident.  I have reviewed the notes, assessment and plan, and/or procedures  performed by the resident. I concur with the resident’s documentation.  I have noted the following changes from initial resident’s H and P:     Patient had a heart cath overnight and currently being treated medically. She reports improvement in her chest pain. She is complaining of feeling congested this morning. Her breathing is improving and her oxygen is weaned down to 8 L on HFNC.     Physical Exam:  General: NAD.  CV: S1 and S2 normal. Tachycardic   Pulmonary: Diminished breath sounds present bilaterally.   Abdomen: Bowel sounds present and normal.  Abdomen is soft, and nontender.  Extremities: No lower extremity edema.      Plan: Cut down fluid rate. Wean down oxygen as tolerated. Rocephin/Azithromycin for antibiotic coverage. Continue medical management for CAD - Cardiology following.       This document has been electronically signed by Hu To MD on 2022 11:15 CST                               HISTORY AND PHYSICAL  NAME: Maria Yepez  : 1955  MRN: [id number]    DATE OF ADMISSION:  2022     DATE & TIME SEEN: 22 at 138    PCP: Sammie Gonzalez DO    CODE STATUS: Full code    CHIEF COMPLAINT:  Chest pain    HPI:  Maria Yepez is a 66 y.o. female with a CMH of DM, HTN, COPD who presents complaining of  Chest pain. Symptoms started approximately 1900, ~2 hours prior to presentation. Patient is somewhat of a poor historian. Describes substernal, pressure like sensation that is 8-9/10 in intensity without radiation. Pain is not worsened by deep breathing. Associated symptoms include SOA, palpitations, and nausea. Denies diaphoresis, syncope, fever, chills. Reports a productive cough that started 1 day ago. Currently on NRB; reports breathing and chest pain are improved. Of  "note, she had heart cath in 2014 that did not show obstruction in [place].    CONCURRENT MEDICAL HISTORY:  Past Medical History:   Diagnosis Date   • Abdominal pain    • Acute bronchitis    • Acute pancreatitis    • Acute posthemorrhagic anemia    • Acute sinusitis    • Anal pain    • Anemia    • Anemia due to blood loss    • Anxiety    • Backache     mid and lower back     • Carotid artery disease (HCC)    • Chronic back pain    • Chronic obstructive lung disease (HCC)    • Coronary atherosclerosis    • Diabetic neuropathy (HCC)    • Diarrhea    • Diverticulosis of colon without diverticulitis    • Dysphagia    • Dysuria    • Emphysema/COPD (HCC)     \"Emphysema\"   • Encounter for immunization    • Epigastric pain    • Esophagitis     with stricture   • Essential hypertension    • Fatigue    • Foot pain    • Ganglion cyst of wrist     left wrist     • Gastroesophageal reflux disease    • Generalized abdominal pain    • Gout    • Headache    • Herpes simplex    • Hip pain, bilateral    • History of transfusion    • Hoarse    • Hyperlipidemia    • Incontinence of feces    • Insomnia    • Irritable bowel syndrome    • Joint pain    • Knee pain    • Left lower quadrant pain    • Left upper quadrant pain    • Leg pain    • Leukorrhea     not specified as infective    • Low back pain     injury   • Muscle pain    • Nausea    • Nausea    • Nausea and vomiting    • Neck pain    • Neck pain    • Noncompliance with medication regimen    • Osteopenia    • Pain in lower back    • Pain in right femur    • Palpitations    • Productive cough    • PTSD (post-traumatic stress disorder)    • Scoliosis deformity of spine    • Screening examination for venereal disease    • Shoulder pain, left    • Stricture of esophagus    • Symptomatic menopausal or female climacteric states    • Synovitis and tenosynovitis     left forearm   • Tenderness     Tenderness of left upper quadrant of abdomen      • Thoracic back pain    • Type 2 " diabetes mellitus (HCC)    • Upper abdominal pain    • Urgency of urination     Urgent desire to urinate      • Urinary frequency     due to benign prostatic hypertrophy      • Urinary tract infection     site not specified   • Urinary tract infectious disease    • Venous varices    • Weakness     General symptom - weakness    • Weight gain        PAST SURGICAL HISTORY:  Past Surgical History:   Procedure Laterality Date   • ANKLE OPEN REDUCTION INTERNAL FIXATION Right 10/28/2021    Procedure: OPEN REDUCTION AND INTERNAL FIXATION OF RIGHT ANKLE FRACTURE;  Surgeon: Danial Carter DPM;  Location: [facility] OR;  Service: Podiatry;  Laterality: Right;   • BREAST BIOPSY      Stereotactic breast biopsy (1)  left   • CARDIAC CATHETERIZATION  2014    Cardiac cath 86405 (1)  Non obstructive coronary artery disease. Normal left ventricular systolic function. Performed at [facility].   • CARPAL TUNNEL RELEASE Bilateral    •  SECTION     • CHOLECYSTECTOMY      laparoscopic   • COLONOSCOPY N/A 2017    Procedure: COLONOSCOPY;  Surgeon: Elia Cheatham MD;  Location: [facility] ENDOSCOPY;  Service:    • ENDOSCOPY  10/24/2012    Colon endoscopy 36057 (2)  internal & external hemorrhoids found. Diverticulum in sigmoid colon.   • ENDOSCOPY  2014    EGD w/ Dilatation 92974 (1)  Esophageal stricture was present. Dilatation performed. Gastritis found in the stomach. Biopsy taken. Normal duodenum.   • ENDOSCOPY  2013    EGD w/ tube 76581 (3)  Normal esophagus. Gastritis in stomach. Biopsy taken. Normal duodenum. Biopsy taken.   • ENDOSCOPY N/A 2017    Procedure: ESOPHAGOGASTRODUODENOSCOPY, possible dilation;  Surgeon: Grant Morris MD;  Location: [facility] ENDOSCOPY;  Service:    • ENDOSCOPY N/A 3/10/2020    Procedure: ESOPHAGOGASTRODUODENOSCOPY;  Surgeon: Grant Morris MD;  Location: [facility] ENDOSCOPY;  Service: General;  Laterality: N/A;   • FRACTURE SURGERY  Right     Arm Surgery (1)  right, arm fracture   • HERNIA REPAIR      Hernia repair w/mesh (1)   • HYSTERECTOMY      Anesth, hysterectomy (1)   • INCISION AND DRAINAGE ABSCESS N/A 9/5/2017    Procedure: INCISION AND DRAINAGE OF ABDOMINAL WOUND;  Surgeon: Grant Morris MD;  Location: [facility];  Service:    • INJECTION OF MEDICATION  05/27/2016    Depo Medrol (Methylprednisone) 80mg (2)  Ordered By: BRIAN LOU ([facility])    • INJECTION OF MEDICATION  03/10/2016    Kenalog (4)  Ordered By: ARNOLDO MURDOCK ([facility])    • INJECTION OF MEDICATION  01/07/2016    Rocephin (1)  Ordered By: ARNOLDO MURDOCK ([facility])    • LYMPH NODE BIOPSY      Biopsy/removal, lymph node(s) (1)  cervical node biopsy   • NECK SURGERY  2013   • NISSEN FUNDOPLICATION     • NISSEN FUNDOPLICATION N/A 6/7/2017    Procedure: OPEN REVISION OF NISSEN FUNDOPLICATION WITH GASTROSTOMY TUBE PLACEMENT ;  Surgeon: Grant Morris MD;  Location: [facility];  Service:    • TOTAL ABDOMINAL HYSTERECTOMY      RUPALI BSO   • TRIGGER FINGER RELEASE Bilateral    • TUBAL ABDOMINAL LIGATION     • UPPER GASTROINTESTINAL ENDOSCOPY  05/23/2017   • VENTRAL/INCISIONAL HERNIA REPAIR N/A 10/13/2017    Procedure: REMOVAL OF INFECTED MESH, REPAIR OF ABDOMINAL WALL   (Will need Strattice mesh in room).;  Surgeon: Grant Morris MD;  Location: [facility];  Service:        FAMILY HISTORY:  Family History   Problem Relation Age of Onset   • Lung cancer Mother    • Hypertension Mother    • Cancer Mother    • Lymphoma Father         NonHodgkins   • Hypertension Father    • Diabetes Other    • Hypertension Other    • Hypertension Sister    • Diabetes Sister    • Hypertension Brother    • Diabetes Brother         SOCIAL HISTORY:  Social History     Socioeconomic History   • Marital status: Single   Tobacco Use   • Smoking status: Former Smoker     Years: 0.00     Quit date: 2007     Years since quitting: 15.0   • Smokeless tobacco: Never Used   Vaping Use   • Vaping Use: Never  used   Substance and Sexual Activity   • Alcohol use: Not Currently   • Drug use: No   • Sexual activity: Defer       HOME MEDICATIONS:  Prior to Admission medications    Medication Sig Start Date End Date Taking? Authorizing Provider   amLODIPine (NORVASC) 2.5 MG tablet Take 1 tablet by mouth 2 (Two) Times a Day. 6/9/21   Bettie Panda MD   aspirin 81 MG EC tablet Take 1 tablet by mouth Every Night. Hx of CAD 10/25/17   Manjinder Joshi MD   atenolol (TENORMIN) 25 MG tablet Take 25 mg by mouth Every Night.    Bettie Panda MD   FLUoxetine (PROzac) 10 MG capsule Take 10 mg by mouth Every Night.    Bettie Panda MD   furosemide (LASIX) 20 MG tablet Take 20 mg by mouth Every Other Day. Takes in the morning    Bettie Panda MD   gabapentin (NEURONTIN) 400 MG capsule Take 800 mg by mouth Every Night. 9/28/21   Bettie Panda MD   HYDROcodone-acetaminophen (NORCO) 7.5-325 MG per tablet  11/2/21   Bettie Panda MD   lisinopril (PRINIVIL,ZESTRIL) 5 MG tablet Take 5 mg by mouth Every Night.    Bettie Panda MD   metFORMIN (GLUCOPHAGE) 500 MG tablet Take 1 tablet by mouth 2 (Two) Times a Day With Meals. 4/16/18   Manjinder Joshi MD   nitroglycerin (NITROSTAT) 0.4 MG SL tablet Place 1 tablet under the tongue Every 5 (Five) Minutes As Needed for Chest Pain. 8/24/17   Manjinder Joshi MD   Omega-3 Fatty Acids (FISH OIL) 1000 MG capsule capsule Take 1,000 mg by mouth Daily With Breakfast.    Bettie Panda MD   ondansetron (ZOFRAN) 4 MG tablet Take 1 tablet by mouth Every 8 (Eight) Hours As Needed for Nausea or Vomiting. 10/25/17   Manjinder Joshi MD   pantoprazole (PROTONIX) 40 MG EC tablet Take 40 mg by mouth Every Night.    Bettie Panda MD   pravastatin (PRAVACHOL) 80 MG tablet Take 1 tablet by mouth Every Night. 8/4/21   Bettie Panda MD   silver sulfadiazine (SILVADENE, SSD) 1 % cream Apply 1 application topically to the appropriate  area as directed 2 (Two) Times a Day. 10/20/21   Cesar Shi PA-C   tiZANidine (ZANAFLEX) 4 MG tablet Take 4 mg by mouth 2 (Two) Times a Day. 4/26/21   ProviderBettie MD   traZODone (DESYREL) 150 MG tablet Take 150-300 mg by mouth Every Night.    Provider, MD Bettie   vitamin D (ERGOCALCIFEROL) 93608 units capsule capsule Take 1 capsule by mouth Every 14 (Fourteen) Days. 10/25/17   Manjinder Joshi MD       ALLERGIES:  Albuterol, Adhesive tape, Claritin [loratadine], Ibuprofen, Lactose intolerance (gi), Latex, and Shellfish-derived products    REVIEW OF SYSTEMS  Review of Systems   Constitutional: Negative for activity change, appetite change, chills, diaphoresis, fatigue and fever.   HENT: Negative for congestion, rhinorrhea, sinus pain and sore throat.    Eyes: Negative for pain, redness and visual disturbance.   Respiratory: Positive for cough and shortness of breath. Negative for wheezing.    Cardiovascular: Positive for chest pain and palpitations. Negative for leg swelling.   Gastrointestinal: Positive for diarrhea. Negative for abdominal pain, constipation, nausea and vomiting.   Genitourinary: Negative for dysuria and flank pain.   Musculoskeletal: Negative for arthralgias, back pain, joint swelling and myalgias.   Skin: Negative for color change, pallor and rash.   Neurological: Negative for dizziness, light-headedness and headaches.       PHYSICAL EXAM:  Temp:  [97.6 °F (36.4 °C)] 97.6 °F (36.4 °C)  Heart Rate:  [102-126] 126  Resp:  [20-22] 22  BP: (112-166)/(65-99) 112/65  Body mass index is 21.09 kg/m².  Physical Exam  Vitals and nursing note reviewed.   Constitutional:       General: She is not in acute distress.     Appearance: Normal appearance. She is well-developed. She is ill-appearing. She is not diaphoretic.   HENT:      Head: Normocephalic and atraumatic.      Right Ear: Hearing normal.      Left Ear: Hearing normal.   Eyes:      General: Lids are normal.       Conjunctiva/sclera: Conjunctivae normal.   Cardiovascular:      Rate and Rhythm: Regular rhythm. Tachycardia present.      Heart sounds: Normal heart sounds.   Pulmonary:      Effort: Tachypnea and accessory muscle usage present. No respiratory distress.      Breath sounds: Normal breath sounds. No wheezing or rales.      Comments: 15L NRB  Abdominal:      General: Bowel sounds are normal. There is no distension.      Palpations: Abdomen is soft.      Tenderness: There is no abdominal tenderness.   Musculoskeletal:         General: No tenderness or deformity. Normal range of motion.      Right lower leg: No edema.      Left lower leg: No edema.   Skin:     General: Skin is warm and dry.      Coloration: Skin is not pale.      Findings: No erythema or rash.   Neurological:      Mental Status: She is alert and oriented to person, place, and time. She is not disoriented.         DIAGNOSTIC DATA:   Lab Results (last 24 hours)     Procedure Component Value Units Date/Time    Troponin [[id number]]  (Abnormal) Collected: 01/08/22 0214    Specimen: Blood Updated: 01/08/22 0259     Troponin T 0.992 ng/mL     Narrative:      Troponin T Reference Range:  <= 0.03 ng/mL-   Negative for AMI  >0.03 ng/mL-     Abnormal for myocardial necrosis.  Clinicians would have to utilize clinical acumen, EKG, Troponin and serial changes to determine if it is an Acute Myocardial Infarction or myocardial injury due to an underlying chronic condition.       Results may be falsely decreased if patient taking Biotin.      Protime-INR [[id number]]  (Normal) Collected: 01/08/22 0214    Specimen: Blood Updated: 01/08/22 0240     Protime 12.6 Seconds      INR 0.95    Narrative:      Therapeutic range for most indications is 2.0-3.0 INR,  or 2.5-3.5 for mechanical heart valves.    aPTT [[id number]]  (Normal) Collected: 01/08/22 0214    Specimen: Blood Updated: 01/08/22 0240     PTT 26.7 seconds     Narrative:      The recommended Heparin therapeutic range  is 68-97 seconds.    Procalcitonin [[id number]] Collected: 01/08/22 0214    Specimen: Blood Updated: 01/08/22 0217    Protime-INR [[id number]]  (Normal) Collected: 01/07/22 2046    Specimen: Blood Updated: 01/08/22 0201     Protime 12.4 Seconds      INR 0.93    Narrative:      Therapeutic range for most indications is 2.0-3.0 INR,  or 2.5-3.5 for mechanical heart valves.    Lactic Acid, Plasma [[id number]]  (Normal) Collected: 01/08/22 0043    Specimen: Blood Updated: 01/08/22 0147     Lactate 1.9 mmol/L     Blood Culture - Blood, Wrist, Left [[id number]] Collected: 01/08/22 0100    Specimen: Blood from Wrist, Left Updated: 01/08/22 0110    Blood Culture - Blood, Hand, Right [[id number]] Collected: 01/08/22 0043    Specimen: Blood from Hand, Right Updated: 01/08/22 0101    Troponin [[id number]]  (Abnormal) Collected: 01/07/22 2251    Specimen: Blood Updated: 01/07/22 2334     Troponin T 0.754 ng/mL     Narrative:      Troponin T Reference Range:  <= 0.03 ng/mL-   Negative for AMI  >0.03 ng/mL-     Abnormal for myocardial necrosis.  Clinicians would have to utilize clinical acumen, EKG, Troponin and serial changes to determine if it is an Acute Myocardial Infarction or myocardial injury due to an underlying chronic condition.       Results may be falsely decreased if patient taking Biotin.      COVID-19 and FLU A/B PCR - Swab, Nasopharynx [[id number]]  (Normal) Collected: 01/07/22 2219    Specimen: Swab from Nasopharynx Updated: 01/07/22 2255     COVID19 Not Detected     Influenza A PCR Not Detected     Influenza B PCR Not Detected    Narrative:      Fact sheet for providers: https://www.fda.gov/media/778007/download    Fact sheet for patients: https://www.fda.gov/media/958610/download    Test performed by PCR.    [place] Draw [[id number]] Collected: 01/07/22 2046    Specimen: Blood Updated: 01/07/22 2200    Narrative:      The following orders were created for panel order [place] Draw.  Procedure                                Abnormality         Status                     ---------                               -----------         ------                     Green Top (Gel)[[id number]]                                  Final result               Lavender Top[[id number]]                                     Final result               Gold Top - SST[[id number]]                                   Final result               Light Blue Top[[id number]]                                   Final result                 Please view results for these tests on the individual orders.    Lavender Top [[id number]] Collected: 01/07/22 2046    Specimen: Blood Updated: 01/07/22 2200     Extra Tube hold for add-on     Comment: Auto resulted       Gold Top - SST [[id number]] Collected: 01/07/22 2046    Specimen: Blood Updated: 01/07/22 2200     Extra Tube Hold for add-ons.     Comment: Auto resulted.       Light Blue Top [[id number]] Collected: 01/07/22 2046    Specimen: Blood Updated: 01/07/22 2200     Extra Tube hold for add-on     Comment: Auto resulted       Green Top (Gel) [[id number]] Collected: 01/07/22 2046    Specimen: Blood Updated: 01/07/22 2200     Extra Tube Hold for add-ons.     Comment: Auto resulted.       Lipase [[id number]]  (Abnormal) Collected: 01/07/22 2046    Specimen: Blood Updated: 01/07/22 2130     Lipase 6 U/L     CK [[id number]]  (Abnormal) Collected: 01/07/22 2046    Specimen: Blood Updated: 01/07/22 2130     Creatine Kinase 264 U/L     Magnesium [[id number]]  (Abnormal) Collected: 01/07/22 2046    Specimen: Blood Updated: 01/07/22 2130     Magnesium 1.2 mg/dL     D-dimer, Quantitative [[id number]]  (Abnormal) Collected: 01/07/22 2046    Specimen: Blood Updated: 01/07/22 2127     D-Dimer, Quantitative 484 ng/mL (FEU)     Narrative:      Dimer values <500 ng/ml FEU are FDA approved as aid in diagnosis of deep venous thrombosis and pulmonary embolism.  This test should not be used in an exclusion strategy with pretest probability alone.    A  recent guideline regarding diagnosis for pulmonary thromboembolism recommends an adjusted exclusion criterion of age x 10 ng/ml FEU for patients >50 years of age ([employer] Intern Med 2015; 163: 701-711).      Comprehensive Metabolic Panel [[id number]]  (Abnormal) Collected: 01/07/22 2046    Specimen: Blood Updated: 01/07/22 2115     Glucose 157 mg/dL      BUN 13 mg/dL      Creatinine 0.76 mg/dL      Sodium 135 mmol/L      Potassium 4.3 mmol/L      Chloride 97 mmol/L      CO2 24.0 mmol/L      Calcium 9.3 mg/dL      Total Protein 7.3 g/dL      Albumin 4.20 g/dL      ALT (SGPT) 75 U/L      AST (SGOT) 220 U/L      Alkaline Phosphatase 137 U/L      Total Bilirubin 0.4 mg/dL      eGFR Non African Amer 76 mL/min/1.73      Globulin 3.1 gm/dL      A/G Ratio 1.4 g/dL      BUN/Creatinine Ratio 17.1     Anion Gap 14.0 mmol/L     Narrative:      GFR Normal >60  Chronic Kidney Disease <60  Kidney Failure <15      Troponin [[id number]]  (Abnormal) Collected: 01/07/22 2046    Specimen: Blood Updated: 01/07/22 2114     Troponin T 0.493 ng/mL     Narrative:      Troponin T Reference Range:  <= 0.03 ng/mL-   Negative for AMI  >0.03 ng/mL-     Abnormal for myocardial necrosis.  Clinicians would have to utilize clinical acumen, EKG, Troponin and serial changes to determine if it is an Acute Myocardial Infarction or myocardial injury due to an underlying chronic condition.       Results may be falsely decreased if patient taking Biotin.      BNP [[id number]]  (Normal) Collected: 01/07/22 2046    Specimen: Blood Updated: 01/07/22 2113     proBNP 554.9 pg/mL     Narrative:      Among patients with dyspnea, NT-proBNP is highly sensitive for the detection of acute congestive heart failure. In addition NT-proBNP of <300 pg/ml effectively rules out acute congestive heart failure with 99% negative predictive value.    Results may be falsely decreased if patient taking Biotin.      CBC & Differential [[id number]]  (Abnormal) Collected: 01/07/22 2046     Specimen: Blood Updated: 01/07/22 2052    Narrative:      The following orders were created for panel order CBC & Differential.  Procedure                               Abnormality         Status                     ---------                               -----------         ------                     CBC Auto Differential[[id number]]        Abnormal            Final result                 Please view results for these tests on the individual orders.    CBC Auto Differential [[id number]]  (Abnormal) Collected: 01/07/22 2046    Specimen: Blood Updated: 01/07/22 2052     WBC 11.84 10*3/mm3      RBC 3.52 10*6/mm3      Hemoglobin 10.5 g/dL      Hematocrit 31.6 %      MCV 89.8 fL      MCH 29.8 pg      MCHC 33.2 g/dL      RDW 13.2 %      RDW-SD 43.0 fl      MPV 9.0 fL      Platelets 376 10*3/mm3      Neutrophil % 86.6 %      Lymphocyte % 9.0 %      Monocyte % 3.6 %      Eosinophil % 0.2 %      Basophil % 0.3 %      Immature Grans % 0.3 %      Neutrophils, Absolute 10.25 10*3/mm3      Lymphocytes, Absolute 1.06 10*3/mm3      Monocytes, Absolute 0.43 10*3/mm3      Eosinophils, Absolute 0.02 10*3/mm3      Basophils, Absolute 0.04 10*3/mm3      Immature Grans, Absolute 0.04 10*3/mm3      nRBC 0.0 /100 WBC            Imaging Results (Last 24 Hours)     Procedure Component Value Units Date/Time    CT Abdomen Pelvis With Contrast [[id number]] Collected: 01/07/22 2132     Updated: 01/07/22 2354    Narrative:      CT ABDOMEN PELVIS WITH IV CONTRAST    INDICATION: 66 years Female; Abdominal pain, acute, nonlocalized    TECHNIQUE:  CT scan of the abdomen and pelvis was performed with  IV contrast.  This exam was performed according to our  departmental dose-optimization program, which includes automated  exposure control, adjustment of the mA and/or kV according to  patient size and/or use of iterative reconstruction technique.     Comparison: 12/7/2017.    FINDINGS:  Liver: Hepatic steatosis.  Gallbladder/Biliary tree: Status post  cholecystectomy. The common  bile duct is dilated measuring up to 12 mm similar to previous  study and may be within normal limits for postcholecystectomy  changes.  Pancreas: Unremarkable.  Spleen: Unremarkable.  Adrenals: Unremarkable.  Genitourinary: No hydronephrosis. No bladder wall thickening.  Status post hysterectomy. No adnexal masses.  Gastrointestinal: Diverticulosis of the sigmoid and descending  colon without diverticulitis. Mild to moderate stool in the  colon. Normal appendix. No gastric wall thickening. Multiple  surgical clips surrounding the stomach. No free air or free  fluid.  Peritoneum: Unremarkable.  Vasculature: Mild atherosclerosis of the aorta.  Lymph nodes: No pathologically enlarged lymph nodes.  Bones: Mild degenerative changes of the right hip. Multilevel  degenerative changes of the lumbar spine most prominent at L4-5..  Soft tissues: Unremarkable.  Incidental findings: None.      Impression:      1.  No acute abdominal or pelvic findings.  2.  Hepatic steatosis.  3.  Diverticulosis without diverticulitis.    Electronically signed by:  Chanel Salcido  1/7/2022 11:53 PM CST  Workstation: [id number]    XR Chest 1 View [[id number]] Collected: 01/07/22 2235     Updated: 01/07/22 2258    Narrative:      PORTABLE CHEST X-RAY    CLINICAL HISTORY: de sating, change in status.    COMPARISON: 8:28 PM.    FINDINGS: Portable AP view of the chest was obtained with  overlying monitor leads in place. There has been development of  rather significant interstitial and groundglass opacities  bilaterally most consistent with acute pulmonary edema. Heart is  enlarged. No significant pleural fluid. Surgical clips and  metallic densities again noted in the visualized upper abdomen.      Impression:      Development of significant bilateral opacities likely  acute pulmonary edema      Electronically signed by:  Basim Puentes MD  1/7/2022 10:57 PM  CST Workstation: [id number]    CT Angiogram Chest [[id number]]  Collected: 01/07/22 2132     Updated: 01/07/22 2213    Narrative:      CT ANGIOGRAM CHEST  RPID:CT CHEST ANGIOGRAPHY WITH IV CONTRAST    History: 66 years-year-oldFemale with history of chest pain, SOA  COMPARISON: Chest radiograph on this date.  TECHNIQUE: CT acquisition through the chest the uneventful of IV  contrast. Bolus timing per protocol. Coronal reformats, sagittal  reformats, and MIP imaging were performed per local departmental  protocol.    This exam was performed according to our departmental  dose-optimization program, which includes automated exposure  control, adjustment of the mA and/or kV according to patient size  and/or use of iterative reconstruction technique.    Intravenous contrast (agent and volume): 80 mL of Isovue-370    FINDINGS:    Diagnostic quality: Excellent pulmonary arterial opacification,  limited aortic opacification.    No pulmonary artery filling defects. The thoracic aorta is  grossly unremarkable other than some atherosclerotic plaquing on  this study which is essentially unenhanced. No pericardial or  pleural fluid is seen.    Bibasilar groundglass attenuation opacities are seen. Focal  triangular-shaped scarring is seen at the tip of the lingula  medially.    ABDOMEN:  No acute process in the visualized portions of the abdomen.         Impression:          1. No pulmonary embolus is seen.  2. Bilateral groundglass attenuation opacities are noted.  Findings are nonspecific but could be seen with atypical  infection.     Electronically signed by:  Eric Kerns MD  1/7/2022 10:12 PM CST  Workstation: [id number]    XR Chest 1 View [[id number]] Collected: 01/07/22 2055     Updated: 01/07/22 2113    Narrative:      PORTABLE CHEST X-RAY    CLINICAL HISTORY: Chest pain protocol  chest pain protocol    COMPARISON:  None that are available at the time of  interpretation. .    FINDINGS:  Single portable view of the chest obtained.  There are  various overlying monitor leads/artifacts  in place. The lungs are  well expanded and clear where they can be visualized.  Cardiac  size is within normal limits.  Vascularity is normal considering  technique.  No pleural fluid is demonstrated by portable imaging.   There are vascular calculi. There are surgical clips as well as  small rounded and linear metallic densities in the upper abdomen.        Impression:        No active disease by portable imaging.    Electronically signed by:  Basim Puentes MD  1/7/2022 9:11 PM CST  Workstation: [id number]            I reviewed the patient's new clinical results.    ASSESSMENT AND PLAN: This is a 66 y.o. female with:    Active Hospital Problems    Diagnosis  POA   • **Chest pain with high risk for cardiac etiology [R07.9]  Yes     STEMI vs Acute Pericarditis.   -Cath in 2014 did not show obstructive disease  -Initial CXR (1/7; 2057) showed no acute disease. Repeat CXR (1/7; 2235) later showed bilateral opacities with concern for acute pulmonary edema.  -Initial EKG (1/7; 2032) showed ST elevation in inferior leads. Repeat EKG (1/7; 2047) showed ST elevations, tachycardia, concern for pericarditis  -TroponinL 0.493 -> 0.754  -HARPREET therapy  -NPO  -IVF @ 75 ml/hr  -EKG prn for chest pain  -Cardiology consulted, Dr. Walker. Recommendations apprecaited.   +Cath lab on admission 1/8/22     • Type 2 diabetes mellitus (HCC) [E11.9]  Yes     -HbA1c (4/2021): 5.8%  -Hold home Metformin 500mg BID  -SSI low dose  -Glucose checks QID  -Consistent carb diet once no longer NPO       • Anxiety [F41.9]  Yes     -Continue home Prozac 10mg     • Chronic bronchitis (HCC) [J42]  Yes     -Not on any inhalers currently  -Quit tobacco use >5 years ago  -Supplemental O2 to maintain sats 88-92%     • Primary hypertension [I10]  Yes     -Continue home Lisinopril 10mg, Norvasc 2.5mg  -Monitor per floor policy         DVT prophylaxis: Heparin     Maria Yepez and I have discussed pain goals for this hospitalization after reviewing  her current clinical condition, medical history and prior pain experiences.  The goal is to keep the pain level <5/10.  To help achieve this, I plan to norco, morphine.    MILES reviewed via Epic PDMP, reviewed and consistent with patient reported medications.    Expected Length of Stay: Where: home and When:  2 days    I discussed the patient's findings and my recommendations with patient and consulting provider.     Hu To MD is the attending on record at time of admission, He is aware of the patient's status and agrees with the above history and physical.      Savage Batista M.D. PGY3  [facility]  [address] [place] [address]  Office: [phone]  This document has been electronically signed by Savage Batista MD on January 8, 2022 03:02 CST        Electronically signed by Hu To MD at 01/08/22 1115          Emergency Department Notes      Alvaro Johnson MD at 01/07/22 2043          Subjective   Patient presents to the emergency department via EMS with chest pain that began roughly 2 hours prior to arrival.      Chest Pain  Pain location:  Substernal area  Pain quality: aching    Pain severity:  Moderate  Onset quality:  Sudden  Duration:  2 hours  Timing:  Constant  Progression:  Waxing and waning  Relieved by:  Nothing  Worsened by:  Nothing  Associated symptoms: abdominal pain and shortness of breath    Associated symptoms: no cough, no diaphoresis, no dizziness, no dysphagia, no fatigue, no fever, no headache, no nausea, no vomiting and no weakness    Risk factors: coronary artery disease, diabetes mellitus, high cholesterol and hypertension    Risk factors: not obese and no smoking (quit smoking 5 years ago)        Review of Systems   Constitutional: Positive for activity change. Negative for appetite change, chills, diaphoresis, fatigue and fever.   HENT: Negative for congestion, ear discharge, ear pain,  "nosebleeds, rhinorrhea, sinus pressure, sore throat and trouble swallowing.    Eyes: Negative for discharge and redness.   Respiratory: Positive for shortness of breath. Negative for apnea, cough, chest tightness and wheezing.    Cardiovascular: Positive for chest pain.   Gastrointestinal: Positive for abdominal pain. Negative for diarrhea, nausea and vomiting.   Endocrine: Negative for polyuria.   Genitourinary: Negative for dysuria, frequency and urgency.   Musculoskeletal: Negative for myalgias and neck pain.   Skin: Negative for color change and rash.   Allergic/Immunologic: Negative for immunocompromised state.   Neurological: Negative for dizziness, seizures, syncope, weakness, light-headedness and headaches.   Hematological: Negative for adenopathy. Does not bruise/bleed easily.   Psychiatric/Behavioral: Negative for behavioral problems and confusion.   All other systems reviewed and are negative.      Past Medical History:   Diagnosis Date   • Abdominal pain    • Acute bronchitis    • Acute pancreatitis    • Acute posthemorrhagic anemia    • Acute sinusitis    • Anal pain    • Anemia    • Anemia due to blood loss    • Anxiety    • Backache     mid and lower back     • Carotid artery disease (HCC)    • Chronic back pain    • Chronic obstructive lung disease (HCC)    • Coronary atherosclerosis    • Diabetic neuropathy (HCC)    • Diarrhea    • Diverticulosis of colon without diverticulitis    • Dysphagia    • Dysuria    • Emphysema/COPD (HCC)     \"Emphysema\"   • Encounter for immunization    • Epigastric pain    • Esophagitis     with stricture   • Essential hypertension    • Fatigue    • Foot pain    • Ganglion cyst of wrist     left wrist     • Gastroesophageal reflux disease    • Generalized abdominal pain    • Gout    • Headache    • Herpes simplex    • Hip pain, bilateral    • History of transfusion    • Hoarse    • Hyperlipidemia    • Incontinence of feces    • Insomnia    • Irritable bowel syndrome    • " "Joint pain    • Knee pain    • Left lower quadrant pain    • Left upper quadrant pain    • Leg pain    • Leukorrhea     not specified as infective    • Low back pain     injury   • Muscle pain    • Nausea    • Nausea    • Nausea and vomiting    • Neck pain    • Neck pain    • Noncompliance with medication regimen    • Osteopenia    • Pain in lower back    • Pain in right femur    • Palpitations    • Productive cough    • PTSD (post-traumatic stress disorder)    • Scoliosis deformity of spine    • Screening examination for venereal disease    • Shoulder pain, left    • Stricture of esophagus    • Symptomatic menopausal or female climacteric states    • Synovitis and tenosynovitis     left forearm   • Tenderness     Tenderness of left upper quadrant of abdomen      • Thoracic back pain    • Type 2 diabetes mellitus (HCC)    • Upper abdominal pain    • Urgency of urination     Urgent desire to urinate      • Urinary frequency     due to benign prostatic hypertrophy      • Urinary tract infection     site not specified   • Urinary tract infectious disease    • Venous varices    • Weakness     General symptom - weakness    • Weight gain        Allergies   Allergen Reactions   • Albuterol Shortness Of Breath and Palpitations     Heart rate shot up   • Adhesive Tape Itching   • Claritin [Loratadine] Other (See Comments)     \"weird feeling\"   • Ibuprofen GI Intolerance and Unknown - High Severity   • Lactose Intolerance (Gi) GI Intolerance   • Latex Itching   • Shellfish-Derived Products Itching       Past Surgical History:   Procedure Laterality Date   • ANKLE OPEN REDUCTION INTERNAL FIXATION Right 10/28/2021    Procedure: OPEN REDUCTION AND INTERNAL FIXATION OF RIGHT ANKLE FRACTURE;  Surgeon: Danial Carter DPM;  Location: [facility];  Service: Podiatry;  Laterality: Right;   • BREAST BIOPSY      Stereotactic breast biopsy (1)  left   • CARDIAC CATHETERIZATION  06/25/2014    Cardiac cath 81803 (1)  Non obstructive " coronary artery disease. Normal left ventricular systolic function. Performed at [facility].   • CARPAL TUNNEL RELEASE Bilateral    •  SECTION     • CHOLECYSTECTOMY      laparoscopic   • COLONOSCOPY N/A 2017    Procedure: COLONOSCOPY;  Surgeon: Elia Cheatham MD;  Location: [facility] ENDOSCOPY;  Service:    • ENDOSCOPY  10/24/2012    Colon endoscopy 70570 (2)  internal & external hemorrhoids found. Diverticulum in sigmoid colon.   • ENDOSCOPY  2014    EGD w/ Dilatation 45480 (1)  Esophageal stricture was present. Dilatation performed. Gastritis found in the stomach. Biopsy taken. Normal duodenum.   • ENDOSCOPY  2013    EGD w/ tube 82025 (3)  Normal esophagus. Gastritis in stomach. Biopsy taken. Normal duodenum. Biopsy taken.   • ENDOSCOPY N/A 2017    Procedure: ESOPHAGOGASTRODUODENOSCOPY, possible dilation;  Surgeon: Grant Morris MD;  Location: [facility] ENDOSCOPY;  Service:    • ENDOSCOPY N/A 3/10/2020    Procedure: ESOPHAGOGASTRODUODENOSCOPY;  Surgeon: Grant Morris MD;  Location: [facility] ENDOSCOPY;  Service: General;  Laterality: N/A;   • FRACTURE SURGERY Right     Arm Surgery (1)  right, arm fracture   • HERNIA REPAIR      Hernia repair w/mesh (1)   • HYSTERECTOMY      Anesth, hysterectomy (1)   • INCISION AND DRAINAGE ABSCESS N/A 2017    Procedure: INCISION AND DRAINAGE OF ABDOMINAL WOUND;  Surgeon: Grant Morris MD;  Location: [facility] OR;  Service:    • INJECTION OF MEDICATION  2016    Depo Medrol (Methylprednisone) 80mg (2)  Ordered By: BRIAN LOU ([facility])    • INJECTION OF MEDICATION  03/10/2016    Kenalog (4)  Ordered By: ARNOLDO MURDOCK ([facility])    • INJECTION OF MEDICATION  2016    Rocephin (1)  Ordered By: ARNOLDO MURDOCK ([facility])    • LYMPH NODE BIOPSY      Biopsy/removal, lymph node(s) (1)  cervical node biopsy   • NECK SURGERY     • NISSEN FUNDOPLICATION     • NISSEN FUNDOPLICATION N/A 2017    Procedure:  OPEN REVISION OF NISSEN FUNDOPLICATION WITH GASTROSTOMY TUBE PLACEMENT ;  Surgeon: Grant Morris MD;  Location: [facility];  Service:    • TOTAL ABDOMINAL HYSTERECTOMY      RUPALI BSO   • TRIGGER FINGER RELEASE Bilateral    • TUBAL ABDOMINAL LIGATION     • UPPER GASTROINTESTINAL ENDOSCOPY  05/23/2017   • VENTRAL/INCISIONAL HERNIA REPAIR N/A 10/13/2017    Procedure: REMOVAL OF INFECTED MESH, REPAIR OF ABDOMINAL WALL   (Will need Strattice mesh in room).;  Surgeon: Grant Morris MD;  Location: [facility];  Service:        Family History   Problem Relation Age of Onset   • Lung cancer Mother    • Hypertension Mother    • Cancer Mother    • Lymphoma Father         NonHodgkins   • Hypertension Father    • Diabetes Other    • Hypertension Other    • Hypertension Sister    • Diabetes Sister    • Hypertension Brother    • Diabetes Brother        Social History     Socioeconomic History   • Marital status: Single   Tobacco Use   • Smoking status: Former Smoker     Years: 0.00     Quit date: 2007     Years since quitting: 15.0   • Smokeless tobacco: Never Used   Vaping Use   • Vaping Use: Never used   Substance and Sexual Activity   • Alcohol use: Not Currently   • Drug use: No   • Sexual activity: Defer           Objective   Physical Exam  Vitals and nursing note reviewed.   Constitutional:       Appearance: She is well-developed. She is ill-appearing.   HENT:      Head: Normocephalic and atraumatic.      Nose: Nose normal.   Eyes:      General: No scleral icterus.        Right eye: No discharge.         Left eye: No discharge.      Conjunctiva/sclera: Conjunctivae normal.      Pupils: Pupils are equal, round, and reactive to light.   Neck:      Trachea: No tracheal deviation.   Cardiovascular:      Rate and Rhythm: Regular rhythm. Tachycardia present.      Heart sounds: Normal heart sounds. No murmur heard.      Pulmonary:      Effort: Pulmonary effort is normal. No respiratory distress.      Breath sounds:  Normal breath sounds. No stridor. No wheezing or rales.   Abdominal:      General: Bowel sounds are normal. There is no distension.      Palpations: Abdomen is soft. There is no mass.      Tenderness: There is no abdominal tenderness. There is no guarding or rebound.   Musculoskeletal:      Cervical back: Normal range of motion and neck supple.   Skin:     General: Skin is warm and dry.      Findings: No erythema or rash.   Neurological:      Mental Status: She is alert and oriented to person, place, and time.      Coordination: Coordination normal.   Psychiatric:         Behavior: Behavior normal.         Thought Content: Thought content normal.         Procedures          ED Course  ED Course as of 01/08/22 0109   Fri Jan 07, 2022 2047 EKG findings discussed with Dr. Walker.  We will wait for labs and repeat imaging to reassess patient. [CB]   Sat Jan 08, 2022   0056 All three EKGs were reviewed by cardiologist as well as upward trending troponins. Hospitalist (Dr Yoder) and cardiologist (Dr. Walker) have spoken, and Dr. Walker will admit. Heparin initiated in the ED.  [CB]      ED Course User Index  [CB] Alvaro Johnson MD                   Labs Reviewed   TROPONIN (IN-HOUSE) - Abnormal; Notable for the following components:       Result Value    Troponin T 0.493 (*)     All other components within normal limits    Narrative:     Troponin T Reference Range:  <= 0.03 ng/mL-   Negative for AMI  >0.03 ng/mL-     Abnormal for myocardial necrosis.  Clinicians would have to utilize clinical acumen, EKG, Troponin and serial changes to determine if it is an Acute Myocardial Infarction or myocardial injury due to an underlying chronic condition.       Results may be falsely decreased if patient taking Biotin.     TROPONIN (IN-HOUSE) - Abnormal; Notable for the following components:    Troponin T 0.754 (*)     All other components within normal limits    Narrative:     Troponin T Reference Range:  <= 0.03  ng/mL-   Negative for AMI  >0.03 ng/mL-     Abnormal for myocardial necrosis.  Clinicians would have to utilize clinical acumen, EKG, Troponin and serial changes to determine if it is an Acute Myocardial Infarction or myocardial injury due to an underlying chronic condition.       Results may be falsely decreased if patient taking Biotin.     COMPREHENSIVE METABOLIC PANEL - Abnormal; Notable for the following components:    Glucose 157 (*)     Sodium 135 (*)     Chloride 97 (*)     ALT (SGPT) 75 (*)     AST (SGOT) 220 (*)     Alkaline Phosphatase 137 (*)     All other components within normal limits    Narrative:     GFR Normal >60  Chronic Kidney Disease <60  Kidney Failure <15     CBC WITH AUTO DIFFERENTIAL - Abnormal; Notable for the following components:    WBC 11.84 (*)     RBC 3.52 (*)     Hemoglobin 10.5 (*)     Hematocrit 31.6 (*)     Neutrophil % 86.6 (*)     Lymphocyte % 9.0 (*)     Monocyte % 3.6 (*)     Eosinophil % 0.2 (*)     Neutrophils, Absolute 10.25 (*)     All other components within normal limits   LIPASE - Abnormal; Notable for the following components:    Lipase 6 (*)     All other components within normal limits   CK - Abnormal; Notable for the following components:    Creatine Kinase 264 (*)     All other components within normal limits   MAGNESIUM - Abnormal; Notable for the following components:    Magnesium 1.2 (*)     All other components within normal limits   D-DIMER, QUANTITATIVE - Abnormal; Notable for the following components:    D-Dimer, Quantitative 484 (*)     All other components within normal limits    Narrative:     Dimer values <500 ng/ml FEU are FDA approved as aid in diagnosis of deep venous thrombosis and pulmonary embolism.  This test should not be used in an exclusion strategy with pretest probability alone.    A recent guideline regarding diagnosis for pulmonary thromboembolism recommends an adjusted exclusion criterion of age x 10 ng/ml FEU for patients >50 years of age  ([employer] Intern Med 2015; 163: 701-711).     COVID-19 AND FLU A/B, NP SWAB IN TRANSPORT MEDIA 8-12 HR TAT - Normal    Narrative:     Fact sheet for providers: https://www.fda.gov/media/971411/download    Fact sheet for patients: https://www.fda.gov/media/706411/download    Test performed by PCR.   BNP (IN-HOUSE) - Normal    Narrative:     Among patients with dyspnea, NT-proBNP is highly sensitive for the detection of acute congestive heart failure. In addition NT-proBNP of <300 pg/ml effectively rules out acute congestive heart failure with 99% negative predictive value.    Results may be falsely decreased if patient taking Biotin.     BLOOD CULTURE   BLOOD CULTURE   RAINBOW DRAW    Narrative:     The following orders were created for panel order [place] Draw.  Procedure                               Abnormality         Status                     ---------                               -----------         ------                     Green Top (Gel)[[id number]]                                  Final result               Lavender Top[[id number]]                                     Final result               Gold Top - SST[[id number]]                                   Final result               Light Blue Top[[id number]]                                   Final result                 Please view results for these tests on the individual orders.   PROCALCITONIN   LACTIC ACID, PLASMA   PROTIME-INR   APTT   CBC WITH AUTO DIFFERENTIAL   CBC AND DIFFERENTIAL    Narrative:     The following orders were created for panel order CBC & Differential.  Procedure                               Abnormality         Status                     ---------                               -----------         ------                     CBC Auto Differential[[id number]]        Abnormal            Final result                 Please view results for these tests on the individual orders.   GREEN TOP   LAVENDER TOP   GOLD TOP - SST   LIGHT BLUE TOP   CBC AND  DIFFERENTIAL    Narrative:     The following orders were created for panel order CBC & Differential.  Procedure                               Abnormality         Status                     ---------                               -----------         ------                     CBC Auto Differential[[id number]]                                                         Please view results for these tests on the individual orders.       XR Chest 1 View   Final Result   Development of significant bilateral opacities likely   acute pulmonary edema         Electronically signed by:  Basim Puentes MD  1/7/2022 10:57 PM   CST Workstation: [id number]      CT Angiogram Chest   Final Result         1. No pulmonary embolus is seen.   2. Bilateral groundglass attenuation opacities are noted.   Findings are nonspecific but could be seen with atypical   infection.       Electronically signed by:  Eric Kerns MD  1/7/2022 10:12 PM CST   Workstation: [id number]      CT Abdomen Pelvis With Contrast   Final Result   1.  No acute abdominal or pelvic findings.   2.  Hepatic steatosis.   3.  Diverticulosis without diverticulitis.      Electronically signed by:  Chanel Salcido  1/7/2022 11:53 PM CST   Workstation: [id number]      XR Chest 1 View   Final Result      No active disease by portable imaging.      Electronically signed by:  Basim Puentes MD  1/7/2022 9:11 PM CST   Workstation: [id number]                                             MDM    Final diagnoses:   Precordial pain       ED Disposition  ED Disposition     ED Disposition Condition Comment    Decision to Admit  Level of Care: Critical Care [6]   Diagnosis: Precordial pain [786.51.ICD-9-CM]   Admitting Physician: KWABENA NORRIS [[id number]]   Attending Physician: KWABENA NORRIS [[id number]]   Certification: I Certify That Inpatient Hospital Services Are Medically Necessary For Greater Than 2 Midnights            No follow-up provider specified.       Medication List      No  changes were made to your prescriptions during this visit.          Alvaro Johnson MD  22 010      Electronically signed by Alvaro Johnson MD at 22 010     Mariaa Leon RN at 22 0116        This nurse called pt daughter for status update.      Mariaa Leon RN  22 0118      Electronically signed by Mariaa Leon RN at 22 0118     Mariaa Leon RN at 22 0207        Hospitalist and cardiologist at bedside evaluating pt.      Mariaa Leon RN  22 0208      Electronically signed by Mariaa Leon RN at 22 0208       Operative/Procedure Notes (last 24 hours)  Notes from 22 1212 through 22 1212   No notes of this type exist for this encounter.            Physician Progress Notes (last 24 hours)      Grant Ignacio MD at 22 0803     Attestation signed by Hu To MD at 22 1116      I have seen the patient.  I have reviewed the notes, assessments, and/or procedures performed by Dr. Ignacio, I concur with her/his documentation and assessment and plan for Maria Yepez.       This document has been electronically signed by Hu To MD on 2022 11:16 CST                          CRITICAL CARE DAILY PROGRESS NOTE  Family Medicine Residency Service  NAME: Maria Yepez  : 1955  MRN: [id number]      LOS: 0 days     PROVIDER OF SERVICE: Grant Ignacio MD    Chief Complaint: Chest pain with high risk for cardiac etiology    Subjective:     Interval History: History provided by: patient chart RN  Nurse reported no overnight events.  Nurse does state that the patient seems to be suffering with some anxiety and nausea.  She is receiving treatment with Zofran and morphine.  She had taken the nonrebreather down from 15 L to 10 L with oxygen saturation 94%.  While in room we changed to nasal cannula and left the patient on 4 l saturating at 92%.  Catheter site is clean and dry.   Patient endorses mild chest pain at this time she has a dry cough.  She also is complaining of the right ankle pain from the fracture in October.    Review of Systems:   Review of Systems   Constitutional: Negative for chills, diaphoresis, fatigue and fever.   HENT: Negative for congestion, sore throat and trouble swallowing.    Respiratory: Positive for cough.    Cardiovascular: Positive for chest pain. Negative for palpitations.   Gastrointestinal: Negative for abdominal pain.   Musculoskeletal: Positive for arthralgias and gait problem.   Skin: Positive for wound.        Burn on right buttock   Neurological: Negative for dizziness, light-headedness and headaches.       Objective:     Vital Signs  Temp:  [97.2 °F (36.2 °C)-97.8 °F (36.6 °C)] 97.2 °F (36.2 °C)  Heart Rate:  [102-126] 124  Resp:  [20-26] 26  BP: (112-166)/(61-99) 136/78  Body mass index is 24.11 kg/m².         I/O last 3 completed shifts:  In: 250 [Other:250]  Out: 2000 [Urine:2000]    Physical Exam  Physical Exam  Constitutional:       General: She is not in acute distress.     Appearance: She is not diaphoretic.   HENT:      Nose: No rhinorrhea.   Eyes:      General: No scleral icterus.  Neck:      Vascular: No carotid bruit.   Cardiovascular:      Rate and Rhythm: Regular rhythm. Tachycardia present.      Pulses: Normal pulses.   Pulmonary:      Effort: No respiratory distress.      Breath sounds: No wheezing or rales.   Abdominal:      General: Abdomen is flat.      Palpations: Abdomen is soft.      Tenderness: There is no abdominal tenderness.   Skin:     Findings: Lesion present.      Comments: Small lesion right hip gluteal region secondary to patient sleeping on a heating pad while at home.  Healing well.   Neurological:      Mental Status: She is alert.   Psychiatric:         Mood and Affect: Mood normal.         Thought Content: Thought content normal.         Medication Review    Current Facility-Administered Medications:   •   acetaminophen (TYLENOL) tablet 650 mg, 650 mg, Oral, Q4H PRN, Inder Walker MD  •  amLODIPine (NORVASC) tablet 2.5 mg, 2.5 mg, Oral, BID, Savage Batista MD  •  aspirin EC tablet 81 mg, 81 mg, Oral, Nightly, Savage Batista MD  •  atenolol (TENORMIN) tablet 25 mg, 25 mg, Oral, Nightly, Savage Batista MD  •  [START ON 1/9/2022] AZITHROMYCIN 500 MG/250 ML 0.9% NS IVPB (vial-mate), 500 mg, Intravenous, Q24H, Savage Batista MD  •  [START ON 1/9/2022] cefTRIAXone (ROCEPHIN) 1 g/100 mL 0.9% NS (MBP), 1 g, Intravenous, Q24H, Savage Batista MD  •  dextrose (D50W) (25 g/50 mL) IV injection 25 g, 25 g, Intravenous, Q15 Min PRN, Inder Walker MD  •  dextrose (GLUTOSE) oral gel 15 g, 15 g, Oral, Q15 Min PRN, Inder Walker MD  •  FLUoxetine (PROzac) capsule 10 mg, 10 mg, Oral, Nightly, Savage Batista MD  •  gabapentin (NEURONTIN) capsule 400 mg, 400 mg, Oral, Q12H, Savage Batista MD  •  glucagon (human recombinant) (GLUCAGEN DIAGNOSTIC) injection 1 mg, 1 mg, Subcutaneous, Q15 Min PRN, Inder Walker MD  •  HYDROcodone-acetaminophen (NORCO) 7.5-325 MG per tablet 1 tablet, 1 tablet, Oral, Q6H PRN, Savage Batista MD  •  insulin aspart (novoLOG) injection 0-7 Units, 0-7 Units, Subcutaneous, TID AC, Inder Walker MD  •  lisinopril (PRINIVIL,ZESTRIL) tablet 5 mg, 5 mg, Oral, Nightly, Savage Batista MD  •  morphine injection 1 mg, 1 mg, Intravenous, Q4H PRN, 1 mg at 01/08/22 0441 **AND** naloxone (NARCAN) injection 0.4 mg, 0.4 mg, Intravenous, Q5 Min PRN, Savage Batista MD  •  ondansetron (ZOFRAN) tablet 4 mg, 4 mg, Oral, Q6H PRN **OR** ondansetron (ZOFRAN) injection 4 mg, 4 mg, Intravenous, Q6H PRN, Savage Batista MD, 4 mg at 01/08/22 0441  •  sodium chloride 0.9 % flush 10 mL, 10 mL, Intravenous, PRN, Savage Batista MD  •  sodium chloride 0.9 % flush 10 mL, 10 mL, Intravenous, Q12H, Savage Batista MD  •  sodium chloride 0.9 % flush 10 mL, 10 mL, Intravenous, PRN, Savage Batista,  MD  •  sodium chloride 0.9 % infusion, 75 mL/hr, Intravenous, Continuous, Savage Batista MD, Last Rate: 75 mL/hr at 01/08/22 0500, 75 mL/hr at 01/08/22 0500  •  sodium chloride 0.9 % infusion, 100 mL/hr, Intravenous, Continuous, Inder Walker MD  •  traZODone (DESYREL) tablet 150 mg, 150 mg, Oral, Nightly, Savage Batista MD     Diagnostic Data    Lab Results (last 24 hours)     Procedure Component Value Units Date/Time    Magnesium [[id number]]  (Normal) Collected: 01/08/22 0523    Specimen: Blood Updated: 01/08/22 0740     Magnesium 1.6 mg/dL     Comprehensive Metabolic Panel [[id number]]  (Abnormal) Collected: 01/08/22 0523    Specimen: Blood Updated: 01/08/22 0656     Glucose 153 mg/dL      BUN 13 mg/dL      Creatinine 0.78 mg/dL      Sodium 134 mmol/L      Potassium 4.3 mmol/L      Chloride 94 mmol/L      CO2 20.0 mmol/L      Calcium 8.8 mg/dL      Total Protein 7.1 g/dL      Albumin 3.80 g/dL      ALT (SGPT) 68 U/L      AST (SGOT) 163 U/L      Alkaline Phosphatase 139 U/L      Total Bilirubin 0.4 mg/dL      eGFR Non African Amer 74 mL/min/1.73      Globulin 3.3 gm/dL      A/G Ratio 1.2 g/dL      BUN/Creatinine Ratio 16.7     Anion Gap 20.0 mmol/L     Narrative:      GFR Normal >60  Chronic Kidney Disease <60  Kidney Failure <15      POC Glucose Once [[id number]]  (Abnormal) Collected: 01/08/22 0626    Specimen: Blood Updated: 01/08/22 0637     Glucose 136 mg/dL      Comment: RN NotifiedOperator: [id number] Critical access hospitalMeter ID: [id number]       CBC & Differential [[id number]]  (Abnormal) Collected: 01/08/22 0523    Specimen: Blood Updated: 01/08/22 0626    Narrative:      The following orders were created for panel order CBC & Differential.  Procedure                               Abnormality         Status                     ---------                               -----------         ------                     CBC Auto Differential[[id number]]        Abnormal            Final result              "    Please view results for these tests on the individual orders.    CBC Auto Differential [[id number]]  (Abnormal) Collected: 01/08/22 0523    Specimen: Blood Updated: 01/08/22 0626     WBC 14.64 10*3/mm3      RBC 4.04 10*6/mm3      Hemoglobin 11.8 g/dL      Hematocrit 34.9 %      MCV 86.4 fL      MCH 29.2 pg      MCHC 33.8 g/dL      RDW 13.0 %      RDW-SD 40.9 fl      MPV 9.6 fL      Platelets 485 10*3/mm3      Neutrophil % 92.8 %      Lymphocyte % 4.0 %      Monocyte % 2.6 %      Eosinophil % 0.0 %      Basophil % 0.2 %      Immature Grans % 0.4 %      Neutrophils, Absolute 13.58 10*3/mm3      Lymphocytes, Absolute 0.59 10*3/mm3      Monocytes, Absolute 0.38 10*3/mm3      Eosinophils, Absolute 0.00 10*3/mm3      Basophils, Absolute 0.03 10*3/mm3      Immature Grans, Absolute 0.06 10*3/mm3      nRBC 0.0 /100 WBC     Procalcitonin [[id number]]  (Normal) Collected: 01/08/22 0214    Specimen: Blood Updated: 01/08/22 0619     Procalcitonin 0.14 ng/mL     Narrative:      As a Marker for Sepsis (Non-Neonates):     1. <0.5 ng/mL represents a low risk of severe sepsis and/or septic shock.  2. >2 ng/mL represents a high risk of severe sepsis and/or septic shock.    As a Marker for Lower Respiratory Tract Infections that require antibiotic therapy:  PCT on Admission     Antibiotic Therapy             6-12 Hrs later  >0.5                          Strongly Recommended            >0.25 - <0.5             Recommended  0.1 - 0.25                  Discouraged                       Remeasure/reassess PCT  <0.1                         Strongly Discouraged         Remeasure/reassess PCT      As 28 day mortality risk marker: \"Change in Procalcitonin Result\" (>80% or <=80%) if Day 0 (or Day 1) and Day 4 values are available. Refer to [email or web address]    Change in PCT <=80 %   A decrease of PCT levels below or equal to 80% defines a positive change in PCT test result representing a higher risk for 28-day all-cause " mortality of patients diagnosed with severe sepsis or septic shock.    Change in PCT >80 %   A decrease of PCT levels of more than 80% defines a negative change in PCT result representing a lower risk for 28-day all-cause mortality of patients diagnosed with severe sepsis or septic shock.                Troponin [[id number]]  (Abnormal) Collected: 01/08/22 0214    Specimen: Blood Updated: 01/08/22 0259     Troponin T 0.992 ng/mL     Narrative:      Troponin T Reference Range:  <= 0.03 ng/mL-   Negative for AMI  >0.03 ng/mL-     Abnormal for myocardial necrosis.  Clinicians would have to utilize clinical acumen, EKG, Troponin and serial changes to determine if it is an Acute Myocardial Infarction or myocardial injury due to an underlying chronic condition.       Results may be falsely decreased if patient taking Biotin.      Protime-INR [[id number]]  (Normal) Collected: 01/08/22 0214    Specimen: Blood Updated: 01/08/22 0240     Protime 12.6 Seconds      INR 0.95    Narrative:      Therapeutic range for most indications is 2.0-3.0 INR,  or 2.5-3.5 for mechanical heart valves.    aPTT [[id number]]  (Normal) Collected: 01/08/22 0214    Specimen: Blood Updated: 01/08/22 0240     PTT 26.7 seconds     Narrative:      The recommended Heparin therapeutic range is 68-97 seconds.    Protime-INR [[id number]]  (Normal) Collected: 01/07/22 2046    Specimen: Blood Updated: 01/08/22 0201     Protime 12.4 Seconds      INR 0.93    Narrative:      Therapeutic range for most indications is 2.0-3.0 INR,  or 2.5-3.5 for mechanical heart valves.    Lactic Acid, Plasma [[id number]]  (Normal) Collected: 01/08/22 0043    Specimen: Blood Updated: 01/08/22 0147     Lactate 1.9 mmol/L     Blood Culture - Blood, Wrist, Left [[id number]] Collected: 01/08/22 0100    Specimen: Blood from Wrist, Left Updated: 01/08/22 0110    Blood Culture - Blood, Hand, Right [[id number]] Collected: 01/08/22 0043    Specimen: Blood from Hand, Right Updated: 01/08/22 0101     Troponin [[id number]]  (Abnormal) Collected: 01/07/22 2251    Specimen: Blood Updated: 01/07/22 2334     Troponin T 0.754 ng/mL     Narrative:      Troponin T Reference Range:  <= 0.03 ng/mL-   Negative for AMI  >0.03 ng/mL-     Abnormal for myocardial necrosis.  Clinicians would have to utilize clinical acumen, EKG, Troponin and serial changes to determine if it is an Acute Myocardial Infarction or myocardial injury due to an underlying chronic condition.       Results may be falsely decreased if patient taking Biotin.      COVID-19 and FLU A/B PCR - Swab, Nasopharynx [[id number]]  (Normal) Collected: 01/07/22 2219    Specimen: Swab from Nasopharynx Updated: 01/07/22 2255     COVID19 Not Detected     Influenza A PCR Not Detected     Influenza B PCR Not Detected    Narrative:      Fact sheet for providers: https://www.fda.gov/media/671758/download    Fact sheet for patients: https://www.fda.gov/media/033241/download    Test performed by PCR.    [facility] Draw [[id number]] Collected: 01/07/22 2046    Specimen: Blood Updated: 01/07/22 2200    Narrative:      The following orders were created for panel order [facility] Draw.  Procedure                               Abnormality         Status                     ---------                               -----------         ------                     Green Top (Gel)[[id number]]                                  Final result               Lavender Top[[id number]]                                     Final result               Gold Top - SST[[id number]]                                   Final result               Light Blue Top[[id number]]                                   Final result                 Please view results for these tests on the individual orders.    Lavender Top [[id number]] Collected: 01/07/22 2046    Specimen: Blood Updated: 01/07/22 2200     Extra Tube hold for add-on     Comment: Auto resulted       Gold Top - SST [[id number]] Collected: 01/07/22 2046    Specimen:  Blood Updated: 01/07/22 2200     Extra Tube Hold for add-ons.     Comment: Auto resulted.       Light Blue Top [[id number]] Collected: 01/07/22 2046    Specimen: Blood Updated: 01/07/22 2200     Extra Tube hold for add-on     Comment: Auto resulted       Green Top (Gel) [[id number]] Collected: 01/07/22 2046    Specimen: Blood Updated: 01/07/22 2200     Extra Tube Hold for add-ons.     Comment: Auto resulted.       Lipase [[id number]]  (Abnormal) Collected: 01/07/22 2046    Specimen: Blood Updated: 01/07/22 2130     Lipase 6 U/L     CK [[id number]]  (Abnormal) Collected: 01/07/22 2046    Specimen: Blood Updated: 01/07/22 2130     Creatine Kinase 264 U/L     Magnesium [[id number]]  (Abnormal) Collected: 01/07/22 2046    Specimen: Blood Updated: 01/07/22 2130     Magnesium 1.2 mg/dL     D-dimer, Quantitative [[id number]]  (Abnormal) Collected: 01/07/22 2046    Specimen: Blood Updated: 01/07/22 2127     D-Dimer, Quantitative 484 ng/mL (FEU)     Narrative:      Dimer values <500 ng/ml FEU are FDA approved as aid in diagnosis of deep venous thrombosis and pulmonary embolism.  This test should not be used in an exclusion strategy with pretest probability alone.    A recent guideline regarding diagnosis for pulmonary thromboembolism recommends an adjusted exclusion criterion of age x 10 ng/ml FEU for patients >50 years of age ([employer] Intern Med 2015; 163: 701-711).      Comprehensive Metabolic Panel [[id number]]  (Abnormal) Collected: 01/07/22 2046    Specimen: Blood Updated: 01/07/22 2115     Glucose 157 mg/dL      BUN 13 mg/dL      Creatinine 0.76 mg/dL      Sodium 135 mmol/L      Potassium 4.3 mmol/L      Chloride 97 mmol/L      CO2 24.0 mmol/L      Calcium 9.3 mg/dL      Total Protein 7.3 g/dL      Albumin 4.20 g/dL      ALT (SGPT) 75 U/L      AST (SGOT) 220 U/L      Alkaline Phosphatase 137 U/L      Total Bilirubin 0.4 mg/dL      eGFR Non African Amer 76 mL/min/1.73      Globulin 3.1 gm/dL      A/G Ratio 1.4 g/dL       BUN/Creatinine Ratio 17.1     Anion Gap 14.0 mmol/L     Narrative:      GFR Normal >60  Chronic Kidney Disease <60  Kidney Failure <15      Troponin [[id number]]  (Abnormal) Collected: 01/07/22 2046    Specimen: Blood Updated: 01/07/22 2114     Troponin T 0.493 ng/mL     Narrative:      Troponin T Reference Range:  <= 0.03 ng/mL-   Negative for AMI  >0.03 ng/mL-     Abnormal for myocardial necrosis.  Clinicians would have to utilize clinical acumen, EKG, Troponin and serial changes to determine if it is an Acute Myocardial Infarction or myocardial injury due to an underlying chronic condition.       Results may be falsely decreased if patient taking Biotin.      BNP [[id number]]  (Normal) Collected: 01/07/22 2046    Specimen: Blood Updated: 01/07/22 2113     proBNP 554.9 pg/mL     Narrative:      Among patients with dyspnea, NT-proBNP is highly sensitive for the detection of acute congestive heart failure. In addition NT-proBNP of <300 pg/ml effectively rules out acute congestive heart failure with 99% negative predictive value.    Results may be falsely decreased if patient taking Biotin.      CBC & Differential [[id number]]  (Abnormal) Collected: 01/07/22 2046    Specimen: Blood Updated: 01/07/22 2052    Narrative:      The following orders were created for panel order CBC & Differential.  Procedure                               Abnormality         Status                     ---------                               -----------         ------                     CBC Auto Differential[[id number]]        Abnormal            Final result                 Please view results for these tests on the individual orders.    CBC Auto Differential [[id number]]  (Abnormal) Collected: 01/07/22 2046    Specimen: Blood Updated: 01/07/22 2052     WBC 11.84 10*3/mm3      RBC 3.52 10*6/mm3      Hemoglobin 10.5 g/dL      Hematocrit 31.6 %      MCV 89.8 fL      MCH 29.8 pg      MCHC 33.2 g/dL      RDW 13.2 %      RDW-SD 43.0 fl      MPV 9.0  fL      Platelets 376 10*3/mm3      Neutrophil % 86.6 %      Lymphocyte % 9.0 %      Monocyte % 3.6 %      Eosinophil % 0.2 %      Basophil % 0.3 %      Immature Grans % 0.3 %      Neutrophils, Absolute 10.25 10*3/mm3      Lymphocytes, Absolute 1.06 10*3/mm3      Monocytes, Absolute 0.43 10*3/mm3      Eosinophils, Absolute 0.02 10*3/mm3      Basophils, Absolute 0.04 10*3/mm3      Immature Grans, Absolute 0.04 10*3/mm3      nRBC 0.0 /100 WBC           I reviewed the patient's new clinical results.    Assessment/Plan:     Active Hospital Problems    Diagnosis  POA   • **Chest pain with high risk for cardiac etiology [R07.9]  Yes     STEMI vs Acute Pericarditis.   -Cath in 2014 did not show obstructive disease  -Initial CXR (1/7; 2057) showed no acute disease. Repeat CXR (1/7; 2235) later showed bilateral opacities with concern for acute pulmonary edema.  -Initial EKG (1/7; 2032) showed ST elevation in inferior leads. Repeat EKG (1/7; 2047) showed ST elevations, tachycardia, concern for pericarditis  -TroponinL 0.493 > 0.754>0.992  -HARPREET therapy  -NPO  -IVF @ 75 ml/hr  -Dr. Bustos consulted did coronary cath early this morning, no blockage identified.  -Femoral site for insertion is clean and dry.-     • Type 2 diabetes mellitus (HCC) [E11.9]  Yes     -HbA1c (4/2021): 5.8%  -Hold home Metformin 500mg BID  -SSI low dose  -Glucose checks QID  -Consistent carb diet once no longer NPO  -Glucose this morning 136       • Anxiety [F41.9]  Yes     -Continue home Prozac 10mg  -Nurse endorses some anxiety on part of patient     • Chronic bronchitis (HCC) [J42]  Yes     -Not on any inhalers currently  -Former smoker quit tobacco use >15 years ago  -Supplemental O2 to maintain sats 88-92%  -Patient currently on 4 L via nasal cannula satting in low 90s.     • Primary hypertension [I10]  Yes     -Continue home Lisinopril 10mg, Norvasc 2.5mg  -Monitor per floor policy  -Continue IV fluids at 75 mL's per hour normal saline          Code status is   Code Status and Medical Interventions:   Ordered at: 01/08/22 0233     Level Of Support Discussed With:    Patient     Code Status (Patient has no pulse and is not breathing):    CPR (Attempt to Resuscitate)     Medical Interventions (Patient has pulse or is breathing):    Full Support       Prognosis: good  Plan for disposition:Where: home    Time: Critical care 30 min        This document has been electronically signed by Grant Ignacio MD on January 8, 2022 08:16 CST        Electronically signed by Hu To MD at 01/08/22 1116

## 2022-01-08 NOTE — ACP (ADVANCE CARE PLANNING)
Patient wishes to be FULL CODE. Designate her daughter, Kim Medley ([phone]), to make medical decisions if she is unable to.      Savage Batista M.D. PGY3  [facility]  [address] [place], [place] [address]  Office: [phone]  This document has been electronically signed by Savage Batista MD on January 8, 2022 02:18 CST

## 2022-01-09 PROBLEM — F05 SUNDOWNING: Status: ACTIVE | Noted: 2022-01-09

## 2022-01-09 LAB
ALBUMIN SERPL-MCNC: 3.7 G/DL (ref 3.5–5.2)
ALBUMIN/GLOB SERPL: 1.4 G/DL
ALP SERPL-CCNC: 101 U/L (ref 39–117)
ALT SERPL W P-5'-P-CCNC: 46 U/L (ref 1–33)
ANION GAP SERPL CALCULATED.3IONS-SCNC: 11 MMOL/L (ref 5–15)
AST SERPL-CCNC: 98 U/L (ref 1–32)
BASOPHILS # BLD AUTO: 0.04 10*3/MM3 (ref 0–0.2)
BASOPHILS NFR BLD AUTO: 0.3 % (ref 0–1.5)
BILIRUB SERPL-MCNC: 0.4 MG/DL (ref 0–1.2)
BUN SERPL-MCNC: 18 MG/DL (ref 8–23)
BUN/CREAT SERPL: 22.8 (ref 7–25)
CALCIUM SPEC-SCNC: 8.6 MG/DL (ref 8.6–10.5)
CHLORIDE SERPL-SCNC: 97 MMOL/L (ref 98–107)
CO2 SERPL-SCNC: 29 MMOL/L (ref 22–29)
CREAT SERPL-MCNC: 0.79 MG/DL (ref 0.57–1)
DEPRECATED RDW RBC AUTO: 43.9 FL (ref 37–54)
EOSINOPHIL # BLD AUTO: 0.01 10*3/MM3 (ref 0–0.4)
EOSINOPHIL NFR BLD AUTO: 0.1 % (ref 0.3–6.2)
ERYTHROCYTE [DISTWIDTH] IN BLOOD BY AUTOMATED COUNT: 13.5 % (ref 12.3–15.4)
GFR SERPL CREATININE-BSD FRML MDRD: 73 ML/MIN/1.73
GLOBULIN UR ELPH-MCNC: 2.7 GM/DL
GLUCOSE BLDC GLUCOMTR-MCNC: 109 MG/DL (ref 70–130)
GLUCOSE BLDC GLUCOMTR-MCNC: 116 MG/DL (ref 70–130)
GLUCOSE BLDC GLUCOMTR-MCNC: 121 MG/DL (ref 70–130)
GLUCOSE BLDC GLUCOMTR-MCNC: 132 MG/DL (ref 70–130)
GLUCOSE SERPL-MCNC: 121 MG/DL (ref 65–99)
HCT VFR BLD AUTO: 31.4 % (ref 34–46.6)
HGB BLD-MCNC: 10.8 G/DL (ref 12–15.9)
IMM GRANULOCYTES # BLD AUTO: 0.09 10*3/MM3 (ref 0–0.05)
IMM GRANULOCYTES NFR BLD AUTO: 0.7 % (ref 0–0.5)
LYMPHOCYTES # BLD AUTO: 2.35 10*3/MM3 (ref 0.7–3.1)
LYMPHOCYTES NFR BLD AUTO: 17.7 % (ref 19.6–45.3)
MCH RBC QN AUTO: 30.3 PG (ref 26.6–33)
MCHC RBC AUTO-ENTMCNC: 34.4 G/DL (ref 31.5–35.7)
MCV RBC AUTO: 88 FL (ref 79–97)
MONOCYTES # BLD AUTO: 0.74 10*3/MM3 (ref 0.1–0.9)
MONOCYTES NFR BLD AUTO: 5.6 % (ref 5–12)
NEUTROPHILS NFR BLD AUTO: 10.05 10*3/MM3 (ref 1.7–7)
NEUTROPHILS NFR BLD AUTO: 75.6 % (ref 42.7–76)
NRBC BLD AUTO-RTO: 0 /100 WBC (ref 0–0.2)
PLATELET # BLD AUTO: 359 10*3/MM3 (ref 140–450)
PMV BLD AUTO: 9.6 FL (ref 6–12)
POTASSIUM SERPL-SCNC: 3.6 MMOL/L (ref 3.5–5.2)
PROT SERPL-MCNC: 6.4 G/DL (ref 6–8.5)
RBC # BLD AUTO: 3.57 10*6/MM3 (ref 3.77–5.28)
SODIUM SERPL-SCNC: 137 MMOL/L (ref 136–145)
WBC NRBC COR # BLD: 13.28 10*3/MM3 (ref 3.4–10.8)

## 2022-01-09 PROCEDURE — 25010000002 CEFTRIAXONE PER 250 MG: Performed by: STUDENT IN AN ORGANIZED HEALTH CARE EDUCATION/TRAINING PROGRAM

## 2022-01-09 PROCEDURE — 25010000002 MORPHINE PER 10 MG: Performed by: STUDENT IN AN ORGANIZED HEALTH CARE EDUCATION/TRAINING PROGRAM

## 2022-01-09 PROCEDURE — 25010000002 AZITHROMYCIN PER 500 MG: Performed by: STUDENT IN AN ORGANIZED HEALTH CARE EDUCATION/TRAINING PROGRAM

## 2022-01-09 PROCEDURE — 80053 COMPREHEN METABOLIC PANEL: CPT | Performed by: STUDENT IN AN ORGANIZED HEALTH CARE EDUCATION/TRAINING PROGRAM

## 2022-01-09 PROCEDURE — 82962 GLUCOSE BLOOD TEST: CPT

## 2022-01-09 PROCEDURE — 25010000002 DOBUTAMINE PER 250 MG: Performed by: STUDENT IN AN ORGANIZED HEALTH CARE EDUCATION/TRAINING PROGRAM

## 2022-01-09 PROCEDURE — 99233 SBSQ HOSP IP/OBS HIGH 50: CPT | Performed by: CHIROPRACTOR

## 2022-01-09 PROCEDURE — 85025 COMPLETE CBC W/AUTO DIFF WBC: CPT | Performed by: STUDENT IN AN ORGANIZED HEALTH CARE EDUCATION/TRAINING PROGRAM

## 2022-01-09 RX ORDER — POTASSIUM CHLORIDE 750 MG/1
40 CAPSULE, EXTENDED RELEASE ORAL ONCE
Status: COMPLETED | OUTPATIENT
Start: 2022-01-09 | End: 2022-01-09

## 2022-01-09 RX ORDER — NOREPINEPHRINE BIT/0.9 % NACL 8 MG/250ML
.02-.3 INFUSION BOTTLE (ML) INTRAVENOUS
Status: DISCONTINUED | OUTPATIENT
Start: 2022-01-09 | End: 2022-01-10 | Stop reason: HOSPADM

## 2022-01-09 RX ADMIN — TRAZODONE HYDROCHLORIDE 150 MG: 150 TABLET ORAL at 22:52

## 2022-01-09 RX ADMIN — MORPHINE SULFATE 1 MG: 2 INJECTION, SOLUTION INTRAMUSCULAR; INTRAVENOUS at 14:31

## 2022-01-09 RX ADMIN — GABAPENTIN 400 MG: 400 CAPSULE ORAL at 20:03

## 2022-01-09 RX ADMIN — HYDROCODONE BITARTRATE AND ACETAMINOPHEN 1 TABLET: 7.5; 325 TABLET ORAL at 21:24

## 2022-01-09 RX ADMIN — GABAPENTIN 400 MG: 400 CAPSULE ORAL at 09:12

## 2022-01-09 RX ADMIN — ASPIRIN 81 MG: 81 TABLET, FILM COATED ORAL at 20:02

## 2022-01-09 RX ADMIN — SODIUM CHLORIDE, PRESERVATIVE FREE 10 ML: 5 INJECTION INTRAVENOUS at 20:05

## 2022-01-09 RX ADMIN — HYDROCODONE BITARTRATE AND ACETAMINOPHEN 1 TABLET: 7.5; 325 TABLET ORAL at 09:40

## 2022-01-09 RX ADMIN — GUAIFENESIN 1200 MG: 600 TABLET, EXTENDED RELEASE ORAL at 20:01

## 2022-01-09 RX ADMIN — GUAIFENESIN 1200 MG: 600 TABLET, EXTENDED RELEASE ORAL at 09:12

## 2022-01-09 RX ADMIN — POTASSIUM CHLORIDE 40 MEQ: 750 CAPSULE, EXTENDED RELEASE ORAL at 12:16

## 2022-01-09 RX ADMIN — FLUOXETINE 10 MG: 10 CAPSULE ORAL at 20:02

## 2022-01-09 RX ADMIN — CEFTRIAXONE SODIUM 1 G: 1 INJECTION, POWDER, FOR SOLUTION INTRAMUSCULAR; INTRAVENOUS at 06:29

## 2022-01-09 RX ADMIN — METOPROLOL TARTRATE 25 MG: 25 TABLET, FILM COATED ORAL at 20:02

## 2022-01-09 RX ADMIN — HYDROCODONE BITARTRATE AND ACETAMINOPHEN 1 TABLET: 7.5; 325 TABLET ORAL at 16:01

## 2022-01-09 RX ADMIN — SODIUM CHLORIDE, PRESERVATIVE FREE 10 ML: 5 INJECTION INTRAVENOUS at 09:12

## 2022-01-09 RX ADMIN — AZITHROMYCIN MONOHYDRATE 500 MG: 500 INJECTION, POWDER, LYOPHILIZED, FOR SOLUTION INTRAVENOUS at 06:32

## 2022-01-09 RX ADMIN — NOREPINEPHRINE BITARTRATE 0.02 MCG/KG/MIN: 1 SOLUTION INTRAVENOUS at 02:27

## 2022-01-09 NOTE — PROGRESS NOTES
CRITICAL CARE DAILY PROGRESS NOTE  Family Medicine Residency Service  NAME: Maria Yepez  : 1955  MRN: [id number]      LOS: 1 day     PROVIDER OF SERVICE: Grant Ignacio MD    Chief Complaint: Takotsubo cardiomyopathy    Subjective:     Interval History: History provided by: patient chart RN  Nurse reports that overnight patient exhibited symptoms of sundowning.  She attempted to pull out several of her IV lines.  She was frequently confused.  She required two doses of digoxin overnight.  Cardiology made changes to her medication.  Lisinopril was changed to losartan due to a dry cough.  She was started on metoprolol twenty-five.  She was also started on Levophed.  This morning the patient does seem mildly confused.  It took several explanations to get her to understand what had been done to her last night.  Currently she is satting 97% on high flow at 4 L.  She has an IVF with 30 mL normal saline per hour.  Her anion gap which was initially twenty is now close to eleven.  White cell count is decreasing.    Review of Systems:   Review of Systems   Constitutional: Negative for chills and fever.   HENT: Negative for congestion and trouble swallowing.    Respiratory: Negative for chest tightness and shortness of breath.    Cardiovascular: Negative for chest pain and leg swelling.   Gastrointestinal: Negative for abdominal pain.   Skin: Negative for rash.   Neurological: Negative for dizziness and syncope.   Psychiatric/Behavioral: Positive for confusion.       Objective:     Vital Signs  Temp:  [96.8 °F (36 °C)-98.3 °F (36.8 °C)] 97.9 °F (36.6 °C)  Heart Rate:  [] 110  Resp:  [16-22] 18  BP: ()/(40-81) 107/58  Body mass index is 23.55 kg/m².         I/O last 3 completed shifts:  In: 280 [I.V.:30; Other:250]  Out: 4045 [Urine:4045]    Physical Exam  Physical Exam  Vitals and nursing note reviewed.   Constitutional:       General: She is not in acute distress.     Appearance: She is not  diaphoretic.   HENT:      Head: Atraumatic.      Nose: No rhinorrhea.   Neck:      Vascular: No carotid bruit.   Cardiovascular:      Rate and Rhythm: Regular rhythm. Tachycardia present.      Pulses: Normal pulses.      Heart sounds: No murmur heard.      Pulmonary:      Breath sounds: No wheezing or rales.      Comments: Patient on 4 L high flow.  Satting at 97%.  Abdominal:      Tenderness: There is no abdominal tenderness.   Musculoskeletal:      Right lower leg: No edema.      Left lower leg: No edema.   Skin:     General: Skin is warm and dry.      Capillary Refill: Capillary refill takes less than 2 seconds.         Medication Review    Current Facility-Administered Medications:   •  acetaminophen (TYLENOL) tablet 650 mg, 650 mg, Oral, Q4H PRN, Inder Walker MD  •  aspirin EC tablet 81 mg, 81 mg, Oral, Nightly, Savage Batista MD, 81 mg at 01/08/22 2012  •  AZITHROMYCIN 500 MG/250 ML 0.9% NS IVPB (vial-mate), 500 mg, Intravenous, Q24H, Savage Batista MD, 500 mg at 01/09/22 0632  •  cefTRIAXone (ROCEPHIN) 1 g/100 mL 0.9% NS (MBP), 1 g, Intravenous, Q24H, Savage Batista MD, 1 g at 01/09/22 0629  •  dextrose (D50W) (25 g/50 mL) IV injection 25 g, 25 g, Intravenous, Q15 Min PRN, Inder Walker MD  •  dextrose (GLUTOSE) oral gel 15 g, 15 g, Oral, Q15 Min PRN, Inder Walker MD  •  DOBUTamine (DOBUTREX) 1 mg/mL infusion, 2-20 mcg/kg/min, Intravenous, Titrated, Savage Batista MD, Stopped at 01/09/22 0227  •  FLUoxetine (PROzac) capsule 10 mg, 10 mg, Oral, Nightly, Savage Batista MD, 10 mg at 01/08/22 2012  •  gabapentin (NEURONTIN) capsule 400 mg, 400 mg, Oral, Q12H, Savage Batista MD, 400 mg at 01/08/22 2012  •  glucagon (human recombinant) (GLUCAGEN DIAGNOSTIC) injection 1 mg, 1 mg, Subcutaneous, Q15 Min PRN, Inder Walker MD  •  guaiFENesin (MUCINEX) 12 hr tablet 1,200 mg, 1,200 mg, Oral, Q12H, Madalyn Erickson MD, 1,200 mg at 01/08/22 2012  •  HYDROcodone-acetaminophen (NORCO) 7.5-325 MG  per tablet 1 tablet, 1 tablet, Oral, Q6H PRN, Savage Batista MD, 1 tablet at 01/08/22 1737  •  insulin aspart (novoLOG) injection 0-7 Units, 0-7 Units, Subcutaneous, TID AC, Inder Walker MD, 2 Units at 01/08/22 1711  •  losartan (COZAAR) tablet 25 mg, 25 mg, Oral, Q24H, Inder Walker MD, 25 mg at 01/08/22 1801  •  metoprolol tartrate (LOPRESSOR) tablet 25 mg, 25 mg, Oral, BID, Inder Walker MD, 25 mg at 01/08/22 2012  •  morphine injection 1 mg, 1 mg, Intravenous, Q4H PRN, 1 mg at 01/08/22 2013 **AND** naloxone (NARCAN) injection 0.4 mg, 0.4 mg, Intravenous, Q5 Min PRN, Savage Batista MD  •  norepinephrine (LEVOPHED) 8 mg in 250 mL NS infusion (premix), 0.02-0.3 mcg/kg/min, Intravenous, Titrated, Savage Batista MD, Held at 01/09/22 0600  •  ondansetron (ZOFRAN) tablet 4 mg, 4 mg, Oral, Q6H PRN **OR** ondansetron (ZOFRAN) injection 4 mg, 4 mg, Intravenous, Q6H PRN, Savage Batista MD, 4 mg at 01/08/22 0441  •  sodium chloride 0.9 % flush 10 mL, 10 mL, Intravenous, PRN, Savage Batista MD  •  sodium chloride 0.9 % flush 10 mL, 10 mL, Intravenous, Q12H, Savage Batista MD, 10 mL at 01/08/22 2148  •  sodium chloride 0.9 % flush 10 mL, 10 mL, Intravenous, PRN, Savage Batista MD  •  traZODone (DESYREL) tablet 150 mg, 150 mg, Oral, Nightly, Savage Batista MD, 150 mg at 01/08/22 2130     Diagnostic Data    Lab Results (last 24 hours)     Procedure Component Value Units Date/Time    Comprehensive Metabolic Panel [[id number]]  (Abnormal) Collected: 01/09/22 0505    Specimen: Blood Updated: 01/09/22 0619     Glucose 121 mg/dL      BUN 18 mg/dL      Creatinine 0.79 mg/dL      Sodium 137 mmol/L      Potassium 3.6 mmol/L      Chloride 97 mmol/L      CO2 29.0 mmol/L      Calcium 8.6 mg/dL      Total Protein 6.4 g/dL      Albumin 3.70 g/dL      ALT (SGPT) 46 U/L      AST (SGOT) 98 U/L      Alkaline Phosphatase 101 U/L      Total Bilirubin 0.4 mg/dL      eGFR Non African Amer 73 mL/min/1.73       Globulin 2.7 gm/dL      A/G Ratio 1.4 g/dL      BUN/Creatinine Ratio 22.8     Anion Gap 11.0 mmol/L     Narrative:      GFR Normal >60  Chronic Kidney Disease <60  Kidney Failure <15      POC Glucose Once [[id number]]  (Normal) Collected: 01/09/22 0538    Specimen: Blood Updated: 01/09/22 0554     Glucose 109 mg/dL      Comment: : [id number] SANTOS ALEXANDER [id number] ID: [id number]       CBC & Differential [[id number]]  (Abnormal) Collected: 01/09/22 0505    Specimen: Blood Updated: 01/09/22 0553    Narrative:      The following orders were created for panel order CBC & Differential.  Procedure                               Abnormality         Status                     ---------                               -----------         ------                     CBC Auto Differential[[id number]]        Abnormal            Final result                 Please view results for these tests on the individual orders.    CBC Auto Differential [[id number]]  (Abnormal) Collected: 01/09/22 0505    Specimen: Blood Updated: 01/09/22 0553     WBC 13.28 10*3/mm3      RBC 3.57 10*6/mm3      Hemoglobin 10.8 g/dL      Hematocrit 31.4 %      MCV 88.0 fL      MCH 30.3 pg      MCHC 34.4 g/dL      RDW 13.5 %      RDW-SD 43.9 fl      MPV 9.6 fL      Platelets 359 10*3/mm3      Neutrophil % 75.6 %      Lymphocyte % 17.7 %      Monocyte % 5.6 %      Eosinophil % 0.1 %      Basophil % 0.3 %      Immature Grans % 0.7 %      Neutrophils, Absolute 10.05 10*3/mm3      Lymphocytes, Absolute 2.35 10*3/mm3      Monocytes, Absolute 0.74 10*3/mm3      Eosinophils, Absolute 0.01 10*3/mm3      Basophils, Absolute 0.04 10*3/mm3      Immature Grans, Absolute 0.09 10*3/mm3      nRBC 0.0 /100 WBC     Blood Culture - Blood, Hand, Right [[id number]]  (Normal) Collected: 01/08/22 0043    Specimen: Blood from Hand, Right Updated: 01/09/22 0115     Blood Culture No growth at 24 hours    Blood Culture - Blood, Wrist, Left [[id number]]  (Normal) Collected: 01/08/22  0100    Specimen: Blood from Wrist, Left Updated: 01/09/22 0115     Blood Culture No growth at 24 hours    POC Glucose Once [[id number]]  (Abnormal) Collected: 01/08/22 1933    Specimen: Blood Updated: 01/08/22 2040     Glucose 156 mg/dL      Comment: RN NotifiedOperator: [id number] SANTOS ALEXANDER [id number] ID: [id number]       POC Glucose Once [[id number]]  (Abnormal) Collected: 01/08/22 1708    Specimen: Blood Updated: 01/08/22 1736     Glucose 193 mg/dL      Comment: RN NotifiedOperator: [id number] KOEPP AMANDAMeter ID: [id number]       Mycoplasma Pneumoniae PCR - Swab, Throat [[id number]] Collected: 01/08/22 1459    Specimen: Swab from Throat Updated: 01/08/22 1714     MYCOPLASMAE PNEUMONIAE BY PCR Negative    Narrative:      This test is performed by utilizing real time polymerace chain recation (PCR).     Respiratory Culture - Sputum, Oropharynx [[id number]] Updated: 01/08/22 1458    Specimen: Sputum from Oropharynx     S. Pneumo Ag Urine or CSF - Urine, Urine, Clean Catch [[id number]]  (Normal) Collected: 01/08/22 1341    Specimen: Urine, Clean Catch Updated: 01/08/22 1407     Strep Pneumo Ag Negative    Legionella Antigen, Urine - Urine, Urine, Clean Catch [[id number]]  (Normal) Collected: 01/08/22 1341    Specimen: Urine, Clean Catch Updated: 01/08/22 1407     LEGIONELLA ANTIGEN, URINE Negative    POC Glucose Once [[id number]]  (Abnormal) Collected: 01/08/22 1051    Specimen: Blood Updated: 01/08/22 1114     Glucose 155 mg/dL      Comment: RN NotifiedOperator: [id number] KOEPP AMANDAMeter ID: [id number]             I reviewed the patient's new clinical results.    Assessment/Plan:     Active Hospital Problems    Diagnosis  POA   • **Takotsubo cardiomyopathy [I51.81]  Yes     -On  ECHO  -Patient does have significant mental stressors in her current living arrangements.  - has been consulted.  Appreciate their recommendations.       • Chest pain with high risk for cardiac etiology [R07.9]  Yes      STEMI vs Acute Pericarditis.   -Cath in 2014 did not show obstructive disease  -Initial CXR (1/7; 2057) showed no acute disease. Repeat CXR (1/7; 2235) later showed bilateral opacities with concern for acute pulmonary edema.  -Initial EKG (1/7; 2032) showed ST elevation in inferior leads. Repeat EKG (1/7; 2047) showed ST elevations, tachycardia, concern for pericarditis  -TroponinL 0.493 > 0.754>0.992  -HARPREET therapy  -NPO  -IVF @ 75 ml/hr  -Dr. Bustos consulted did coronary cath 1/8 no blockage identified.  -Echo shows area of apical hypokinesis.  Ejection fraction 3135%.     • Precordial pain [R07.2]  Yes     This morning the patient endorses no chest pain.     • Type 2 diabetes mellitus (HCC) [E11.9]  Yes     -HbA1c (4/2021): 5.8%  -Hold home Metformin 500mg BID  -SSI low dose  -Glucose checks QID  -Consistent carb diet once no longer NPO  -Glucose this morning 109       • Anxiety [F41.9]  Yes     -Continue home Prozac 10mg  -Nurse endorses some anxiety on part of patient  -Nurse endorsed sundowning episodes overnight with increased anxiety and confusion on part of patient.     • Chronic bronchitis (HCC) [J42]  Yes     -Not on any inhalers currently  -Former smoker quit tobacco use >15 years ago  -Supplemental O2 to maintain sats 88-92%  -Patient currently on 4 L via nasal cannula satting in low 90s.     • Primary hypertension [I10]  Yes     -Continue home Lisinopril 10mg, Norvasc 2.5mg  -Monitor per floor policy  -Continue IV fluids at 75 mL's per hour normal saline         Code status is   Code Status and Medical Interventions:   Ordered at: 01/08/22 0233     Level Of Support Discussed With:    Patient     Code Status (Patient has no pulse and is not breathing):    CPR (Attempt to Resuscitate)     Medical Interventions (Patient has pulse or is breathing):    Full Support       Prognosis: fair  Plan for disposition:Where: Unsure at this time what arrangements will be made for patient's home  placement.    Time: Critical care Thirty-five min      This document has been electronically signed by Grant Ignacio MD on January 9, 2022 08:38 CST

## 2022-01-09 NOTE — PLAN OF CARE
Goal Outcome Evaluation:           Progress: improving   Alert with some periods of confusion noted, easily reoriented. Walked in hallway with stand by assist. O2 at 3L via nasal canula. Remains tachycardic, BP soft at times. Urine output adequate.

## 2022-01-09 NOTE — PLAN OF CARE
Outcome Summary: Patient on levo gtt this morning. 3rd dose digoxin held, MD aware. Patient AxO at current; confused at times through shift, reportedly normal to patient. 4L NC. Sinus tach this AM. Continuing to monitor.

## 2022-01-10 ENCOUNTER — HOME HEALTH ADMISSION (OUTPATIENT)
Dept: HOME HEALTH SERVICES | Facility: HOME HEALTHCARE | Age: 67
End: 2022-01-10

## 2022-01-10 ENCOUNTER — APPOINTMENT (OUTPATIENT)
Dept: GENERAL RADIOLOGY | Facility: HOSPITAL | Age: 67
End: 2022-01-10

## 2022-01-10 VITALS
HEIGHT: 60 IN | BODY MASS INDEX: 20.38 KG/M2 | WEIGHT: 103.8 LBS | DIASTOLIC BLOOD PRESSURE: 53 MMHG | RESPIRATION RATE: 16 BRPM | HEART RATE: 92 BPM | TEMPERATURE: 98.2 F | OXYGEN SATURATION: 95 % | SYSTOLIC BLOOD PRESSURE: 90 MMHG

## 2022-01-10 PROBLEM — J96.01 ACUTE RESPIRATORY FAILURE WITH HYPOXIA (HCC): Status: ACTIVE | Noted: 2022-01-10

## 2022-01-10 PROBLEM — R07.9 CHEST PAIN WITH HIGH RISK FOR CARDIAC ETIOLOGY: Status: RESOLVED | Noted: 2022-01-08 | Resolved: 2022-01-10

## 2022-01-10 PROBLEM — R07.2 PRECORDIAL PAIN: Status: RESOLVED | Noted: 2022-01-08 | Resolved: 2022-01-10

## 2022-01-10 LAB
ALBUMIN SERPL-MCNC: 3.2 G/DL (ref 3.5–5.2)
ALBUMIN/GLOB SERPL: 1.4 G/DL
ALP SERPL-CCNC: 77 U/L (ref 39–117)
ALT SERPL W P-5'-P-CCNC: 30 U/L (ref 1–33)
ANION GAP SERPL CALCULATED.3IONS-SCNC: 7 MMOL/L (ref 5–15)
AST SERPL-CCNC: 50 U/L (ref 1–32)
BASOPHILS # BLD AUTO: 0.03 10*3/MM3 (ref 0–0.2)
BASOPHILS NFR BLD AUTO: 0.3 % (ref 0–1.5)
BILIRUB SERPL-MCNC: 0.3 MG/DL (ref 0–1.2)
BUN SERPL-MCNC: 14 MG/DL (ref 8–23)
BUN/CREAT SERPL: 25.5 (ref 7–25)
CALCIUM SPEC-SCNC: 8.4 MG/DL (ref 8.6–10.5)
CHLORIDE SERPL-SCNC: 103 MMOL/L (ref 98–107)
CO2 SERPL-SCNC: 27 MMOL/L (ref 22–29)
CREAT SERPL-MCNC: 0.55 MG/DL (ref 0.57–1)
DEPRECATED RDW RBC AUTO: 46 FL (ref 37–54)
EOSINOPHIL # BLD AUTO: 0.11 10*3/MM3 (ref 0–0.4)
EOSINOPHIL NFR BLD AUTO: 1.2 % (ref 0.3–6.2)
ERYTHROCYTE [DISTWIDTH] IN BLOOD BY AUTOMATED COUNT: 13.9 % (ref 12.3–15.4)
GFR SERPL CREATININE-BSD FRML MDRD: 111 ML/MIN/1.73
GLOBULIN UR ELPH-MCNC: 2.3 GM/DL
GLUCOSE BLDC GLUCOMTR-MCNC: 147 MG/DL (ref 70–130)
GLUCOSE BLDC GLUCOMTR-MCNC: 95 MG/DL (ref 70–130)
GLUCOSE SERPL-MCNC: 110 MG/DL (ref 65–99)
HCT VFR BLD AUTO: 28.3 % (ref 34–46.6)
HGB BLD-MCNC: 9.1 G/DL (ref 12–15.9)
IMM GRANULOCYTES # BLD AUTO: 0.04 10*3/MM3 (ref 0–0.05)
IMM GRANULOCYTES NFR BLD AUTO: 0.4 % (ref 0–0.5)
LYMPHOCYTES # BLD AUTO: 2.33 10*3/MM3 (ref 0.7–3.1)
LYMPHOCYTES NFR BLD AUTO: 24.9 % (ref 19.6–45.3)
MCH RBC QN AUTO: 29.2 PG (ref 26.6–33)
MCHC RBC AUTO-ENTMCNC: 32.2 G/DL (ref 31.5–35.7)
MCV RBC AUTO: 90.7 FL (ref 79–97)
MONOCYTES # BLD AUTO: 0.55 10*3/MM3 (ref 0.1–0.9)
MONOCYTES NFR BLD AUTO: 5.9 % (ref 5–12)
NEUTROPHILS NFR BLD AUTO: 6.29 10*3/MM3 (ref 1.7–7)
NEUTROPHILS NFR BLD AUTO: 67.3 % (ref 42.7–76)
NRBC BLD AUTO-RTO: 0 /100 WBC (ref 0–0.2)
PLATELET # BLD AUTO: 248 10*3/MM3 (ref 140–450)
PMV BLD AUTO: 9.9 FL (ref 6–12)
POTASSIUM SERPL-SCNC: 4.3 MMOL/L (ref 3.5–5.2)
PROT SERPL-MCNC: 5.5 G/DL (ref 6–8.5)
RBC # BLD AUTO: 3.12 10*6/MM3 (ref 3.77–5.28)
SODIUM SERPL-SCNC: 137 MMOL/L (ref 136–145)
WBC NRBC COR # BLD: 9.35 10*3/MM3 (ref 3.4–10.8)

## 2022-01-10 PROCEDURE — 71045 X-RAY EXAM CHEST 1 VIEW: CPT

## 2022-01-10 PROCEDURE — 97162 PT EVAL MOD COMPLEX 30 MIN: CPT

## 2022-01-10 PROCEDURE — 97165 OT EVAL LOW COMPLEX 30 MIN: CPT

## 2022-01-10 PROCEDURE — 99239 HOSP IP/OBS DSCHRG MGMT >30: CPT | Performed by: STUDENT IN AN ORGANIZED HEALTH CARE EDUCATION/TRAINING PROGRAM

## 2022-01-10 PROCEDURE — 80053 COMPREHEN METABOLIC PANEL: CPT | Performed by: STUDENT IN AN ORGANIZED HEALTH CARE EDUCATION/TRAINING PROGRAM

## 2022-01-10 PROCEDURE — 85025 COMPLETE CBC W/AUTO DIFF WBC: CPT | Performed by: STUDENT IN AN ORGANIZED HEALTH CARE EDUCATION/TRAINING PROGRAM

## 2022-01-10 PROCEDURE — 94760 N-INVAS EAR/PLS OXIMETRY 1: CPT

## 2022-01-10 PROCEDURE — 82962 GLUCOSE BLOOD TEST: CPT

## 2022-01-10 PROCEDURE — 25010000002 CEFTRIAXONE PER 250 MG: Performed by: STUDENT IN AN ORGANIZED HEALTH CARE EDUCATION/TRAINING PROGRAM

## 2022-01-10 PROCEDURE — 94799 UNLISTED PULMONARY SVC/PX: CPT

## 2022-01-10 RX ORDER — FUROSEMIDE 20 MG/1
20 TABLET ORAL DAILY PRN
Qty: 20 TABLET | Refills: 0 | Status: SHIPPED | OUTPATIENT
Start: 2022-01-10 | End: 2022-02-09

## 2022-01-10 RX ORDER — CEFDINIR 300 MG/1
300 CAPSULE ORAL 2 TIMES DAILY
Qty: 6 CAPSULE | Refills: 0 | Status: SHIPPED | OUTPATIENT
Start: 2022-01-10 | End: 2022-01-13

## 2022-01-10 RX ORDER — METOPROLOL SUCCINATE 25 MG/1
12.5 TABLET, EXTENDED RELEASE ORAL DAILY
Qty: 15 TABLET | Refills: 0 | Status: SHIPPED | OUTPATIENT
Start: 2022-01-10 | End: 2022-02-09

## 2022-01-10 RX ADMIN — SODIUM CHLORIDE 500 ML: 9 INJECTION, SOLUTION INTRAVENOUS at 00:38

## 2022-01-10 RX ADMIN — SODIUM CHLORIDE, PRESERVATIVE FREE 10 ML: 5 INJECTION INTRAVENOUS at 08:14

## 2022-01-10 RX ADMIN — GABAPENTIN 400 MG: 400 CAPSULE ORAL at 08:14

## 2022-01-10 RX ADMIN — GUAIFENESIN 1200 MG: 600 TABLET, EXTENDED RELEASE ORAL at 08:14

## 2022-01-10 RX ADMIN — CEFTRIAXONE SODIUM 1 G: 1 INJECTION, POWDER, FOR SOLUTION INTRAMUSCULAR; INTRAVENOUS at 05:17

## 2022-01-10 RX ADMIN — HYDROCODONE BITARTRATE AND ACETAMINOPHEN 1 TABLET: 7.5; 325 TABLET ORAL at 09:26

## 2022-01-10 NOTE — DISCHARGE PLACEMENT REQUEST
"Maria Yepez (66 y.o. Female)             Date of Birth Social Security Number Address Home Phone MRN    1955  [address] [facility] [address]  [place] [phone] [id number]    [facility] Status             None Single       Admission Date Admission Type Admitting Provider Attending Provider Department, Room/Bed    1/7/22 Emergency Hu To MD McNevin, James, MD [address], 331/1    Discharge Date Discharge Disposition Discharge Destination                         Attending Provider: Grant Ignacio MD    Allergies: Albuterol, Adhesive Tape, Claritin [Loratadine], Ibuprofen, Lactose Intolerance (Gi), Latex, Shellfish-derived Products    Isolation: None   Infection: COVID (History) (01/10/22)   Code Status: CPR   Advance Care Planning Activity    Ht: 152.4 cm (60\")   Wt: 47.1 kg (103 lb 12.8 oz)    Admission Cmt: None   Principal Problem: Takotsubo cardiomyopathy [I51.81] More...                 Active Insurance as of 1/7/2022     Primary Coverage     Payor Plan Insurance Group Employer/Plan Group    [facility] [employer] MEDICARE REPLACEMENT      Payor Plan Address Payor Plan Phone Number Payor Plan Fax Number Effective Dates    [address] [address] [phone]  10/1/2021 - None Entered    [facility] [address]       Subscriber Name Subscriber Birth Date Member ID       MARIA YEPEZ [id number]                 Emergency Contacts      (Rel.) Home Phone Work Phone Mobile Phone    Kim Medley (Daughter) [phone] -- [phone]    MauricioNohelia (Friend) [phone] -- [phone]            Insurance Information                [employer] [facility] MEDICARE REPLACEMENT/[employer] MEDICARE REPLACEMENT Phone: [phone]    Subscriber: Maria Yepez Subscriber#: [id number]    Group#:  Precert#: --          "

## 2022-01-10 NOTE — PLAN OF CARE
Goal Outcome Evaluation:  Plan of Care Reviewed With: patient        Progress: improving  Outcome Summary: PT evaluation completed as co-eval with OT. Pt pleasant and agreeable to eval. Pt performing bed mobility with independence and sitting EOB WFL. Pt performing transfers independently and gait with mod independence. Pt with no LOB with gait, however with occasional use of HR d/t ankle pain. Tinetti Balance Assessment performed with pt scoring 27/28, indicating low fall risk. Pt at baseline with mobility and has no further need for skilled PT. Discussed findings with pt who is in agreement. Recommend home at d/c. Will d/c from PT at this time.

## 2022-01-10 NOTE — PROGRESS NOTES
CRITICAL CARE DAILY PROGRESS NOTE  Family [facility] Residency Service  NAME: Maria Yepez  : 1955  MRN: [id number]      LOS: 2 days     PROVIDER OF SERVICE: Grant Ignacio MD    Chief Complaint: Takotsubo cardiomyopathy    Subjective:     Interval History: History provided by: patient chart RN     Nursing staff reported some soft blood pressures overnight.  She did not require any Levophed in the past 24 hours.  Patient has not required any dobutamine in past 24 hours.  At this point she denies any chest pain, nausea, vomiting, or abdominal pain.  She is eating and drinking.  She slept well last night.    Review of Systems:   Review of Systems   Constitutional: Negative for chills, fatigue and fever.   HENT: Negative for congestion and trouble swallowing.    Respiratory: Negative for chest tightness and shortness of breath.    Cardiovascular: Negative for chest pain and palpitations.   Gastrointestinal: Negative for abdominal pain, constipation, diarrhea and nausea.   Genitourinary: Negative for dysuria and frequency.   Musculoskeletal: Negative for joint swelling.   Skin: Negative for color change.   Neurological: Negative for dizziness and light-headedness.   Psychiatric/Behavioral: Negative for agitation and behavioral problems.       Objective:     Vital Signs  Temp:  [97.5 °F (36.4 °C)-99.4 °F (37.4 °C)] 98.6 °F (37 °C)  Heart Rate:  [] 86  Resp:  [16-18] 16  BP: ()/(47-70) 90/54  Body mass index is 23.12 kg/m².         I/O last 3 completed shifts:  In: 130 [I.V.:30; IV Piggyback:100]  Out: 1250 [Urine:1250]    Physical Exam  Physical Exam  Vitals and nursing note reviewed.   Constitutional:       General: She is not in acute distress.     Appearance: She is not diaphoretic.   HENT:      Head: Atraumatic.      Nose: No rhinorrhea.   Cardiovascular:      Rate and Rhythm: Normal rate and regular rhythm.      Pulses: Normal pulses.      Heart sounds: No murmur heard.      Pulmonary:       Effort: Pulmonary effort is normal.      Breath sounds: Normal breath sounds.   Abdominal:      General: Bowel sounds are normal.      Tenderness: There is no abdominal tenderness.   Musculoskeletal:      Right lower leg: No edema.      Left lower leg: No edema.   Skin:     Capillary Refill: Capillary refill takes 2 to 3 seconds.      Findings: No rash.   Neurological:      Mental Status: She is alert and oriented to person, place, and time.         Medication Review    Current Facility-Administered Medications:   •  acetaminophen (TYLENOL) tablet 650 mg, 650 mg, Oral, Q4H PRN, Inder Walker MD  •  aspirin EC tablet 81 mg, 81 mg, Oral, Nightly, Savage Batista MD, 81 mg at 01/09/22 2002  •  cefTRIAXone (ROCEPHIN) 1 g/100 mL 0.9% NS (MBP), 1 g, Intravenous, Q24H, Savage Batista MD, 1 g at 01/10/22 0517  •  dextrose (D50W) (25 g/50 mL) IV injection 25 g, 25 g, Intravenous, Q15 Min PRN, Inder Walker MD  •  dextrose (GLUTOSE) oral gel 15 g, 15 g, Oral, Q15 Min PRN, Inder Walker MD  •  DOBUTamine (DOBUTREX) 1 mg/mL infusion, 2-20 mcg/kg/min, Intravenous, Titrated, Savage Batista MD, Stopped at 01/09/22 0227  •  FLUoxetine (PROzac) capsule 10 mg, 10 mg, Oral, Nightly, Savage Batista MD, 10 mg at 01/09/22 2002  •  gabapentin (NEURONTIN) capsule 400 mg, 400 mg, Oral, Q12H, Savage Batista MD, 400 mg at 01/10/22 0814  •  glucagon (human recombinant) (GLUCAGEN DIAGNOSTIC) injection 1 mg, 1 mg, Subcutaneous, Q15 Min PRN, Inder Walker MD  •  guaiFENesin (MUCINEX) 12 hr tablet 1,200 mg, 1,200 mg, Oral, Q12H, Madalyn Erickson MD, 1,200 mg at 01/10/22 0814  •  HYDROcodone-acetaminophen (NORCO) 7.5-325 MG per tablet 1 tablet, 1 tablet, Oral, Q6H PRN, Savage Batista MD, 1 tablet at 01/09/22 2124  •  insulin aspart (novoLOG) injection 0-7 Units, 0-7 Units, Subcutaneous, TID AC, Inder Walker MD, 2 Units at 01/08/22 1711  •  losartan (COZAAR) tablet 25 mg, 25 mg, Oral, Q24H, Inder Walker MD, 25  mg at 01/08/22 1801  •  metoprolol tartrate (LOPRESSOR) tablet 25 mg, 25 mg, Oral, BID, Inder Walker MD, 25 mg at 01/09/22 2002  •  morphine injection 1 mg, 1 mg, Intravenous, Q4H PRN, 1 mg at 01/09/22 1431 **AND** naloxone (NARCAN) injection 0.4 mg, 0.4 mg, Intravenous, Q5 Min PRN, Savage Batista MD  •  norepinephrine (LEVOPHED) 8 mg in 250 mL NS infusion (premix), 0.02-0.3 mcg/kg/min, Intravenous, Titrated, Savage Batista MD, Held at 01/09/22 0600  •  ondansetron (ZOFRAN) tablet 4 mg, 4 mg, Oral, Q6H PRN **OR** ondansetron (ZOFRAN) injection 4 mg, 4 mg, Intravenous, Q6H PRN, Savage Batista MD, 4 mg at 01/08/22 0441  •  sodium chloride 0.9 % flush 10 mL, 10 mL, Intravenous, PRN, Savage Batista MD  •  sodium chloride 0.9 % flush 10 mL, 10 mL, Intravenous, Q12H, Savage Batista MD, 10 mL at 01/10/22 0814  •  sodium chloride 0.9 % flush 10 mL, 10 mL, Intravenous, PRN, Savage Batista MD  •  traZODone (DESYREL) tablet 150 mg, 150 mg, Oral, Nightly, Savage Batista MD, 150 mg at 01/09/22 2252     Diagnostic Data    Lab Results (last 24 hours)     Procedure Component Value Units Date/Time    POC Glucose Once [[id number]]  (Normal) Collected: 01/10/22 0623    Specimen: Blood Updated: 01/10/22 0636     Glucose 95 mg/dL      Comment: : [id number] KILO Betancourt ID: [id number]       Comprehensive Metabolic Panel [[id number]]  (Abnormal) Collected: 01/10/22 0509    Specimen: Blood Updated: 01/10/22 0556     Glucose 110 mg/dL      BUN 14 mg/dL      Creatinine 0.55 mg/dL      Sodium 137 mmol/L      Potassium 4.3 mmol/L      Chloride 103 mmol/L      CO2 27.0 mmol/L      Calcium 8.4 mg/dL      Total Protein 5.5 g/dL      Albumin 3.20 g/dL      ALT (SGPT) 30 U/L      AST (SGOT) 50 U/L      Alkaline Phosphatase 77 U/L      Total Bilirubin 0.3 mg/dL      eGFR Non African Amer 111 mL/min/1.73      Globulin 2.3 gm/dL      A/G Ratio 1.4 g/dL      BUN/Creatinine Ratio 25.5     Anion Gap 7.0 mmol/L      Narrative:      GFR Normal >60  Chronic Kidney Disease <60  Kidney Failure <15      CBC & Differential [[id number]]  (Abnormal) Collected: 01/10/22 0509    Specimen: Blood Updated: 01/10/22 0550    Narrative:      The following orders were created for panel order CBC & Differential.  Procedure                               Abnormality         Status                     ---------                               -----------         ------                     CBC Auto Differential[[id number]]        Abnormal            Final result                 Please view results for these tests on the individual orders.    CBC Auto Differential [[id number]]  (Abnormal) Collected: 01/10/22 0509    Specimen: Blood Updated: 01/10/22 0550     WBC 9.35 10*3/mm3      RBC 3.12 10*6/mm3      Hemoglobin 9.1 g/dL      Hematocrit 28.3 %      MCV 90.7 fL      MCH 29.2 pg      MCHC 32.2 g/dL      RDW 13.9 %      RDW-SD 46.0 fl      MPV 9.9 fL      Platelets 248 10*3/mm3      Neutrophil % 67.3 %      Lymphocyte % 24.9 %      Monocyte % 5.9 %      Eosinophil % 1.2 %      Basophil % 0.3 %      Immature Grans % 0.4 %      Neutrophils, Absolute 6.29 10*3/mm3      Lymphocytes, Absolute 2.33 10*3/mm3      Monocytes, Absolute 0.55 10*3/mm3      Eosinophils, Absolute 0.11 10*3/mm3      Basophils, Absolute 0.03 10*3/mm3      Immature Grans, Absolute 0.04 10*3/mm3      nRBC 0.0 /100 WBC     Blood Culture - Blood, Hand, Right [[id number]]  (Normal) Collected: 01/08/22 0043    Specimen: Blood from Hand, Right Updated: 01/10/22 0115     Blood Culture No growth at 2 days    Blood Culture - Blood, Wrist, Left [[id number]]  (Normal) Collected: 01/08/22 0100    Specimen: Blood from Wrist, Left Updated: 01/10/22 0115     Blood Culture No growth at 2 days    POC Glucose Once [[id number]]  (Normal) Collected: 01/09/22 1948    Specimen: Blood Updated: 01/09/22 2017     Glucose 121 mg/dL      Comment: : [id number] IKLO FERRERREYMeter ID: [id number]       POC  Glucose Once [[id number]]  (Normal) Collected: 01/09/22 1619    Specimen: Blood Updated: 01/09/22 1845     Glucose 116 mg/dL      Comment: RN NotifiedOperator: [id number] KOGARDENIAP AMANDAMeter ID: [id number]       POC Glucose Once [[id number]]  (Abnormal) Collected: 01/09/22 1129    Specimen: Blood Updated: 01/09/22 1157     Glucose 132 mg/dL      Comment: RN NotifiedOperator: [id number] KOEPP AMANDAMeter ID: [id number]             I reviewed the patient's new clinical results.    Assessment/Plan:     Active Hospital Problems    Diagnosis  POA   • **Takotsubo cardiomyopathy [I51.81]  Yes     -On  ECHO  -Patient does have significant mental stressors in her current living arrangements.  - has been consulted.  Appreciate their recommendations.       • Sundowning [F05]  Unknown     First step would be to reorient patient as needed.  -If reorientation is not effective might consider Haldol as needed     • Chest pain with high risk for cardiac etiology [R07.9]  Yes     STEMI vs Acute Pericarditis.   -Cath in 2014 did not show obstructive disease  -Initial CXR (1/7; 2057) showed no acute disease. Repeat CXR (1/7; 2235) later showed bilateral opacities with concern for acute pulmonary edema.  -Initial EKG (1/7; 2032) showed ST elevation in inferior leads. Repeat EKG (1/7; 2047) showed ST elevations, tachycardia, concern for pericarditis  -TroponinL 0.493 > 0.754>0.992  -Dr. Bustos consulted did coronary cath 1/8 no blockage identified.  -Echo shows area of apical hypokinesis.  Ejection fraction 3135%.  -Patient with no recurrence of chest pain overnight.  -Denies any shortness of breath currently satting 98% on 2 L via nasal cannula.     • Type 2 diabetes mellitus (HCC) [E11.9]  Yes     -HbA1c (4/2021): 5.8%  -Hold home Metformin 500mg BID  -SSI low dose  -Glucose checks QID  -Consistent carb diet once no longer NPO  -Patient required no insulin in the last 24       • Anxiety [F41.9]  Yes     -Continue home  Prozac 10mg  -Anxiety from yesterday reduced today.     • Chronic bronchitis (HCC) [J42]  Yes     -Not on any inhalers currently  -Former smoker quit tobacco use >15 years ago  -Supplemental O2 to maintain sats 88-92%  -Currently satting 90% on 2 L oxygen.  Will titrate down to target goal of 88 to 92%.     • Primary hypertension [I10]  Yes     -Continue home Lisinopril 10mg, Norvasc 2.5mg  -Monitor per floor policy  -Continue IV fluids at 75 mL's per hour normal saline         Code status is   Code Status and Medical Interventions:   Ordered at: 01/08/22 0233     Level Of Support Discussed With:    Patient     Code Status (Patient has no pulse and is not breathing):    CPR (Attempt to Resuscitate)     Medical Interventions (Patient has pulse or is breathing):    Full Support       Prognosis: good  Plan for disposition:Where: home    Time: Critical care 30 min        This document has been electronically signed by Grant Ignacio MD on January 10, 2022 08:19 CST

## 2022-01-10 NOTE — DISCHARGE PLACEMENT REQUEST
"  Please deliver portable oxygen to room 331. If any questions, please call Paulette at [phone]. Thank you      Maria Yepez (66 y.o. Female)             Date of Birth Social Security Number Address Home Phone MRN    1955  [address]  [facility] [address] [phone] [id number]    Taoist Marital Status             None Single       Admission Date Admission Type Admitting Provider Attending Provider Department, Room/Bed    1/7/22 Emergency Hu To MD McNevin, James, MD [address], 331/1    Discharge Date Discharge Disposition Discharge Destination                         Attending Provider: Grant Ignacio MD    Allergies: Albuterol, Adhesive Tape, Claritin [Loratadine], Ibuprofen, Lactose Intolerance (Gi), Latex, Shellfish-derived Products    Isolation: None   Infection: COVID (History) (01/10/22)   Code Status: CPR   Advance Care Planning Activity    Ht: 152.4 cm (60\")   Wt: 47.1 kg (103 lb 12.8 oz)    Admission Cmt: None   Principal Problem: Takotsubo cardiomyopathy [I51.81] More...                 Active Insurance as of 1/7/2022     Primary Coverage     Payor Plan Insurance Group Employer/Plan Group    [facility] WELL[employer] MEDICARE REPLACEMENT      Payor Plan Address Payor Plan Phone Number Payor Plan Fax Number Effective Dates    [address] [address] [phone]  10/1/2021 - None Entered    [facility] [address]       Subscriber Name Subscriber Birth Date Member ID       MARIA YEPEZ [id number]                 Emergency Contacts      (Rel.) Home Phone Work Phone Mobile Phone    Kim Medley (Daughter) [phone] -- [phone]    Nohelia Martínez (Friend) [phone] -- [phone]            Insurance Information                [facility]C[employer] [facility] MEDICARE REPLACEMENT/WELLCARE MEDICARE REPLACEMENT Phone: [phone]    Subscriber: Maria Yepez Maureen " Subscriber#: [id number]    Group#:  Precert#: --             History & Physical      Savage Batista MD at 22 0235     Attestation signed by Hu To MD at 22 1115    I have performed a history and physical examination of the patient. I have discussed the management of the patient with the resident.  I have reviewed the notes, assessment and plan, and/or procedures  performed by the resident. I concur with the resident’s documentation.  I have noted the following changes from initial resident’s H and P:     Patient had a heart cath overnight and currently being treated medically. She reports improvement in her chest pain. She is complaining of feeling congested this morning. Her breathing is improving and her oxygen is weaned down to 8 L on HFNC.     Physical Exam:  General: NAD.  CV: S1 and S2 normal. Tachycardic   Pulmonary: Diminished breath sounds present bilaterally.   Abdomen: Bowel sounds present and normal.  Abdomen is soft, and nontender.  Extremities: No lower extremity edema.      Plan: Cut down fluid rate. Wean down oxygen as tolerated. Rocephin/Azithromycin for antibiotic coverage. Continue medical management for CAD - Cardiology following.       This document has been electronically signed by Hu To MD on 2022 11:15 CST                               HISTORY AND PHYSICAL  NAME: Maria Yepez  : 1955  MRN: [id number]    DATE OF ADMISSION:  2022     DATE & TIME SEEN: 22 at 138    PCP: Sammie Gonzalez DO    CODE STATUS: Full code    CHIEF COMPLAINT:  Chest pain    HPI:  Maria Yepez is a 66 y.o. female with a CMH of DM, HTN, COPD who presents complaining of  Chest pain. Symptoms started approximately 1900, ~2 hours prior to presentation. Patient is somewhat of a poor historian. Describes substernal, pressure like sensation that is 8-9/10 in intensity without radiation. Pain is not worsened by deep breathing. Associated  "symptoms include SOA, palpitations, and nausea. Denies diaphoresis, syncope, fever, chills. Reports a productive cough that started 1 day ago. Currently on NRB; reports breathing and chest pain are improved. Of note, she had heart cath in 2014 that did not show obstruction in [place].    CONCURRENT MEDICAL HISTORY:  Past Medical History:   Diagnosis Date   • Abdominal pain    • Acute bronchitis    • Acute pancreatitis    • Acute posthemorrhagic anemia    • Acute sinusitis    • Anal pain    • Anemia    • Anemia due to blood loss    • Anxiety    • Backache     mid and lower back     • Carotid artery disease (HCC)    • Chronic back pain    • Chronic obstructive lung disease (HCC)    • Coronary atherosclerosis    • Diabetic neuropathy (HCC)    • Diarrhea    • Diverticulosis of colon without diverticulitis    • Dysphagia    • Dysuria    • Emphysema/COPD (HCC)     \"Emphysema\"   • Encounter for immunization    • Epigastric pain    • Esophagitis     with stricture   • Essential hypertension    • Fatigue    • Foot pain    • Ganglion cyst of wrist     left wrist     • Gastroesophageal reflux disease    • Generalized abdominal pain    • Gout    • Headache    • Herpes simplex    • Hip pain, bilateral    • History of transfusion    • Hoarse    • Hyperlipidemia    • Incontinence of feces    • Insomnia    • Irritable bowel syndrome    • Joint pain    • Knee pain    • Left lower quadrant pain    • Left upper quadrant pain    • Leg pain    • Leukorrhea     not specified as infective    • Low back pain     injury   • Muscle pain    • Nausea    • Nausea    • Nausea and vomiting    • Neck pain    • Neck pain    • Noncompliance with medication regimen    • Osteopenia    • Pain in lower back    • Pain in right femur    • Palpitations    • Productive cough    • PTSD (post-traumatic stress disorder)    • Scoliosis deformity of spine    • Screening examination for venereal disease    • Shoulder pain, left    • Stricture of esophagus    • " Symptomatic menopausal or female climacteric states    • Synovitis and tenosynovitis     left forearm   • Tenderness     Tenderness of left upper quadrant of abdomen      • Thoracic back pain    • Type 2 diabetes mellitus (HCC)    • Upper abdominal pain    • Urgency of urination     Urgent desire to urinate      • Urinary frequency     due to benign prostatic hypertrophy      • Urinary tract infection     site not specified   • Urinary tract infectious disease    • Venous varices    • Weakness     General symptom - weakness    • Weight gain        PAST SURGICAL HISTORY:  Past Surgical History:   Procedure Laterality Date   • ANKLE OPEN REDUCTION INTERNAL FIXATION Right 10/28/2021    Procedure: OPEN REDUCTION AND INTERNAL FIXATION OF RIGHT ANKLE FRACTURE;  Surgeon: Danial Carter DPM;  Location: [facility] OR;  Service: Podiatry;  Laterality: Right;   • BREAST BIOPSY      Stereotactic breast biopsy (1)  left   • CARDIAC CATHETERIZATION  2014    Cardiac cath 24043 (1)  Non obstructive coronary artery disease. Normal left ventricular systolic function. Performed at [facility].   • CARPAL TUNNEL RELEASE Bilateral    •  SECTION     • CHOLECYSTECTOMY      laparoscopic   • COLONOSCOPY N/A 2017    Procedure: COLONOSCOPY;  Surgeon: Elia Cheatham MD;  Location: [facility] ENDOSCOPY;  Service:    • ENDOSCOPY  10/24/2012    Colon endoscopy 48315 (2)  internal & external hemorrhoids found. Diverticulum in sigmoid colon.   • ENDOSCOPY  2014    EGD w/ Dilatation 01612 (1)  Esophageal stricture was present. Dilatation performed. Gastritis found in the stomach. Biopsy taken. Normal duodenum.   • ENDOSCOPY  2013    EGD w/ tube 47487 (3)  Normal esophagus. Gastritis in stomach. Biopsy taken. Normal duodenum. Biopsy taken.   • ENDOSCOPY N/A 2017    Procedure: ESOPHAGOGASTRODUODENOSCOPY, possible dilation;  Surgeon: Grant Morris MD;  Location: [facility] ENDOSCOPY;   Service:    • ENDOSCOPY N/A 3/10/2020    Procedure: ESOPHAGOGASTRODUODENOSCOPY;  Surgeon: Grant Morris MD;  Location: [facility] ENDOSCOPY;  Service: General;  Laterality: N/A;   • FRACTURE SURGERY Right     Arm Surgery (1)  right, arm fracture   • HERNIA REPAIR      Hernia repair w/mesh (1)   • HYSTERECTOMY      Anesth, hysterectomy (1)   • INCISION AND DRAINAGE ABSCESS N/A 9/5/2017    Procedure: INCISION AND DRAINAGE OF ABDOMINAL WOUND;  Surgeon: Grant Morris MD;  Location: [facility] OR;  Service:    • INJECTION OF MEDICATION  05/27/2016    Depo Medrol (Methylprednisone) 80mg (2)  Ordered By: BRIAN LOU ([facility])    • INJECTION OF MEDICATION  03/10/2016    Kenalog (4)  Ordered By: ARNOLDO MURDOCK ([facility])    • INJECTION OF MEDICATION  01/07/2016    Rocephin (1)  Ordered By: ARNOLDO MURDOCK ([facility])    • LYMPH NODE BIOPSY      Biopsy/removal, lymph node(s) (1)  cervical node biopsy   • NECK SURGERY  2013   • NISSEN FUNDOPLICATION     • NISSEN FUNDOPLICATION N/A 6/7/2017    Procedure: OPEN REVISION OF NISSEN FUNDOPLICATION WITH GASTROSTOMY TUBE PLACEMENT ;  Surgeon: Grant Morris MD;  Location: [facility] OR;  Service:    • TOTAL ABDOMINAL HYSTERECTOMY      RUPALI BSO   • TRIGGER FINGER RELEASE Bilateral    • TUBAL ABDOMINAL LIGATION     • UPPER GASTROINTESTINAL ENDOSCOPY  05/23/2017   • VENTRAL/INCISIONAL HERNIA REPAIR N/A 10/13/2017    Procedure: REMOVAL OF INFECTED MESH, REPAIR OF ABDOMINAL WALL   (Will need Strattice mesh in room).;  Surgeon: Grant Morris MD;  Location: [facility] OR;  Service:        FAMILY HISTORY:  Family History   Problem Relation Age of Onset   • Lung cancer Mother    • Hypertension Mother    • Cancer Mother    • Lymphoma Father         NonHodgkins   • Hypertension Father    • Diabetes Other    • Hypertension Other    • Hypertension Sister    • Diabetes Sister    • Hypertension Brother    • Diabetes Brother         SOCIAL HISTORY:  Social History     Socioeconomic History   •  Marital status: Single   Tobacco Use   • Smoking status: Former Smoker     Years: 0.00     Quit date: 2007     Years since quitting: 15.0   • Smokeless tobacco: Never Used   Vaping Use   • Vaping Use: Never used   Substance and Sexual Activity   • Alcohol use: Not Currently   • Drug use: No   • Sexual activity: Defer       HOME MEDICATIONS:  Prior to Admission medications    Medication Sig Start Date End Date Taking? Authorizing Provider   amLODIPine (NORVASC) 2.5 MG tablet Take 1 tablet by mouth 2 (Two) Times a Day. 6/9/21   Bettie Panda MD   aspirin 81 MG EC tablet Take 1 tablet by mouth Every Night. Hx of CAD 10/25/17   Manjinder Joshi MD   atenolol (TENORMIN) 25 MG tablet Take 25 mg by mouth Every Night.    Bettie Panda MD   FLUoxetine (PROzac) 10 MG capsule Take 10 mg by mouth Every Night.    Bettie Panda MD   furosemide (LASIX) 20 MG tablet Take 20 mg by mouth Every Other Day. Takes in the morning    Bettie Panda MD   gabapentin (NEURONTIN) 400 MG capsule Take 800 mg by mouth Every Night. 9/28/21   Bettie Panda MD   HYDROcodone-acetaminophen (NORCO) 7.5-325 MG per tablet  11/2/21   Bettie Panda MD   lisinopril (PRINIVIL,ZESTRIL) 5 MG tablet Take 5 mg by mouth Every Night.    Bettie Panda MD   metFORMIN (GLUCOPHAGE) 500 MG tablet Take 1 tablet by mouth 2 (Two) Times a Day With Meals. 4/16/18   Manjinder Joshi MD   nitroglycerin (NITROSTAT) 0.4 MG SL tablet Place 1 tablet under the tongue Every 5 (Five) Minutes As Needed for Chest Pain. 8/24/17   Manjinder Joshi MD   Omega-3 Fatty Acids (FISH OIL) 1000 MG capsule capsule Take 1,000 mg by mouth Daily With Breakfast.    Bettie Panda MD   ondansetron (ZOFRAN) 4 MG tablet Take 1 tablet by mouth Every 8 (Eight) Hours As Needed for Nausea or Vomiting. 10/25/17   Manjinder Joshi MD   pantoprazole (PROTONIX) 40 MG EC tablet Take 40 mg by mouth Every Night.    Bettie Panda MD    pravastatin (PRAVACHOL) 80 MG tablet Take 1 tablet by mouth Every Night. 8/4/21   Bettie Panda MD   silver sulfadiazine (SILVADENE, SSD) 1 % cream Apply 1 application topically to the appropriate area as directed 2 (Two) Times a Day. 10/20/21   Cesar Shi PA-C   tiZANidine (ZANAFLEX) 4 MG tablet Take 4 mg by mouth 2 (Two) Times a Day. 4/26/21   Bettie Panda MD   traZODone (DESYREL) 150 MG tablet Take 150-300 mg by mouth Every Night.    Bettie Panda MD   vitamin D (ERGOCALCIFEROL) 21698 units capsule capsule Take 1 capsule by mouth Every 14 (Fourteen) Days. 10/25/17   Manjinder Joshi MD       ALLERGIES:  Albuterol, Adhesive tape, Claritin [loratadine], Ibuprofen, Lactose intolerance (gi), Latex, and Shellfish-derived products    REVIEW OF SYSTEMS  Review of Systems   Constitutional: Negative for activity change, appetite change, chills, diaphoresis, fatigue and fever.   HENT: Negative for congestion, rhinorrhea, sinus pain and sore throat.    Eyes: Negative for pain, redness and visual disturbance.   Respiratory: Positive for cough and shortness of breath. Negative for wheezing.    Cardiovascular: Positive for chest pain and palpitations. Negative for leg swelling.   Gastrointestinal: Positive for diarrhea. Negative for abdominal pain, constipation, nausea and vomiting.   Genitourinary: Negative for dysuria and flank pain.   Musculoskeletal: Negative for arthralgias, back pain, joint swelling and myalgias.   Skin: Negative for color change, pallor and rash.   Neurological: Negative for dizziness, light-headedness and headaches.       PHYSICAL EXAM:  Temp:  [97.6 °F (36.4 °C)] 97.6 °F (36.4 °C)  Heart Rate:  [102-126] 126  Resp:  [20-22] 22  BP: (112-166)/(65-99) 112/65  Body mass index is 21.09 kg/m².  Physical Exam  Vitals and nursing note reviewed.   Constitutional:       General: She is not in acute distress.     Appearance: Normal appearance. She is well-developed. She  is ill-appearing. She is not diaphoretic.   HENT:      Head: Normocephalic and atraumatic.      Right Ear: Hearing normal.      Left Ear: Hearing normal.   Eyes:      General: Lids are normal.      Conjunctiva/sclera: Conjunctivae normal.   Cardiovascular:      Rate and Rhythm: Regular rhythm. Tachycardia present.      Heart sounds: Normal heart sounds.   Pulmonary:      Effort: Tachypnea and accessory muscle usage present. No respiratory distress.      Breath sounds: Normal breath sounds. No wheezing or rales.      Comments: 15L NRB  Abdominal:      General: Bowel sounds are normal. There is no distension.      Palpations: Abdomen is soft.      Tenderness: There is no abdominal tenderness.   Musculoskeletal:         General: No tenderness or deformity. Normal range of motion.      Right lower leg: No edema.      Left lower leg: No edema.   Skin:     General: Skin is warm and dry.      Coloration: Skin is not pale.      Findings: No erythema or rash.   Neurological:      Mental Status: She is alert and oriented to person, place, and time. She is not disoriented.         DIAGNOSTIC DATA:   Lab Results (last 24 hours)     Procedure Component Value Units Date/Time    Troponin [[id number]]  (Abnormal) Collected: 01/08/22 0214    Specimen: Blood Updated: 01/08/22 0259     Troponin T 0.992 ng/mL     Narrative:      Troponin T Reference Range:  <= 0.03 ng/mL-   Negative for AMI  >0.03 ng/mL-     Abnormal for myocardial necrosis.  Clinicians would have to utilize clinical acumen, EKG, Troponin and serial changes to determine if it is an Acute Myocardial Infarction or myocardial injury due to an underlying chronic condition.       Results may be falsely decreased if patient taking Biotin.      Protime-INR [[id number]]  (Normal) Collected: 01/08/22 0214    Specimen: Blood Updated: 01/08/22 0240     Protime 12.6 Seconds      INR 0.95    Narrative:      Therapeutic range for most indications is 2.0-3.0 INR,  or 2.5-3.5 for  mechanical heart valves.    aPTT [[id number]]  (Normal) Collected: 01/08/22 0214    Specimen: Blood Updated: 01/08/22 0240     PTT 26.7 seconds     Narrative:      The recommended Heparin therapeutic range is 68-97 seconds.    Procalcitonin [[id number]] Collected: 01/08/22 0214    Specimen: Blood Updated: 01/08/22 0217    Protime-INR [[id number]]  (Normal) Collected: 01/07/22 2046    Specimen: Blood Updated: 01/08/22 0201     Protime 12.4 Seconds      INR 0.93    Narrative:      Therapeutic range for most indications is 2.0-3.0 INR,  or 2.5-3.5 for mechanical heart valves.    Lactic Acid, Plasma [[id number]]  (Normal) Collected: 01/08/22 0043    Specimen: Blood Updated: 01/08/22 0147     Lactate 1.9 mmol/L     Blood Culture - Blood, Wrist, Left [[id number]] Collected: 01/08/22 0100    Specimen: Blood from Wrist, Left Updated: 01/08/22 0110    Blood Culture - Blood, Hand, Right [[id number]] Collected: 01/08/22 0043    Specimen: Blood from Hand, Right Updated: 01/08/22 0101    Troponin [[id number]]  (Abnormal) Collected: 01/07/22 2251    Specimen: Blood Updated: 01/07/22 2334     Troponin T 0.754 ng/mL     Narrative:      Troponin T Reference Range:  <= 0.03 ng/mL-   Negative for AMI  >0.03 ng/mL-     Abnormal for myocardial necrosis.  Clinicians would have to utilize clinical acumen, EKG, Troponin and serial changes to determine if it is an Acute Myocardial Infarction or myocardial injury due to an underlying chronic condition.       Results may be falsely decreased if patient taking Biotin.      COVID-19 and FLU A/B PCR - Swab, Nasopharynx [[id number]]  (Normal) Collected: 01/07/22 2219    Specimen: Swab from Nasopharynx Updated: 01/07/22 2255     COVID19 Not Detected     Influenza A PCR Not Detected     Influenza B PCR Not Detected    Narrative:      Fact sheet for providers: https://www.fda.gov/media/301724/download    Fact sheet for patients: https://www.fda.gov/media/030561/download    Test performed by PCR.    [facility]  Draw [[id number]] Collected: 01/07/22 2046    Specimen: Blood Updated: 01/07/22 2200    Narrative:      The following orders were created for panel order [facility] Draw.  Procedure                               Abnormality         Status                     ---------                               -----------         ------                     Green Top (Gel)[[id number]]                                  Final result               Lavender Top[[id number]]                                     Final result               Gold Top - SST[[id number]]                                   Final result               Light Blue Top[[id number]]                                   Final result                 Please view results for these tests on the individual orders.    Lavender Top [[id number]] Collected: 01/07/22 2046    Specimen: Blood Updated: 01/07/22 2200     Extra Tube hold for add-on     Comment: Auto resulted       Gold Top - SST [[id number]] Collected: 01/07/22 2046    Specimen: Blood Updated: 01/07/22 2200     Extra Tube Hold for add-ons.     Comment: Auto resulted.       Light Blue Top [[id number]] Collected: 01/07/22 2046    Specimen: Blood Updated: 01/07/22 2200     Extra Tube hold for add-on     Comment: Auto resulted       Green Top (Gel) [[id number]] Collected: 01/07/22 2046    Specimen: Blood Updated: 01/07/22 2200     Extra Tube Hold for add-ons.     Comment: Auto resulted.       Lipase [[id number]]  (Abnormal) Collected: 01/07/22 2046    Specimen: Blood Updated: 01/07/22 2130     Lipase 6 U/L     CK [[id number]]  (Abnormal) Collected: 01/07/22 2046    Specimen: Blood Updated: 01/07/22 2130     Creatine Kinase 264 U/L     Magnesium [[id number]]  (Abnormal) Collected: 01/07/22 2046    Specimen: Blood Updated: 01/07/22 2130     Magnesium 1.2 mg/dL     D-dimer, Quantitative [[id number]]  (Abnormal) Collected: 01/07/22 2046    Specimen: Blood Updated: 01/07/22 2127     D-Dimer, Quantitative 484 ng/mL (FEU)     Narrative:      Dimer  values <500 ng/ml FEU are FDA approved as aid in diagnosis of deep venous thrombosis and pulmonary embolism.  This test should not be used in an exclusion strategy with pretest probability alone.    A recent guideline regarding diagnosis for pulmonary thromboembolism recommends an adjusted exclusion criterion of age x 10 ng/ml FEU for patients >50 years of age ([employer]aj Intern Med 2015; 163: 701-711).      Comprehensive Metabolic Panel [[id number]]  (Abnormal) Collected: 01/07/22 2046    Specimen: Blood Updated: 01/07/22 2115     Glucose 157 mg/dL      BUN 13 mg/dL      Creatinine 0.76 mg/dL      Sodium 135 mmol/L      Potassium 4.3 mmol/L      Chloride 97 mmol/L      CO2 24.0 mmol/L      Calcium 9.3 mg/dL      Total Protein 7.3 g/dL      Albumin 4.20 g/dL      ALT (SGPT) 75 U/L      AST (SGOT) 220 U/L      Alkaline Phosphatase 137 U/L      Total Bilirubin 0.4 mg/dL      eGFR Non African Amer 76 mL/min/1.73      Globulin 3.1 gm/dL      A/G Ratio 1.4 g/dL      BUN/Creatinine Ratio 17.1     Anion Gap 14.0 mmol/L     Narrative:      GFR Normal >60  Chronic Kidney Disease <60  Kidney Failure <15      Troponin [[id number]]  (Abnormal) Collected: 01/07/22 2046    Specimen: Blood Updated: 01/07/22 2114     Troponin T 0.493 ng/mL     Narrative:      Troponin T Reference Range:  <= 0.03 ng/mL-   Negative for AMI  >0.03 ng/mL-     Abnormal for myocardial necrosis.  Clinicians would have to utilize clinical acumen, EKG, Troponin and serial changes to determine if it is an Acute Myocardial Infarction or myocardial injury due to an underlying chronic condition.       Results may be falsely decreased if patient taking Biotin.      BNP [[id number]]  (Normal) Collected: 01/07/22 2046    Specimen: Blood Updated: 01/07/22 2113     proBNP 554.9 pg/mL     Narrative:      Among patients with dyspnea, NT-proBNP is highly sensitive for the detection of acute congestive heart failure. In addition NT-proBNP of <300 pg/ml effectively rules out acute  congestive heart failure with 99% negative predictive value.    Results may be falsely decreased if patient taking Biotin.      CBC & Differential [[id number]]  (Abnormal) Collected: 01/07/22 2046    Specimen: Blood Updated: 01/07/22 2052    Narrative:      The following orders were created for panel order CBC & Differential.  Procedure                               Abnormality         Status                     ---------                               -----------         ------                     CBC Auto Differential[[id number]]        Abnormal            Final result                 Please view results for these tests on the individual orders.    CBC Auto Differential [[id number]]  (Abnormal) Collected: 01/07/22 2046    Specimen: Blood Updated: 01/07/22 2052     WBC 11.84 10*3/mm3      RBC 3.52 10*6/mm3      Hemoglobin 10.5 g/dL      Hematocrit 31.6 %      MCV 89.8 fL      MCH 29.8 pg      MCHC 33.2 g/dL      RDW 13.2 %      RDW-SD 43.0 fl      MPV 9.0 fL      Platelets 376 10*3/mm3      Neutrophil % 86.6 %      Lymphocyte % 9.0 %      Monocyte % 3.6 %      Eosinophil % 0.2 %      Basophil % 0.3 %      Immature Grans % 0.3 %      Neutrophils, Absolute 10.25 10*3/mm3      Lymphocytes, Absolute 1.06 10*3/mm3      Monocytes, Absolute 0.43 10*3/mm3      Eosinophils, Absolute 0.02 10*3/mm3      Basophils, Absolute 0.04 10*3/mm3      Immature Grans, Absolute 0.04 10*3/mm3      nRBC 0.0 /100 WBC            Imaging Results (Last 24 Hours)     Procedure Component Value Units Date/Time    CT Abdomen Pelvis With Contrast [[id number]] Collected: 01/07/22 2132     Updated: 01/07/22 2354    Narrative:      CT ABDOMEN PELVIS WITH IV CONTRAST    INDICATION: 66 years Female; Abdominal pain, acute, nonlocalized    TECHNIQUE:  CT scan of the abdomen and pelvis was performed with  IV contrast.  This exam was performed according to our  departmental dose-optimization program, which includes automated  exposure control, adjustment of the  mA and/or kV according to  patient size and/or use of iterative reconstruction technique.     Comparison: 12/7/2017.    FINDINGS:  Liver: Hepatic steatosis.  Gallbladder/Biliary tree: Status post cholecystectomy. The common  bile duct is dilated measuring up to 12 mm similar to previous  study and may be within normal limits for postcholecystectomy  changes.  Pancreas: Unremarkable.  Spleen: Unremarkable.  Adrenals: Unremarkable.  Genitourinary: No hydronephrosis. No bladder wall thickening.  Status post hysterectomy. No adnexal masses.  Gastrointestinal: Diverticulosis of the sigmoid and descending  colon without diverticulitis. Mild to moderate stool in the  colon. Normal appendix. No gastric wall thickening. Multiple  surgical clips surrounding the stomach. No free air or free  fluid.  Peritoneum: Unremarkable.  Vasculature: Mild atherosclerosis of the aorta.  Lymph nodes: No pathologically enlarged lymph nodes.  Bones: Mild degenerative changes of the right hip. Multilevel  degenerative changes of the lumbar spine most prominent at L4-5..  Soft tissues: Unremarkable.  Incidental findings: None.      Impression:      1.  No acute abdominal or pelvic findings.  2.  Hepatic steatosis.  3.  Diverticulosis without diverticulitis.    Electronically signed by:  Chanel Salcido  1/7/2022 11:53 PM CST  Workstation: [id number]    XR Chest 1 View [[id number]] Collected: 01/07/22 2235     Updated: 01/07/22 2258    Narrative:      PORTABLE CHEST X-RAY    CLINICAL HISTORY: de sating, change in status.    COMPARISON: 8:28 PM.    FINDINGS: Portable AP view of the chest was obtained with  overlying monitor leads in place. There has been development of  rather significant interstitial and groundglass opacities  bilaterally most consistent with acute pulmonary edema. Heart is  enlarged. No significant pleural fluid. Surgical clips and  metallic densities again noted in the visualized upper abdomen.      Impression:      Development of  significant bilateral opacities likely  acute pulmonary edema      Electronically signed by:  Basim Puentes MD  1/7/2022 10:57 PM  CST Workstation: [id number]    CT Angiogram Chest [[id number]] Collected: 01/07/22 2132     Updated: 01/07/22 2213    Narrative:      CT ANGIOGRAM CHEST  RPID:CT CHEST ANGIOGRAPHY WITH IV CONTRAST    History: 66 years-year-oldFemale with history of chest pain, SOA  COMPARISON: Chest radiograph on this date.  TECHNIQUE: CT acquisition through the chest the uneventful of IV  contrast. Bolus timing per protocol. Coronal reformats, sagittal  reformats, and MIP imaging were performed per local departmental  protocol.    This exam was performed according to our departmental  dose-optimization program, which includes automated exposure  control, adjustment of the mA and/or kV according to patient size  and/or use of iterative reconstruction technique.    Intravenous contrast (agent and volume): 80 mL of Isovue-370    FINDINGS:    Diagnostic quality: Excellent pulmonary arterial opacification,  limited aortic opacification.    No pulmonary artery filling defects. The thoracic aorta is  grossly unremarkable other than some atherosclerotic plaquing on  this study which is essentially unenhanced. No pericardial or  pleural fluid is seen.    Bibasilar groundglass attenuation opacities are seen. Focal  triangular-shaped scarring is seen at the tip of the lingula  medially.    ABDOMEN:  No acute process in the visualized portions of the abdomen.         Impression:          1. No pulmonary embolus is seen.  2. Bilateral groundglass attenuation opacities are noted.  Findings are nonspecific but could be seen with atypical  infection.     Electronically signed by:  Eric Kerns MD  1/7/2022 10:12 PM CST  Workstation: [id number]    XR Chest 1 View [[id number]] Collected: 01/07/22 2055     Updated: 01/07/22 2113    Narrative:      PORTABLE CHEST X-RAY    CLINICAL HISTORY: Chest pain protocol  chest pain  protocol    COMPARISON:  None that are available at the time of  interpretation. .    FINDINGS:  Single portable view of the chest obtained.  There are  various overlying monitor leads/artifacts in place. The lungs are  well expanded and clear where they can be visualized.  Cardiac  size is within normal limits.  Vascularity is normal considering  technique.  No pleural fluid is demonstrated by portable imaging.   There are vascular calculi. There are surgical clips as well as  small rounded and linear metallic densities in the upper abdomen.        Impression:        No active disease by portable imaging.    Electronically signed by:  Basim Puentes MD  1/7/2022 9:11 PM CST  Workstation: [id number]            I reviewed the patient's new clinical results.    ASSESSMENT AND PLAN: This is a 66 y.o. female with:    Active Hospital Problems    Diagnosis  POA   • **Chest pain with high risk for cardiac etiology [R07.9]  Yes     STEMI vs Acute Pericarditis.   -Cath in 2014 did not show obstructive disease  -Initial CXR (1/7; 2057) showed no acute disease. Repeat CXR (1/7; 2235) later showed bilateral opacities with concern for acute pulmonary edema.  -Initial EKG (1/7; 2032) showed ST elevation in inferior leads. Repeat EKG (1/7; 2047) showed ST elevations, tachycardia, concern for pericarditis  -TroponinL 0.493 -> 0.754  -HARPREET therapy  -NPO  -IVF @ 75 ml/hr  -EKG prn for chest pain  -Cardiology consulted, Dr. Walker. Recommendations apprecaited.   +Cath lab on admission 1/8/22     • Type 2 diabetes mellitus (HCC) [E11.9]  Yes     -HbA1c (4/2021): 5.8%  -Hold home Metformin 500mg BID  -SSI low dose  -Glucose checks QID  -Consistent carb diet once no longer NPO       • Anxiety [F41.9]  Yes     -Continue home Prozac 10mg     • Chronic bronchitis (HCC) [J42]  Yes     -Not on any inhalers currently  -Quit tobacco use >5 years ago  -Supplemental O2 to maintain sats 88-92%     • Primary hypertension [I10]  Yes     -Continue  home Lisinopril 10mg, Norvasc 2.5mg  -Monitor per floor policy         DVT prophylaxis: Heparin     Maria Yepez and I have discussed pain goals for this hospitalization after reviewing her current clinical condition, medical history and prior pain experiences.  The goal is to keep the pain level <5/10.  To help achieve this, I plan to norco, morphine.    MILES reviewed via Epic PDMP, reviewed and consistent with patient reported medications.    Expected Length of Stay: Where: home and When:  2 days    I discussed the patient's findings and my recommendations with patient and consulting provider.     Hu To MD is the attending on record at time of admission, He is aware of the patient's status and agrees with the above history and physical.      Savage Batista M.D. PGY3  [facility]  [address] [place] [address]  Office: [phone]  This document has been electronically signed by Savage Batista MD on January 8, 2022 03:02 CST        Electronically signed by Hu To MD at 01/08/22 1115       Oxygen Therapy [EQ60] (Order [id number])  Order  Date: 1/10/2022 Department: [facility] [place] Ordering/Authorizing: Kaitlyn Dodson MD       Order History  Outpatient  Date/Time Action Taken User Additional Information   01/10/22 1315 Sign Kaitlyn Dodson MD      Order Details    Frequency Duration Priority Order Class   None None Routine External     Start Date/Time    Start Date   01/10/22     Order Information    Order Date Service Start Date Start Time   01/10/22 Family Medicine Residency - Unassigned ([facility] Only) 01/10/22      Reference Links    Associated Reports   View Encounter     Order Questions    Question Answer   Delivery Modality Nasal Cannula   Liters Per Minute: 2   Duration: Continuous   Equipment  Oxygen Concentrator &  &  Portable Gaseous Oxygen System & Portable  Oxygen Contents Gaseous &  Conserving Regulator   Length of Need (99 Months = Lifetime) 3 Months   Note: Custom values to enter length of need for DME            Source Order Set / Preference List    Preference List   AMB SUPPLIES [[id number]]     Clinical Indications     ICD-10-CM ICD-9-CM   Takotsubo cardiomyopathy     I51.81 429.83   Acute respiratory failure with hypoxia (HCC)     J96.01 518.81     Reprint Order Requisition    Oxygen Therapy (Order #[id number]) on 1/10/22     Encounter    View Encounter            Order Provider Info        Office phone Pager E-mail   Ordering User Kaitlyn Dodson [phone] -- --   Authorizing Provider Kaitlyn Dodson [phone] -- --   Attending Provider Grant Ignacio [phone] -- --     Tracking Reports    Cosign Tracking Order Transmittal Tracking   Authorized by:  Kaitlyn Dodson MD  (NPI: [id number])            Lab Component SmartPhrase Guide    Oxygen Therapy (Order #[id number]) on 1/10/22       Lamar Schulz, RN   Registered Nurse   Cardiology   Nursing Note   Signed   Date of Service:  01/10/22 1326   Creation Time:  01/10/22 1326              Signed            Attempted to wean patient off O2.  Took O2 off patient and initially, her O2 sat was 96%.  On hour later, O2 at rest on room air was 86%.  15 minutes later pt on O2 at 2LBNC O2 sat at rest 95%

## 2022-01-10 NOTE — NURSING NOTE
Attempted to wean patient off O2.  Took O2 off patient and initially, her O2 sat was 96%.  On hour later, O2 at rest on room air was 86%.  15 minutes later pt on O2 at 2LBNC O2 sat at rest 95%

## 2022-01-10 NOTE — PLAN OF CARE
Goal Outcome Evaluation:  Plan of Care Reviewed With: patient        Progress: improving  Outcome Summary: Patient on 2L NC, no current gtts infusing. 500 cc bolus given for low BP. BP acceptable at current, NSR. Patient has been alert and oriented throughout shift. Will continue to monitor.

## 2022-01-10 NOTE — NURSING NOTE
"Prior to starting bolus ordered by MD for low BP, patient stated that she has not been drinking today because she is unable to drink water due to \"it making her sick/ vomit\" and she usually only drinks Dr. Pepper. They have been bringing her diet Dr. Pepper which she is only able to sip on. I have offered her all of the beverages we keep on the unit and she is unable to drink those. I will relay  beverage preference to day shift.    "

## 2022-01-10 NOTE — NURSING NOTE
Transfer orders received for 3W. Report given to LISETH Rogel on 3W. Pt will be sent up on 2L NC and a cardiac monitor. Pt belongings will be sent at bedside with pt.

## 2022-01-10 NOTE — PLAN OF CARE
Goal Outcome Evaluation:  Plan of Care Reviewed With: patient           Outcome Summary: OT eval complete, co-eval with PT. BP 94/53 at start of session, throughout session no dizziness, SOA, or lightheadness felt throughout session. Supine<>sit with independence. Sit to stand and toilet transfer with independence. Toileting with indepenence. Grooming with independence. LE dressing with independence. Patient at baseline, HR 90-99 throughout session, SP02 96% on RA at end of session, with RN's permission. Recommend home with assist. Discharge from OT as patient independent in all ADLs and transfers.

## 2022-01-10 NOTE — PROGRESS NOTES
Cardiology Progress Note     LOS: 1 day   Patient Care Team:  Sammie Gonzalez DO as PCP - General (Internal Medicine)  Grant Morris MD as Surgeon (General Surgery)  Kalin Martínez DPM as Consulting Physician (Podiatry)    Subjective:    Chart reviewed. Patient seen and examined. Patient had episodes of low blood pressure last night.  Patient has been ambulating in the hallway without any symptoms of chest pain.  Patient has diuresed well.  Patient does have nonischemic cardiomyopathy Takotsubo cardiomyopathy.  Patient would be continued on medical management.  Patient has not had any further recurrent symptoms or chest pain.        Objective:  Temp:  [96.8 °F (36 °C)-99.4 °F (37.4 °C)] 99.4 °F (37.4 °C)  Heart Rate:  [] 108  Resp:  [16-22] 18  BP: ()/(40-75) 107/55    Intake/Output Summary (Last 24 hours) at 1/9/2022 2132  Last data filed at 1/9/2022 1956  Gross per 24 hour   Intake 30 ml   Output 1050 ml   Net -1020 ml       Physical Exam:   General Appearance:    Alert, oriented, cooperative, in no acute distress.   Head:    Normocephalic, atraumatic, without obvious abnormality   Eyes:             CIPRINAO. Lids and lashes normal, conjunctivae and sclerae normal, no icterus, no pallor.   Ears:    Ears appear intact with no abnormalities noted.   Throat:   Mucous membranes pink and moist.   Neck:  Supple, trachea midline, no carotid bruit, no organomegaly or JVD.   Lungs:    Clear to auscultation and percussion.  Respirations regular, even and unlabored. No wheezes, rales, or rhonchi.    Heart:    Regular rhythm and normal rate, normal S1 and S2, no      murmur, no gallop, no rub, no click.   Abdomen:    Soft, nontender, nondistended, no guarding, no rebound tenderness. Normal bowel sounds in all four quadrants, no masses, liver and spleen nonpalpable.    Genitalia:    Deferred.   Extremities:   Moves all extremities well, no edema, no cyanosis, no       redness, no clubbing.   Pulses:    Pulses palpable and equal bilaterally.   Skin:   Moist and warm. No bleeding, bruising or rash.   Neurologic/Psychiatric:   Alert and oriented to person, place, and time.  Motor, power and tone in upper and lower extremities are grossly intact.  No focal neurological deficits. Normal cognitive function. No psychomotor reaction or tangential thought. No depression, homicidal ideations and suicidal ideations.          Results Review:    Results from last 7 days   Lab Units 01/09/22  0505   SODIUM mmol/L 137   POTASSIUM mmol/L 3.6   CHLORIDE mmol/L 97*   CO2 mmol/L 29.0   BUN mg/dL 18   CREATININE mg/dL 0.79   CALCIUM mg/dL 8.6   BILIRUBIN mg/dL 0.4   ALK PHOS U/L 101   ALT (SGPT) U/L 46*   AST (SGOT) U/L 98*   GLUCOSE mg/dL 121*     Results from last 7 days   Lab Units 01/08/22  0214 01/07/22  2251 01/07/22  2046   CK TOTAL U/L  --   --  264*   TROPONIN T ng/mL 0.992* 0.754* 0.493*         Results from last 7 days   Lab Units 01/09/22  0505   WBC 10*3/mm3 13.28*   HEMOGLOBIN g/dL 10.8*   HEMATOCRIT % 31.4*   PLATELETS 10*3/mm3 359     Results from last 7 days   Lab Units 01/08/22  0214 01/07/22  2046   INR  0.95 0.93   APTT seconds 26.7  --      Results from last 7 days   Lab Units 01/08/22  0523   MAGNESIUM mg/dL 1.6                   ECHO:  Results for orders placed during the hospital encounter of 01/07/22    Adult Transthoracic Echo Complete W/ Cont if Necessary Per Protocol    Interpretation Summary  · The left ventricular cavity is borderline dilated.  · The following left ventricular wall segments are hypokinetic: mid inferoseptal. The following left ventricular wall segments are akinetic: apical anterior, apical lateral, apical inferior, apical septal and apex.  · Left ventricular ejection fraction appears to be 31 - 35%.  · Left ventricular diastolic function was normal.      ECG 12 Lead   Preliminary Result   Test Reason : tachycardia   Blood Pressure :   */*   mmHG   Vent. Rate : 121 BPM     Atrial Rate :  121 BPM      P-R Int : 160 ms          QRS Dur :  68 ms       QT Int : 326 ms       P-R-T Axes :  78  61  81 degrees      QTc Int : 462 ms      Sinus tachycardia   Low voltage QRS   Borderline ECG   When compared with ECG of 07-JAN-2022 22:18,   No significant change was found      Referred By:            Confirmed By:       ECG 12 Lead         ECG 12 Lead         ECG 12 Lead              Medication Review:   Current Facility-Administered Medications   Medication Dose Route Frequency Provider Last Rate Last Admin   • acetaminophen (TYLENOL) tablet 650 mg  650 mg Oral Q4H PRN Inder Walker MD       • aspirin EC tablet 81 mg  81 mg Oral Nightly Savage Batista MD   81 mg at 01/09/22 2002   • cefTRIAXone (ROCEPHIN) 1 g/100 mL 0.9% NS (MBP)  1 g Intravenous Q24H Savage Batista MD   1 g at 01/09/22 0629   • dextrose (D50W) (25 g/50 mL) IV injection 25 g  25 g Intravenous Q15 Min PRN Inder Walker MD       • dextrose (GLUTOSE) oral gel 15 g  15 g Oral Q15 Min PRN Inder Walker MD       • DOBUTamine (DOBUTREX) 1 mg/mL infusion  2-20 mcg/kg/min Intravenous Titrated Savage Batista MD   Stopped at 01/09/22 0227   • FLUoxetine (PROzac) capsule 10 mg  10 mg Oral Nightly Savage Batista MD   10 mg at 01/09/22 2002   • gabapentin (NEURONTIN) capsule 400 mg  400 mg Oral Q12H Savage Batista MD   400 mg at 01/09/22 2003   • glucagon (human recombinant) (GLUCAGEN DIAGNOSTIC) injection 1 mg  1 mg Subcutaneous Q15 Min PRN Inder Walker MD       • guaiFENesin (MUCINEX) 12 hr tablet 1,200 mg  1,200 mg Oral Q12H Madalyn Erickson MD   1,200 mg at 01/09/22 2001   • HYDROcodone-acetaminophen (NORCO) 7.5-325 MG per tablet 1 tablet  1 tablet Oral Q6H PRN Savage Batista MD   1 tablet at 01/09/22 2124   • insulin aspart (novoLOG) injection 0-7 Units  0-7 Units Subcutaneous TID AC Inder Walker MD   2 Units at 01/08/22 1711   • losartan (COZAAR) tablet 25 mg  25 mg Oral Q24H Inder Walker MD   25 mg at 01/08/22  1801   • metoprolol tartrate (LOPRESSOR) tablet 25 mg  25 mg Oral BID Inder Walker MD   25 mg at 01/09/22 2002   • morphine injection 1 mg  1 mg Intravenous Q4H PRN Savage Batista MD   1 mg at 01/09/22 1431    And   • naloxone (NARCAN) injection 0.4 mg  0.4 mg Intravenous Q5 Min PRN Savage Batista MD       • norepinephrine (LEVOPHED) 8 mg in 250 mL NS infusion (premix)  0.02-0.3 mcg/kg/min Intravenous Titrated Savage Batista MD   Held at 01/09/22 0600   • ondansetron (ZOFRAN) tablet 4 mg  4 mg Oral Q6H PRN Savage Batista MD        Or   • ondansetron (ZOFRAN) injection 4 mg  4 mg Intravenous Q6H PRN Savage Batista MD   4 mg at 01/08/22 0441   • sodium chloride 0.9 % flush 10 mL  10 mL Intravenous PRN Savage Batista MD       • sodium chloride 0.9 % flush 10 mL  10 mL Intravenous Q12H Savage Batista MD   10 mL at 01/09/22 2005   • sodium chloride 0.9 % flush 10 mL  10 mL Intravenous PRN Savage Batista MD       • traZODone (DESYREL) tablet 150 mg  150 mg Oral Nightly Savage Batista MD   150 mg at 01/08/22 2130       Assessment and Plan:      Takotsubo cardiomyopathy    Chest pain with high risk for cardiac etiology    Precordial pain    Type 2 diabetes mellitus (HCC)    Anxiety    Chronic bronchitis (HCC)    Primary hypertension    Sundowning  1.  Chest pain.  Patient has not had any further recurrent symptoms of chest pain.  Patient telemetry monitor has not reveal of any evidence of any arrhythmia.  Patient would be treated medically.    2.  Nonischemic cardiomyopathy Takotsubo cardiomyopathy.  Patient ejection fraction was 30 to 35%.  Patient would be continued on the present dose of the beta-blocker, losartan and digoxin.  Patient would be administered Lasix on a as needed basis.    Recent COVID-19 infection with bibasilar opacity.  Patient has not had any febrile episode.  Patient continued to have persistent cough.  Patient is currently on Rocephin.  Patient Zithromax has been  stopped.     4.  Low body mass index is noted.  Patient was recommended to increase the caloric intake.        The above plan of management were discussed with the patient and the nursing staff           Electronically signed by Inder Walker MD, 01/09/22, 9:32 PM CST.      Time: Time spent on face-to-face interaction 20 minutes    Dictated utilizing Dragon dictation.

## 2022-01-10 NOTE — THERAPY DISCHARGE NOTE
"Patient Name: Maria Yepez  : 1955    MRN: [id number]                              Today's Date: 1/10/2022       Admit Date: 2022    Visit Dx:     ICD-10-CM ICD-9-CM   1. Precordial pain  R07.2 786.51   2. Chest pain, unspecified type  R07.9 786.50   3. Abnormal EKG  R94.31 794.31   4. Elevated troponin  R77.8 790.6   5. Impaired mobility and ADLs  Z74.09 V49.89    Z78.9    6. Impaired functional mobility, balance, gait, and endurance  Z74.09 V49.89     Patient Active Problem List   Diagnosis   • Right foot pain   • Plantar fasciitis of right foot   • Dysphagia   • Elevated liver function tests   • Wound infection   • Follow up   • Infected hernioplasty mesh (HCC)   • Nausea without vomiting   • Closed fracture of right ankle   • Type 2 diabetes mellitus (HCC)   • Anxiety   • Chronic bronchitis (HCC)   • Primary hypertension   • Takotsubo cardiomyopathy   • Sundowning     Past Medical History:   Diagnosis Date   • Abdominal pain    • Acute bronchitis    • Acute pancreatitis    • Acute posthemorrhagic anemia    • Acute sinusitis    • Anal pain    • Anemia    • Anemia due to blood loss    • Anxiety    • Backache     mid and lower back     • Carotid artery disease (HCC)    • Chronic back pain    • Chronic obstructive lung disease (HCC)    • Coronary atherosclerosis    • Diabetic neuropathy (HCC)    • Diarrhea    • Diverticulosis of colon without diverticulitis    • Dysphagia    • Dysuria    • Emphysema/COPD (HCC)     \"Emphysema\"   • Encounter for immunization    • Epigastric pain    • Esophagitis     with stricture   • Essential hypertension    • Fatigue    • Foot pain    • Ganglion cyst of wrist     left wrist     • Gastroesophageal reflux disease    • Generalized abdominal pain    • Gout    • Headache    • Herpes simplex    • Hip pain, bilateral    • History of transfusion    • Hoarse    • Hyperlipidemia    • Incontinence of feces    • Insomnia    • Irritable bowel syndrome    • Joint pain    • " Knee pain    • Left lower quadrant pain    • Left upper quadrant pain    • Leg pain    • Leukorrhea     not specified as infective    • Low back pain     injury   • Muscle pain    • Nausea    • Nausea    • Nausea and vomiting    • Neck pain    • Neck pain    • Noncompliance with medication regimen    • Osteopenia    • Pain in lower back    • Pain in right femur    • Palpitations    • Productive cough    • PTSD (post-traumatic stress disorder)    • Scoliosis deformity of spine    • Screening examination for venereal disease    • Shoulder pain, left    • Stricture of esophagus    • Symptomatic menopausal or female climacteric states    • Synovitis and tenosynovitis     left forearm   • Tenderness     Tenderness of left upper quadrant of abdomen      • Thoracic back pain    • Type 2 diabetes mellitus (HCC)    • Upper abdominal pain    • Urgency of urination     Urgent desire to urinate      • Urinary frequency     due to benign prostatic hypertrophy      • Urinary tract infection     site not specified   • Urinary tract infectious disease    • Venous varices    • Weakness     General symptom - weakness    • Weight gain      Past Surgical History:   Procedure Laterality Date   • ANKLE OPEN REDUCTION INTERNAL FIXATION Right 10/28/2021    Procedure: OPEN REDUCTION AND INTERNAL FIXATION OF RIGHT ANKLE FRACTURE;  Surgeon: Danial Carter DPM;  Location: [facility] OR;  Service: Podiatry;  Laterality: Right;   • BREAST BIOPSY      Stereotactic breast biopsy (1)  left   • CARDIAC CATHETERIZATION  2014    Cardiac cath 82157 (1)  Non obstructive coronary artery disease. Normal left ventricular systolic function. Performed at [facility].   • CARDIAC CATHETERIZATION N/A 2022    Procedure: Left Heart Cath;  Surgeon: Inder Walker MD;  Location: [facility] CATH INVASIVE LOCATION;  Service: Cardiology;  Laterality: N/A;   • CARPAL TUNNEL RELEASE Bilateral    •  SECTION     • CHOLECYSTECTOMY       laparoscopic   • COLONOSCOPY N/A 4/19/2017    Procedure: COLONOSCOPY;  Surgeon: Elia Cheatham MD;  Location: [facility] ENDOSCOPY;  Service:    • ENDOSCOPY  10/24/2012    Colon endoscopy 57210 (2)  internal & external hemorrhoids found. Diverticulum in sigmoid colon.   • ENDOSCOPY  11/05/2014    EGD w/ Dilatation 72655 (1)  Esophageal stricture was present. Dilatation performed. Gastritis found in the stomach. Biopsy taken. Normal duodenum.   • ENDOSCOPY  07/11/2013    EGD w/ tube 86264 (3)  Normal esophagus. Gastritis in stomach. Biopsy taken. Normal duodenum. Biopsy taken.   • ENDOSCOPY N/A 5/23/2017    Procedure: ESOPHAGOGASTRODUODENOSCOPY, possible dilation;  Surgeon: Grant Morris MD;  Location: [facility] ENDOSCOPY;  Service:    • ENDOSCOPY N/A 3/10/2020    Procedure: ESOPHAGOGASTRODUODENOSCOPY;  Surgeon: Grant Morris MD;  Location: [facility] ENDOSCOPY;  Service: General;  Laterality: N/A;   • FRACTURE SURGERY Right     Arm Surgery (1)  right, arm fracture   • HERNIA REPAIR      Hernia repair w/mesh (1)   • HYSTERECTOMY      Anesth, hysterectomy (1)   • INCISION AND DRAINAGE ABSCESS N/A 9/5/2017    Procedure: INCISION AND DRAINAGE OF ABDOMINAL WOUND;  Surgeon: Grant Morris MD;  Location: [facility] OR;  Service:    • INJECTION OF MEDICATION  05/27/2016    Depo Medrol (Methylprednisone) 80mg (2)  Ordered By: BRIAN LOU ([facility])    • INJECTION OF MEDICATION  03/10/2016    Kenalog (4)  Ordered By: ARNOLDO MURDOCK ([facility])    • INJECTION OF MEDICATION  01/07/2016    Rocephin (1)  Ordered By: ARNOLDO MURDOCK ([facility])    • LYMPH NODE BIOPSY      Biopsy/removal, lymph node(s) (1)  cervical node biopsy   • NECK SURGERY  2013   • NISSEN FUNDOPLICATION     • NISSEN FUNDOPLICATION N/A 6/7/2017    Procedure: OPEN REVISION OF NISSEN FUNDOPLICATION WITH GASTROSTOMY TUBE PLACEMENT ;  Surgeon: Grant Morris MD;  Location: [facility] OR;  Service:    • TOTAL ABDOMINAL HYSTERECTOMY      RUPALI BSO   • TRIGGER  FINGER RELEASE Bilateral    • TUBAL ABDOMINAL LIGATION     • UPPER GASTROINTESTINAL ENDOSCOPY  05/23/2017   • VENTRAL/INCISIONAL HERNIA REPAIR N/A 10/13/2017    Procedure: REMOVAL OF INFECTED MESH, REPAIR OF ABDOMINAL WALL   (Will need Strattice mesh in room).;  Surgeon: Grant Morris MD;  Location: [facility];  Service:       General Information     Row Name 01/10/22 1048          Physical Therapy Time and Intention    Document Type evaluation  -KW     Mode of Treatment occupational therapy; physical therapy; co-treatment  -KW     Row Name 01/10/22 1048          General Information    Patient Profile Reviewed yes  -KW     Prior Level of Function independent:; gait; transfer; ADL's; driving  -KW     Existing Precautions/Restrictions fall  -KW     Row Name 01/10/22 1048          Living Environment    Lives With grandchild(aleksey)  15 yo grandson  -KW     Row Name 01/10/22 1048          Home Main Entrance    Number of Stairs, Main Entrance three  -KW     Stair Railings, Main Entrance railings on both sides of stairs  -KW     Row Name 01/10/22 1048          Stairs Within Home, Primary    Stairs, Within Home, Primary mobile house, tub with a shower, with a shower chair, with grab bars, raised toilet seat with arms. Uses a RW  -KW     Row Name 01/10/22 1048          Cognition    Orientation Status (Cognition) oriented to; person; place; situation; time  -KW     Row Name 01/10/22 1048          Safety Issues, Functional Mobility    Comment, Safety Issues/Impairments (Mobility) R ankle fracture in October 2021, has a brace she wears (not present in hospital); states she has a hx of falls  -KW           User Key  (r) = Recorded By, (t) = Taken By, (c) = Cosigned By    Initials Name Provider Type    KW Mary Barrett, PT Physical Therapist               Mobility     Row Name 01/10/22 1049          Bed Mobility    Bed Mobility supine-sit; sit-supine  -KW     Supine-Sit [place] (Bed Mobility) independent  -KW      Sit-Supine [place] (Bed Mobility) independent  -KW     Row Name 01/10/22 1049          Sit-Stand Transfer    Sit-Stand [place] (Transfers) independent  -     Row Name 01/10/22 1049          Gait/Stairs (Locomotion)    [place] Level (Gait) modified independence  -     Distance in Feet (Gait) 200ft; occasional use of HR d/t ankle pain, but no LOB; pt reports this is baseline  -KW           User Key  (r) = Recorded By, (t) = Taken By, (c) = Cosigned By    Initials Name Provider Type    KW Mary Barrett, PT Physical Therapist               Obj/Interventions     Row Name 01/10/22 1050          Range of Motion Comprehensive    Comment, General Range of Motion BLE AROM WFL  -     Row Name 01/10/22 1050          Strength Comprehensive (MMT)    Comment, General Manual Muscle Testing (MMT) Assessment BLE grossly 4/5  -KW     [facility] Name 01/10/22 1050          Sensory Assessment (Somatosensory)    Sensory Assessment (Somatosensory) LE sensation intact  BLE light touch assessed  -           User Key  (r) = Recorded By, (t) = Taken By, (c) = Cosigned By    Initials Name Provider Type    KW Mary Barrett, PT Physical Therapist               Goals/Plan    No documentation.                Clinical Impression     [facility] Name 01/10/22 1051          Pain Scale: Numbers Pre/Post-Treatment    Pretreatment Pain Rating 6/10  -KW     Posttreatment Pain Rating 6/10  -KW     Pain Location - Side Right  -KW     Pain Location ankle  -KW     Pain Intervention(s) Repositioned; Ambulation/increased activity  -     Row Name 01/10/22 1051          Plan of Care Review    Plan of Care Reviewed With patient  -KW     Progress improving  -[facility] Name 01/10/22 1051          Therapy Assessment/Plan (PT)    Criteria for Skilled Interventions Met (PT) no problems identified which require skilled intervention  -[facility] Name 01/10/22 1051          Vital Signs    Pre Systolic BP Rehab 94  -KW     Pre Treatment Diastolic BP 53  -KW      "Pretreatment Heart Rate (beats/min) 93  -KW     Intratreatment Heart Rate (beats/min) 95  -KW     Posttreatment Heart Rate (beats/min) 94  -KW     Pre SpO2 (%) 97  -KW     O2 Delivery Pre Treatment supplemental O2  -KW     Intra SpO2 (%) 94  -KW     O2 Delivery Intra Treatment room air  -KW     Post SpO2 (%) 96  -KW     O2 Delivery Post Treatment room air  -KW     Pre Patient Position Supine  -KW     Intra Patient Position Sitting  -KW     Post Patient Position Supine  -KW     Row Name 01/10/22 1051          Positioning and Restraints    Pre-Treatment Position in bed  -KW     Post Treatment Position bed  -KW     In Bed notified nsg; fowlers; call light within reach; encouraged to call for assist  -KW           User Key  (r) = Recorded By, (t) = Taken By, (c) = Cosigned By    Initials Name Provider Type    Mary Barkley, PT Physical Therapist               Outcome Measures     Row Name 01/10/22 1055          Tinetti Assessment    Tinetti Assessment yes  -KW     Sitting Balance 1  -KW     Arises 1  -KW     Attempts to Rise 2  -KW     Immediate Standing Balance (first 5 sec) 2  -KW     Standing Balance 2  -KW     Sternal Nudge (feet close together) 2  -KW     Eyes Closed (feet close together) 1  -KW     Turning 360 Degrees- Steps 1  -KW     Turning 360 Degrees- Steadiness 1  -KW     Sitting Down 2  -KW     T[place]etti Balance Score 15  -KW     Gait Initiation (immediate after told \"go\") 1  -KW     Step Length- Right Swing 1  -KW     Step Length- Left Swing 1  -KW     Foot Clearance- Right Foot 1  -KW     Foot Clearance- Left Foot 1  -KW     Step Symmetry 1  -KW     Step Continuity 1  -KW     Path (excursion) 2  -KW     Trunk 2  -KW     Base of Support 1  -KW     Gait Score 12  -KW     Tinetti Total Score 27  -KW     Row Name 01/10/22 1055 01/10/22 1005       Functional Assessment    Outcome Measure Options AM-PAC 6 Clicks Basic Mobility (PT); Tinetti  -KW AM-PAC 6 Clicks Daily Activity (OT)  -SJ          User Key  (r) = " Recorded By, (t) = Taken By, (c) = Cosigned By    Initials Name Provider Type    KW Mary Barrett, PT Physical Therapist    SJ Karine Garcia, OT Occupational Therapist                             Physical Therapy Education                 Title: PT OT SLP Therapies (In Progress)     Topic: Physical Therapy (In Progress)     Point: Mobility training (Done)     Learning Progress Summary           Patient Acceptance, E, VU by KW at 1/10/2022 1056    Comment: Role of PT, POC, use of gait belt                   Point: Home exercise program (Not Started)     Learner Progress:  Not documented in this visit.          Point: Body mechanics (Not Started)     Learner Progress:  Not documented in this visit.          Point: Precautions (Done)     Learning Progress Summary           Patient Acceptance, E, VU by CHARLES at 1/10/2022 1056    Comment: Role of PT, POC, use of gait belt                               User Key     Initials Effective Dates Name Provider Type [facility] 06/16/21 -  Mary Barrett, PT Physical Therapist PT              PT Recommendation and Plan     Plan of Care Reviewed With: patient  Progress: improving  Outcome Summary: PT evaluation completed as co-eval with OT. Pt pleasant and agreeable to eval. Pt performing bed mobility with independence and sitting EOB WFL. Pt performing transfers independently and gait with mod independence. Pt with no LOB with gait, however with occasional use of HR d/t ankle pain. Tinetti Balance Assessment performed with pt scoring 27/28, indicating low fall risk. Pt at baseline with mobility and has no further need for skilled PT. Discussed findings with pt who is in agreement. Recommend home at d/c. Will d/c from PT at this time.     Time Calculation:    PT Charges     Row Name 01/10/22 1059             Time Calculation    Start Time 1005  -KW      Stop Time 1032  -KW      Time Calculation (min) 27 min  -KW      PT Received On 01/10/22  -KW            User Key  (r) =  Recorded By, (t) = Taken By, (c) = Cosigned By    Initials Name Provider Type    KW Mary Barrett, PT Physical Therapist              Therapy Charges for Today     Code Description Service Date Service Provider Modifiers Qty    21763316952 HC PT EVAL MOD COMPLEXITY 2 1/10/2022 Mary Barrett, TIAN GP 1          PT G-Codes  Outcome Measure Options: AM-PAC 6 Clicks Basic Mobility (PT), Tinetti  AM-PAC 6 Clicks Score (OT): 24  Tinetti Total Score: 27    Mary Barrett PT  1/10/2022

## 2022-01-10 NOTE — PAYOR COMM NOTE
"Ronda Poe RN  [facility]  [phone]  Fax [phone]  Maria Yepez (66 y.o. Female)             Date of Birth Social Security Number Address Home Phone MRN    1955  [address]  [facility] [address] [phone] [id number]    Taoist Marital Status             None Single       Admission Date Admission Type Admitting Provider Attending Provider Department, Room/Bed    1/7/22 Emergency Hu oT MD McNevin, James, MD [facility] CRITICAL CARE STEPDOWN, 16/A    Discharge Date Discharge Disposition Discharge Destination                         Attending Provider: Grant Ignacio MD    Allergies: Albuterol, Adhesive Tape, Claritin [Loratadine], Ibuprofen, Lactose Intolerance (Gi), Latex, Shellfish-derived Products    Isolation: None   Infection: COVID (History) (01/10/22)   Code Status: CPR   Advance Care Planning Activity    Ht: 152.4 cm (60\")   Wt: 53.7 kg (118 lb 6.2 oz)    Admission Cmt: None   Principal Problem: Takotsubo cardiomyopathy [I51.81] More...                 Active Insurance as of 1/7/2022     Primary Coverage     Payor Plan Insurance Group Employer/Plan Group    [facility]      Payor Plan Address Payor Plan Phone Number Payor Plan Fax Number Effective Dates    [address] [address] [phone]  10/1/2021 - None Entered    [facility] [id number]       Subscriber Name Subscriber Birth Date Member ID       MARIA YEPEZ [id number]                 Emergency Contacts      (Rel.) Home Phone Work Phone Mobile Phone    Kim Medley (Daughter) [phone] -- [phone]    Nohelia Martínez (Friend) [phone] -- [phone]            Vital Signs (last day)     Date/Time Temp Temp src Pulse Resp BP Patient Position SpO2    01/10/22 0700 -- -- 86 -- 90/54 -- 98    01/10/22 0625 -- -- 93 -- 94/52 -- 95    01/10/22 0600 -- -- 85 -- 83/48 -- 98    01/10/22 " 0525 -- -- 88 16 104/52 -- 98    01/10/22 0500 -- -- 79 18 81/47 -- 97    01/10/22 0400 98.6 (37) Oral 82 16 87/55 -- 98    01/10/22 0200 -- -- 84 18 90/53 -- 93    01/10/22 0100 -- -- 79 16 84/51 -- 97    01/10/22 0000 -- -- 79 16 82/51 -- 97    01/09/22 2317 98.9 (37.2) Oral -- -- -- -- --    01/09/22 2300 -- -- 92 16 90/55 -- 95    01/09/22 2100 -- -- 108 18 107/59 -- 95    01/09/22 2000 -- -- 108 18 107/55 -- 97    01/09/22 1940 99.4 (37.4) Oral -- -- -- -- --    01/09/22 1900 -- -- 106 16 108/57 -- 98    01/09/22 1800 -- -- 104 -- 113/57 -- 97    01/09/22 1730 -- -- 108 -- -- -- 98    01/09/22 1700 -- -- 105 -- 112/57 -- 97    01/09/22 1630 -- -- 109 -- -- -- 99    01/09/22 1600 97.5 (36.4) -- 109 -- 111/56 -- 99    01/09/22 1536 -- -- 111 -- 111/62 -- 99    01/09/22 1300 -- -- 110 -- 102/57 -- 97    01/09/22 1200 97.5 (36.4) Temporal 101 -- 95/70 -- 100    01/09/22 1100 -- -- 110 -- 109/61 -- 97    01/09/22 1000 -- -- 103 -- 107/57 -- 98    01/09/22 0900 -- -- 108 -- 105/55 -- 94    01/09/22 0830 -- -- -- -- -- -- 95    01/09/22 0800 98 (36.7) Temporal 110 -- 107/58 -- 97    01/09/22 0730 -- -- 109 -- 97/50 -- 94    01/09/22 0700 -- -- 107 -- 98/54 -- 94    01/09/22 0645 -- -- 110 -- 104/59 -- 90    01/09/22 0630 -- -- 104 -- 107/59 -- 95    01/09/22 0618 -- -- -- -- -- -- 96    01/09/22 0600 -- -- 101 -- 109/59 -- 97    01/09/22 0545 -- -- 100 -- 112/58 -- 94    01/09/22 0530 -- -- 100 -- 112/59 -- 96    01/09/22 0515 -- -- 100 -- 126/75 -- 96    01/09/22 0500 -- -- 100 -- 111/57 -- 92    01/09/22 0430 -- -- 96 -- 110/58 -- 99    01/09/22 0415 -- -- 95 -- 101/52 -- 93    01/09/22 0405 -- -- 93 -- 88/51 -- 96    01/09/22 0400 -- -- 92 18 84/46 -- 96    01/09/22 0344 97.9 (36.6) Temporal -- -- -- -- --    01/09/22 0330 -- -- 96 -- 94/53 -- 97    01/09/22 0315 -- -- 92 -- 86/47 -- 99    01/09/22 0305 -- -- 93 -- 95/55 -- 99    01/09/22 0300 -- -- 94 18 96/55 -- 98    01/09/22 0255 -- -- 94 -- 93/55 -- 98     01/09/22 0245 -- -- 93 -- 101/53 -- 98    01/09/22 0240 -- -- 93 -- 100/58 -- 98    01/09/22 0235 -- -- 92 -- 97/54 -- 99    01/09/22 0230 -- -- 93 -- 92/50 -- 98    01/09/22 0227 -- -- 95 -- 84/46 -- 97    01/09/22 0205 -- -- 98 18 95/50 -- 99    01/09/22 0100 -- -- 95 16 89/52 -- 100    01/09/22 0055 -- -- 100 -- 102/52 -- 100    01/09/22 0045 -- -- 94 -- 91/50 -- 100    01/09/22 0015 -- -- 95 -- 83/54 -- 100    01/09/22 0010 -- -- 95 -- 81/50 -- 100    01/09/22 0005 -- -- 94 -- 80/50 -- 100    01/09/22 0000 -- -- 96 16 76/51 -- 99          Oxygen Therapy (last day)     Date/Time SpO2 Device (Oxygen Therapy) Flow (L/min) Oxygen Concentration (%) ETCO2 (mmHg)    01/10/22 0800 -- nasal cannula 2 -- --    01/10/22 0700 98 -- -- -- --    01/10/22 0625 95 -- -- -- --    01/10/22 0600 98 -- -- -- --    01/10/22 0525 98 nasal cannula 2 -- --    01/10/22 0500 97 nasal cannula 2 -- --    01/10/22 0400 98 nasal cannula 2 -- --    01/10/22 0200 93 high-flow nasal cannula -- -- --    01/10/22 0100 97 high-flow nasal cannula 3 -- --    01/10/22 0000 97 high-flow nasal cannula 3 -- --    01/09/22 2300 95 high-flow nasal cannula 3 -- --    01/09/22 2100 95 -- -- -- --    01/09/22 2000 97 high-flow nasal cannula 3 -- --    01/09/22 1900 98 -- -- -- --    01/09/22 1800 97 -- -- -- --    01/09/22 1730 98 -- -- -- --    01/09/22 1700 97 -- -- -- --    01/09/22 1630 99 -- -- -- --    01/09/22 1600 99 -- -- -- --    01/09/22 1536 99 -- -- -- --    01/09/22 1400 -- high-flow nasal cannula 3 -- --    01/09/22 1300 97 -- -- -- --    01/09/22 1200 100 -- -- -- --    01/09/22 1100 97 -- -- -- --    01/09/22 1000 98 -- -- -- --    01/09/22 0900 94 -- -- -- --    01/09/22 0830 95 -- -- -- --    01/09/22 0800 97 -- -- -- --    01/09/22 0730 94 -- -- -- --    01/09/22 0700 94 -- -- -- --    01/09/22 0645 90 -- -- -- --    01/09/22 0630 95 -- -- -- --    01/09/22 0618 96 high-flow nasal cannula 4 -- --    01/09/22 0600 97 -- -- -- --     01/09/22 0545 94 -- -- -- --    01/09/22 0530 96 -- -- -- --    01/09/22 0515 96 -- -- -- --    01/09/22 0500 92 -- -- -- --    01/09/22 0430 99 -- -- -- --    01/09/22 0415 93 -- -- -- --    01/09/22 0405 96 -- -- -- --    01/09/22 0400 96 high-flow nasal cannula 5 -- --    01/09/22 0330 97 -- -- -- --    01/09/22 0315 99 -- -- -- --    01/09/22 0305 99 -- -- -- --    01/09/22 0300 98 high-flow nasal cannula 5 -- --    01/09/22 0255 98 -- -- -- --    01/09/22 0245 98 -- -- -- --    01/09/22 0240 98 -- -- -- --    01/09/22 0235 99 -- -- -- --    01/09/22 0230 98 -- -- -- --    01/09/22 0227 97 -- -- -- --    01/09/22 0205 99 high-flow nasal cannula 5 -- --    01/09/22 0100 100 high-flow nasal cannula 5 -- --    01/09/22 0055 100 -- -- -- --    01/09/22 0045 100 -- -- -- --    01/09/22 0015 100 -- -- -- --    01/09/22 0010 100 -- -- -- --    01/09/22 0005 100 -- -- -- --    01/09/22 0000 99 high-flow nasal cannula 5 -- --          Intake & Output (last day)       01/09 0701  01/10 0700 01/10 0701  01/11 0700    I.V. (mL/kg)      IV Piggyback 100     Total Intake(mL/kg) 100 (1.9)     Urine (mL/kg/hr) 705 (0.5)     Total Output 705     Net -605               Lines, Drains & Airways     Active LDAs     Name Placement date Placement time Site Days    Peripheral IV 01/09/22 1227 Anterior; Right Forearm 01/09/22  1227  Forearm  less than 1                Current Facility-Administered Medications   Medication Dose Route Frequency Provider Last Rate Last Admin   • acetaminophen (TYLENOL) tablet 650 mg  650 mg Oral Q4H PRN Inder Walker MD       • aspirin EC tablet 81 mg  81 mg Oral Nightly Savage Batista MD   81 mg at 01/09/22 2002   • cefTRIAXone (ROCEPHIN) 1 g/100 mL 0.9% NS (MBP)  1 g Intravenous Q24H Savage Batista MD   1 g at 01/10/22 0517   • dextrose (D50W) (25 g/50 mL) IV injection 25 g  25 g Intravenous Q15 Min PRN Inder Walker MD       • dextrose (GLUTOSE) oral gel 15 g  15 g Oral Q15 Min PRN Denise  MD Inder       • DOBUTamine (DOBUTREX) 1 mg/mL infusion  2-20 mcg/kg/min Intravenous Titrated Savage Batista MD   Stopped at 01/09/22 0227   • FLUoxetine (PROzac) capsule 10 mg  10 mg Oral Nightly Savage Batista MD   10 mg at 01/09/22 2002   • gabapentin (NEURONTIN) capsule 400 mg  400 mg Oral Q12H Savage Batista MD   400 mg at 01/10/22 0814   • glucagon (human recombinant) (GLUCAGEN DIAGNOSTIC) injection 1 mg  1 mg Subcutaneous Q15 Min PRN Inder Walker MD       • guaiFENesin (MUCINEX) 12 hr tablet 1,200 mg  1,200 mg Oral Q12H Madalyn Erickson MD   1,200 mg at 01/10/22 0814   • HYDROcodone-acetaminophen (NORCO) 7.5-325 MG per tablet 1 tablet  1 tablet Oral Q6H PRN Savage Batista MD   1 tablet at 01/09/22 2124   • insulin aspart (novoLOG) injection 0-7 Units  0-7 Units Subcutaneous TID AC Inder Walker MD   2 Units at 01/08/22 1711   • losartan (COZAAR) tablet 25 mg  25 mg Oral Q24H Inder Walker MD   25 mg at 01/08/22 1801   • metoprolol tartrate (LOPRESSOR) tablet 25 mg  25 mg Oral BID Inder Walker MD   25 mg at 01/09/22 2002   • morphine injection 1 mg  1 mg Intravenous Q4H PRN Savage Batista MD   1 mg at 01/09/22 1431    And   • naloxone (NARCAN) injection 0.4 mg  0.4 mg Intravenous Q5 Min PRN Savage Batista MD       • norepinephrine (LEVOPHED) 8 mg in 250 mL NS infusion (premix)  0.02-0.3 mcg/kg/min Intravenous Titrated Savage Batista MD   Held at 01/09/22 0600   • ondansetron (ZOFRAN) tablet 4 mg  4 mg Oral Q6H PRN Savage Batista MD        Or   • ondansetron (ZOFRAN) injection 4 mg  4 mg Intravenous Q6H PRN Savage Batista MD   4 mg at 01/08/22 0441   • sodium chloride 0.9 % flush 10 mL  10 mL Intravenous PRN Savage Batista MD       • sodium chloride 0.9 % flush 10 mL  10 mL Intravenous Q12H Savage Batista MD   10 mL at 01/10/22 0814   • sodium chloride 0.9 % flush 10 mL  10 mL Intravenous PRN Savage Batista MD       • traZODone (DESYREL) tablet 150 mg  150 mg  Oral Nightly Savage Batista MD   150 mg at 22 2252        Physician Progress Notes (last 24 hours)      Grant Ignacio MD at 01/10/22 0632              CRITICAL CARE DAILY PROGRESS NOTE  Family Medicine Residency Service  NAME: Maria Yepez  : 1955  MRN: [id number]      LOS: 2 days     PROVIDER OF SERVICE: Grant Ignacio MD    Chief Complaint: Takotsubo cardiomyopathy    Subjective:     Interval History: History provided by: patient chart RN     Nursing staff reported some soft blood pressures overnight.  She did not require any Levophed in the past 24 hours.  Patient has not required any dobutamine in past 24 hours.  At this point she denies any chest pain, nausea, vomiting, or abdominal pain.  She is eating and drinking.  She slept well last night.    Review of Systems:   Review of Systems   Constitutional: Negative for chills, fatigue and fever.   HENT: Negative for congestion and trouble swallowing.    Respiratory: Negative for chest tightness and shortness of breath.    Cardiovascular: Negative for chest pain and palpitations.   Gastrointestinal: Negative for abdominal pain, constipation, diarrhea and nausea.   Genitourinary: Negative for dysuria and frequency.   Musculoskeletal: Negative for joint swelling.   Skin: Negative for color change.   Neurological: Negative for dizziness and light-headedness.   Psychiatric/Behavioral: Negative for agitation and behavioral problems.       Objective:     Vital Signs  Temp:  [97.5 °F (36.4 °C)-99.4 °F (37.4 °C)] 98.6 °F (37 °C)  Heart Rate:  [] 86  Resp:  [16-18] 16  BP: ()/(47-70) 90/54  Body mass index is 23.12 kg/m².         I/O last 3 completed shifts:  In: 130 [I.V.:30; IV Piggyback:100]  Out: 1250 [Urine:1250]    Physical Exam  Physical Exam  Vitals and nursing note reviewed.   Constitutional:       General: She is not in acute distress.     Appearance: She is not diaphoretic.   HENT:      Head: Atraumatic.      Nose: No  rhinorrhea.   Cardiovascular:      Rate and Rhythm: Normal rate and regular rhythm.      Pulses: Normal pulses.      Heart sounds: No murmur heard.      Pulmonary:      Effort: Pulmonary effort is normal.      Breath sounds: Normal breath sounds.   Abdominal:      General: Bowel sounds are normal.      Tenderness: There is no abdominal tenderness.   Musculoskeletal:      Right lower leg: No edema.      Left lower leg: No edema.   Skin:     Capillary Refill: Capillary refill takes 2 to 3 seconds.      Findings: No rash.   Neurological:      Mental Status: She is alert and oriented to person, place, and time.         Medication Review    Current Facility-Administered Medications:   •  acetaminophen (TYLENOL) tablet 650 mg, 650 mg, Oral, Q4H PRN, Inder Walker MD  •  aspirin EC tablet 81 mg, 81 mg, Oral, Nightly, Savage Batista MD, 81 mg at 01/09/22 2002  •  cefTRIAXone (ROCEPHIN) 1 g/100 mL 0.9% NS (MBP), 1 g, Intravenous, Q24H, Savage Batista MD, 1 g at 01/10/22 0517  •  dextrose (D50W) (25 g/50 mL) IV injection 25 g, 25 g, Intravenous, Q15 Min PRN, Inder Walker MD  •  dextrose (GLUTOSE) oral gel 15 g, 15 g, Oral, Q15 Min PRN, Inder Walker MD  •  DOBUTamine (DOBUTREX) 1 mg/mL infusion, 2-20 mcg/kg/min, Intravenous, Titrated, Savage Batista MD, Stopped at 01/09/22 0227  •  FLUoxetine (PROzac) capsule 10 mg, 10 mg, Oral, Nightly, Savage Batista MD, 10 mg at 01/09/22 2002  •  gabapentin (NEURONTIN) capsule 400 mg, 400 mg, Oral, Q12H, Savage Batista MD, 400 mg at 01/10/22 0814  •  glucagon (human recombinant) (GLUCAGEN DIAGNOSTIC) injection 1 mg, 1 mg, Subcutaneous, Q15 Min PRN, Inder Walker MD  •  guaiFENesin (MUCINEX) 12 hr tablet 1,200 mg, 1,200 mg, Oral, Q12H, Madalyn Erickson MD, 1,200 mg at 01/10/22 0814  •  HYDROcodone-acetaminophen (NORCO) 7.5-325 MG per tablet 1 tablet, 1 tablet, Oral, Q6H PRN, Savage Batista MD, 1 tablet at 01/09/22 2124  •  insulin aspart (novoLOG) injection 0-7  Units, 0-7 Units, Subcutaneous, TID AC, Inder Walker MD, 2 Units at 01/08/22 1711  •  losartan (COZAAR) tablet 25 mg, 25 mg, Oral, Q24H, Inder Walker MD, 25 mg at 01/08/22 1801  •  metoprolol tartrate (LOPRESSOR) tablet 25 mg, 25 mg, Oral, BID, Inder Walker MD, 25 mg at 01/09/22 2002  •  morphine injection 1 mg, 1 mg, Intravenous, Q4H PRN, 1 mg at 01/09/22 1431 **AND** naloxone (NARCAN) injection 0.4 mg, 0.4 mg, Intravenous, Q5 Min PRN, Savage Batista MD  •  norepinephrine (LEVOPHED) 8 mg in 250 mL NS infusion (premix), 0.02-0.3 mcg/kg/min, Intravenous, Titrated, Savage Batista MD, Held at 01/09/22 0600  •  ondansetron (ZOFRAN) tablet 4 mg, 4 mg, Oral, Q6H PRN **OR** ondansetron (ZOFRAN) injection 4 mg, 4 mg, Intravenous, Q6H PRN, Savage Batista MD, 4 mg at 01/08/22 0441  •  sodium chloride 0.9 % flush 10 mL, 10 mL, Intravenous, PRN, Savage Batista MD  •  sodium chloride 0.9 % flush 10 mL, 10 mL, Intravenous, Q12H, Savage Batista MD, 10 mL at 01/10/22 0814  •  sodium chloride 0.9 % flush 10 mL, 10 mL, Intravenous, PRN, Savage Batista MD  •  traZODone (DESYREL) tablet 150 mg, 150 mg, Oral, Nightly, Savage Batista MD, 150 mg at 01/09/22 2252     Diagnostic Data    Lab Results (last 24 hours)     Procedure Component Value Units Date/Time    POC Glucose Once [[id number]]  (Normal) Collected: 01/10/22 0623    Specimen: Blood Updated: 01/10/22 0636     Glucose 95 mg/dL      Comment: : [id number] KILO Betancourt ID: [id number]       Comprehensive Metabolic Panel [[id number]]  (Abnormal) Collected: 01/10/22 0509    Specimen: Blood Updated: 01/10/22 0556     Glucose 110 mg/dL      BUN 14 mg/dL      Creatinine 0.55 mg/dL      Sodium 137 mmol/L      Potassium 4.3 mmol/L      Chloride 103 mmol/L      CO2 27.0 mmol/L      Calcium 8.4 mg/dL      Total Protein 5.5 g/dL      Albumin 3.20 g/dL      ALT (SGPT) 30 U/L      AST (SGOT) 50 U/L      Alkaline Phosphatase 77 U/L      Total  Bilirubin 0.3 mg/dL      eGFR Non African Amer 111 mL/min/1.73      Globulin 2.3 gm/dL      A/G Ratio 1.4 g/dL      BUN/Creatinine Ratio 25.5     Anion Gap 7.0 mmol/L     Narrative:      GFR Normal >60  Chronic Kidney Disease <60  Kidney Failure <15      CBC & Differential [[id number]]  (Abnormal) Collected: 01/10/22 0509    Specimen: Blood Updated: 01/10/22 0550    Narrative:      The following orders were created for panel order CBC & Differential.  Procedure                               Abnormality         Status                     ---------                               -----------         ------                     CBC Auto Differential[[id number]]        Abnormal            Final result                 Please view results for these tests on the individual orders.    CBC Auto Differential [[id number]]  (Abnormal) Collected: 01/10/22 0509    Specimen: Blood Updated: 01/10/22 0550     WBC 9.35 10*3/mm3      RBC 3.12 10*6/mm3      Hemoglobin 9.1 g/dL      Hematocrit 28.3 %      MCV 90.7 fL      MCH 29.2 pg      MCHC 32.2 g/dL      RDW 13.9 %      RDW-SD 46.0 fl      MPV 9.9 fL      Platelets 248 10*3/mm3      Neutrophil % 67.3 %      Lymphocyte % 24.9 %      Monocyte % 5.9 %      Eosinophil % 1.2 %      Basophil % 0.3 %      Immature Grans % 0.4 %      Neutrophils, Absolute 6.29 10*3/mm3      Lymphocytes, Absolute 2.33 10*3/mm3      Monocytes, Absolute 0.55 10*3/mm3      Eosinophils, Absolute 0.11 10*3/mm3      Basophils, Absolute 0.03 10*3/mm3      Immature Grans, Absolute 0.04 10*3/mm3      nRBC 0.0 /100 WBC     Blood Culture - Blood, Hand, Right [[id number]]  (Normal) Collected: 01/08/22 0043    Specimen: Blood from Hand, Right Updated: 01/10/22 0115     Blood Culture No growth at 2 days    Blood Culture - Blood, Wrist, Left [[id number]]  (Normal) Collected: 01/08/22 0100    Specimen: Blood from Wrist, Left Updated: 01/10/22 0115     Blood Culture No growth at 2 days    POC Glucose Once [[id number]]  (Normal)  Collected: 01/09/22 1948    Specimen: Blood Updated: 01/09/22 2017     Glucose 121 mg/dL      Comment: : [id number] KILO AUDREYMeter ID: [id number]       POC Glucose Once [[id number]]  (Normal) Collected: 01/09/22 1619    Specimen: Blood Updated: 01/09/22 1845     Glucose 116 mg/dL      Comment: RN NotifiedOperator: [id number] KOEPP AMANDAMeter ID: [id number]       POC Glucose Once [[id number]]  (Abnormal) Collected: 01/09/22 1129    Specimen: Blood Updated: 01/09/22 1157     Glucose 132 mg/dL      Comment: RN NotifiedOperator: [id number] KOEPP AMANDAMeter ID: [id number]             I reviewed the patient's new clinical results.    Assessment/Plan:     Active Hospital Problems    Diagnosis  POA   • **Takotsubo cardiomyopathy [I51.81]  Yes     -On  ECHO  -Patient does have significant mental stressors in her current living arrangements.  - has been consulted.  Appreciate their recommendations.       • Sundowning [F05]  Unknown     First step would be to reorient patient as needed.  -If reorientation is not effective might consider Haldol as needed     • Chest pain with high risk for cardiac etiology [R07.9]  Yes     STEMI vs Acute Pericarditis.   -Cath in 2014 did not show obstructive disease  -Initial CXR (1/7; 2057) showed no acute disease. Repeat CXR (1/7; 2235) later showed bilateral opacities with concern for acute pulmonary edema.  -Initial EKG (1/7; 2032) showed ST elevation in inferior leads. Repeat EKG (1/7; 2047) showed ST elevations, tachycardia, concern for pericarditis  -TroponinL 0.493 > 0.754>0.992  -Dr. Bustos consulted did coronary cath 1/8 no blockage identified.  -Echo shows area of apical hypokinesis.  Ejection fraction 3135%.  -Patient with no recurrence of chest pain overnight.  -Denies any shortness of breath currently satting 98% on 2 L via nasal cannula.     • Type 2 diabetes mellitus (HCC) [E11.9]  Yes     -HbA1c (4/2021): 5.8%  -Hold home Metformin 500mg  BID  -SSI low dose  -Glucose checks QID  -Consistent carb diet once no longer NPO  -Patient required no insulin in the last 24       • Anxiety [F41.9]  Yes     -Continue home Prozac 10mg  -Anxiety from yesterday reduced today.     • Chronic bronchitis (HCC) [J42]  Yes     -Not on any inhalers currently  -Former smoker quit tobacco use >15 years ago  -Supplemental O2 to maintain sats 88-92%  -Currently satting 90% on 2 L oxygen.  Will titrate down to target goal of 88 to 92%.     • Primary hypertension [I10]  Yes     -Continue home Lisinopril 10mg, Norvasc 2.5mg  -Monitor per floor policy  -Continue IV fluids at 75 mL's per hour normal saline         Code status is   Code Status and Medical Interventions:   Ordered at: 01/08/22 0233     Level Of Support Discussed With:    Patient     Code Status (Patient has no pulse and is not breathing):    CPR (Attempt to Resuscitate)     Medical Interventions (Patient has pulse or is breathing):    Full Support       Prognosis: good  Plan for disposition:Where: home    Time: Critical care 30 min        This document has been electronically signed by Grant Ignacio MD on January 10, 2022 08:19 CST        Electronically signed by Grant Ignacio MD at 01/10/22 0819     Inder Walker MD at 01/09/22 2131          Cardiology Progress Note     LOS: 1 day   Patient Care Team:  Sammie Gonzalez DO as PCP - General (Internal Medicine)  Grant Morris MD as Surgeon (General Surgery)  Kalin Martínez DPM as Consulting Physician (Podiatry)    Subjective:    Chart reviewed. Patient seen and examined. Patient had episodes of low blood pressure last night.  Patient has been ambulating in the hallway without any symptoms of chest pain.  Patient has diuresed well.  Patient does have nonischemic cardiomyopathy Takotsubo cardiomyopathy.  Patient would be continued on medical management.  Patient has not had any further recurrent symptoms or chest pain.        Objective:  Temp:   [96.8 °F (36 °C)-99.4 °F (37.4 °C)] 99.4 °F (37.4 °C)  Heart Rate:  [] 108  Resp:  [16-22] 18  BP: ()/(40-75) 107/55    Intake/Output Summary (Last 24 hours) at 1/9/2022 2132  Last data filed at 1/9/2022 1956  Gross per 24 hour   Intake 30 ml   Output 1050 ml   Net -1020 ml       Physical Exam:   General Appearance:    Alert, oriented, cooperative, in no acute distress.   Head:    Normocephalic, atraumatic, without obvious abnormality   Eyes:             CIPRIANO. Lids and lashes normal, conjunctivae and sclerae normal, no icterus, no pallor.   Ears:    Ears appear intact with no abnormalities noted.   Throat:   Mucous membranes pink and moist.   Neck:  Supple, trachea midline, no carotid bruit, no organomegaly or JVD.   Lungs:    Clear to auscultation and percussion.  Respirations regular, even and unlabored. No wheezes, rales, or rhonchi.    Heart:    Regular rhythm and normal rate, normal S1 and S2, no      murmur, no gallop, no rub, no click.   Abdomen:    Soft, nontender, nondistended, no guarding, no rebound tenderness. Normal bowel sounds in all four quadrants, no masses, liver and spleen nonpalpable.    Genitalia:    Deferred.   Extremities:   Moves all extremities well, no edema, no cyanosis, no       redness, no clubbing.   Pulses:   Pulses palpable and equal bilaterally.   Skin:   Moist and warm. No bleeding, bruising or rash.   Neurologic/Psychiatric:   Alert and oriented to person, place, and time.  Motor, power and tone in upper and lower extremities are grossly intact.  No focal neurological deficits. Normal cognitive function. No psychomotor reaction or tangential thought. No depression, homicidal ideations and suicidal ideations.          Results Review:    Results from last 7 days   Lab Units 01/09/22  0505   SODIUM mmol/L 137   POTASSIUM mmol/L 3.6   CHLORIDE mmol/L 97*   CO2 mmol/L 29.0   BUN mg/dL 18   CREATININE mg/dL 0.79   CALCIUM mg/dL 8.6   BILIRUBIN mg/dL 0.4   ALK PHOS U/L 101    ALT (SGPT) U/L 46*   AST (SGOT) U/L 98*   GLUCOSE mg/dL 121*     Results from last 7 days   Lab Units 01/08/22  0214 01/07/22  2251 01/07/22  2046   CK TOTAL U/L  --   --  264*   TROPONIN T ng/mL 0.992* 0.754* 0.493*         Results from last 7 days   Lab Units 01/09/22  0505   WBC 10*3/mm3 13.28*   HEMOGLOBIN g/dL 10.8*   HEMATOCRIT % 31.4*   PLATELETS 10*3/mm3 359     Results from last 7 days   Lab Units 01/08/22  0214 01/07/22  2046   INR  0.95 0.93   APTT seconds 26.7  --      Results from last 7 days   Lab Units 01/08/22  0523   MAGNESIUM mg/dL 1.6                   ECHO:  Results for orders placed during the hospital encounter of 01/07/22    Adult Transthoracic Echo Complete W/ Cont if Necessary Per Protocol    Interpretation Summary  · The left ventricular cavity is borderline dilated.  · The following left ventricular wall segments are hypokinetic: mid inferoseptal. The following left ventricular wall segments are akinetic: apical anterior, apical lateral, apical inferior, apical septal and apex.  · Left ventricular ejection fraction appears to be 31 - 35%.  · Left ventricular diastolic function was normal.      ECG 12 Lead   Preliminary Result   Test Reason : tachycardia   Blood Pressure :   */*   mmHG   Vent. Rate : 121 BPM     Atrial Rate : 121 BPM      P-R Int : 160 ms          QRS Dur :  68 ms       QT Int : 326 ms       P-R-T Axes :  78  61  81 degrees      QTc Int : 462 ms      Sinus tachycardia   Low voltage QRS   Borderline ECG   When compared with ECG of 07-JAN-2022 22:18,   No significant change was found      Referred By:            Confirmed By:       ECG 12 Lead         ECG 12 Lead         ECG 12 Lead              Medication Review:   Current Facility-Administered Medications   Medication Dose Route Frequency Provider Last Rate Last Admin   • acetaminophen (TYLENOL) tablet 650 mg  650 mg Oral Q4H PRN Inder Walker MD       • aspirin EC tablet 81 mg  81 mg Oral Nightly Savage Batista MD    81 mg at 01/09/22 2002   • cefTRIAXone (ROCEPHIN) 1 g/100 mL 0.9% NS (MBP)  1 g Intravenous Q24H Savage Batista MD   1 g at 01/09/22 0629   • dextrose (D50W) (25 g/50 mL) IV injection 25 g  25 g Intravenous Q15 Min PRN Inder Walker MD       • dextrose (GLUTOSE) oral gel 15 g  15 g Oral Q15 Min PRN Inder Walker MD       • DOBUTamine (DOBUTREX) 1 mg/mL infusion  2-20 mcg/kg/min Intravenous Titrated Savage Batista MD   Stopped at 01/09/22 0227   • FLUoxetine (PROzac) capsule 10 mg  10 mg Oral Nightly Savage Batista MD   10 mg at 01/09/22 2002   • gabapentin (NEURONTIN) capsule 400 mg  400 mg Oral Q12H Savage Batista MD   400 mg at 01/09/22 2003   • glucagon (human recombinant) (GLUCAGEN DIAGNOSTIC) injection 1 mg  1 mg Subcutaneous Q15 Min PRN Inder Walker MD       • guaiFENesin (MUCINEX) 12 hr tablet 1,200 mg  1,200 mg Oral Q12H Madalyn Erickson MD   1,200 mg at 01/09/22 2001   • HYDROcodone-acetaminophen (NORCO) 7.5-325 MG per tablet 1 tablet  1 tablet Oral Q6H PRN Savage Batista MD   1 tablet at 01/09/22 2124   • insulin aspart (novoLOG) injection 0-7 Units  0-7 Units Subcutaneous TID AC Inder Walker MD   2 Units at 01/08/22 1711   • losartan (COZAAR) tablet 25 mg  25 mg Oral Q24H Inder Walker MD   25 mg at 01/08/22 1801   • metoprolol tartrate (LOPRESSOR) tablet 25 mg  25 mg Oral BID Inder Walker MD   25 mg at 01/09/22 2002   • morphine injection 1 mg  1 mg Intravenous Q4H PRN Savage Batista MD   1 mg at 01/09/22 1431    And   • naloxone (NARCAN) injection 0.4 mg  0.4 mg Intravenous Q5 Min PRN Savage Batista MD       • norepinephrine (LEVOPHED) 8 mg in 250 mL NS infusion (premix)  0.02-0.3 mcg/kg/min Intravenous Titrated Savage Batista MD   Held at 01/09/22 0600   • ondansetron (ZOFRAN) tablet 4 mg  4 mg Oral Q6H PRN Savage Batista MD        Or   • ondansetron (ZOFRAN) injection 4 mg  4 mg Intravenous Q6H PRN Savage Batista MD   4 mg at 01/08/22 0441   • sodium  chloride 0.9 % flush 10 mL  10 mL Intravenous PRN Savage Batista MD       • sodium chloride 0.9 % flush 10 mL  10 mL Intravenous Q12H Savage Batista MD   10 mL at 01/09/22 2005   • sodium chloride 0.9 % flush 10 mL  10 mL Intravenous PRN Savage Batista MD       • traZODone (DESYREL) tablet 150 mg  150 mg Oral Nightly Savage Batista MD   150 mg at 01/08/22 2130       Assessment and Plan:      Takotsubo cardiomyopathy    Chest pain with high risk for cardiac etiology    Precordial pain    Type 2 diabetes mellitus (HCC)    Anxiety    Chronic bronchitis (HCC)    Primary hypertension    Sundowning  1.  Chest pain.  Patient has not had any further recurrent symptoms of chest pain.  Patient telemetry monitor has not reveal of any evidence of any arrhythmia.  Patient would be treated medically.    2.  Nonischemic cardiomyopathy Takotsubo cardiomyopathy.  Patient ejection fraction was 30 to 35%.  Patient would be continued on the present dose of the beta-blocker, losartan and digoxin.  Patient would be administered Lasix on a as needed basis.    Recent COVID-19 infection with bibasilar opacity.  Patient has not had any febrile episode.  Patient continued to have persistent cough.  Patient is currently on Rocephin.  Patient Zithromax has been stopped.     4.  Low body mass index is noted.  Patient was recommended to increase the caloric intake.        The above plan of management were discussed with the patient and the nursing staff           Electronically signed by Inder Walker MD, 01/09/22, 9:32 PM CST.      Time: Time spent on face-to-face interaction 20 minutes    Dictated utilizing Dragon dictation.       Electronically signed by Inder Walker MD at 01/09/22 2137

## 2022-01-10 NOTE — THERAPY EVALUATION
"Patient Name: Maria Yepez  : 1955    MRN: [id number]                              Today's Date: 1/10/2022       Admit Date: 2022    Visit Dx:     ICD-10-CM ICD-9-CM   1. Precordial pain  R07.2 786.51   2. Chest pain, unspecified type  R07.9 786.50   3. Abnormal EKG  R94.31 794.31   4. Elevated troponin  R77.8 790.6   5. Impaired mobility and ADLs  Z74.09 V49.89    Z78.9      Patient Active Problem List   Diagnosis   • Right foot pain   • Plantar fasciitis of right foot   • Dysphagia   • Elevated liver function tests   • Wound infection   • Follow up   • Infected hernioplasty mesh (HCC)   • Nausea without vomiting   • Closed fracture of right ankle   • Type 2 diabetes mellitus (HCC)   • Anxiety   • Chronic bronchitis (HCC)   • Primary hypertension   • Takotsubo cardiomyopathy   • Sundowning     Past Medical History:   Diagnosis Date   • Abdominal pain    • Acute bronchitis    • Acute pancreatitis    • Acute posthemorrhagic anemia    • Acute sinusitis    • Anal pain    • Anemia    • Anemia due to blood loss    • Anxiety    • Backache     mid and lower back     • Carotid artery disease (HCC)    • Chronic back pain    • Chronic obstructive lung disease (HCC)    • Coronary atherosclerosis    • Diabetic neuropathy (HCC)    • Diarrhea    • Diverticulosis of colon without diverticulitis    • Dysphagia    • Dysuria    • Emphysema/COPD (HCC)     \"Emphysema\"   • Encounter for immunization    • Epigastric pain    • Esophagitis     with stricture   • Essential hypertension    • Fatigue    • Foot pain    • Ganglion cyst of wrist     left wrist     • Gastroesophageal reflux disease    • Generalized abdominal pain    • Gout    • Headache    • Herpes simplex    • Hip pain, bilateral    • History of transfusion    • Hoarse    • Hyperlipidemia    • Incontinence of feces    • Insomnia    • Irritable bowel syndrome    • Joint pain    • Knee pain    • Left lower quadrant pain    • Left upper quadrant pain    • Leg " pain    • Leukorrhea     not specified as infective    • Low back pain     injury   • Muscle pain    • Nausea    • Nausea    • Nausea and vomiting    • Neck pain    • Neck pain    • Noncompliance with medication regimen    • Osteopenia    • Pain in lower back    • Pain in right femur    • Palpitations    • Productive cough    • PTSD (post-traumatic stress disorder)    • Scoliosis deformity of spine    • Screening examination for venereal disease    • Shoulder pain, left    • Stricture of esophagus    • Symptomatic menopausal or female climacteric states    • Synovitis and tenosynovitis     left forearm   • Tenderness     Tenderness of left upper quadrant of abdomen      • Thoracic back pain    • Type 2 diabetes mellitus (HCC)    • Upper abdominal pain    • Urgency of urination     Urgent desire to urinate      • Urinary frequency     due to benign prostatic hypertrophy      • Urinary tract infection     site not specified   • Urinary tract infectious disease    • Venous varices    • Weakness     General symptom - weakness    • Weight gain      Past Surgical History:   Procedure Laterality Date   • ANKLE OPEN REDUCTION INTERNAL FIXATION Right 10/28/2021    Procedure: OPEN REDUCTION AND INTERNAL FIXATION OF RIGHT ANKLE FRACTURE;  Surgeon: Danial Carter DPM;  Location: [facility] OR;  Service: Podiatry;  Laterality: Right;   • BREAST BIOPSY      Stereotactic breast biopsy (1)  left   • CARDIAC CATHETERIZATION  2014    Cardiac cath 83686 (1)  Non obstructive coronary artery disease. Normal left ventricular systolic function. Performed at [facility].   • CARDIAC CATHETERIZATION N/A 2022    Procedure: Left Heart Cath;  Surgeon: Inder Walker MD;  Location: [facility] INVASIVE LOCATION;  Service: Cardiology;  Laterality: N/A;   • CARPAL TUNNEL RELEASE Bilateral    •  SECTION     • CHOLECYSTECTOMY      laparoscopic   • COLONOSCOPY N/A 2017    Procedure: COLONOSCOPY;   Surgeon: Elia Cheatham MD;  Location: [facility] ENDOSCOPY;  Service:    • ENDOSCOPY  10/24/2012    Colon endoscopy 42024 (2)  internal & external hemorrhoids found. Diverticulum in sigmoid colon.   • ENDOSCOPY  11/05/2014    EGD w/ Dilatation 20659 (1)  Esophageal stricture was present. Dilatation performed. Gastritis found in the stomach. Biopsy taken. Normal duodenum.   • ENDOSCOPY  07/11/2013    EGD w/ tube 79845 (3)  Normal esophagus. Gastritis in stomach. Biopsy taken. Normal duodenum. Biopsy taken.   • ENDOSCOPY N/A 5/23/2017    Procedure: ESOPHAGOGASTRODUODENOSCOPY, possible dilation;  Surgeon: Grant Morris MD;  Location: [facility] ENDOSCOPY;  Service:    • ENDOSCOPY N/A 3/10/2020    Procedure: ESOPHAGOGASTRODUODENOSCOPY;  Surgeon: Grant Morris MD;  Location: [facility] ENDOSCOPY;  Service: General;  Laterality: N/A;   • FRACTURE SURGERY Right     Arm Surgery (1)  right, arm fracture   • HERNIA REPAIR      Hernia repair w/mesh (1)   • HYSTERECTOMY      Anesth, hysterectomy (1)   • INCISION AND DRAINAGE ABSCESS N/A 9/5/2017    Procedure: INCISION AND DRAINAGE OF ABDOMINAL WOUND;  Surgeon: Grant Morris MD;  Location: [facility] OR;  Service:    • INJECTION OF MEDICATION  05/27/2016    Depo Medrol (Methylprednisone) 80mg (2)  Ordered By: BRIAN LOU ([facility])    • INJECTION OF MEDICATION  03/10/2016    Kenalog (4)  Ordered By: ARNOLDO MURDOCK ([facility])    • INJECTION OF MEDICATION  01/07/2016    Rocephin (1)  Ordered By: ARNOLDO MURDOCK ([facility])    • LYMPH NODE BIOPSY      Biopsy/removal, lymph node(s) (1)  cervical node biopsy   • NECK SURGERY  2013   • NISSEN FUNDOPLICATION     • NISSEN FUNDOPLICATION N/A 6/7/2017    Procedure: OPEN REVISION OF NISSEN FUNDOPLICATION WITH GASTROSTOMY TUBE PLACEMENT ;  Surgeon: Grant Morris MD;  Location: [facility] OR;  Service:    • TOTAL ABDOMINAL HYSTERECTOMY      RUPALI BSO   • TRIGGER FINGER RELEASE Bilateral    • TUBAL ABDOMINAL LIGATION     • UPPER  GASTROINTESTINAL ENDOSCOPY  05/23/2017   • VENTRAL/INCISIONAL HERNIA REPAIR N/A 10/13/2017    Procedure: REMOVAL OF INFECTED MESH, REPAIR OF ABDOMINAL WALL   (Will need Strattice mesh in room).;  Surgeon: Grant Morris MD;  Location: [facility];  Service:       General Information     Row Name 01/10/22 1005          OT Time and Intention    Document Type evaluation  -     Mode of Treatment occupational therapy; physical therapy; co-treatment  -     Row Name 01/10/22 1005          General Information    Patient Profile Reviewed yes  -     Prior Level of Function independent:; gait; transfer; bed mobility; ADL's; feeding; grooming; dressing; bathing; home management; cooking; cleaning; driving; shopping  -     Existing Precautions/Restrictions fall  -     Row Name 01/10/22 1005          Living Environment    Lives With --  grandson  -     Row Name 01/10/22 1005          Home Main Entrance    Number of Stairs, Main Entrance three  -     Stair Railings, Main Entrance railings on both sides of stairs  -     Row Name 01/10/22 1005          Stairs Within Home, Primary    Stairs, Within Home, Primary mobile house, tub with a shower, with a shower chair, with grab bars, raised toilet seat with arms. Uses a RW  -     Row Name 01/10/22 1005          Cognition    Orientation Status (Cognition) oriented to; person; place; situation; time  -     Row Name 01/10/22 1005          Safety Issues, Functional Mobility    Safety Issues Affecting Function (Mobility) --  -     Comment, Safety Issues/Impairments (Mobility) R ankle fracture in October 2021, has a brace she wears (not present in hospital); states she has a hx of falls  -           User Key  (r) = Recorded By, (t) = Taken By, (c) = Cosigned By    Initials Name Provider Type     Karine Garcia OT Occupational Therapist                 Mobility/ADL's     Row Name 01/10/22 1005          Bed Mobility    Bed Mobility supine-sit; sit-supine  -      Supine-Sit [place] (Bed Mobility) independent  -     Sit-Supine [place] (Bed Mobility) independent  -[facility] Name 01/10/22 1005          Transfers    Transfers sit-stand transfer; toilet transfer  -     Comment (Transfers) No AE used  -     Sit-Stand [place] (Transfers) independent  -     [place] Level (Toilet Transfer) independent  -[facility] Name 01/10/22 1005          Toilet Transfer    Type (Toilet Transfer) sit-stand; stand-sit  -[facility] 01/10/22 1005          Activities of Daily Living    BADL Assessment/Intervention lower body dressing; grooming; toileting  -[facility] 01/10/22 1005          Lower Body Dressing Assessment/Training    [place] Level (Lower Body Dressing) lower body dressing skills; doff; don; socks; independent  -     Position (Lower Body Dressing) edge of bed sitting  -[facility] Name 01/10/22 1005          Grooming Assessment/Training    [place] Level (Grooming) grooming skills; independent  -     Comment (Grooming) washing hand standing at sink  -[facility] 01/10/22 1005          Toileting Assessment/Training    [place] Level (Toileting) toileting skills; independent  -     Position (Toileting) unsupported sitting; unsupported standing  -           User Key  (r) = Recorded By, (t) = Taken By, (c) = Cosigned By    Initials Name Provider Type     Karine Garcia OT Occupational Therapist               Obj/Interventions     [facility] Name 01/10/22 1005          Sensory Assessment (Somatosensory)    Sensory Assessment (Somatosensory) UE sensation intact  -SJ     Row Name 01/10/22 1005          Range of Motion Comprehensive    General Range of Motion bilateral upper extremity ROM WFL  -[facility] Name 01/10/22 1005          Strength Comprehensive (MMT)    General Manual Muscle Testing (MMT) Assessment other (see comments)  -     Comment, General Manual Muscle Testing (MMT) Assessment BUE 4/5 grossly  -           User  Key  (r) = Recorded By, (t) = Taken By, (c) = Cosigned By    Initials Name Provider Type     Karine Garcia, OT Occupational Therapist               Goals/Plan     Row Name 01/10/22 1005          Transfer Goal 1 (OT)    Activity/Assistive Device (Transfer Goal 1, OT) --  -SJ     [place] Level/Cues Needed (Transfer Goal 1, OT) --  -SJ     Time Frame (Transfer Goal 1, OT) --  -SJ     Progress/Outcome (Transfer Goal 1, OT) --  -[facility] Name 01/10/22 [facility]          Bathing Goal 1 (OT)    Activity/Device (Bathing Goal 1, OT) --  -SJ     [place] Level/Cues Needed (Bathing Goal 1, OT) --  -SJ     Time Frame (Bathing Goal 1, OT) --  -SJ     Progress/Outcomes (Bathing Goal 1, OT) --  -SJ     Row Name 01/10/22 100          Dressing Goal 1 (OT)    Activity/Device (Dressing Goal 1, OT) --  -SJ     [place]/Cues Needed (Dressing Goal 1, OT) --  -SJ     Time Frame (Dressing Goal 1, OT) --  -SJ     Progress/Outcome (Dressing Goal 1, OT) --  -SJ     Row Name 01/10/22 100          Toileting Goal 1 (OT)    Activity/Device (Toileting Goal 1, OT) --  -SJ     [place] Level/Cues Needed (Toileting Goal 1, OT) --  -SJ     Time Frame (Toileting Goal 1, OT) --  -SJ     Progress/Outcome (Toileting Goal 1, OT) --  -SJ     Row Name 01/10/22 1005          Therapy Assessment/Plan (OT)    Planned Therapy Interventions (OT) --  -SJ           User Key  (r) = Recorded By, (t) = Taken By, (c) = Cosigned By    Initials Name Provider Type    Karine Armas OT Occupational Therapist               Clinical Impression     Row Name 01/10/22 1005          Pain Assessment    Additional Documentation Pain Scale: Numbers Pre/Post-Treatment (Group)  -SJ     Row Name 01/10/22 1005          Pain Scale: Numbers Pre/Post-Treatment    Pretreatment Pain Rating 6/10  -SJ     Posttreatment Pain Rating 6/10  -SJ     Pain Location - Side Right  -SJ     Pain Location ankle  -SJ     Pain Intervention(s) Ambulation/increased activity;  Repositioned  -     Row Name 01/10/22 1005          Plan of Care Review    Plan of Care Reviewed With patient  -SJ     Outcome Summary OT eval complete, co-eval with PT. BP 94/53 at start of session, throughout session no dizziness, SOA, or lightheadness felt throughout session. Supine<>sit with independence. Sit to stand and toilet transfer with independence. Toileting with indepenence. Grooming with independence. LE dressing with independence. Patient at baseline, HR 90-99 throughout session, SP02 96% on RA at end of session, with RN's permission. Recommend home with assist. Discharge from OT as patient independent in all ADLs and transfers.  -     Row Name 01/10/22 1005          Therapy Assessment/Plan (OT)    Patient/Family Therapy Goal Statement (OT) return home  -     Therapy Frequency (OT) evaluation only  -     Row Name 01/10/22 1005          Therapy Plan Review/Discharge Plan (OT)    Anticipated Discharge Disposition (OT) home with assist  -     Row Name 01/10/22 1005          Vital Signs    Pre Systolic BP Rehab 94  -SJ     Pre Treatment Diastolic BP 53  -SJ     Pretreatment Heart Rate (beats/min) 93  -SJ     Intratreatment Heart Rate (beats/min) 95  -SJ     Posttreatment Heart Rate (beats/min) 94  -SJ     Pre SpO2 (%) 97  -SJ     O2 Delivery Pre Treatment nasal cannula  1 lpm  -SJ     Intra SpO2 (%) 94  -SJ     O2 Delivery Intra Treatment room air  -SJ     Post SpO2 (%) 96  -SJ     O2 Delivery Post Treatment room air  -SJ     Pre Patient Position Supine  -SJ     Intra Patient Position Sitting  -SJ     Post Patient Position Supine  -     Row Name 01/10/22 1005          Positioning and Restraints    Pre-Treatment Position in bed  -SJ     Post Treatment Position bed  -SJ     In Bed notified nsg; supine; call light within reach; encouraged to call for assist  -SJ           User Key  (r) = Recorded By, (t) = Taken By, (c) = Cosigned By    Initials Name Provider Type    Karine Armas, OT  Occupational Therapist               Outcome Measures     Row Name 01/10/22 1005          How much help from another is currently needed...    Putting on and taking off regular lower body clothing? 4  -SJ     Bathing (including washing, rinsing, and drying) 4  -SJ     Toileting (which includes using toilet bed pan or urinal) 4  -SJ     Putting on and taking off regular upper body clothing 4  -SJ     Taking care of personal grooming (such as brushing teeth) 4  -SJ     Eating meals 4  -SJ     AM-PAC 6 Clicks Score (OT) 24  -SJ     Row Name 01/10/22 1005          Functional Assessment    Outcome Measure Options AM-PAC 6 Clicks Daily Activity (OT)  -           User Key  (r) = Recorded By, (t) = Taken By, (c) = Cosigned By    Initials Name Provider Type    Karine Armas OT Occupational Therapist                Occupational Therapy Education                 Title: PT OT SLP Therapies (In Progress)     Topic: Occupational Therapy (In Progress)     Point: ADL training (Not Started)     Description:   Instruct learner(s) on proper safety adaptation and remediation techniques during self care or transfers.   Instruct in proper use of assistive devices.              Learner Progress:  Not documented in this visit.          Point: Home exercise program (Not Started)     Description:   Instruct learner(s) on appropriate technique for monitoring, assisting and/or progressing therapeutic exercises/activities.              Learner Progress:  Not documented in this visit.          Point: Precautions (Done)     Description:   Instruct learner(s) on prescribed precautions during self-care and functional transfers.              Learning Progress Summary           Patient Acceptance, E,TB, VU by  at 1/10/2022 1027    Comment: POC, discharge from services, Role of OT                   Point: Body mechanics (Done)     Description:   Instruct learner(s) on proper positioning and spine alignment during self-care, functional  mobility activities and/or exercises.              Learning Progress Summary           Patient Acceptance, E,TB, VU by  at 1/10/2022 1027    Comment: POC, discharge from services, Role of OT                               User Key     Initials Effective Dates Name Provider Type Discipline     06/14/21 -  Karine Garcia OT Occupational Therapist OT              OT Recommendation and Plan  Therapy Frequency (OT): evaluation only  Plan of Care Review  Plan of Care Reviewed With: patient  Outcome Summary: OT eval complete, co-eval with PT. BP 94/53 at start of session, throughout session no dizziness, SOA, or lightheadness felt throughout session. Supine<>sit with independence. Sit to stand and toilet transfer with independence. Toileting with indepenence. Grooming with independence. LE dressing with independence. Patient at baseline, HR 90-99 throughout session, SP02 96% on RA at end of session, with RN's permission. Recommend home with assist. Discharge from OT as patient independent in all ADLs and transfers.     Time Calculation:    Time Calculation- OT     Row Name 01/10/22 1039             Time Calculation-     OT Start Time 1005  -      OT Stop Time 1032  -      OT Time Calculation (min) 27 min  -      OT Received On 01/10/22  -      OT Goal Re-Cert Due Date 01/23/22  -              Untimed Charges    OT Eval/Re-eval Minutes 27  -              Total Minutes    Untimed Charges Total Minutes 27  -       Total Minutes 27  -            User Key  (r) = Recorded By, (t) = Taken By, (c) = Cosigned By    Initials Name Provider Type     Karine Garcia OT Occupational Therapist              Therapy Charges for Today     Code Description Service Date Service Provider Modifiers Qty    [id number]  OT EVAL LOW COMPLEXITY 2 1/10/2022 Karine Garcia OT GO 1               Karine Garcia OT  1/10/2022

## 2022-01-11 ENCOUNTER — READMISSION MANAGEMENT (OUTPATIENT)
Dept: CALL CENTER | Facility: HOSPITAL | Age: 67
End: 2022-01-11

## 2022-01-11 NOTE — OUTREACH NOTE
Prep Survey      Responses   [facility]? [facility]   Is LACE score < 7 ? No   Emergency Room discharge w/ pulse ox? No   Eligibility Readm Mgmt   Discharge diagnosis Takotsubo cardiomyopathy   Does the patient have one of the following disease processes/diagnoses(primary or secondary)? Other   Does the patient have Home health ordered? Yes   What is the Home health agency?  [facility]   Is there a DME ordered? Yes   What DME was ordered? Oxygen   Comments regarding appointments see AVS   Medication alerts for this patient see AVS   Prep survey completed? Yes          Brit Troy RN

## 2022-01-11 NOTE — PAYOR COMM NOTE
"Sherrill Bonilla  Case Management Extender  [phone] phone  [phone] fax    Auth# [id number]      Maria Yepez (66 y.o. Female)             Date of Birth Social Security Number Address Home Phone MRN    1955  [address]  [place] [address] [phone] [id number]    Buddhist Marital Status             None Single       Admission Date Admission Type Admitting Provider Attending Provider Department, Room/Bed    1/7/22 Emergency Hu To MD  [address], 331/1    Discharge Date Discharge Disposition Discharge Destination          1/10/2022 Home or Self Care              Attending Provider: (none)   Allergies: Albuterol, Adhesive Tape, Claritin [Loratadine], Ibuprofen, Lactose Intolerance (Gi), Latex, Shellfish-derived Products    Isolation: None   Infection: COVID (History) (01/10/22)   Code Status: Prior   Advance Care Planning Activity    Ht: 152.4 cm (60\")   Wt: 47.1 kg (103 lb 12.8 oz)    Admission Cmt: None   Principal Problem: Takotsubo cardiomyopathy [I51.81] More...                 Active Insurance as of 1/7/2022     Primary Coverage     Payor Plan Insurance Group Employer/Plan Group    [facility]IC[facility]      Payor Plan Address Payor Plan Phone Number Payor Plan Fax Number Effective Dates    [address] [address] [phone]  10/1/2021 - None Entered    [facility] [id number]       Subscriber Name Subscriber Birth Date Member ID       MARIA YEPEZ 1955 [id number]                 Emergency Contacts      (Rel.) Home Phone Work Phone Mobile Phone    Kim Medley (Daughter) [phone] -- [phone]    Nohelia Martínez (Friend) [phone] -- [phone]            Discharge Summary    No notes of this type exist for this encounter.         Discharge Order (From admission, onward)     Start     Ordered    01/10/22 1328  Discharge patient  Once      "   Expected Discharge Date: 01/10/22    Expected Discharge Time: Midday    Discharge Disposition: Home or Self Care    Physician of Record for Attribution - Please select from Treatment Team: KWABENA NORRIS [[id number]]    Review needed by CMO to determine Physician of Record: No       Question Answer Comment   Physician of Record for Attribution - Please select from Treatment Team KWABENA NORRIS    Review needed by CMO to determine Physician of Record No        01/10/22 1348
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
## 2022-01-12 ENCOUNTER — HOME CARE VISIT (OUTPATIENT)
Dept: HOME HEALTH SERVICES | Facility: CLINIC | Age: 67
End: 2022-01-12

## 2022-01-12 PROCEDURE — G0299 HHS/HOSPICE OF RN EA 15 MIN: HCPCS

## 2022-01-13 ENCOUNTER — READMISSION MANAGEMENT (OUTPATIENT)
Dept: CALL CENTER | Facility: HOSPITAL | Age: 67
End: 2022-01-13

## 2022-01-13 VITALS
HEART RATE: 68 BPM | DIASTOLIC BLOOD PRESSURE: 60 MMHG | RESPIRATION RATE: 22 BRPM | OXYGEN SATURATION: 95 % | SYSTOLIC BLOOD PRESSURE: 122 MMHG | TEMPERATURE: 98 F

## 2022-01-13 LAB
BACTERIA SPEC AEROBE CULT: NORMAL
BACTERIA SPEC AEROBE CULT: NORMAL

## 2022-01-13 NOTE — OUTREACH NOTE
Medical Week 1 Survey      Responses   [facility] patient discharged from? [place]   Does the patient have one of the following disease processes/diagnoses(primary or secondary)? Other   Week 1 attempt successful? Yes   Call start time 1646   Call end time 1649   Discharge diagnosis Takotsubo cardiomyopathy   Meds reviewed with patient/caregiver? Yes   Is the patient having any side effects they believe may be caused by any medication additions or changes? No   Does the patient have all medications ordered at discharge? Yes   Is the patient taking all medications as directed (includes completed medication regime)? Yes   Does the patient have a primary care provider?  Yes   Does the patient have an appointment with their PCP within 7 days of discharge? No   What is preventing the patient from scheduling follow up appointments within 7 days of discharge? Unsure of when or with whom  [dtr was unsure]   Nursing Interventions Advised patient to make appointment,  Educated patient on importance of making appointment   Has the patient kept scheduled appointments due by today? N/A   What is the Home health agency?  [facility]   Has home health visited the patient within 72 hours of discharge? Yes   What DME was ordered? Oxygen   Has all DME been delivered? Yes   Psychosocial issues? No   Did the patient receive a copy of their discharge instructions? Yes   Nursing interventions Reviewed instructions with patient   What is the patient's perception of their health status since discharge? Improving   Is the patient/caregiver able to teach back signs and symptoms related to disease process for when to call PCP? Yes   Is the patient/caregiver able to teach back signs and symptoms related to disease process for when to call 911? Yes   Is the patient/caregiver able to teach back the hierarchy of who to call/visit for symptoms/problems? PCP, Specialist, Home health nurse, Urgent Care, ED, 911 Yes   If the patient  is a current smoker, are they able to teach back resources for cessation? --  [former smoker, dtr states she thinks pt sneaks a cig here and there]   Week 1 call completed? Yes          OPAL JAMES RN

## 2022-01-18 ENCOUNTER — HOME CARE VISIT (OUTPATIENT)
Dept: HOME HEALTH SERVICES | Facility: CLINIC | Age: 67
End: 2022-01-18

## 2022-01-18 VITALS
RESPIRATION RATE: 18 BRPM | HEART RATE: 76 BPM | OXYGEN SATURATION: 97 % | DIASTOLIC BLOOD PRESSURE: 76 MMHG | TEMPERATURE: 98.5 F | SYSTOLIC BLOOD PRESSURE: 128 MMHG

## 2022-01-18 PROCEDURE — G0299 HHS/HOSPICE OF RN EA 15 MIN: HCPCS

## 2022-01-19 LAB
QT INTERVAL: 326 MS
QT INTERVAL: 336 MS
QT INTERVAL: 342 MS
QT INTERVAL: 344 MS
QTC INTERVAL: 437 MS
QTC INTERVAL: 439 MS
QTC INTERVAL: 449 MS
QTC INTERVAL: 462 MS

## 2022-01-20 ENCOUNTER — READMISSION MANAGEMENT (OUTPATIENT)
Dept: CALL CENTER | Facility: HOSPITAL | Age: 67
End: 2022-01-20

## 2022-01-20 NOTE — OUTREACH NOTE
Medical Week 2 Survey      Responses   [facility] patient discharged from? [place]   Does the patient have one of the following disease processes/diagnoses(primary or secondary)? Other   Week 2 attempt successful? Yes   Call start time 1558   Discharge diagnosis Takotsubo cardiomyopathy   Call end time 1605   Person spoke with today (if not patient) and relationship patient   Meds reviewed with patient/caregiver? Yes   Is the patient having any side effects they believe may be caused by any medication additions or changes? No   Does the patient have all medications ordered at discharge? Yes   Is the patient taking all medications as directed (includes completed medication regime)? Yes   Does the patient have a primary care provider?  Yes   Does the patient have an appointment with their PCP within 7 days of discharge? No   Comments regarding PCP Sammie Gonzalez, pt needs a ride so waiting until daughter is available.   Has the patient kept scheduled appointments due by today? N/A   What is the Home health agency?  [facility]   Has home health visited the patient within 72 hours of discharge? Yes   What DME was ordered? Oxygen   DME comments 2L continuous   Psychosocial issues? No   Did the patient receive a copy of their discharge instructions? Yes   Nursing interventions Reviewed instructions with patient   What is the patient's perception of their health status since discharge? Improving   Is the patient/caregiver able to teach back the hierarchy of who to call/visit for symptoms/problems? PCP, Specialist, Home health nurse, Urgent Care, ED, 911 Yes   If the patient is a current smoker, are they able to teach back resources for cessation? Not a smoker   Week 2 Call Completed? Yes   Wrap up additional comments Pt reports she is doing ok, she recently lost her sister and is dealing with that loss.  She denies needs at this time.           Noni Bell RN

## 2022-01-21 ENCOUNTER — HOME CARE VISIT (OUTPATIENT)
Dept: HOME HEALTH SERVICES | Facility: CLINIC | Age: 67
End: 2022-01-21

## 2022-01-21 PROCEDURE — G0300 HHS/HOSPICE OF LPN EA 15 MIN: HCPCS

## 2022-01-23 VITALS
RESPIRATION RATE: 18 BRPM | DIASTOLIC BLOOD PRESSURE: 58 MMHG | TEMPERATURE: 97.9 F | HEART RATE: 76 BPM | SYSTOLIC BLOOD PRESSURE: 118 MMHG

## 2022-01-25 ENCOUNTER — HOME CARE VISIT (OUTPATIENT)
Dept: HOME HEALTH SERVICES | Facility: CLINIC | Age: 67
End: 2022-01-25

## 2022-01-25 PROCEDURE — G0300 HHS/HOSPICE OF LPN EA 15 MIN: HCPCS

## 2022-01-27 ENCOUNTER — READMISSION MANAGEMENT (OUTPATIENT)
Dept: CALL CENTER | Facility: HOSPITAL | Age: 67
End: 2022-01-27

## 2022-01-27 VITALS
SYSTOLIC BLOOD PRESSURE: 122 MMHG | DIASTOLIC BLOOD PRESSURE: 60 MMHG | TEMPERATURE: 97.6 F | RESPIRATION RATE: 18 BRPM | HEART RATE: 76 BPM

## 2022-01-27 NOTE — OUTREACH NOTE
Medical Week 3 Survey      Responses   [facility] patient discharged from? [place]   Does the patient have one of the following disease processes/diagnoses(primary or secondary)? Other   Week 3 attempt successful? No   Unsuccessful attempts Attempt 1          Jany Chadwick RN

## 2022-01-31 ENCOUNTER — READMISSION MANAGEMENT (OUTPATIENT)
Dept: CALL CENTER | Facility: HOSPITAL | Age: 67
End: 2022-01-31

## 2022-01-31 NOTE — OUTREACH NOTE
Medical Week 3 Survey      Responses   [facility] patient discharged from? [place]   Does the patient have one of the following disease processes/diagnoses(primary or secondary)? Other   Week 3 attempt successful? No   Unsuccessful attempts Attempt 2          Lanny Wright RN

## 2022-02-02 ENCOUNTER — HOME CARE VISIT (OUTPATIENT)
Dept: HOME HEALTH SERVICES | Facility: CLINIC | Age: 67
End: 2022-02-02

## 2022-02-02 VITALS
SYSTOLIC BLOOD PRESSURE: 148 MMHG | RESPIRATION RATE: 18 BRPM | TEMPERATURE: 98.6 F | HEART RATE: 80 BPM | DIASTOLIC BLOOD PRESSURE: 70 MMHG

## 2022-02-02 PROCEDURE — G0300 HHS/HOSPICE OF LPN EA 15 MIN: HCPCS

## 2022-02-09 ENCOUNTER — HOME CARE VISIT (OUTPATIENT)
Dept: HOME HEALTH SERVICES | Facility: CLINIC | Age: 67
End: 2022-02-09

## 2022-02-09 VITALS
TEMPERATURE: 97.5 F | HEART RATE: 76 BPM | DIASTOLIC BLOOD PRESSURE: 70 MMHG | SYSTOLIC BLOOD PRESSURE: 158 MMHG | RESPIRATION RATE: 18 BRPM

## 2022-02-09 PROCEDURE — G0300 HHS/HOSPICE OF LPN EA 15 MIN: HCPCS

## 2022-02-28 ENCOUNTER — HOME CARE VISIT (OUTPATIENT)
Dept: HOME HEALTH SERVICES | Facility: HOME HEALTHCARE | Age: 67
End: 2022-02-28

## 2023-09-15 NOTE — PROGRESS NOTES
Subjective:     Interval History: Seen at bedside, denies pain        Scheduled Meds:   atorvastatin  40 mg Oral Daily    insulin aspart U-100  9 Units Subcutaneous TIDWM    insulin detemir U-100  10 Units Subcutaneous BID    lisinopriL  10 mg Oral Daily    piperacillin-tazobactam (Zosyn) IV (PEDS and ADULTS) (extended infusion is not appropriate)  4.5 g Intravenous Q8H    vancomycin (VANCOCIN) IV (PEDS and ADULTS)  1,250 mg Intravenous Q8H     Continuous Infusions:  PRN Meds:sodium chloride 0.9%, acetaminophen, dextrose 10%, dextrose 10%, glucagon (human recombinant), glucose, glucose, HYDROcodone-acetaminophen, insulin aspart U-100, melatonin, melatonin, naloxone, ondansetron, simethicone, sodium chloride 0.9%, sodium chloride 0.9%, Pharmacy to dose Vancomycin consult **AND** vancomycin - pharmacy to dose    Review of Systems   Constitutional:  Negative for activity change, chills, diaphoresis and fever.   HENT:  Negative for congestion and hearing loss.    Respiratory:  Negative for cough and shortness of breath.    Cardiovascular:  Negative for leg swelling.   Gastrointestinal:  Negative for nausea and vomiting.   Musculoskeletal:  Positive for gait problem. Negative for back pain, joint swelling and myalgias.   Skin:  Positive for color change and wound. Negative for pallor and rash.   Neurological:  Positive for numbness. Negative for tremors, speech difficulty and weakness.   Psychiatric/Behavioral:  Negative for agitation and confusion. The patient is not nervous/anxious.      Objective:     Vital Signs (Most Recent):  Temp: 98.2 °F (36.8 °C) (09/15/23 1203)  Pulse: 93 (09/15/23 1203)  Resp: 20 (09/15/23 1203)  BP: (!) 150/101 (09/15/23 1203)  SpO2: 99 % (09/15/23 1203) Vital Signs (24h Range):  Temp:  [98.1 °F (36.7 °C)-98.5 °F (36.9 °C)] 98.2 °F (36.8 °C)  Pulse:  [] 93  Resp:  [14-20] 20  SpO2:  [97 %-99 %] 99 %  BP: (126-150)/() 150/101     Weight: 80.4 kg (177 lb 4 oz)  Body mass index is  Subjective   Maria Yepez is a 62 y.o. female.   Chief Complaint   Patient presents with   • Foot Pain       History of Present Illness patient complains pain in the right foot.  In the dorsal area.  She hasn't injured it.  There is no swelling.  It is a little worse when she is on it.  This been going on a few weeks.  Patient is already on Neurontin for diabetic neuropathy.  She is also Motrin/Lortab.  She sees pain clinic.    Patient follows up diabetes, hypertension, hyperlipidemia.    The following portions of the patient's history were reviewed and updated as appropriate: allergies, current medications, past family history, past medical history, past social history, past surgical history and problem list.    Review of Systems   Constitutional: Negative for activity change, appetite change, fatigue and unexpected weight change.   HENT: Negative for ear pain, facial swelling and hearing loss.    Eyes: Negative for discharge and visual disturbance.   Respiratory: Negative for cough, chest tightness, shortness of breath and wheezing.    Cardiovascular: Negative for chest pain, palpitations and leg swelling.   Gastrointestinal: Negative for abdominal pain.   Genitourinary: Negative for difficulty urinating, dysuria, flank pain, frequency, hematuria and urgency.   Musculoskeletal: Positive for arthralgias. Negative for joint swelling and myalgias.   Skin: Negative for color change and rash.   Allergic/Immunologic: Negative for food allergies.   Neurological: Negative for dizziness, syncope, weakness, numbness and headaches.   Hematological: Negative for adenopathy.   Psychiatric/Behavioral: Negative for confusion, decreased concentration, dysphoric mood, hallucinations, self-injury, sleep disturbance and suicidal ideas. The patient is not nervous/anxious.    All other systems reviewed and are negative.    In past two weeks the patient has not felt down, depressed, hopeless, or lost interest in doing things.    Objective   Physical Exam   Constitutional: She is oriented to person, place, and time. Vital signs are normal. She appears well-developed and well-nourished.   HENT:   Head: Normocephalic.   Right Ear: External ear normal.   Left Ear: External ear normal.   Nose: Nose normal.   Mouth/Throat: Oropharynx is clear and moist.   Eyes: Conjunctivae and EOM are normal. Pupils are equal, round, and reactive to light.   Neck: Normal range of motion. Neck supple. No JVD present. No thyromegaly present.   Cardiovascular: Normal rate, regular rhythm, normal heart sounds and intact distal pulses.    No murmur heard.  Pulmonary/Chest: Effort normal and breath sounds normal. No respiratory distress. She has no wheezes. She has no rales. She exhibits no tenderness.   Abdominal: Soft. Bowel sounds are normal. She exhibits no distension and no mass. There is no tenderness. There is no rebound and no guarding. No hernia.   Musculoskeletal: Normal range of motion. She exhibits tenderness. She exhibits no edema or deformity.   Lymphadenopathy:     She has no cervical adenopathy.   Neurological: She is alert and oriented to person, place, and time. No cranial nerve deficit. Coordination normal.   Skin: Skin is warm and dry. No rash noted. No pallor.   Psychiatric: She has a normal mood and affect. Her behavior is normal. Judgment and thought content normal. Her mood appears not anxious. She is not agitated and not aggressive. Thought content is not paranoid and not delusional. Cognition and memory are normal. She does not exhibit a depressed mood. She expresses no homicidal and no suicidal ideation. She expresses no suicidal plans and no homicidal plans.   Nursing note and vitals reviewed.      Assessment/Plan   Maria was seen today for foot pain.    Diagnoses and all orders for this visit:    Right foot pain    Essential hypertension    Familial hypercholesterolemia    Type 2 diabetes mellitus with complication, without long-term  "29.5 kg/m².    Foot Exam    General  General Appearance: appears stated age and healthy   Orientation: alert and oriented to person, place, and time   Affect: appropriate       Right Foot/Ankle     Inspection and Palpation  Ecchymosis: none  Tenderness: none   Swelling: none   Skin Exam: skin intact;     Neurovascular  Dorsalis pedis: 1+  Posterior tibial: 1+  Saphenous nerve sensation: diminished  Tibial nerve sensation: diminished  Superficial peroneal nerve sensation: diminished  Deep peroneal nerve sensation: diminished  Sural nerve sensation: diminished      Left Foot/Ankle      Inspection and Palpation  Ecchymosis: none  Tenderness: none   Swelling: none   Skin Exam: cellulitis and ulcer;     Neurovascular  Dorsalis pedis: 1+  Posterior tibial: 1+  Saphenous nerve sensation: diminished  Tibial nerve sensation: diminished  Superficial peroneal nerve sensation: diminished  Deep peroneal nerve sensation: diminished  Sural nerve sensation: diminished          Laboratory:  A1C:   Recent Labs   Lab 09/13/23 0448   HGBA1C 12.9*     CBC:   Recent Labs   Lab 09/15/23  0429   WBC 8.01   RBC 4.17*   HGB 12.2*   HCT 33.8*      MCV 81*   MCH 29.3   MCHC 36.1*     CMP:   Recent Labs   Lab 09/12/23  2013 09/13/23  0448 09/15/23  0429   *   < > 317*   CALCIUM 9.1   < > 8.9   ALBUMIN 3.8  --   --    PROT 7.6  --   --    *   < > 135*   K 4.3   < > 4.2   CO2 21*   < > 25   CL 93*   < > 101   BUN 31*   < > 20   CREATININE 1.24   < > 1.0   ALKPHOS 146*  --   --    ALT 24  --   --    AST 18  --   --    BILITOT 0.4  --   --     < > = values in this interval not displayed.     CRP:   Recent Labs   Lab 09/12/23 2013   CRP 1.40*     ESR: No results for input(s): "SEDRATE" in the last 168 hours.  Wound Cultures: No results for input(s): "LABAERO" in the last 4320 hours.    Diagnostic Results:  MRI: I have reviewed all pertinent results/findings within the past 24 hours.  reviewed  X-Ray: I have reviewed all " current use of insulin        DM labs due: CBC CMP Lipids Thyroids A1C Urine Protein and Micro Albumin  RTC 2 weeks after the labs to review them  (For male patient get PSA if over the age of 50 and not over age of 80.)Reminder for Woman Patients:      Recommended patient to get female health every year for regular screenings.  Schedule Female Health if due.     Recommended to patient to get mammogram every year to screen for Breast Cancer.  Schedule Mammo every year for female patients 40 years of age and older.  Schedule mammo if due.     Recommended to patient to get colonoscopy every 10 years from age 50 years and older that has had a normal previous colonoscopy -- or every 2-3 years for patient that is 50 years and older with an abnormal colonoscopy, or every 5 years if there is a family history of colon cancer.  Schedule colonoscopy if due.      Recommended to patient to get Bone Density every 2 years starting at age 60.  Schedule BD if due.  kenlog 40mg. If I am giving zanfle stop. Xray right foot on way out. Circulation good.         pertinent results/findings within the past 24 hours.  reviewed    Clinical Findings:  Eschar to dorsal right foot, upon debridement, to level of subcutaneous layer, no purulence. No clinical signs of abscess noted. No fluctuance or crepitus. Cellulitis appears to be resolving

## 2025-05-30 NOTE — PROGRESS NOTES
LOS: 4 days   Patient Care Team:  Manjinder Joshi MD as PCP - General (Internal Medicine)    Chief Complaint:  <principal problem not specified>    Subjective     Interval History:   Feels better, tolerating diet    Objective     Vital Signs  Temp:  [96.4 °F (35.8 °C)-99.5 °F (37.5 °C)] 98.4 °F (36.9 °C)  Heart Rate:  [] 93  Resp:  [14-18] 16  BP: (126-166)/(60-88) 152/78    Physical Exam:  Tube in place, abd OK, incisions clean     Results Review:       Lab Results (last 24 hours)     Procedure Component Value Units Date/Time    CBC & Differential [[id number]] Collected:  06/11/17 0424    Specimen:  Blood Updated:  06/11/17 0510    Narrative:       The following orders were created for panel order CBC & Differential.  Procedure                               Abnormality         Status                     ---------                               -----------         ------                     CBC Auto Differential[[id number]]        Abnormal            Final result                 Please view results for these tests on the individual orders.    CBC Auto Differential [[id number]]  (Abnormal) Collected:  06/11/17 0424    Specimen:  Blood Updated:  06/11/17 0510     WBC 7.86 10*3/mm3      RBC 3.23 (L) 10*6/mm3      Hemoglobin 9.4 (L) g/dL       Pt rec blood        Hematocrit 27.6 (L) %      MCV 85.4 fL      MCH 29.1 pg      MCHC 34.1 g/dL      RDW 13.3 %      RDW-SD 41.7 fl      MPV 9.4 fL      Platelets 179 10*3/mm3      Neutrophil % 72.9 %      Lymphocyte % 13.9 %      Monocyte % 6.4 %      Eosinophil % 6.2 %      Basophil % 0.3 %      Immature Grans % 0.3 %      Neutrophils, Absolute 5.74 10*3/mm3      Lymphocytes, Absolute 1.09 10*3/mm3      Monocytes, Absolute 0.50 10*3/mm3      Eosinophils, Absolute 0.49 10*3/mm3      Basophils, Absolute 0.02 10*3/mm3      Immature Grans, Absolute 0.02 10*3/mm3     Basic Metabolic Panel [[id number]]  (Abnormal) Collected:  06/11/17 0424    Specimen:  Blood Updated:   06/11/17 0520     Glucose 100 mg/dL      BUN 6 (L) mg/dL      Creatinine 0.50 mg/dL      Sodium 142 mmol/L      Potassium 2.5 (C) mmol/L      Chloride 102 mmol/L      CO2 29.0 mmol/L      Calcium 7.8 (L) mg/dL      eGFR Non African Amer 126 (H) mL/min/1.73      BUN/Creatinine Ratio 12.0     Anion Gap 11.0 mmol/L           Medication Review:   Current Facility-Administered Medications   Medication Dose Route Frequency Provider Last Rate Last Dose   • amLODIPine (NORVASC) tablet 2.5 mg  2.5 mg Oral Daily Grant Morris MD   2.5 mg at 06/11/17 0852   • enoxaparin (LOVENOX) syringe 40 mg  40 mg Subcutaneous Daily Grant Morris MD   40 mg at 06/08/17 0917   • FLUoxetine (PROzac) capsule 20 mg  20 mg Oral Daily Grant Morris MD   20 mg at 06/11/17 0852   • gabapentin (NEURONTIN) capsule 400 mg  400 mg Oral Nightly Grant Morris MD   400 mg at 06/10/17 2101   • HYDROcodone-acetaminophen (NORCO) 5-325 MG per tablet 1 tablet  1 tablet Oral Q6H PRN Grant Morris MD       • HYDROcodone-acetaminophen (NORCO) 7.5-325 MG per tablet 2 tablet  2 tablet Oral Q6H PRN Grant Morris MD   2 tablet at 06/11/17 0329   • ipratropium (ATROVENT) nebulizer solution 0.5 mg  0.5 mg Nebulization Q6H - RT Grant Morris MD   0.5 mg at 06/11/17 0637   • lisinopril (PRINIVIL,ZESTRIL) tablet 5 mg  5 mg Oral Daily Grant Morris MD   5 mg at 06/11/17 0852   • Morphine sulfate (PF) injection 2 mg  2 mg Intravenous Q2H PRN Grant Morris MD   2 mg at 06/10/17 2101   • naloxone (NARCAN) injection 0.1 mg  0.1 mg Intravenous Q5 Min PRN Grant Morirs MD       • nitroglycerin (NITROSTAT) SL tablet 0.4 mg  0.4 mg Sublingual Q5 Min PRN Grant Morris MD       • ondansetron (ZOFRAN) tablet 4 mg  4 mg Oral Q6H PRN Grant Morris MD        Or   • ondansetron ODT (ZOFRAN-ODT) disintegrating tablet 4 mg  4 mg Oral Q6H PRN Grant Morris MD         Or   • ondansetron (ZOFRAN) injection 4 mg  4 mg Intravenous Q6H PRN Grant Morris MD       • sodium chloride 0.9 % infusion  75 mL/hr Intravenous Continuous Grant Morris MD 75 mL/hr at 06/11/17 0519 75 mL/hr at 06/11/17 0519   • tamsulosin (FLOMAX) 24 hr capsule 0.4 mg  0.4 mg Oral Daily Grant Morris MD   0.4 mg at 06/09/17 0916   • traZODone (DESYREL) tablet 150 mg  150 mg Oral Nightly Grant Morris MD   150 mg at 06/10/17 2101       Assessment/Plan     Active Problems:    Dysphagia    62 yo lady POD#4 s/p open nissen revision and G tube  Acute blood loss anemia- transfusion successful  Continue diet and leave G tube clamped  Replace K  Home soon    Karl Beckford MD  06/11/17  9:26 AM     [facility]

## 2025-07-04 NOTE — PAT
Called Dr. Morris office in regards to asa patient relates did not receive instructions on whether to stop or continue, office related that he wont be in till after 1pm today, checked with surgery has not arrived yet left message with staff concerning asa.   Yes

## 2025-07-08 NOTE — ANESTHESIA PREPROCEDURE EVALUATION
Anesthesia Evaluation     NPO Solid Status: > 8 hours  NPO Liquid Status: > 8 hours     Airway   Mallampati: II  TM distance: >3 FB  Neck ROM: full  no difficulty expected  Dental    (+) poor dentition    Pulmonary    (+) a smoker Current, COPD moderate, rhonchi,   Cardiovascular     Rhythm: regular  Rate: normal    (+) hypertension well controlled, CAD, PVD, hyperlipidemia      Neuro/Psych  (+) headaches,    GI/Hepatic/Renal/Endo    (+)  GERD well controlled, diabetes mellitus type 2 well controlled,     Musculoskeletal     (+) neck pain,   Abdominal     Abdomen: soft.   Substance History      OB/GYN          Other   (+) arthritis                                   Anesthesia Plan    ASA 3     general   (Gentle induction given that she took her lisinopril this am.)  intravenous induction   Anesthetic plan and risks discussed with patient.       no

## (undated) DEVICE — PK MAJ PROC LF 60

## (undated) DEVICE — GLV SURG TRIUMPH LT PF LTX 7.5 STRL

## (undated) DEVICE — GLV SURG SENSICARE POLYISPRN W/ALOE PF LF 6.5 GRN STRL

## (undated) DEVICE — SOL SCRB POVIDONE IODINE

## (undated) DEVICE — CATH DIAG EXPO .056 FL3.5 6F 100CM

## (undated) DEVICE — DRP WARMR MACH

## (undated) DEVICE — 3M™ STERI-STRIP™ REINFORCED ADHESIVE SKIN CLOSURES, R1547, 1/2 IN X 4 IN (12 MM X 100 MM), 6 STRIPS/ENVELOPE: Brand: 3M™ STERI-STRIP™

## (undated) DEVICE — STERILE POLYISOPRENE POWDER-FREE SURGICAL GLOVES: Brand: PROTEXIS

## (undated) DEVICE — INTRO SHEATH ART/FEM ENGAGE .038 6F12CM

## (undated) DEVICE — PRECISION THIN (9.0 X 0.38 X 31.0MM)

## (undated) DEVICE — GLV SURG SENSICARE GREEN W/ALOE PF LF 8 STRL

## (undated) DEVICE — CANN SMPL SOFTECH BIFLO ETCO2 A/M 7FT

## (undated) DEVICE — SHEET,DRAPE,53X77,STERILE: Brand: MEDLINE

## (undated) DEVICE — TOTAL TRAY, 16FR 10ML SIL FOLEY, URN: Brand: MEDLINE

## (undated) DEVICE — GOWN,PREVENTION PLUS,XLNG/XXLARGE,STRL: Brand: MEDLINE

## (undated) DEVICE — SUT VIC 2/0 SH 27IN

## (undated) DEVICE — IMPLANTABLE DEVICE
Type: IMPLANTABLE DEVICE | Site: ANKLE | Status: NON-FUNCTIONAL
Brand: MICROAIRE®
Removed: 2021-10-28

## (undated) DEVICE — SUT PDS 1 XLH LP 99IN Z881G

## (undated) DEVICE — POSTN ELEV LEG PROCARE FM UNIV 45DEG 31.5X10IN

## (undated) DEVICE — PENCL ES MEGADINE EZ/CLEAN BUTN W/HOLSTR 10FT

## (undated) DEVICE — PAD UNDERCAST WYTEX 6IN 4YD LF STRL

## (undated) DEVICE — GLV SURG SENSICARE GREEN W/ALOE PF LF 7 STRL

## (undated) DEVICE — GLV SURG TRIUMPH PF LTX 7 STRL

## (undated) DEVICE — SUT PDS CLOSURE CT1 1/0 27IN Z341H

## (undated) DEVICE — PAD HEMOST NEPTUNE 2X2IN

## (undated) DEVICE — KT INTRO MINISTICK MAX W/GW NITNL/TUNG ECHO 4F 21G 7CM

## (undated) DEVICE — SPNG GZ WOVN 4X4IN 12PLY 10/BX STRL

## (undated) DEVICE — PREP SOL POVIDONE/IODINE BT 4OZ

## (undated) DEVICE — STERILE POLYISOPRENE POWDER-FREE SURGICAL GLOVES WITH EMOLLIENT COATING: Brand: PROTEXIS

## (undated) DEVICE — PANT KNIT MATERN L/XL 2BG

## (undated) DEVICE — ELECTRODE,RT,STRESS,FOAM,50PK: Brand: MEDLINE

## (undated) DEVICE — ANGIO-SEAL VIP VASCULAR CLOSURE DEVICE: Brand: ANGIO-SEAL

## (undated) DEVICE — LIGHT HANDLE: Brand: DEVON

## (undated) DEVICE — ELECTRD BLD STD SS 3/32X6.5IN

## (undated) DEVICE — GLV SURG SENSICARE GREEN W/ALOE PF LF 6.5 STRL

## (undated) DEVICE — GLV SURG SENSICARE PI ORTHO SZ7.5 LF STRL

## (undated) DEVICE — GW PERIPH GUIDERIGHT STD/J/TP PTFE/PCOAT SS 0.038IN 5X150CM

## (undated) DEVICE — GLV SURG SENSICARE PI LF PF 7.0

## (undated) DEVICE — SOL IRR NACL 0.9PCT BT 1000ML

## (undated) DEVICE — SUT SILK B SUTUPK 2/0 18IN SA65H

## (undated) DEVICE — PK CATH LAB 60

## (undated) DEVICE — BANDAGE,GAUZE,BULKEE II,4.5"X4.1YD,STRL: Brand: MEDLINE

## (undated) DEVICE — SPNG LAP 18X18IN LF STRL PK/5

## (undated) DEVICE — PAD UNDERCAST WYTEX 4IN 4YD LF STRL

## (undated) DEVICE — CVR FLUOROSCOPE C/ARM W/TP 36X28IN

## (undated) DEVICE — BNDG ELAS ELITE V/CLOSE 4IN 5YD LF STRL

## (undated) DEVICE — SPLNT ORTHOGLASS P/C 34X30IN LF

## (undated) DEVICE — DRAINBAG,ANTI-REFLUX TOWER,L/F,2000ML,LL: Brand: MEDLINE

## (undated) DEVICE — SUT VIC 3/0 TIES 18IN J110T

## (undated) DEVICE — PK POD 60

## (undated) DEVICE — PAD,ABDOMINAL,8"X10",ST,LF: Brand: MEDLINE

## (undated) DEVICE — APPL COTN TP PLSTC 6IN STRL LF PK/2

## (undated) DEVICE — GLV SURG TRIUMPH LT PF LTX 6.5 STRL

## (undated) DEVICE — UNDRPD BREATH 23X36 BG/10

## (undated) DEVICE — GLV SURG SENSICARE PI PF LF 7 GRN STRL

## (undated) DEVICE — PROXIMATE SKIN STAPLERS (35 WIDE) CONTAINS 35 STAINLESS STEEL STAPLES (FIXED HEAD): Brand: PROXIMATE

## (undated) DEVICE — MODEL BT2000 P/N 700287-012KIT CONTENTS: MANIFOLD WITH SALINE AND CONTRAST PORTS, SALINE TUBING WITH SPIKE AND HAND SYRINGE, TRANSDUCER: Brand: BT2000 AUTOMATED MANIFOLD KIT

## (undated) DEVICE — GOWN,AURORA,NOREINF,RAGLAN,XL,STERILE: Brand: MEDLINE

## (undated) DEVICE — ANTIBACTERIAL UNDYED BRAIDED (POLYGLACTIN 910), SYNTHETIC ABSORBABLE SUTURE: Brand: COATED VICRYL

## (undated) DEVICE — DRSNG WND GZ CURAD OIL EMULSION 3X8IN STRL PK/3

## (undated) DEVICE — PATIENT RETURN ELECTRODE, SINGLE-USE, CONTACT QUALITY MONITORING, ADULT, WITH 9FT CORD, FOR PATIENTS WEIGING OVER 33LBS. (15KG): Brand: MEGADYNE

## (undated) DEVICE — PREP PVP-I 7.5P BT 4OZ

## (undated) DEVICE — BIT DRL TRIMIT 3.5MM

## (undated) DEVICE — SUT VICRYL 0 TIES J112T

## (undated) DEVICE — STPLR SKIN VISISTAT WD 35CT

## (undated) DEVICE — IMPLANTABLE DEVICE
Type: IMPLANTABLE DEVICE | Site: ANKLE | Status: NON-FUNCTIONAL
Removed: 2021-10-28

## (undated) DEVICE — NDL HYPO ECLPS SFTY 25G 1 1/2IN

## (undated) DEVICE — BNDG ELAS ELITE V/CLOSE 6IN 5YD LF STRL

## (undated) DEVICE — ADHS LIQ MASTISOL 2/3ML

## (undated) DEVICE — 1010 S-DRAPE TOWEL DRAPE 10/BX: Brand: STERI-DRAPE™

## (undated) DEVICE — BITEBLOCK ENDO W/STRAP 60F A/ LF DISP

## (undated) DEVICE — CATH DIAG EXPO M/ PK 6FR FL4/FR4 PIG 3PK

## (undated) DEVICE — SUT ETHIB 0 CT1 CR8 18IN CX21D

## (undated) DEVICE — BIT DRL 2.5X110MM

## (undated) DEVICE — UNDYED BRAIDED (POLYGLACTIN 910), SYNTHETIC ABSORBABLE SUTURE: Brand: COATED VICRYL

## (undated) DEVICE — SUT VICRYL 3-0 SH-1 PO 18IN J772D

## (undated) DEVICE — SUT NLY 2/0 664G

## (undated) DEVICE — DRN PENRS 1X18IN LTX

## (undated) DEVICE — SINGLE-USE BIOPSY FORCEPS: Brand: RADIAL JAW 4

## (undated) DEVICE — GLV SURG SENSICARE PI ORTHO PF SZ7 LF STRL

## (undated) DEVICE — CATH MALECOT 4WING 22F

## (undated) DEVICE — PLUG,CATHETER,DRAINAGE PROTECTOR,TUBE: Brand: MEDLINE

## (undated) DEVICE — INTENDED FOR TISSUE SEPARATION, AND OTHER PROCEDURES THAT REQUIRE A SHARP SURGICAL BLADE TO PUNCTURE OR CUT.: Brand: BARD-PARKER ® CARBON RIB-BACK BLADES

## (undated) DEVICE — APPL CHLORAPREP W/TINT 26ML ORNG

## (undated) DEVICE — SUT VIC 2/0 TIES 18IN J111T

## (undated) DEVICE — GLV SURG SENSICARE GREEN W/ALOE PF LF 6 STRL

## (undated) DEVICE — HARMONIC ACE +7 SHEARS ADVANCED HEMOSTASIS 5MM DIAMETER 23CM SHAFT LENGTH  FOR USE WITH GRAY HAND PIECE ONLY: Brand: HARMONIC ACE

## (undated) DEVICE — WRAP LEG 31X48X6IN STRL

## (undated) DEVICE — ANGIO-SEAL EVOLUTION VASCULAR CLOSURE DEVICE: Brand: ANGIO-SEAL

## (undated) DEVICE — A2000 MULTI-USE SYRINGE KIT, P/N 701277-003KIT CONTENTS: 100ML CONTRAST RESERVOIR AND TUBING WITH CONTRAST SPIKE AND CLAMP: Brand: A2000 MULTI-USE SYRINGE KIT

## (undated) DEVICE — DISPOSABLE TOURNIQUET CUFF SINGLE BLADDER, DUAL PORT AND QUICK CONNECT CONNECTOR: Brand: COLOR CUFF